# Patient Record
Sex: FEMALE | Race: WHITE | NOT HISPANIC OR LATINO | Employment: OTHER | URBAN - METROPOLITAN AREA
[De-identification: names, ages, dates, MRNs, and addresses within clinical notes are randomized per-mention and may not be internally consistent; named-entity substitution may affect disease eponyms.]

---

## 2017-01-06 ENCOUNTER — OFFICE VISIT (OUTPATIENT)
Dept: RETAIL CLINIC | Facility: CLINIC | Age: 67
End: 2017-01-06

## 2017-01-06 VITALS
OXYGEN SATURATION: 97 % | DIASTOLIC BLOOD PRESSURE: 68 MMHG | HEART RATE: 72 BPM | SYSTOLIC BLOOD PRESSURE: 104 MMHG | RESPIRATION RATE: 24 BRPM | TEMPERATURE: 98.8 F

## 2017-01-06 DIAGNOSIS — N30.00 ACUTE CYSTITIS WITHOUT HEMATURIA: Primary | ICD-10-CM

## 2017-01-06 LAB
BILIRUB BLD-MCNC: ABNORMAL MG/DL
CLARITY, POC: ABNORMAL
COLOR UR: YELLOW
GLUCOSE UR STRIP-MCNC: NEGATIVE MG/DL
KETONES UR QL: ABNORMAL
LEUKOCYTE EST, POC: ABNORMAL
NITRITE UR-MCNC: POSITIVE MG/ML
PH UR: 5 [PH] (ref 5–8)
PROT UR STRIP-MCNC: ABNORMAL MG/DL
RBC # UR STRIP: ABNORMAL /UL
SP GR UR: 1.02 (ref 1–1.03)
UROBILINOGEN UR QL: NORMAL

## 2017-01-06 PROCEDURE — 81002 URINALYSIS NONAUTO W/O SCOPE: CPT | Performed by: NURSE PRACTITIONER

## 2017-01-06 PROCEDURE — 99213 OFFICE O/P EST LOW 20 MIN: CPT | Performed by: NURSE PRACTITIONER

## 2017-01-06 RX ORDER — PHENAZOPYRIDINE HYDROCHLORIDE 100 MG/1
100 TABLET, FILM COATED ORAL 3 TIMES DAILY PRN
Qty: 6 TABLET | Refills: 0 | Status: SHIPPED | OUTPATIENT
Start: 2017-01-06 | End: 2017-01-08

## 2017-01-06 RX ORDER — NITROFURANTOIN 25; 75 MG/1; MG/1
100 CAPSULE ORAL 2 TIMES DAILY
Qty: 14 CAPSULE | Refills: 0 | Status: SHIPPED | OUTPATIENT
Start: 2017-01-06 | End: 2017-01-13

## 2017-01-06 NOTE — MR AVS SNAPSHOT
Sallynitish Rivera   1/6/2017 9:45 AM   Office Visit    Dept Phone:  [phone]   Encounter #:  [id number]    Provider:  PROVIDER RAMON CABRERA   Department:  [facility]                Your Full Care Plan              Today's Medication Changes          These changes are accurate as of: 1/6/17 10:51 AM.  If you have any questions, ask your nurse or doctor.               New Medication(s)Ordered:     nitrofurantoin (macrocrystal-monohydrate) 100 MG capsule   Commonly known as:  MACROBID   Take 1 capsule by mouth 2 (Two) Times a Day for 7 days.       phenazopyridine 100 MG tablet   Commonly known as:  PYRIDIUM   Take 1 tablet by mouth 3 (Three) Times a Day As Needed for bladder spasms for up to 2 days.         Stop taking medication(s)listed here:     doxycycline 100 MG capsule   Commonly known as:  VIBRAMYCIN           predniSONE 10 MG tablet   Commonly known as:  DELTASONE                Where to Get Your Medications      These medications were sent to [id number] - [place], [place] - [address] [place] [address] [phone]  - [phone] [place] [id number]     Phone:  [phone]     nitrofurantoin (macrocrystal-monohydrate) 100 MG capsule    phenazopyridine 100 MG tablet                  Your Updated Medication List          This list is accurate as of: 1/6/17 10:51 AM.  Always use your most recent med list.                albuterol 108 (90 BASE) MCG/ACT inhaler   Commonly known as:  PROVENTIL HFA;VENTOLIN HFA   Inhale 2 puffs Every 4 (Four) Hours As Needed for wheezing.       aspirin 325 MG tablet       atorvastatin 20 MG tablet   Commonly known as:  LIPITOR   TAKE 1 TABLET BY MOUTH ONE TIME A DAY       diltiaZEM 120 MG tablet   Commonly known as:  CARDIZEM   Take 1 tablet by mouth 3 (three) times a day.       metFORMIN 1000 MG tablet   Commonly known as:  GLUCOPHAGE   Take 1 tablet by mouth 2 (two) times a day with meals.       metoprolol  tartrate 25 MG tablet   Commonly known as:  LOPRESSOR   TAKE 1 TABLET BY MOUTH TWO TIMES A DAY       nitrofurantoin (macrocrystal-monohydrate) 100 MG capsule   Commonly known as:  MACROBID   Take 1 capsule by mouth 2 (Two) Times a Day for 7 days.       omeprazole 20 MG capsule   Commonly known as:  priLOSEC   Take 1 capsule by mouth daily.       phenazopyridine 100 MG tablet   Commonly known as:  PYRIDIUM   Take 1 tablet by mouth 3 (Three) Times a Day As Needed for bladder spasms for up to 2 days.       potassium chloride 10 MEQ CR tablet   Commonly known as:  K-DUR   Take 1 tablet by mouth daily.               We Performed the Following     POC Urinalysis Dipstick     Urine Culture, Masood ([facility])       You Were Diagnosed With        Codes Comments    Acute cystitis without hematuria    -  Primary ICD-10-CM: N30.00  ICD-9-CM: 595.0       Medications to be Given to You by a Medical Professional       Instructions    Urinary Tract Infection  Urinary tract infections (UTIs) can develop anywhere along your urinary tract. Your urinary tract is your body's drainage system for removing wastes and extra water. Your urinary tract includes two kidneys, two ureters, a bladder, and a urethra. Your kidneys are a pair of bean-shaped organs. Each kidney is about the size of your fist. They are located below your ribs, one on each side of your spine.  CAUSES  Infections are caused by microbes, which are microscopic organisms, including fungi, viruses, and bacteria. These organisms are so small that they can only be seen through a microscope. Bacteria are the microbes that most commonly cause UTIs.  SYMPTOMS   Symptoms of UTIs may vary by age and gender of the patient and by the location of the infection. Symptoms in young women typically include a frequent and intense urge to urinate and a painful, burning feeling in the bladder or urethra during urination. Older women and men are more likely to be tired, shaky, and weak and  have muscle aches and abdominal pain. A fever may mean the infection is in your kidneys. Other symptoms of a kidney infection include pain in your back or sides below the ribs, nausea, and vomiting.  DIAGNOSIS  To diagnose a UTI, your caregiver will ask you about your symptoms. Your caregiver will also ask you to provide a urine sample. The urine sample will be tested for bacteria and white blood cells. White blood cells are made by your body to help fight infection.  TREATMENT   Typically, UTIs can be treated with medication. Because most UTIs are caused by a bacterial infection, they usually can be treated with the use of antibiotics. The choice of antibiotic and length of treatment depend on your symptoms and the type of bacteria causing your infection.  HOME CARE INSTRUCTIONS  · If you were prescribed antibiotics, take them exactly as your caregiver instructs you. Finish the medication even if you feel better after you have only taken some of the medication.  · Drink enough water and fluids to keep your urine clear or pale yellow.  · Avoid caffeine, tea, and carbonated beverages. They tend to irritate your bladder.  · Empty your bladder often. Avoid holding urine for long periods of time.  · Empty your bladder before and after sexual intercourse.  · After a bowel movement, women should cleanse from front to back. Use each tissue only once.  SEEK MEDICAL CARE IF:   · You have back pain.  · You develop a fever.  · Your symptoms do not begin to resolve within 3 days.  SEEK IMMEDIATE MEDICAL CARE IF:   · You have severe back pain or lower abdominal pain.  · You develop chills.  · You have nausea or vomiting.  · You have continued burning or discomfort with urination.  MAKE SURE YOU:   · Understand these instructions.  · Will watch your condition.  · Will get help right away if you are not doing well or get worse.     This information is not intended to replace advice given to you by your health care provider. Make  sure you discuss any questions you have with your health care provider.     Document Released: 2006 Document Revised: 2016 Document Reviewed: 2013  Elsevier Interactive Patient Education  [employer]       Patient Instructions History      Upcoming Appointments     Visit Type Date Time Department    OFFICE VISIT 2017  9:45 AM [facility]    OFFICE VISIT 2017  8:00 AM MICHAEL IQBAL      [employer] Signup     [facility] [employer] allows you to send messages to your doctor, view your test results, renew your prescriptions, schedule appointments, and more. To sign up, go to [email or web address] and click on the Sign Up Now link in the New User? box. Enter your [employer] Activation Code exactly as it appears below along with the last four digits of your Social Security Number and your Date of Birth () to complete the sign-up process. If you do not sign up before the expiration date, you must request a new code.    [employer] Activation Code: [email or web address]  Expires: 2017 10:51 AM    If you have questions, you can email [email or web address] or call [phone] to talk to our [employer] staff. Remember, [employer] is NOT to be used for urgent needs. For medical emergencies, dial 911.               Other Info from Your Visit           Your Appointments     2017  8:00 AM EST   Office Visit with Christopher Hastings MD   [facility] (--)    [address] [place] [address] [phone]           Arrive 15 minutes prior to appointment.              Allergies     No Known Allergies      Reason for Visit     Urinary Tract Infection pressure, burning since this am      Vital Signs     Blood Pressure Pulse Temperature Respirations Oxygen Saturation Smoking Status    104/68 72 98.8 °F (37.1 °C) 24 97% Former Smoker      Problems and Diagnoses Noted     Bladder infection    -  Primary

## 2017-01-06 NOTE — PROGRESS NOTES
Subjective   Sally Rivera is a 66 y.o. female.     Urinary Tract Infection    This is a new problem. The current episode started today. The problem occurs every urination. The quality of the pain is described as burning and aching. The pain is severe. There has been no fever. She is not sexually active. There is no history of pyelonephritis. Associated symptoms include frequency and urgency. Pertinent negatives include no chills, discharge, flank pain, hematuria, hesitancy, nausea, possible pregnancy, sweats or vomiting. She has tried nothing for the symptoms. Her past medical history is significant for recurrent UTIs. There is no history of a single kidney.        The following portions of the patient's history were reviewed and updated as appropriate: allergies, current medications, past family history, past medical history, past social history, past surgical history and problem list.    Review of Systems   Constitutional: Positive for fatigue. Negative for chills, diaphoresis and fever.   Gastrointestinal: Negative for nausea and vomiting.   Genitourinary: Positive for dysuria, frequency, pelvic pain (pressure) and urgency. Negative for difficulty urinating, flank pain, hematuria, hesitancy, vaginal discharge and vaginal pain.       Objective   Physical Exam   Constitutional: She is oriented to person, place, and time. She appears well-developed and well-nourished. She does not appear ill. No distress.   HENT:   Head: Normocephalic.   Right Ear: Hearing, tympanic membrane, external ear and ear canal normal.   Left Ear: Hearing, tympanic membrane, external ear and ear canal normal.   Nose: Mucosal edema present. No rhinorrhea or sinus tenderness.   Mouth/Throat: Oropharynx is clear and moist and mucous membranes are normal.   Eyes: Conjunctivae are normal.   Sclera white.   Neck: No tracheal deviation present.   Cardiovascular: Normal rate, regular rhythm, S1 normal, S2 normal and normal heart sounds.     Pulmonary/Chest: Effort normal and breath sounds normal. No accessory muscle usage. No respiratory distress.   Abdominal: Soft. Bowel sounds are normal. There is tenderness in the suprapubic area. There is no CVA tenderness.   Lymphadenopathy:     She has no cervical adenopathy.   Neurological: She is alert and oriented to person, place, and time.   Skin: Skin is warm and dry.   Vitals reviewed.      Assessment/Plan   Diagnoses and all orders for this visit:    Acute cystitis without hematuria  -     nitrofurantoin, macrocrystal-monohydrate, (MACROBID) 100 MG capsule; Take 1 capsule by mouth 2 (Two) Times a Day for 7 days.  -     phenazopyridine (PYRIDIUM) 100 MG tablet; Take 1 tablet by mouth 3 (Three) Times a Day As Needed for bladder spasms for up to 2 days.  -     POC Urinalysis Dipstick  -     Urine Culture, Comprehen ([facility])

## 2017-01-06 NOTE — PATIENT INSTRUCTIONS
Urinary Tract Infection  Urinary tract infections (UTIs) can develop anywhere along your urinary tract. Your urinary tract is your body's drainage system for removing wastes and extra water. Your urinary tract includes two kidneys, two ureters, a bladder, and a urethra. Your kidneys are a pair of bean-shaped organs. Each kidney is about the size of your fist. They are located below your ribs, one on each side of your spine.  CAUSES  Infections are caused by microbes, which are microscopic organisms, including fungi, viruses, and bacteria. These organisms are so small that they can only be seen through a microscope. Bacteria are the microbes that most commonly cause UTIs.  SYMPTOMS   Symptoms of UTIs may vary by age and gender of the patient and by the location of the infection. Symptoms in young women typically include a frequent and intense urge to urinate and a painful, burning feeling in the bladder or urethra during urination. Older women and men are more likely to be tired, shaky, and weak and have muscle aches and abdominal pain. A fever may mean the infection is in your kidneys. Other symptoms of a kidney infection include pain in your back or sides below the ribs, nausea, and vomiting.  DIAGNOSIS  To diagnose a UTI, your caregiver will ask you about your symptoms. Your caregiver will also ask you to provide a urine sample. The urine sample will be tested for bacteria and white blood cells. White blood cells are made by your body to help fight infection.  TREATMENT   Typically, UTIs can be treated with medication. Because most UTIs are caused by a bacterial infection, they usually can be treated with the use of antibiotics. The choice of antibiotic and length of treatment depend on your symptoms and the type of bacteria causing your infection.  HOME CARE INSTRUCTIONS  · If you were prescribed antibiotics, take them exactly as your caregiver instructs you. Finish the medication even if you feel better after  you have only taken some of the medication.  · Drink enough water and fluids to keep your urine clear or pale yellow.  · Avoid caffeine, tea, and carbonated beverages. They tend to irritate your bladder.  · Empty your bladder often. Avoid holding urine for long periods of time.  · Empty your bladder before and after sexual intercourse.  · After a bowel movement, women should cleanse from front to back. Use each tissue only once.  SEEK MEDICAL CARE IF:   · You have back pain.  · You develop a fever.  · Your symptoms do not begin to resolve within 3 days.  SEEK IMMEDIATE MEDICAL CARE IF:   · You have severe back pain or lower abdominal pain.  · You develop chills.  · You have nausea or vomiting.  · You have continued burning or discomfort with urination.  MAKE SURE YOU:   · Understand these instructions.  · Will watch your condition.  · Will get help right away if you are not doing well or get worse.     This information is not intended to replace advice given to you by your health care provider. Make sure you discuss any questions you have with your health care provider.     Document Released: 09/27/2006 Document Revised: 09/07/2016 Document Reviewed: 01/25/2013  [employer] Interactive Patient Education ©2016 [employer]

## 2017-01-07 DIAGNOSIS — I25.10 CHRONIC CORONARY ARTERY DISEASE: ICD-10-CM

## 2017-01-07 DIAGNOSIS — E78.5 HYPERLIPIDEMIA: ICD-10-CM

## 2017-01-08 ENCOUNTER — TELEPHONE (OUTPATIENT)
Dept: RETAIL CLINIC | Facility: CLINIC | Age: 67
End: 2017-01-08

## 2017-01-08 NOTE — TELEPHONE ENCOUNTER
Reported urine cx of mixed urogenital ruby to patient. Advised that she can stop abx therapy. Patient reports feeling better. Advised to f/u with PCP or Gyne if symptoms return. Patient verbalizes understanding.

## 2017-01-09 RX ORDER — ATORVASTATIN CALCIUM 20 MG/1
TABLET, FILM COATED ORAL
Qty: 30 TABLET | Refills: 0 | Status: SHIPPED | OUTPATIENT
Start: 2017-01-09 | End: 2017-02-06 | Stop reason: SDUPTHER

## 2017-01-21 DIAGNOSIS — I25.10 CHRONIC CORONARY ARTERY DISEASE: ICD-10-CM

## 2017-02-06 DIAGNOSIS — I25.10 CHRONIC CORONARY ARTERY DISEASE: ICD-10-CM

## 2017-02-06 DIAGNOSIS — E78.5 HYPERLIPIDEMIA: ICD-10-CM

## 2017-02-06 RX ORDER — ATORVASTATIN CALCIUM 20 MG/1
TABLET, FILM COATED ORAL
Qty: 30 TABLET | Refills: 0 | Status: SHIPPED | OUTPATIENT
Start: 2017-02-06 | End: 2017-03-28 | Stop reason: SDUPTHER

## 2017-02-27 DIAGNOSIS — E78.5 HYPERLIPIDEMIA: ICD-10-CM

## 2017-02-28 RX ORDER — ATORVASTATIN CALCIUM 20 MG/1
TABLET, FILM COATED ORAL
Qty: 30 TABLET | Refills: 0 | Status: SHIPPED | OUTPATIENT
Start: 2017-02-28 | End: 2017-04-05 | Stop reason: SDUPTHER

## 2017-03-07 RX ORDER — POTASSIUM CHLORIDE 750 MG/1
TABLET, EXTENDED RELEASE ORAL
Qty: 90 TABLET | Refills: 0 | Status: SHIPPED | OUTPATIENT
Start: 2017-03-07 | End: 2017-03-30

## 2017-03-21 DIAGNOSIS — I25.10 CHRONIC CORONARY ARTERY DISEASE: ICD-10-CM

## 2017-03-30 ENCOUNTER — OFFICE VISIT (OUTPATIENT)
Dept: FAMILY MEDICINE CLINIC | Facility: CLINIC | Age: 67
End: 2017-03-30

## 2017-03-30 VITALS
DIASTOLIC BLOOD PRESSURE: 74 MMHG | SYSTOLIC BLOOD PRESSURE: 124 MMHG | WEIGHT: 214 LBS | HEART RATE: 66 BPM | BODY MASS INDEX: 36.54 KG/M2 | HEIGHT: 64 IN | TEMPERATURE: 98.1 F | OXYGEN SATURATION: 98 %

## 2017-03-30 DIAGNOSIS — E11.9 CONTROLLED TYPE 2 DIABETES MELLITUS WITHOUT COMPLICATION, WITHOUT LONG-TERM CURRENT USE OF INSULIN (HCC): Primary | ICD-10-CM

## 2017-03-30 DIAGNOSIS — E78.5 HYPERLIPIDEMIA, UNSPECIFIED HYPERLIPIDEMIA TYPE: ICD-10-CM

## 2017-03-30 DIAGNOSIS — E66.9 OBESITY (BMI 30-39.9): ICD-10-CM

## 2017-03-30 DIAGNOSIS — Z98.890 HISTORY OF RECTAL POLYPECTOMY: ICD-10-CM

## 2017-03-30 DIAGNOSIS — I25.10 CHRONIC CORONARY ARTERY DISEASE: ICD-10-CM

## 2017-03-30 DIAGNOSIS — K76.0 NONALCOHOLIC FATTY LIVER DISEASE: ICD-10-CM

## 2017-03-30 DIAGNOSIS — Z79.899 HIGH RISK MEDICATION USE: ICD-10-CM

## 2017-03-30 DIAGNOSIS — Z87.19 HISTORY OF RECTAL POLYPECTOMY: ICD-10-CM

## 2017-03-30 DIAGNOSIS — I10 ESSENTIAL HYPERTENSION: ICD-10-CM

## 2017-03-30 DIAGNOSIS — L98.9: ICD-10-CM

## 2017-03-30 DIAGNOSIS — L98.9 BENIGN SKIN LESION OF THORACIC REGION: ICD-10-CM

## 2017-03-30 PROCEDURE — 99214 OFFICE O/P EST MOD 30 MIN: CPT | Performed by: FAMILY MEDICINE

## 2017-03-30 RX ORDER — CLOTRIMAZOLE AND BETAMETHASONE DIPROPIONATE 10; .64 MG/G; MG/G
CREAM TOPICAL 2 TIMES DAILY
Qty: 15 G | Refills: 1 | Status: SHIPPED | OUTPATIENT
Start: 2017-03-30 | End: 2017-10-24

## 2017-03-30 RX ORDER — DILTIAZEM HYDROCHLORIDE 120 MG/1
120 TABLET, FILM COATED ORAL 3 TIMES DAILY
Qty: 270 TABLET | Refills: 3 | Status: SHIPPED | OUTPATIENT
Start: 2017-03-30 | End: 2018-06-25 | Stop reason: SDUPTHER

## 2017-03-30 RX ORDER — CLOTRIMAZOLE AND BETAMETHASONE DIPROPIONATE 10; .64 MG/G; MG/G
CREAM TOPICAL 2 TIMES DAILY
Qty: 15 G | Refills: 1 | Status: SHIPPED | OUTPATIENT
Start: 2017-03-30 | End: 2017-03-30 | Stop reason: SDUPTHER

## 2017-03-30 NOTE — PROGRESS NOTES
HPI  Sally Rivera is a 67 y.o. female who is here for follow up of diabetes and hypertension.  Has a couple of skin lesions she would like to have rechecked.  Reports diarrhea with metformin and is only taking 500 mg twice a day.  Otherwise denies new complaints and seems to be stable.      Review of Systems   Gastrointestinal: Positive for diarrhea.   Skin:        Itchy skin lesion on anterior chest and a lesion on her back which seems to be enlarging.   All other systems reviewed and are negative.        Past Medical History:   Diagnosis Date   • Acid reflux    • Diabetes mellitus type I    • Elevated cholesterol    • Heart attack 1995   • Hypertension    • Liver disease        Past Surgical History:   Procedure Laterality Date   • CHOLECYSTECTOMY  2011   • COLON SURGERY  2014    bowel resection for obstruction   • HYSTERECTOMY  2011       Family History   Problem Relation Age of Onset   • No Known Problems Mother    • Diabetes Father    • Heart disease Father        Social History     Social History   • Marital status:      Spouse name: N/A   • Number of children: N/A   • Years of education: N/A     Occupational History   • Not on file.     Social History Main Topics   • Smoking status: Former Smoker     Quit date: 12/20/1990   • Smokeless tobacco: Not on file   • Alcohol use No   • Drug use: No   • Sexual activity: Not on file     Other Topics Concern   • Not on file     Social History Narrative         Physical Exam   Constitutional: She appears well-developed and well-nourished.   HENT:   Head: Normocephalic.   Left Ear: External ear normal.   Mouth/Throat: Oropharynx is clear and moist.   Eyes: Conjunctivae are normal. Pupils are equal, round, and reactive to light.   Neck: Neck supple. No JVD present. No thyromegaly present.   Cardiovascular: Normal rate and regular rhythm.    Pulmonary/Chest: Effort normal. No respiratory distress.   Abdominal: Soft. She exhibits no distension and no mass. There  is no tenderness.   Musculoskeletal: She exhibits no edema or deformity.   Neurological: She is alert. She has normal reflexes.   Skin: Skin is warm and dry.        Psychiatric: She has a normal mood and affect. Her behavior is normal. Judgment and thought content normal.   Nursing note and vitals reviewed.        Assessment/Plan    Sally was seen today for follow-up, hypertension, hyperlipidemia and diabetes.    Diagnoses and all orders for this visit:    Controlled type 2 diabetes mellitus without complication, without long-term current use of insulin  -     Comprehensive Metabolic Panel  -     Hemoglobin A1c    Hyperlipidemia, unspecified hyperlipidemia type  -     Lipid Panel    Nonalcoholic fatty liver disease    Essential hypertension  -     Comprehensive Metabolic Panel    Chronic coronary artery disease  -     diltiaZEM (CARDIZEM) 120 MG tablet; Take 1 tablet by mouth 3 (Three) Times a Day.    High risk medication use  -     CBC & Differential  -     Comprehensive Metabolic Panel    Benign skin lesion of sternum    Benign skin lesion of thoracic region    History of rectal polypectomy    Obesity (BMI 30-39.9)    Other orders  -     Discontinue: clotrimazole-betamethasone (LOTRISONE) 1-0.05 % cream; Apply  topically 2 (Two) Times a Day.  -     clotrimazole-betamethasone (LOTRISONE) 1-0.05 % cream; Apply  topically 2 (Two) Times a Day.        Patient is here for routine follow-up of diabetes and hypertension.  2 skin lesions noted one of which could be a fungal infection and will be treated as above.  Other lesion appears to be a seborrheic keratosis.  Some weight gain is noted and strongly recommended weight loss diet etc.  Also reviewing old records note a rectal polyp was removed in 2011 and will contact gastroenterologist if will need follow-up colonoscopy prior to 2021.  Await lab work and otherwise continue current therapy including follow-up in 6 months.

## 2017-03-31 LAB
ALBUMIN SERPL-MCNC: 4.6 G/DL (ref 3.5–5.2)
ALBUMIN/GLOB SERPL: 1.4 G/DL
ALP SERPL-CCNC: 94 U/L (ref 39–117)
ALT SERPL-CCNC: 23 U/L (ref 1–33)
AST SERPL-CCNC: 21 U/L (ref 1–32)
BASOPHILS # BLD AUTO: 0.03 10*3/MM3 (ref 0–0.2)
BASOPHILS NFR BLD AUTO: 0.4 % (ref 0–1.5)
BILIRUB SERPL-MCNC: 0.5 MG/DL (ref 0.1–1.2)
BUN SERPL-MCNC: 14 MG/DL (ref 8–23)
BUN/CREAT SERPL: 19.7 (ref 7–25)
CALCIUM SERPL-MCNC: 9.9 MG/DL (ref 8.6–10.5)
CHLORIDE SERPL-SCNC: 100 MMOL/L (ref 98–107)
CHOLEST SERPL-MCNC: 166 MG/DL (ref 0–200)
CO2 SERPL-SCNC: 22.3 MMOL/L (ref 22–29)
CREAT SERPL-MCNC: 0.71 MG/DL (ref 0.57–1)
EOSINOPHIL # BLD AUTO: 0.21 10*3/MM3 (ref 0–0.7)
EOSINOPHIL NFR BLD AUTO: 2.8 % (ref 0.3–6.2)
ERYTHROCYTE [DISTWIDTH] IN BLOOD BY AUTOMATED COUNT: 16.7 % (ref 11.7–13)
GLOBULIN SER CALC-MCNC: 3.4 GM/DL
GLUCOSE SERPL-MCNC: 99 MG/DL (ref 65–99)
HBA1C MFR BLD: 6.4 % (ref 4.8–5.6)
HCT VFR BLD AUTO: 40.5 % (ref 35.6–45.5)
HDLC SERPL-MCNC: 41 MG/DL (ref 40–60)
HGB BLD-MCNC: 12.7 G/DL (ref 11.9–15.5)
IMM GRANULOCYTES # BLD: 0 10*3/MM3 (ref 0–0.03)
IMM GRANULOCYTES NFR BLD: 0 % (ref 0–0.5)
INTERPRETATION: NORMAL
LDLC SERPL CALC-MCNC: 89 MG/DL (ref 0–100)
LYMPHOCYTES # BLD AUTO: 2.68 10*3/MM3 (ref 0.9–4.8)
LYMPHOCYTES NFR BLD AUTO: 35.7 % (ref 19.6–45.3)
Lab: NORMAL
MCH RBC QN AUTO: 24.9 PG (ref 26.9–32)
MCHC RBC AUTO-ENTMCNC: 31.4 G/DL (ref 32.4–36.3)
MCV RBC AUTO: 79.3 FL (ref 80.5–98.2)
MONOCYTES # BLD AUTO: 0.5 10*3/MM3 (ref 0.2–1.2)
MONOCYTES NFR BLD AUTO: 6.7 % (ref 5–12)
NEUTROPHILS # BLD AUTO: 4.08 10*3/MM3 (ref 1.9–8.1)
NEUTROPHILS NFR BLD AUTO: 54.4 % (ref 42.7–76)
PLATELET # BLD AUTO: 257 10*3/MM3 (ref 140–500)
POTASSIUM SERPL-SCNC: 4.3 MMOL/L (ref 3.5–5.2)
PROT SERPL-MCNC: 8 G/DL (ref 6–8.5)
RBC # BLD AUTO: 5.11 10*6/MM3 (ref 3.9–5.2)
SODIUM SERPL-SCNC: 139 MMOL/L (ref 136–145)
TRIGL SERPL-MCNC: 178 MG/DL (ref 0–150)
VLDLC SERPL CALC-MCNC: 35.6 MG/DL (ref 5–40)
WBC # BLD AUTO: 7.5 10*3/MM3 (ref 4.5–10.7)

## 2017-04-01 DIAGNOSIS — I25.10 CHRONIC CORONARY ARTERY DISEASE: ICD-10-CM

## 2017-04-03 DIAGNOSIS — I47.1 ATRIAL PAROXYSMAL TACHYCARDIA (HCC): ICD-10-CM

## 2017-04-03 DIAGNOSIS — I25.10 CHRONIC CORONARY ARTERY DISEASE: Primary | ICD-10-CM

## 2017-04-03 RX ORDER — METOPROLOL TARTRATE 50 MG/1
50 TABLET, FILM COATED ORAL 2 TIMES DAILY
Qty: 180 TABLET | Refills: 3 | Status: SHIPPED | OUTPATIENT
Start: 2017-04-03 | End: 2018-06-02 | Stop reason: SDUPTHER

## 2017-04-05 DIAGNOSIS — E78.5 HYPERLIPIDEMIA: ICD-10-CM

## 2017-04-05 RX ORDER — ATORVASTATIN CALCIUM 20 MG/1
TABLET, FILM COATED ORAL
Qty: 90 TABLET | Refills: 3 | Status: SHIPPED | OUTPATIENT
Start: 2017-04-05 | End: 2018-07-07 | Stop reason: SDUPTHER

## 2017-04-07 DIAGNOSIS — K21.9 GASTROESOPHAGEAL REFLUX DISEASE WITHOUT ESOPHAGITIS: ICD-10-CM

## 2017-04-07 RX ORDER — OMEPRAZOLE 20 MG/1
20 CAPSULE, DELAYED RELEASE ORAL DAILY
Qty: 90 CAPSULE | Refills: 3
Start: 2017-04-07 | End: 2017-04-10 | Stop reason: SDUPTHER

## 2017-04-10 DIAGNOSIS — K21.9 GASTROESOPHAGEAL REFLUX DISEASE WITHOUT ESOPHAGITIS: ICD-10-CM

## 2017-04-10 RX ORDER — OMEPRAZOLE 20 MG/1
20 CAPSULE, DELAYED RELEASE ORAL DAILY
Qty: 90 CAPSULE | Refills: 3 | Status: SHIPPED | OUTPATIENT
Start: 2017-04-10 | End: 2018-05-24 | Stop reason: SDUPTHER

## 2017-06-29 RX ORDER — POTASSIUM CHLORIDE 750 MG/1
TABLET, FILM COATED, EXTENDED RELEASE ORAL
Qty: 90 TABLET | Refills: 0 | Status: SHIPPED | OUTPATIENT
Start: 2017-06-29 | End: 2017-09-26 | Stop reason: SDUPTHER

## 2017-07-03 RX ORDER — POTASSIUM CHLORIDE 750 MG/1
TABLET, EXTENDED RELEASE ORAL
Qty: 90 TABLET | Refills: 0 | Status: SHIPPED | OUTPATIENT
Start: 2017-07-03 | End: 2017-10-24 | Stop reason: SDUPTHER

## 2017-09-26 RX ORDER — POTASSIUM CHLORIDE 750 MG/1
TABLET, FILM COATED, EXTENDED RELEASE ORAL
Qty: 90 TABLET | Refills: 0 | Status: SHIPPED | OUTPATIENT
Start: 2017-09-26 | End: 2017-10-24 | Stop reason: SDUPTHER

## 2017-10-24 ENCOUNTER — OFFICE VISIT (OUTPATIENT)
Dept: FAMILY MEDICINE CLINIC | Facility: CLINIC | Age: 67
End: 2017-10-24

## 2017-10-24 VITALS
HEART RATE: 72 BPM | TEMPERATURE: 97.5 F | HEIGHT: 64 IN | WEIGHT: 220 LBS | BODY MASS INDEX: 37.56 KG/M2 | SYSTOLIC BLOOD PRESSURE: 132 MMHG | OXYGEN SATURATION: 99 % | RESPIRATION RATE: 16 BRPM | DIASTOLIC BLOOD PRESSURE: 86 MMHG

## 2017-10-24 DIAGNOSIS — E78.5 HYPERLIPIDEMIA, UNSPECIFIED HYPERLIPIDEMIA TYPE: ICD-10-CM

## 2017-10-24 DIAGNOSIS — E11.59 CONTROLLED TYPE 2 DIABETES MELLITUS WITH OTHER CIRCULATORY COMPLICATION, WITHOUT LONG-TERM CURRENT USE OF INSULIN (HCC): ICD-10-CM

## 2017-10-24 DIAGNOSIS — I10 ESSENTIAL HYPERTENSION: ICD-10-CM

## 2017-10-24 DIAGNOSIS — Z79.899 HIGH RISK MEDICATION USE: Primary | ICD-10-CM

## 2017-10-24 DIAGNOSIS — I25.10 CHRONIC CORONARY ARTERY DISEASE: ICD-10-CM

## 2017-10-24 DIAGNOSIS — K76.0 NONALCOHOLIC FATTY LIVER DISEASE: ICD-10-CM

## 2017-10-24 DIAGNOSIS — M25.512 ACUTE PAIN OF LEFT SHOULDER: ICD-10-CM

## 2017-10-24 PROCEDURE — 99214 OFFICE O/P EST MOD 30 MIN: CPT | Performed by: FAMILY MEDICINE

## 2017-10-24 RX ORDER — ZOSTER VACCINE LIVE 19400 [PFU]/.65ML
INJECTION, POWDER, LYOPHILIZED, FOR SUSPENSION SUBCUTANEOUS
Refills: 0 | COMMUNITY
Start: 2017-09-25 | End: 2018-06-07

## 2017-10-24 RX ORDER — INFLUENZA A VIRUS A/MICHIGAN/45/2015 X-275 (H1N1) ANTIGEN (FORMALDEHYDE INACTIVATED), INFLUENZA A VIRUS A/SINGAPORE/INFIMH-16-0019/2016 IVR-186 (H3N2) ANTIGEN (FORMALDEHYDE INACTIVATED), AND INFLUENZA B VIRUS B/MARYLAND/15/2016 BX-69A (A B/COLORADO/6/2017-LIKE VIRUS) ANTIGEN (FORMALDEHYDE INACTIVATED) 60; 60; 60 UG/.5ML; UG/.5ML; UG/.5ML
INJECTION, SUSPENSION INTRAMUSCULAR
Refills: 0 | COMMUNITY
Start: 2017-09-25 | End: 2018-06-07

## 2017-10-24 RX ORDER — NAPROXEN 500 MG/1
500 TABLET ORAL 2 TIMES DAILY WITH MEALS
Qty: 30 TABLET | Refills: 0 | Status: SHIPPED | OUTPATIENT
Start: 2017-10-24 | End: 2017-11-02 | Stop reason: SDUPTHER

## 2017-10-24 NOTE — PROGRESS NOTES
HPI  Sally Rivera is a 67 y.o. female who is here for plain of left shoulder pain.  She has been taking care of her 2-year-old granddaughter and has been lifting her a lot because of allergy symptoms etc.  Pain is fairly constant and does seem to be worse with certain positions.  Has used some ibuprofen with slight improvement.  Patient is past due for routine follow-up of her diabetes and known coronary artery disease.      Review of Systems   Musculoskeletal: Positive for arthralgias, myalgias and neck pain.   Hematological: Bruises/bleeds easily.   All other systems reviewed and are negative.        Past Medical History:   Diagnosis Date   • Acid reflux    • Diabetes mellitus type I    • Elevated cholesterol    • Heart attack 1995   • Hypertension    • Liver disease        Past Surgical History:   Procedure Laterality Date   • CHOLECYSTECTOMY  2011   • COLON SURGERY  2014    bowel resection for obstruction   • HYSTERECTOMY  2011       Family History   Problem Relation Age of Onset   • No Known Problems Mother    • Diabetes Father    • Heart disease Father        Social History     Social History   • Marital status:      Spouse name: N/A   • Number of children: N/A   • Years of education: N/A     Occupational History   • Not on file.     Social History Main Topics   • Smoking status: Former Smoker     Quit date: 12/20/1990   • Smokeless tobacco: Not on file   • Alcohol use No   • Drug use: No   • Sexual activity: Not on file     Other Topics Concern   • Not on file     Social History Narrative   • No narrative on file         Physical Exam   Constitutional: She appears well-developed and well-nourished. No distress.   HENT:   Head: Normocephalic.   Mouth/Throat: Oropharynx is clear and moist.   Eyes: EOM are normal. Pupils are equal, round, and reactive to light.   Neck: Normal range of motion. Neck supple. No thyromegaly present.   Cardiovascular: Normal rate, regular rhythm and normal heart sounds.     Pulmonary/Chest: Effort normal. No respiratory distress. She has no wheezes.   Abdominal: Soft. She exhibits no distension. There is no tenderness.   Musculoskeletal: She exhibits tenderness. She exhibits no edema or deformity.        Left shoulder: She exhibits decreased range of motion and pain. She exhibits no tenderness, no bony tenderness, no swelling, no effusion, no crepitus, no deformity and no spasm.        Arms:  Skin: Skin is dry.   Psychiatric: She has a normal mood and affect. Her behavior is normal. Judgment and thought content normal.   Nursing note and vitals reviewed.        Assessment/Plan    Sally was seen today for arm pain.    Diagnoses and all orders for this visit:    High risk medication use  -     CBC & Differential  -     Comprehensive Metabolic Panel    Hyperlipidemia, unspecified hyperlipidemia type  -     Lipid Panel    Essential hypertension    Controlled type 2 diabetes mellitus with other circulatory complication, without long-term current use of insulin  -     Hemoglobin A1c    Chronic coronary artery disease    Nonalcoholic fatty liver disease    Acute pain of left shoulder  -     naproxen (NAPROSYN) 500 MG tablet; Take 1 tablet by mouth 2 (Two) Times a Day With Meals.      Patient presents mostly because of pain in the left shoulder probably related to lifting 2-year-old grandchild.  Recommend trying to rest shoulder including wearing a sling.  Will give short course of Naprosyn, felt to be safest NSAID with known coronary artery disease.  Also is past due for follow-up of diabetes and routine lab work will be obtained.  If shoulder symptoms worsen or persist may need orthopedic consultation as discussed.  Otherwise routine follow-up in one month especially regarding other issues noted above, as well as routine health maintenance    This note includes information entered using a voice recognition dictation system.  Though reviewed, some nonsensible errors may remain.

## 2017-10-25 LAB
ALBUMIN SERPL-MCNC: 4.5 G/DL (ref 3.5–5.2)
ALBUMIN/GLOB SERPL: 1.4 G/DL
ALP SERPL-CCNC: 82 U/L (ref 39–117)
ALT SERPL-CCNC: 31 U/L (ref 1–33)
AST SERPL-CCNC: 31 U/L (ref 1–32)
BASOPHILS # BLD AUTO: 0.04 10*3/MM3 (ref 0–0.2)
BASOPHILS NFR BLD AUTO: 0.4 % (ref 0–1.5)
BILIRUB SERPL-MCNC: 0.3 MG/DL (ref 0.1–1.2)
BUN SERPL-MCNC: 14 MG/DL (ref 8–23)
BUN/CREAT SERPL: 19.4 (ref 7–25)
CALCIUM SERPL-MCNC: 9.7 MG/DL (ref 8.6–10.5)
CHLORIDE SERPL-SCNC: 99 MMOL/L (ref 98–107)
CHOLEST SERPL-MCNC: 169 MG/DL (ref 0–200)
CO2 SERPL-SCNC: 23 MMOL/L (ref 22–29)
CREAT SERPL-MCNC: 0.72 MG/DL (ref 0.57–1)
EOSINOPHIL # BLD AUTO: 0.22 10*3/MM3 (ref 0–0.7)
EOSINOPHIL NFR BLD AUTO: 2.3 % (ref 0.3–6.2)
ERYTHROCYTE [DISTWIDTH] IN BLOOD BY AUTOMATED COUNT: 16.5 % (ref 11.7–13)
GFR SERPLBLD CREATININE-BSD FMLA CKD-EPI: 81 ML/MIN/1.73
GFR SERPLBLD CREATININE-BSD FMLA CKD-EPI: 98 ML/MIN/1.73
GLOBULIN SER CALC-MCNC: 3.3 GM/DL
GLUCOSE SERPL-MCNC: 131 MG/DL (ref 65–99)
HBA1C MFR BLD: 6.8 % (ref 4.8–5.6)
HCT VFR BLD AUTO: 39 % (ref 35.6–45.5)
HDLC SERPL-MCNC: 39 MG/DL (ref 40–60)
HGB BLD-MCNC: 12.5 G/DL (ref 11.9–15.5)
IMM GRANULOCYTES # BLD: 0.02 10*3/MM3 (ref 0–0.03)
IMM GRANULOCYTES NFR BLD: 0.2 % (ref 0–0.5)
INTERPRETATION: NORMAL
LDLC SERPL CALC-MCNC: 89 MG/DL (ref 0–100)
LYMPHOCYTES # BLD AUTO: 3.48 10*3/MM3 (ref 0.9–4.8)
LYMPHOCYTES NFR BLD AUTO: 36.1 % (ref 19.6–45.3)
MCH RBC QN AUTO: 25.4 PG (ref 26.9–32)
MCHC RBC AUTO-ENTMCNC: 32.1 G/DL (ref 32.4–36.3)
MCV RBC AUTO: 79.3 FL (ref 80.5–98.2)
MONOCYTES # BLD AUTO: 0.73 10*3/MM3 (ref 0.2–1.2)
MONOCYTES NFR BLD AUTO: 7.6 % (ref 5–12)
NEUTROPHILS # BLD AUTO: 5.15 10*3/MM3 (ref 1.9–8.1)
NEUTROPHILS NFR BLD AUTO: 53.4 % (ref 42.7–76)
PLATELET # BLD AUTO: 227 10*3/MM3 (ref 140–500)
POTASSIUM SERPL-SCNC: 4.2 MMOL/L (ref 3.5–5.2)
PROT SERPL-MCNC: 7.8 G/DL (ref 6–8.5)
RBC # BLD AUTO: 4.92 10*6/MM3 (ref 3.9–5.2)
SODIUM SERPL-SCNC: 139 MMOL/L (ref 136–145)
TRIGL SERPL-MCNC: 206 MG/DL (ref 0–150)
VLDLC SERPL CALC-MCNC: 41.2 MG/DL (ref 5–40)
WBC # BLD AUTO: 9.64 10*3/MM3 (ref 4.5–10.7)

## 2017-11-02 DIAGNOSIS — M25.512 ACUTE PAIN OF LEFT SHOULDER: ICD-10-CM

## 2017-11-02 RX ORDER — NAPROXEN 500 MG/1
TABLET ORAL
Qty: 30 TABLET | Refills: 0 | Status: SHIPPED | OUTPATIENT
Start: 2017-11-02 | End: 2017-11-16 | Stop reason: SDUPTHER

## 2017-11-16 DIAGNOSIS — M25.512 ACUTE PAIN OF LEFT SHOULDER: ICD-10-CM

## 2017-11-16 RX ORDER — NAPROXEN 500 MG/1
TABLET ORAL
Qty: 30 TABLET | Refills: 0 | Status: SHIPPED | OUTPATIENT
Start: 2017-11-16 | End: 2018-07-05

## 2017-12-07 RX ORDER — POTASSIUM CHLORIDE 750 MG/1
TABLET, FILM COATED, EXTENDED RELEASE ORAL
Qty: 90 TABLET | Refills: 0 | Status: SHIPPED | OUTPATIENT
Start: 2017-12-07 | End: 2018-05-10 | Stop reason: SDUPTHER

## 2018-02-21 RX ORDER — POTASSIUM CHLORIDE 750 MG/1
TABLET, FILM COATED, EXTENDED RELEASE ORAL
Qty: 90 TABLET | Refills: 0 | Status: SHIPPED | OUTPATIENT
Start: 2018-02-21 | End: 2018-07-23 | Stop reason: SDUPTHER

## 2018-04-30 DIAGNOSIS — Z12.39 BREAST CANCER SCREENING: Primary | ICD-10-CM

## 2018-05-10 RX ORDER — POTASSIUM CHLORIDE 750 MG/1
TABLET, FILM COATED, EXTENDED RELEASE ORAL
Qty: 90 TABLET | Refills: 0 | Status: SHIPPED | OUTPATIENT
Start: 2018-05-10 | End: 2018-06-07 | Stop reason: SDUPTHER

## 2018-05-16 ENCOUNTER — APPOINTMENT (OUTPATIENT)
Dept: MAMMOGRAPHY | Facility: HOSPITAL | Age: 68
End: 2018-05-16
Attending: FAMILY MEDICINE

## 2018-05-17 ENCOUNTER — HOSPITAL ENCOUNTER (OUTPATIENT)
Dept: MAMMOGRAPHY | Facility: HOSPITAL | Age: 68
Discharge: HOME | End: 2018-05-17
Attending: FAMILY MEDICINE | Admitting: FAMILY MEDICINE

## 2018-05-17 DIAGNOSIS — Z12.39 BREAST CANCER SCREENING: ICD-10-CM

## 2018-05-17 PROCEDURE — 77067 SCR MAMMO BI INCL CAD: CPT

## 2018-05-24 DIAGNOSIS — K21.9 GASTROESOPHAGEAL REFLUX DISEASE WITHOUT ESOPHAGITIS: ICD-10-CM

## 2018-05-24 RX ORDER — OMEPRAZOLE 20 MG/1
CAPSULE, DELAYED RELEASE ORAL
Qty: 14 CAPSULE | Refills: 0 | Status: SHIPPED | OUTPATIENT
Start: 2018-05-24 | End: 2018-06-01 | Stop reason: SDUPTHER

## 2018-06-01 DIAGNOSIS — K21.9 GASTROESOPHAGEAL REFLUX DISEASE WITHOUT ESOPHAGITIS: ICD-10-CM

## 2018-06-01 RX ORDER — OMEPRAZOLE 20 MG/1
CAPSULE, DELAYED RELEASE ORAL
Qty: 14 CAPSULE | Refills: 0 | OUTPATIENT
Start: 2018-06-01

## 2018-06-01 RX ORDER — OMEPRAZOLE 20 MG/1
CAPSULE, DELAYED RELEASE ORAL
Qty: 30 CAPSULE | Refills: 0 | Status: SHIPPED | OUTPATIENT
Start: 2018-06-01 | End: 2018-07-02 | Stop reason: SDUPTHER

## 2018-06-02 DIAGNOSIS — I25.10 CHRONIC CORONARY ARTERY DISEASE: ICD-10-CM

## 2018-06-02 DIAGNOSIS — I47.1 ATRIAL PAROXYSMAL TACHYCARDIA (HCC): ICD-10-CM

## 2018-06-04 RX ORDER — METOPROLOL TARTRATE 50 MG/1
50 TABLET, FILM COATED ORAL 2 TIMES DAILY
Qty: 60 TABLET | Refills: 0 | Status: SHIPPED | OUTPATIENT
Start: 2018-06-04 | End: 2018-07-02 | Stop reason: SDUPTHER

## 2018-06-07 ENCOUNTER — OFFICE VISIT (OUTPATIENT)
Dept: RETAIL CLINIC | Facility: CLINIC | Age: 68
End: 2018-06-07

## 2018-06-07 VITALS
OXYGEN SATURATION: 95 % | DIASTOLIC BLOOD PRESSURE: 80 MMHG | RESPIRATION RATE: 18 BRPM | TEMPERATURE: 99.7 F | HEART RATE: 74 BPM | SYSTOLIC BLOOD PRESSURE: 110 MMHG

## 2018-06-07 DIAGNOSIS — J40 BRONCHITIS: Primary | ICD-10-CM

## 2018-06-07 DIAGNOSIS — J04.0 LARYNGITIS: ICD-10-CM

## 2018-06-07 PROCEDURE — 99213 OFFICE O/P EST LOW 20 MIN: CPT | Performed by: NURSE PRACTITIONER

## 2018-06-07 RX ORDER — GUAIFENESIN 600 MG/1
1200 TABLET, EXTENDED RELEASE ORAL 2 TIMES DAILY
COMMUNITY
End: 2018-07-05

## 2018-06-07 NOTE — PROGRESS NOTES
Sally Rivera is a 68 y.o. female.     Laryngitis   This is a new problem. The current episode started in the past 7 days. The problem occurs constantly. The problem has been gradually worsening. Associated symptoms include anorexia, congestion, coughing, fatigue and headaches. Pertinent negatives include no abdominal pain, chest pain, chills, fever, myalgias, nausea, sore throat or vomiting. Nothing aggravates the symptoms. She has tried nothing for the symptoms.   Cough   This is a new problem. The current episode started in the past 7 days. The problem has been gradually worsening. The problem occurs every few minutes. The cough is non-productive. Associated symptoms include headaches, nasal congestion and postnasal drip. Pertinent negatives include no chest pain, chills, fever, myalgias, sore throat or wheezing. Nothing aggravates the symptoms. She has tried nothing for the symptoms. There is no history of asthma or COPD.        The following portions of the patient's history were reviewed and updated as appropriate: allergies, current medications, past family history, past medical history, past social history, past surgical history and problem list.    Review of Systems   Constitutional: Positive for fatigue. Negative for chills and fever.   HENT: Positive for congestion and postnasal drip. Negative for sore throat.    Respiratory: Positive for cough. Negative for wheezing.    Cardiovascular: Negative for chest pain.   Gastrointestinal: Positive for anorexia. Negative for abdominal pain, nausea and vomiting.   Musculoskeletal: Negative for myalgias.   Neurological: Positive for headaches.           Physical Exam   Constitutional: She is oriented to person, place, and time. She appears well-developed and well-nourished. No distress.   HENT:   Head: Normocephalic and atraumatic.   Right Ear: Hearing, tympanic membrane, external ear and ear canal normal.   Left Ear: Hearing, tympanic membrane, external  ear and ear canal normal.   Nose: Nose normal.   Mouth/Throat: Uvula is midline and mucous membranes are normal. Posterior oropharyngeal erythema present. No oropharyngeal exudate or tonsillar abscesses.   Eyes: Conjunctivae and EOM are normal. Pupils are equal, round, and reactive to light.   Neck: Normal range of motion. Neck supple.   Cardiovascular: Normal rate, regular rhythm and normal heart sounds.    Pulmonary/Chest: No respiratory distress. She has wheezes (left lung).   Neurological: She is alert and oriented to person, place, and time.   Skin: Skin is warm and dry.   Psychiatric: She has a normal mood and affect. Her behavior is normal.           Diagnoses and all orders for this visit:    Bronchitis    Laryngitis    Other orders  -     guaiFENesin (MUCINEX) 600 MG 12 hr tablet; Take 1,200 mg by mouth 2 (Two) Times a Day.  -     PredniSONE 5 MG tablet therapy pack dosepak; Take as directed on package instructions.      Acute Bronchitis, Adult  Acute bronchitis is sudden (acute) swelling of the air tubes (bronchi) in the lungs. Acute bronchitis causes these tubes to fill with mucus, which can make it hard to breathe. It can also cause coughing or wheezing.  In adults, acute bronchitis usually goes away within 2 weeks. A cough caused by bronchitis may last up to 3 weeks. Smoking, allergies, and asthma can make the condition worse. Repeated episodes of bronchitis may cause further lung problems, such as chronic obstructive pulmonary disease (COPD).  What are the causes?  This condition can be caused by germs and by substances that irritate the lungs, including:  · Cold and flu viruses. This condition is most often caused by the same virus that causes a cold.  · Bacteria.  · Exposure to tobacco smoke, dust, fumes, and air pollution.  What increases the risk?  This condition is more likely to develop in people who:  · Have close contact with someone with acute bronchitis.  · Are exposed to lung irritants, such  as tobacco smoke, dust, fumes, and vapors.  · Have a weak immune system.  · Have a respiratory condition such as asthma.  What are the signs or symptoms?  Symptoms of this condition include:  · A cough.  · Coughing up clear, yellow, or green mucus.  · Wheezing.  · Chest congestion.  · Shortness of breath.  · A fever.  · Body aches.  · Chills.  · A sore throat.  How is this diagnosed?  This condition is usually diagnosed with a physical exam. During the exam, your health care provider may order tests, such as chest X-rays, to rule out other conditions. He or she may also:  · Test a sample of your mucus for bacterial infection.  · Check the level of oxygen in your blood. This is done to check for pneumonia.  · Do a chest X-ray or lung function testing to rule out pneumonia and other conditions.  · Perform blood tests.  Your health care provider will also ask about your symptoms and medical history.  How is this treated?  Most cases of acute bronchitis clear up over time without treatment. Your health care provider may recommend:  · Drinking more fluids. Drinking more makes your mucus thinner, which may make it easier to breathe.  · Taking a medicine for a fever or cough.  · Taking an antibiotic medicine.  · Using an inhaler to help improve shortness of breath and to control a cough.  · Using a cool mist vaporizer or humidifier to make it easier to breathe.  Follow these instructions at home:  Medicines   · Take over-the-counter and prescription medicines only as told by your health care provider.  · If you were prescribed an antibiotic, take it as told by your health care provider. Do not stop taking the antibiotic even if you start to feel better.  General instructions   · Get plenty of rest.  · Drink enough fluids to keep your urine clear or pale yellow.  · Avoid smoking and secondhand smoke. Exposure to cigarette smoke or irritating chemicals will make bronchitis worse. If you smoke and you need help quitting, ask  your health care provider. Quitting smoking will help your lungs heal faster.  · Use an inhaler, cool mist vaporizer, or humidifier as told by your health care provider.  · Keep all follow-up visits as told by your health care provider. This is important.  How is this prevented?  To lower your risk of getting this condition again:  · Wash your hands often with soap and water. If soap and water are not available, use hand .  · Avoid contact with people who have cold symptoms.  · Try not to touch your hands to your mouth, nose, or eyes.  · Make sure to get the flu shot every year.  Contact a health care provider if:  · Your symptoms do not improve in 2 weeks of treatment.  Get help right away if:  · You cough up blood.  · You have chest pain.  · You have severe shortness of breath.  · You become dehydrated.  · You faint or keep feeling like you are going to faint.  · You keep vomiting.  · You have a severe headache.  · Your fever or chills gets worse.  This information is not intended to replace advice given to you by your health care provider. Make sure you discuss any questions you have with your health care provider.  Document Released: 01/25/2006 Document Revised: 07/12/2017 Document Reviewed: 06/07/2017  [employer] Interactive Patient Education © 2017 [employer]  Laryngitis  Laryngitis is swelling (inflammation) of your vocal cords. This causes hoarseness, coughing, loss of voice, sore throat, or a dry throat. When your vocal cords are inflamed, your voice sounds different.  Laryngitis can be temporary (acute) or long-term (chronic). Most cases of acute laryngitis improve with time. Chronic laryngitis is laryngitis that lasts for more than three weeks.  Follow these instructions at home:  · Drink enough fluid to keep your pee (urine) clear or pale yellow.  · Breathe in moist air. Use a humidifier if you live in a dry climate.  · Take medicines only as told by your doctor.  · Do not smoke cigarettes or  electronic cigarettes. If you need help quitting, ask your doctor.  · Talk as little as possible. Also avoid whispering, which can cause vocal strain.  · Write instead of talking. Do this until your voice is back to normal.  Contact a doctor if:  · You have a fever.  · Your pain is worse.  · You have trouble swallowing.  Get help right away if:  · You cough up blood.  · You have trouble breathing.  This information is not intended to replace advice given to you by your health care provider. Make sure you discuss any questions you have with your health care provider.  Document Released: 12/06/2012 Document Revised: 05/25/2017 Document Reviewed: 06/02/2015  [employer] Interactive Patient Education © 2017 [employer]

## 2018-06-07 NOTE — PATIENT INSTRUCTIONS
Acute Bronchitis, Adult  Acute bronchitis is sudden (acute) swelling of the air tubes (bronchi) in the lungs. Acute bronchitis causes these tubes to fill with mucus, which can make it hard to breathe. It can also cause coughing or wheezing.  In adults, acute bronchitis usually goes away within 2 weeks. A cough caused by bronchitis may last up to 3 weeks. Smoking, allergies, and asthma can make the condition worse. Repeated episodes of bronchitis may cause further lung problems, such as chronic obstructive pulmonary disease (COPD).  What are the causes?  This condition can be caused by germs and by substances that irritate the lungs, including:  · Cold and flu viruses. This condition is most often caused by the same virus that causes a cold.  · Bacteria.  · Exposure to tobacco smoke, dust, fumes, and air pollution.  What increases the risk?  This condition is more likely to develop in people who:  · Have close contact with someone with acute bronchitis.  · Are exposed to lung irritants, such as tobacco smoke, dust, fumes, and vapors.  · Have a weak immune system.  · Have a respiratory condition such as asthma.  What are the signs or symptoms?  Symptoms of this condition include:  · A cough.  · Coughing up clear, yellow, or green mucus.  · Wheezing.  · Chest congestion.  · Shortness of breath.  · A fever.  · Body aches.  · Chills.  · A sore throat.  How is this diagnosed?  This condition is usually diagnosed with a physical exam. During the exam, your health care provider may order tests, such as chest X-rays, to rule out other conditions. He or she may also:  · Test a sample of your mucus for bacterial infection.  · Check the level of oxygen in your blood. This is done to check for pneumonia.  · Do a chest X-ray or lung function testing to rule out pneumonia and other conditions.  · Perform blood tests.  Your health care provider will also ask about your symptoms and medical history.  How is this treated?  Most cases  of acute bronchitis clear up over time without treatment. Your health care provider may recommend:  · Drinking more fluids. Drinking more makes your mucus thinner, which may make it easier to breathe.  · Taking a medicine for a fever or cough.  · Taking an antibiotic medicine.  · Using an inhaler to help improve shortness of breath and to control a cough.  · Using a cool mist vaporizer or humidifier to make it easier to breathe.  Follow these instructions at home:  Medicines   · Take over-the-counter and prescription medicines only as told by your health care provider.  · If you were prescribed an antibiotic, take it as told by your health care provider. Do not stop taking the antibiotic even if you start to feel better.  General instructions   · Get plenty of rest.  · Drink enough fluids to keep your urine clear or pale yellow.  · Avoid smoking and secondhand smoke. Exposure to cigarette smoke or irritating chemicals will make bronchitis worse. If you smoke and you need help quitting, ask your health care provider. Quitting smoking will help your lungs heal faster.  · Use an inhaler, cool mist vaporizer, or humidifier as told by your health care provider.  · Keep all follow-up visits as told by your health care provider. This is important.  How is this prevented?  To lower your risk of getting this condition again:  · Wash your hands often with soap and water. If soap and water are not available, use hand .  · Avoid contact with people who have cold symptoms.  · Try not to touch your hands to your mouth, nose, or eyes.  · Make sure to get the flu shot every year.  Contact a health care provider if:  · Your symptoms do not improve in 2 weeks of treatment.  Get help right away if:  · You cough up blood.  · You have chest pain.  · You have severe shortness of breath.  · You become dehydrated.  · You faint or keep feeling like you are going to faint.  · You keep vomiting.  · You have a severe headache.  · Your  fever or chills gets worse.  This information is not intended to replace advice given to you by your health care provider. Make sure you discuss any questions you have with your health care provider.  Document Released: 01/25/2006 Document Revised: 07/12/2017 Document Reviewed: 06/07/2017  [employer] Interactive Patient Education © 2017 [employer]  Laryngitis  Laryngitis is swelling (inflammation) of your vocal cords. This causes hoarseness, coughing, loss of voice, sore throat, or a dry throat. When your vocal cords are inflamed, your voice sounds different.  Laryngitis can be temporary (acute) or long-term (chronic). Most cases of acute laryngitis improve with time. Chronic laryngitis is laryngitis that lasts for more than three weeks.  Follow these instructions at home:  · Drink enough fluid to keep your pee (urine) clear or pale yellow.  · Breathe in moist air. Use a humidifier if you live in a dry climate.  · Take medicines only as told by your doctor.  · Do not smoke cigarettes or electronic cigarettes. If you need help quitting, ask your doctor.  · Talk as little as possible. Also avoid whispering, which can cause vocal strain.  · Write instead of talking. Do this until your voice is back to normal.  Contact a doctor if:  · You have a fever.  · Your pain is worse.  · You have trouble swallowing.  Get help right away if:  · You cough up blood.  · You have trouble breathing.  This information is not intended to replace advice given to you by your health care provider. Make sure you discuss any questions you have with your health care provider.  Document Released: 12/06/2012 Document Revised: 05/25/2017 Document Reviewed: 06/02/2015  [employer] Interactive Patient Education © 2017 [employer]

## 2018-06-15 DIAGNOSIS — I25.10 CHRONIC CORONARY ARTERY DISEASE: ICD-10-CM

## 2018-06-15 RX ORDER — DILTIAZEM HYDROCHLORIDE 120 MG/1
TABLET, FILM COATED ORAL
Qty: 270 TABLET | Refills: 2 | OUTPATIENT
Start: 2018-06-15

## 2018-06-16 DIAGNOSIS — E78.5 HYPERLIPIDEMIA: ICD-10-CM

## 2018-06-18 RX ORDER — ATORVASTATIN CALCIUM 20 MG/1
TABLET, FILM COATED ORAL
Qty: 90 TABLET | Refills: 2 | OUTPATIENT
Start: 2018-06-18

## 2018-06-25 DIAGNOSIS — I25.10 CHRONIC CORONARY ARTERY DISEASE: ICD-10-CM

## 2018-06-25 RX ORDER — DILTIAZEM HYDROCHLORIDE 120 MG/1
TABLET, FILM COATED ORAL
Qty: 270 TABLET | Refills: 3 | Status: SHIPPED | OUTPATIENT
Start: 2018-06-25 | End: 2019-06-11 | Stop reason: SDUPTHER

## 2018-07-02 DIAGNOSIS — I25.10 CHRONIC CORONARY ARTERY DISEASE: ICD-10-CM

## 2018-07-02 DIAGNOSIS — I47.1 ATRIAL PAROXYSMAL TACHYCARDIA (HCC): ICD-10-CM

## 2018-07-02 DIAGNOSIS — K21.9 GASTROESOPHAGEAL REFLUX DISEASE WITHOUT ESOPHAGITIS: ICD-10-CM

## 2018-07-02 RX ORDER — OMEPRAZOLE 20 MG/1
CAPSULE, DELAYED RELEASE ORAL
Qty: 30 CAPSULE | Refills: 5 | Status: SHIPPED | OUTPATIENT
Start: 2018-07-02 | End: 2018-09-24 | Stop reason: SDUPTHER

## 2018-07-02 RX ORDER — METOPROLOL TARTRATE 50 MG/1
TABLET, FILM COATED ORAL
Qty: 60 TABLET | Refills: 5 | Status: SHIPPED | OUTPATIENT
Start: 2018-07-02 | End: 2018-12-11 | Stop reason: SDUPTHER

## 2018-07-05 ENCOUNTER — OFFICE VISIT (OUTPATIENT)
Dept: FAMILY MEDICINE CLINIC | Facility: CLINIC | Age: 68
End: 2018-07-05

## 2018-07-05 VITALS
HEART RATE: 84 BPM | TEMPERATURE: 97.9 F | WEIGHT: 224 LBS | RESPIRATION RATE: 16 BRPM | DIASTOLIC BLOOD PRESSURE: 80 MMHG | BODY MASS INDEX: 38.24 KG/M2 | SYSTOLIC BLOOD PRESSURE: 125 MMHG | OXYGEN SATURATION: 93 % | HEIGHT: 64 IN

## 2018-07-05 DIAGNOSIS — I10 ESSENTIAL HYPERTENSION: ICD-10-CM

## 2018-07-05 DIAGNOSIS — E78.5 HYPERLIPIDEMIA, UNSPECIFIED HYPERLIPIDEMIA TYPE: ICD-10-CM

## 2018-07-05 DIAGNOSIS — Z79.899 HIGH RISK MEDICATION USE: ICD-10-CM

## 2018-07-05 DIAGNOSIS — K76.0 NONALCOHOLIC FATTY LIVER DISEASE: ICD-10-CM

## 2018-07-05 DIAGNOSIS — E11.59 CONTROLLED TYPE 2 DIABETES MELLITUS WITH OTHER CIRCULATORY COMPLICATION, WITHOUT LONG-TERM CURRENT USE OF INSULIN (HCC): ICD-10-CM

## 2018-07-05 DIAGNOSIS — I25.10 CHRONIC CORONARY ARTERY DISEASE: Primary | ICD-10-CM

## 2018-07-05 DIAGNOSIS — K21.9 GASTROESOPHAGEAL REFLUX DISEASE WITHOUT ESOPHAGITIS: ICD-10-CM

## 2018-07-05 PROCEDURE — 99214 OFFICE O/P EST MOD 30 MIN: CPT | Performed by: FAMILY MEDICINE

## 2018-07-05 RX ORDER — HYDROCODONE BITARTRATE AND ACETAMINOPHEN 5; 325 MG/1; MG/1
TABLET ORAL
Refills: 0 | COMMUNITY
Start: 2018-05-24 | End: 2019-05-03

## 2018-07-05 NOTE — PROGRESS NOTES
HPI  Sally Rivera is a 68 y.o. female who is here for follow up of diabetes and hyperlipidemia.  Patient has been unable to tolerate metformin because of cramping and diarrhea.  Admits to dietary indiscretions with weight gain and expects blood sugars will be elevated.  Denies any chest pain or shortness of breath and has not followed up with cardiologist.      Review of Systems   HENT: Positive for mouth sores.    Gastrointestinal: Positive for diarrhea.   All other systems reviewed and are negative.        Past Medical History:   Diagnosis Date   • Acid reflux    • Diabetes mellitus type I (CMS/HCC)    • Elevated cholesterol    • Heart attack 1995   • Hypertension    • Liver disease        Past Surgical History:   Procedure Laterality Date   • CHOLECYSTECTOMY  2011   • COLON SURGERY  2014    bowel resection for obstruction   • HYSTERECTOMY  2011   • MOUTH BIOPSY         Family History   Problem Relation Age of Onset   • No Known Problems Mother    • Diabetes Father    • Heart disease Father        Social History     Social History   • Marital status:      Spouse name: N/A   • Number of children: N/A   • Years of education: N/A     Occupational History   • Not on file.     Social History Main Topics   • Smoking status: Former Smoker     Quit date: 12/20/1990   • Smokeless tobacco: Not on file   • Alcohol use No   • Drug use: No   • Sexual activity: Not on file     Other Topics Concern   • Not on file     Social History Narrative   • No narrative on file         Physical Exam   Constitutional: She is oriented to person, place, and time. She appears well-developed and well-nourished.   HENT:   Head: Normocephalic.   Nose: Nose normal.   Mouth/Throat: Oropharynx is clear and moist.   Eyes: Conjunctivae and EOM are normal. Pupils are equal, round, and reactive to light.   Neck: Normal range of motion.   Cardiovascular: Normal rate, regular rhythm and normal heart sounds.    Pulmonary/Chest: Effort  normal. No respiratory distress.   Abdominal: Soft. She exhibits no distension.   Musculoskeletal: Normal range of motion. She exhibits no edema or deformity.   Lymphadenopathy:     She has no cervical adenopathy.   Neurological: She is alert and oriented to person, place, and time. Coordination normal.   Skin: Skin is warm and dry.   Psychiatric: She has a normal mood and affect. Her behavior is normal. Judgment and thought content normal.   Nursing note and vitals reviewed.        Assessment/Plan    Sally was seen today for diabetes, hypertension, hyperlipidemia and rapid heart rate.    Diagnoses and all orders for this visit:    Chronic coronary artery disease    Hyperlipidemia, unspecified hyperlipidemia type  -     Lipid Panel    Essential hypertension  -     Comprehensive Metabolic Panel    Gastroesophageal reflux disease without esophagitis    Nonalcoholic fatty liver disease  -     Comprehensive Metabolic Panel    Controlled type 2 diabetes mellitus with other circulatory complication, without long-term current use of insulin (CMS/[facility])  -     Hemoglobin A1c  -     Microalbumin / Creatinine Urine Ratio - Urine, Clean Catch    High risk medication use  -     CBC & Differential  -     Comprehensive Metabolic Panel        Patient is here for routine follow-up especially of diabetes and known coronary artery disease.  Has been unable to tolerate metformin and admits to dietary indiscretions.  Unfortunately treatment alternatives somewhat limited but will await lab testing.  In view of elevated liver tests in the past might consider Actos therapy has a possible affordable alternative?  Also should consider follow-up appointment with cardiologist?  Most likely will need follow-up appointment in 3 months because expect diabetes will be no longer under good control.  Discussed avoiding sweets and low carbohydrate diet as well as weight loss and exercise.    This note includes information entered using a voice  recognition dictation system.  Though reviewed, some nonsensible errors may remain.

## 2018-07-06 DIAGNOSIS — E11.9 TYPE 2 DIABETES MELLITUS WITHOUT COMPLICATION, WITHOUT LONG-TERM CURRENT USE OF INSULIN (HCC): Primary | ICD-10-CM

## 2018-07-06 DIAGNOSIS — K76.0 NONALCOHOLIC FATTY LIVER DISEASE: ICD-10-CM

## 2018-07-06 LAB
ALBUMIN SERPL-MCNC: 4.6 G/DL (ref 3.6–4.8)
ALBUMIN/CREAT UR: 37.6 MG/G CREAT (ref 0–30)
ALBUMIN/GLOB SERPL: 1.4 {RATIO} (ref 1.2–2.2)
ALP SERPL-CCNC: 81 IU/L (ref 39–117)
ALT SERPL-CCNC: 37 IU/L (ref 0–32)
AST SERPL-CCNC: 66 IU/L (ref 0–40)
BASOPHILS # BLD AUTO: 0 X10E3/UL (ref 0–0.2)
BASOPHILS NFR BLD AUTO: 0 %
BILIRUB SERPL-MCNC: 0.5 MG/DL (ref 0–1.2)
BUN SERPL-MCNC: 13 MG/DL (ref 8–27)
BUN/CREAT SERPL: 18 (ref 12–28)
CALCIUM SERPL-MCNC: 9.4 MG/DL (ref 8.7–10.3)
CHLORIDE SERPL-SCNC: 101 MMOL/L (ref 96–106)
CHOLEST SERPL-MCNC: 172 MG/DL (ref 100–199)
CO2 SERPL-SCNC: 19 MMOL/L (ref 20–29)
CREAT SERPL-MCNC: 0.74 MG/DL (ref 0.57–1)
CREAT UR-MCNC: 266 MG/DL
EOSINOPHIL # BLD AUTO: 0.2 X10E3/UL (ref 0–0.4)
EOSINOPHIL NFR BLD AUTO: 2 %
ERYTHROCYTE [DISTWIDTH] IN BLOOD BY AUTOMATED COUNT: 16.7 % (ref 12.3–15.4)
GLOBULIN SER CALC-MCNC: 3.4 G/DL (ref 1.5–4.5)
GLUCOSE SERPL-MCNC: 152 MG/DL (ref 65–99)
HBA1C MFR BLD: 8.8 % (ref 4.8–5.6)
HCT VFR BLD AUTO: 39.1 % (ref 34–46.6)
HDLC SERPL-MCNC: 39 MG/DL
HGB BLD-MCNC: 12.7 G/DL (ref 11.1–15.9)
IMM GRANULOCYTES # BLD: 0 X10E3/UL (ref 0–0.1)
IMM GRANULOCYTES NFR BLD: 0 %
LDLC SERPL CALC-MCNC: 102 MG/DL (ref 0–99)
LYMPHOCYTES # BLD AUTO: 3.5 X10E3/UL (ref 0.7–3.1)
LYMPHOCYTES NFR BLD AUTO: 39 %
MCH RBC QN AUTO: 25 PG (ref 26.6–33)
MCHC RBC AUTO-ENTMCNC: 32.5 G/DL (ref 31.5–35.7)
MCV RBC AUTO: 77 FL (ref 79–97)
MICROALBUMIN UR-MCNC: 100 UG/ML
MONOCYTES # BLD AUTO: 0.5 X10E3/UL (ref 0.1–0.9)
MONOCYTES NFR BLD AUTO: 6 %
NEUTROPHILS # BLD AUTO: 4.6 X10E3/UL (ref 1.4–7)
NEUTROPHILS NFR BLD AUTO: 53 %
PLATELET # BLD AUTO: 252 X10E3/UL (ref 150–379)
POTASSIUM SERPL-SCNC: 4.2 MMOL/L (ref 3.5–5.2)
PROT SERPL-MCNC: 8 G/DL (ref 6–8.5)
RBC # BLD AUTO: 5.09 X10E6/UL (ref 3.77–5.28)
SODIUM SERPL-SCNC: 137 MMOL/L (ref 134–144)
TRIGL SERPL-MCNC: 156 MG/DL (ref 0–149)
VLDLC SERPL CALC-MCNC: 31 MG/DL (ref 5–40)
WBC # BLD AUTO: 8.9 X10E3/UL (ref 3.4–10.8)

## 2018-07-06 RX ORDER — PIOGLITAZONEHYDROCHLORIDE 15 MG/1
15 TABLET ORAL DAILY
Qty: 30 TABLET | Refills: 5 | Status: SHIPPED | OUTPATIENT
Start: 2018-07-06 | End: 2018-09-25 | Stop reason: SDUPTHER

## 2018-07-07 DIAGNOSIS — E78.5 HYPERLIPIDEMIA: ICD-10-CM

## 2018-07-09 RX ORDER — ATORVASTATIN CALCIUM 20 MG/1
TABLET, FILM COATED ORAL
Qty: 90 TABLET | Refills: 2 | Status: SHIPPED | OUTPATIENT
Start: 2018-07-09 | End: 2018-12-31 | Stop reason: SDUPTHER

## 2018-07-23 RX ORDER — POTASSIUM CHLORIDE 750 MG/1
TABLET, FILM COATED, EXTENDED RELEASE ORAL
Qty: 90 TABLET | Refills: 0 | Status: SHIPPED | OUTPATIENT
Start: 2018-07-23 | End: 2019-12-22

## 2018-09-24 ENCOUNTER — OFFICE VISIT (OUTPATIENT)
Dept: FAMILY MEDICINE CLINIC | Facility: CLINIC | Age: 68
End: 2018-09-24

## 2018-09-24 VITALS
DIASTOLIC BLOOD PRESSURE: 72 MMHG | RESPIRATION RATE: 16 BRPM | BODY MASS INDEX: 39.44 KG/M2 | TEMPERATURE: 98 F | OXYGEN SATURATION: 95 % | HEIGHT: 64 IN | HEART RATE: 67 BPM | SYSTOLIC BLOOD PRESSURE: 112 MMHG | WEIGHT: 231 LBS

## 2018-09-24 DIAGNOSIS — Z79.899 HIGH RISK MEDICATION USE: ICD-10-CM

## 2018-09-24 DIAGNOSIS — K21.9 GASTROESOPHAGEAL REFLUX DISEASE WITHOUT ESOPHAGITIS: ICD-10-CM

## 2018-09-24 DIAGNOSIS — I10 ESSENTIAL HYPERTENSION: ICD-10-CM

## 2018-09-24 DIAGNOSIS — E11.9 TYPE 2 DIABETES MELLITUS WITHOUT COMPLICATION, WITHOUT LONG-TERM CURRENT USE OF INSULIN (HCC): Primary | ICD-10-CM

## 2018-09-24 DIAGNOSIS — K76.0 NONALCOHOLIC FATTY LIVER DISEASE: ICD-10-CM

## 2018-09-24 DIAGNOSIS — K57.30 DIVERTICULOSIS OF LARGE INTESTINE WITHOUT HEMORRHAGE: ICD-10-CM

## 2018-09-24 DIAGNOSIS — I25.10 CHRONIC CORONARY ARTERY DISEASE: ICD-10-CM

## 2018-09-24 DIAGNOSIS — I47.1 ATRIAL PAROXYSMAL TACHYCARDIA (HCC): ICD-10-CM

## 2018-09-24 LAB
ALBUMIN SERPL-MCNC: 4 G/DL (ref 3.5–5.2)
ALBUMIN/GLOB SERPL: 1.1 G/DL
ALP SERPL-CCNC: 71 U/L (ref 39–117)
ALT SERPL-CCNC: 33 U/L (ref 1–33)
AST SERPL-CCNC: 40 U/L (ref 1–32)
BILIRUB SERPL-MCNC: 0.4 MG/DL (ref 0.1–1.2)
BUN SERPL-MCNC: 11 MG/DL (ref 8–23)
BUN/CREAT SERPL: 15.5 (ref 7–25)
CALCIUM SERPL-MCNC: 9.1 MG/DL (ref 8.6–10.5)
CHLORIDE SERPL-SCNC: 102 MMOL/L (ref 98–107)
CO2 SERPL-SCNC: 21.3 MMOL/L (ref 22–29)
CREAT SERPL-MCNC: 0.71 MG/DL (ref 0.57–1)
GLOBULIN SER CALC-MCNC: 3.5 GM/DL
GLUCOSE SERPL-MCNC: 160 MG/DL (ref 65–99)
HBA1C MFR BLD: 7.8 % (ref 4.8–5.6)
POTASSIUM SERPL-SCNC: 4.5 MMOL/L (ref 3.5–5.2)
PROT SERPL-MCNC: 7.5 G/DL (ref 6–8.5)
SODIUM SERPL-SCNC: 138 MMOL/L (ref 136–145)

## 2018-09-24 PROCEDURE — 99213 OFFICE O/P EST LOW 20 MIN: CPT | Performed by: FAMILY MEDICINE

## 2018-09-24 RX ORDER — OMEPRAZOLE 20 MG/1
20 CAPSULE, DELAYED RELEASE ORAL DAILY
Qty: 90 CAPSULE | Refills: 3 | Status: SHIPPED | OUTPATIENT
Start: 2018-09-24 | End: 2019-11-30 | Stop reason: SDUPTHER

## 2018-09-24 RX ORDER — INFLUENZA VACCINE, ADJUVANTED 15; 15; 15 UG/.5ML; UG/.5ML; UG/.5ML
INJECTION, SUSPENSION INTRAMUSCULAR
Refills: 0 | COMMUNITY
Start: 2018-09-16 | End: 2018-09-24

## 2018-09-24 NOTE — PROGRESS NOTES
LEVI Lunanitish Rivera is a 68 y.o. female who is here for follow up of diabetes.  3 months ago added Actos but discontinued metformin because of GI intolerance.  However blood sugars began to increase significantly in 6 weeks ago patient resumed metformin at 500 mg twice a day.  Seems to be tolerated.  However does report some abdominal pain and cramping she feels is diverticulitis and increased fiber in her diet.  This was discussed specifically recommending a low fiber and even liquid diet if having acute episodes.  Also discussed if symptoms worsen may need to get CT scan and consider antibiotic therapy.  Especially if develops fever and chills.  Currently patient is taking Actos and metformin and will await lab work.  Depending on results consider increasing Actos and continue metformin as tolerated.  Call if abdominal symptoms worsen.      Review of Systems   Constitutional: Negative for chills, fatigue and fever.   Gastrointestinal: Positive for abdominal distention and abdominal pain. Negative for blood in stool and diarrhea.   All other systems reviewed and are negative.        Past Medical History:   Diagnosis Date   • Acid reflux    • Diabetes mellitus type I (CMS/HCC)    • Elevated cholesterol    • Heart attack 1995   • Hypertension    • Liver disease        Past Surgical History:   Procedure Laterality Date   • CHOLECYSTECTOMY  2011   • COLON SURGERY  2014    bowel resection for obstruction   • HYSTERECTOMY  2011   • MOUTH BIOPSY         Family History   Problem Relation Age of Onset   • No Known Problems Mother    • Diabetes Father    • Heart disease Father        Social History     Social History   • Marital status:      Spouse name: N/A   • Number of children: N/A   • Years of education: N/A     Occupational History   • Not on file.     Social History Main Topics   • Smoking status: Former Smoker     Quit date: 12/20/1990   • Smokeless tobacco: Not on file   • Alcohol use No   • Drug use:  No   • Sexual activity: Not on file     Other Topics Concern   • Not on file     Social History Narrative   • No narrative on file         Physical Exam   Constitutional: She is oriented to person, place, and time. She appears well-developed and well-nourished. No distress.   Eyes: Pupils are equal, round, and reactive to light. Conjunctivae and EOM are normal.   Neck: Normal range of motion.   Cardiovascular: Normal rate and regular rhythm.    Pulmonary/Chest: Effort normal. No respiratory distress.   Abdominal: Soft. She exhibits no distension. There is no tenderness. There is no guarding.   Musculoskeletal: She exhibits no edema or deformity.   Neurological: She is alert and oriented to person, place, and time. Coordination normal.   Skin: Skin is warm and dry.   Psychiatric: She has a normal mood and affect. Her behavior is normal. Judgment and thought content normal.   Nursing note and vitals reviewed.        Assessment/Plan    Sally was seen today for hyperlipidemia, hypertension and diabetes.    Diagnoses and all orders for this visit:    Type 2 diabetes mellitus without complication, without long-term current use of insulin (CMS/HCC)  -     metFORMIN (GLUCOPHAGE) 500 MG tablet; Take 1 tablet by mouth 2 (Two) Times a Day With Meals.  -     Hemoglobin A1c    Gastroesophageal reflux disease without esophagitis  -     omeprazole (priLOSEC) 20 MG capsule; Take 1 capsule by mouth Daily.    Chronic coronary artery disease    Atrial paroxysmal tachycardia (CMS/HCC)    Nonalcoholic fatty liver disease  -     Comprehensive Metabolic Panel    High risk medication use  -     Comprehensive Metabolic Panel    Diverticulosis of large intestine without hemorrhage    Essential hypertension      Patient is mostly here for follow-up of diabetes after starting a new medication 3 months ago.  Had discontinued metformin because of abdominal symptoms but blood sugars increased and therefore resumed metformin at 500 mg twice a  day.  This was discussed and hopefully can remain on both Actos and metformin.  Also discussed recommendations for acute flareups of diverticulitis.  Depending on today's lab work consider adjusting current medications?  Otherwise follow-up in 3 months.    This note includes information entered using a voice recognition dictation system.  Though reviewed, some nonsensible errors may remain.

## 2018-09-25 DIAGNOSIS — K76.0 NONALCOHOLIC FATTY LIVER DISEASE: ICD-10-CM

## 2018-09-25 DIAGNOSIS — E11.9 TYPE 2 DIABETES MELLITUS WITHOUT COMPLICATION, WITHOUT LONG-TERM CURRENT USE OF INSULIN (HCC): ICD-10-CM

## 2018-09-25 RX ORDER — PIOGLITAZONEHYDROCHLORIDE 15 MG/1
15 TABLET ORAL DAILY
Qty: 90 TABLET | Refills: 3 | Status: SHIPPED | OUTPATIENT
Start: 2018-09-25 | End: 2019-06-26 | Stop reason: SDUPTHER

## 2018-10-05 RX ORDER — POTASSIUM CHLORIDE 750 MG/1
TABLET, FILM COATED, EXTENDED RELEASE ORAL
Qty: 90 TABLET | Refills: 3 | Status: SHIPPED | OUTPATIENT
Start: 2018-10-05 | End: 2018-12-28 | Stop reason: SDUPTHER

## 2018-12-11 DIAGNOSIS — I25.10 CHRONIC CORONARY ARTERY DISEASE: ICD-10-CM

## 2018-12-11 DIAGNOSIS — I47.1 ATRIAL PAROXYSMAL TACHYCARDIA (HCC): ICD-10-CM

## 2018-12-11 RX ORDER — METOPROLOL TARTRATE 50 MG/1
TABLET, FILM COATED ORAL
Qty: 60 TABLET | Refills: 5 | Status: SHIPPED | OUTPATIENT
Start: 2018-12-11 | End: 2019-05-28 | Stop reason: SDUPTHER

## 2018-12-18 ENCOUNTER — OFFICE VISIT (OUTPATIENT)
Dept: RETAIL CLINIC | Facility: CLINIC | Age: 68
End: 2018-12-18

## 2018-12-18 VITALS
SYSTOLIC BLOOD PRESSURE: 102 MMHG | RESPIRATION RATE: 16 BRPM | TEMPERATURE: 97.7 F | OXYGEN SATURATION: 95 % | DIASTOLIC BLOOD PRESSURE: 64 MMHG | HEART RATE: 60 BPM

## 2018-12-18 DIAGNOSIS — J40 BRONCHITIS: Primary | ICD-10-CM

## 2018-12-18 DIAGNOSIS — H65.03 BILATERAL ACUTE SEROUS OTITIS MEDIA, RECURRENCE NOT SPECIFIED: ICD-10-CM

## 2018-12-18 DIAGNOSIS — J98.8 VIRAL RESPIRATORY ILLNESS: ICD-10-CM

## 2018-12-18 DIAGNOSIS — B97.89 VIRAL RESPIRATORY ILLNESS: ICD-10-CM

## 2018-12-18 PROCEDURE — 99213 OFFICE O/P EST LOW 20 MIN: CPT | Performed by: NURSE PRACTITIONER

## 2018-12-18 RX ORDER — ALBUTEROL SULFATE 90 UG/1
2 AEROSOL, METERED RESPIRATORY (INHALATION) EVERY 4 HOURS PRN
Qty: 1 INHALER | Refills: 0 | Status: SHIPPED | OUTPATIENT
Start: 2018-12-18 | End: 2018-12-28

## 2018-12-18 RX ORDER — AZITHROMYCIN 250 MG/1
TABLET, FILM COATED ORAL
Qty: 6 TABLET | Refills: 0 | Status: SHIPPED | OUTPATIENT
Start: 2018-12-18 | End: 2018-12-28

## 2018-12-18 NOTE — PATIENT INSTRUCTIONS
Acute Bronchitis, Adult  Acute bronchitis is sudden (acute) swelling of the air tubes (bronchi) in the lungs. Acute bronchitis causes these tubes to fill with mucus, which can make it hard to breathe. It can also cause coughing or wheezing.  In adults, acute bronchitis usually goes away within 2 weeks. A cough caused by bronchitis may last up to 3 weeks. Smoking, allergies, and asthma can make the condition worse. Repeated episodes of bronchitis may cause further lung problems, such as chronic obstructive pulmonary disease (COPD).  What are the causes?  This condition can be caused by germs and by substances that irritate the lungs, including:  · Cold and flu viruses. This condition is most often caused by the same virus that causes a cold.  · Bacteria.  · Exposure to tobacco smoke, dust, fumes, and air pollution.    What increases the risk?  This condition is more likely to develop in people who:  · Have close contact with someone with acute bronchitis.  · Are exposed to lung irritants, such as tobacco smoke, dust, fumes, and vapors.  · Have a weak immune system.  · Have a respiratory condition such as asthma.    What are the signs or symptoms?  Symptoms of this condition include:  · A cough.  · Coughing up clear, yellow, or green mucus.  · Wheezing.  · Chest congestion.  · Shortness of breath.  · A fever.  · Body aches.  · Chills.  · A sore throat.    How is this diagnosed?  This condition is usually diagnosed with a physical exam. During the exam, your health care provider may order tests, such as chest X-rays, to rule out other conditions. He or she may also:  · Test a sample of your mucus for bacterial infection.  · Check the level of oxygen in your blood. This is done to check for pneumonia.  · Do a chest X-ray or lung function testing to rule out pneumonia and other conditions.  · Perform blood tests.    Your health care provider will also ask about your symptoms and medical history.  How is this  treated?  Most cases of acute bronchitis clear up over time without treatment. Your health care provider may recommend:  · Drinking more fluids. Drinking more makes your mucus thinner, which may make it easier to breathe.  · Taking a medicine for a fever or cough.  · Taking an antibiotic medicine.  · Using an inhaler to help improve shortness of breath and to control a cough.  · Using a cool mist vaporizer or humidifier to make it easier to breathe.    Follow these instructions at home:  Medicines  · Take over-the-counter and prescription medicines only as told by your health care provider.  · If you were prescribed an antibiotic, take it as told by your health care provider. Do not stop taking the antibiotic even if you start to feel better.  General instructions  · Get plenty of rest.  · Drink enough fluids to keep your urine clear or pale yellow.  · Avoid smoking and secondhand smoke. Exposure to cigarette smoke or irritating chemicals will make bronchitis worse. If you smoke and you need help quitting, ask your health care provider. Quitting smoking will help your lungs heal faster.  · Use an inhaler, cool mist vaporizer, or humidifier as told by your health care provider.  · Keep all follow-up visits as told by your health care provider. This is important.  How is this prevented?  To lower your risk of getting this condition again:  · Wash your hands often with soap and water. If soap and water are not available, use hand .  · Avoid contact with people who have cold symptoms.  · Try not to touch your hands to your mouth, nose, or eyes.  · Make sure to get the flu shot every year.    Contact a health care provider if:  · Your symptoms do not improve in 2 weeks of treatment.  Get help right away if:  · You cough up blood.  · You have chest pain.  · You have severe shortness of breath.  · You become dehydrated.  · You faint or keep feeling like you are going to faint.  · You keep vomiting.  · You have a  severe headache.  · Your fever or chills gets worse.  This information is not intended to replace advice given to you by your health care provider. Make sure you discuss any questions you have with your health care provider.  Document Released: 01/25/2006 Document Revised: 07/12/2017 Document Reviewed: 06/07/2017  [employer] Interactive Patient Education © 2018 [employer]  Viral Respiratory Infection  A respiratory infection is an illness that affects part of the respiratory system, such as the lungs, nose, or throat. Most respiratory infections are caused by either viruses or bacteria. A respiratory infection that is caused by a virus is called a viral respiratory infection.  Common types of viral respiratory infections include:  · A cold.  · The flu (influenza).  · A respiratory syncytial virus (RSV) infection.    How do I know if I have a viral respiratory infection?  Most viral respiratory infections cause:  · A stuffy or runny nose.  · Yellow or green nasal discharge.  · A cough.  · Sneezing.  · Fatigue.  · Achy muscles.  · A sore throat.  · Sweating or chills.  · A fever.  · A headache.    How are viral respiratory infections treated?  If influenza is diagnosed early, it may be treated with an antiviral medicine that shortens the length of time a person has symptoms. Symptoms of viral respiratory infections may be treated with over-the-counter and prescription medicines, such as:  · Expectorants. These make it easier to cough up mucus.  · Decongestant nasal sprays.    Health care providers do not prescribe antibiotic medicines for viral infections. This is because antibiotics are designed to kill bacteria. They have no effect on viruses.  How do I know if I should stay home from work or school?  To avoid exposing others to your respiratory infection, stay home if you have:  · A fever.  · A persistent cough.  · A sore throat.  · A runny nose.  · Sneezing.  · Muscles  aches.  · Headaches.  · Fatigue.  · Weakness.  · Chills.  · Sweating.  · Nausea.    Follow these instructions at home:  · Rest as much as possible.  · Take over-the-counter and prescription medicines only as told by your health care provider.  · Drink enough fluid to keep your urine clear or pale yellow. This helps prevent dehydration and helps loosen up mucus.  · Gargle with a salt-water mixture 3-4 times per day or as needed. To make a salt-water mixture, completely dissolve ½-1 tsp of salt in 1 cup of warm water.  · Use nose drops made from salt water to ease congestion and soften raw skin around your nose.  · Do not drink alcohol.  · Do not use tobacco products, including cigarettes, chewing tobacco, and e-cigarettes. If you need help quitting, ask your health care provider.  Contact a health care provider if:  · Your symptoms last for 10 days or longer.  · Your symptoms get worse over time.  · You have a fever.  · You have severe sinus pain in your face or forehead.  · The glands in your jaw or neck become very swollen.  Get help right away if:  · You feel pain or pressure in your chest.  · You have shortness of breath.  · You faint or feel like you will faint.  · You have severe and persistent vomiting.  · You feel confused or disoriented.  This information is not intended to replace advice given to you by your health care provider. Make sure you discuss any questions you have with your health care provider.  Document Released: 09/27/2006 Document Revised: 05/25/2017 Document Reviewed: 05/25/2016  [employer] Interactive Patient Education © 2018 [employer]  Otitis Media With Effusion  Otitis media with effusion is the presence of fluid in the middle ear. This is a common problem in children, which often follows ear infections. It may be present for weeks or longer after the infection. Unlike an acute ear infection, otitis media with effusion refers only to fluid behind the ear drum and not infection. Children  "with repeated ear and sinus infections and allergy problems are the most likely to get otitis media with effusion.  CAUSES   The most frequent cause of the fluid buildup is dysfunction of the eustachian tubes. These are the tubes that drain fluid in the ears to the back of the nose (nasopharynx).  SYMPTOMS   · The main symptom of this condition is hearing loss. As a result, you or your child may:  ¨ Listen to the TV at a loud volume.  ¨ Not respond to questions.  ¨ Ask \"what\" often when spoken to.  ¨ Mistake or confuse one sound or word for another.  · There may be a sensation of fullness or pressure but usually not pain.  DIAGNOSIS   · Your health care provider will diagnose this condition by examining you or your child's ears.  · Your health care provider may test the pressure in you or your child's ear with a tympanometer.  · A hearing test may be conducted if the problem persists.  TREATMENT   · Treatment depends on the duration and the effects of the effusion.  · Antibiotics, decongestants, nose drops, and cortisone-type drugs (tablets or nasal spray) may not be helpful.  · Children with persistent ear effusions may have delayed language or behavioral problems. Children at risk for developmental delays in hearing, learning, and speech may require referral to a specialist earlier than children not at risk.  · You or your child's health care provider may suggest a referral to an ear, nose, and throat surgeon for treatment. The following may help restore normal hearing:  ¨ Drainage of fluid.  ¨ Placement of ear tubes (tympanostomy tubes).  ¨ Removal of adenoids (adenoidectomy).  HOME CARE INSTRUCTIONS   · Avoid secondhand smoke.  · Infants who are  are less likely to have this condition.  · Avoid feeding infants while they are lying flat.  · Avoid known environmental allergens.  · Avoid people who are sick.  SEEK MEDICAL CARE IF:   · Hearing is not better in 3 months.  · Hearing is worse.  · Ear " pain.  · Drainage from the ear.  · Dizziness.  MAKE SURE YOU:   · Understand these instructions.  · Will watch your condition.  · Will get help right away if you are not doing well or get worse.  This information is not intended to replace advice given to you by your health care provider. Make sure you discuss any questions you have with your health care provider.  Document Released: 01/25/2006 Document Revised: 01/08/2016 Document Reviewed: 07/15/2014  [employer] Interactive Patient Education © 2017 [employer]

## 2018-12-18 NOTE — PROGRESS NOTES
Subjective   Patient ID: Sally Rivera is a 68 y.o. female presents with   Chief Complaint   Patient presents with   • URI     Pt states her symptoms started a week ago. She states she has a cough and short of breath. She states she has started sneezing.        URI    This is a new problem. The current episode started 1 to 4 weeks ago. The problem has been waxing and waning. Maximum temperature: low grade. Associated symptoms include congestion, coughing (greyish), diarrhea (baseline from metformin), headaches (from cough), sneezing and wheezing (mild). Pertinent negatives include no ear pain, nausea, rhinorrhea, sore throat or vomiting. Treatments tried: mucinex. The treatment provided mild relief.       No Known Allergies    The following portions of the patient's history were reviewed and updated as appropriate: allergies, current medications, past family history, past medical history, past social history, past surgical history and problem list.      Review of Systems   Constitutional: Positive for chills, diaphoresis, fatigue and fever.   HENT: Positive for congestion, postnasal drip (mild) and sneezing. Negative for ear pain, rhinorrhea, sinus pressure and sore throat.    Respiratory: Positive for cough (greyish), chest tightness (mild), shortness of breath (mild) and wheezing (mild).    Cardiovascular: Negative.    Gastrointestinal: Positive for diarrhea (baseline from metformin). Negative for nausea and vomiting.   Neurological: Positive for headaches (from cough).       Objective     Vitals:    12/18/18 0926   BP: 102/64   Pulse: 60   Resp: 16   Temp: 97.7 °F (36.5 °C)   SpO2: 95%         Physical Exam   Constitutional: She is oriented to person, place, and time. She appears well-developed and well-nourished. She does not appear ill. No distress.   HENT:   Head: Normocephalic.   Right Ear: Hearing, external ear and ear canal normal. A middle ear effusion is present.   Left Ear: Hearing, external ear  and ear canal normal. A middle ear effusion is present.   Nose: Mucosal edema present. No rhinorrhea or sinus tenderness.   Mouth/Throat: Oropharynx is clear and moist and mucous membranes are normal. No tonsillar exudate.   Eyes: Conjunctivae are normal.   Sclera white.   Neck: No tracheal deviation present.   Cardiovascular: Normal rate, regular rhythm, S1 normal, S2 normal and normal heart sounds.   Pulmonary/Chest: Effort normal. No accessory muscle usage or stridor. No respiratory distress. She has no decreased breath sounds. She has wheezes in the right upper field and the left upper field. She has no rhonchi. She has no rales.   Abdominal: Soft. Bowel sounds are normal. There is no tenderness.   Lymphadenopathy:     She has no cervical adenopathy.   Neurological: She is alert and oriented to person, place, and time.   Skin: Skin is warm and dry.   Vitals reviewed.        Sally was seen today for uri.    Diagnoses and all orders for this visit:    Bronchitis  -     azithromycin (ZITHROMAX Z-REUBEN) 250 MG tablet; Take 2 tablets the first day, then 1 tablet daily for 4 days.  -     albuterol sulfate  (90 Base) MCG/ACT inhaler; Inhale 2 puffs Every 4 (Four) Hours As Needed for Wheezing or Shortness of Air for up to 30 days.  -     PredniSONE 5 MG tablet therapy pack dosepak; Take as directed on package    Bilateral acute serous otitis media, recurrence not specified  -     PredniSONE 5 MG tablet therapy pack dosepak; Take as directed on package    Viral respiratory illness        Patient understands possible side effects of all medications ordered. Follow-up with Primary Care Physician in 48-72 hours if these symptoms worsen or fail to improve as anticipated. Patient verbalizes understanding.      Acute Bronchitis, Adult  Acute bronchitis is sudden (acute) swelling of the air tubes (bronchi) in the lungs. Acute bronchitis causes these tubes to fill with mucus, which can make it hard to breathe. It can  also cause coughing or wheezing.  In adults, acute bronchitis usually goes away within 2 weeks. A cough caused by bronchitis may last up to 3 weeks. Smoking, allergies, and asthma can make the condition worse. Repeated episodes of bronchitis may cause further lung problems, such as chronic obstructive pulmonary disease (COPD).  What are the causes?  This condition can be caused by germs and by substances that irritate the lungs, including:  · Cold and flu viruses. This condition is most often caused by the same virus that causes a cold.  · Bacteria.  · Exposure to tobacco smoke, dust, fumes, and air pollution.    What increases the risk?  This condition is more likely to develop in people who:  · Have close contact with someone with acute bronchitis.  · Are exposed to lung irritants, such as tobacco smoke, dust, fumes, and vapors.  · Have a weak immune system.  · Have a respiratory condition such as asthma.    What are the signs or symptoms?  Symptoms of this condition include:  · A cough.  · Coughing up clear, yellow, or green mucus.  · Wheezing.  · Chest congestion.  · Shortness of breath.  · A fever.  · Body aches.  · Chills.  · A sore throat.    How is this diagnosed?  This condition is usually diagnosed with a physical exam. During the exam, your health care provider may order tests, such as chest X-rays, to rule out other conditions. He or she may also:  · Test a sample of your mucus for bacterial infection.  · Check the level of oxygen in your blood. This is done to check for pneumonia.  · Do a chest X-ray or lung function testing to rule out pneumonia and other conditions.  · Perform blood tests.    Your health care provider will also ask about your symptoms and medical history.  How is this treated?  Most cases of acute bronchitis clear up over time without treatment. Your health care provider may recommend:  · Drinking more fluids. Drinking more makes your mucus thinner, which may make it easier to  breathe.  · Taking a medicine for a fever or cough.  · Taking an antibiotic medicine.  · Using an inhaler to help improve shortness of breath and to control a cough.  · Using a cool mist vaporizer or humidifier to make it easier to breathe.    Follow these instructions at home:  Medicines  · Take over-the-counter and prescription medicines only as told by your health care provider.  · If you were prescribed an antibiotic, take it as told by your health care provider. Do not stop taking the antibiotic even if you start to feel better.  General instructions  · Get plenty of rest.  · Drink enough fluids to keep your urine clear or pale yellow.  · Avoid smoking and secondhand smoke. Exposure to cigarette smoke or irritating chemicals will make bronchitis worse. If you smoke and you need help quitting, ask your health care provider. Quitting smoking will help your lungs heal faster.  · Use an inhaler, cool mist vaporizer, or humidifier as told by your health care provider.  · Keep all follow-up visits as told by your health care provider. This is important.  How is this prevented?  To lower your risk of getting this condition again:  · Wash your hands often with soap and water. If soap and water are not available, use hand .  · Avoid contact with people who have cold symptoms.  · Try not to touch your hands to your mouth, nose, or eyes.  · Make sure to get the flu shot every year.    Contact a health care provider if:  · Your symptoms do not improve in 2 weeks of treatment.  Get help right away if:  · You cough up blood.  · You have chest pain.  · You have severe shortness of breath.  · You become dehydrated.  · You faint or keep feeling like you are going to faint.  · You keep vomiting.  · You have a severe headache.  · Your fever or chills gets worse.  This information is not intended to replace advice given to you by your health care provider. Make sure you discuss any questions you have with your health care  provider.  Document Released: 01/25/2006 Document Revised: 07/12/2017 Document Reviewed: 06/07/2017  [employer] Interactive Patient Education © 2018 [employer]  Viral Respiratory Infection  A respiratory infection is an illness that affects part of the respiratory system, such as the lungs, nose, or throat. Most respiratory infections are caused by either viruses or bacteria. A respiratory infection that is caused by a virus is called a viral respiratory infection.  Common types of viral respiratory infections include:  · A cold.  · The flu (influenza).  · A respiratory syncytial virus (RSV) infection.    How do I know if I have a viral respiratory infection?  Most viral respiratory infections cause:  · A stuffy or runny nose.  · Yellow or green nasal discharge.  · A cough.  · Sneezing.  · Fatigue.  · Achy muscles.  · A sore throat.  · Sweating or chills.  · A fever.  · A headache.    How are viral respiratory infections treated?  If influenza is diagnosed early, it may be treated with an antiviral medicine that shortens the length of time a person has symptoms. Symptoms of viral respiratory infections may be treated with over-the-counter and prescription medicines, such as:  · Expectorants. These make it easier to cough up mucus.  · Decongestant nasal sprays.    Health care providers do not prescribe antibiotic medicines for viral infections. This is because antibiotics are designed to kill bacteria. They have no effect on viruses.  How do I know if I should stay home from work or school?  To avoid exposing others to your respiratory infection, stay home if you have:  · A fever.  · A persistent cough.  · A sore throat.  · A runny nose.  · Sneezing.  · Muscles aches.  · Headaches.  · Fatigue.  · Weakness.  · Chills.  · Sweating.  · Nausea.    Follow these instructions at home:  · Rest as much as possible.  · Take over-the-counter and prescription medicines only as told by your health care provider.  · Drink enough  fluid to keep your urine clear or pale yellow. This helps prevent dehydration and helps loosen up mucus.  · Gargle with a salt-water mixture 3-4 times per day or as needed. To make a salt-water mixture, completely dissolve ½-1 tsp of salt in 1 cup of warm water.  · Use nose drops made from salt water to ease congestion and soften raw skin around your nose.  · Do not drink alcohol.  · Do not use tobacco products, including cigarettes, chewing tobacco, and e-cigarettes. If you need help quitting, ask your health care provider.  Contact a health care provider if:  · Your symptoms last for 10 days or longer.  · Your symptoms get worse over time.  · You have a fever.  · You have severe sinus pain in your face or forehead.  · The glands in your jaw or neck become very swollen.  Get help right away if:  · You feel pain or pressure in your chest.  · You have shortness of breath.  · You faint or feel like you will faint.  · You have severe and persistent vomiting.  · You feel confused or disoriented.  This information is not intended to replace advice given to you by your health care provider. Make sure you discuss any questions you have with your health care provider.  Document Released: 09/27/2006 Document Revised: 05/25/2017 Document Reviewed: 05/25/2016  [employer] Interactive Patient Education © 2018 [employer]  Otitis Media With Effusion  Otitis media with effusion is the presence of fluid in the middle ear. This is a common problem in children, which often follows ear infections. It may be present for weeks or longer after the infection. Unlike an acute ear infection, otitis media with effusion refers only to fluid behind the ear drum and not infection. Children with repeated ear and sinus infections and allergy problems are the most likely to get otitis media with effusion.  CAUSES   The most frequent cause of the fluid buildup is dysfunction of the eustachian tubes. These are the tubes that drain fluid in the ears  "to the back of the nose (nasopharynx).  SYMPTOMS   · The main symptom of this condition is hearing loss. As a result, you or your child may:  ¨ Listen to the TV at a loud volume.  ¨ Not respond to questions.  ¨ Ask \"what\" often when spoken to.  ¨ Mistake or confuse one sound or word for another.  · There may be a sensation of fullness or pressure but usually not pain.  DIAGNOSIS   · Your health care provider will diagnose this condition by examining you or your child's ears.  · Your health care provider may test the pressure in you or your child's ear with a tympanometer.  · A hearing test may be conducted if the problem persists.  TREATMENT   · Treatment depends on the duration and the effects of the effusion.  · Antibiotics, decongestants, nose drops, and cortisone-type drugs (tablets or nasal spray) may not be helpful.  · Children with persistent ear effusions may have delayed language or behavioral problems. Children at risk for developmental delays in hearing, learning, and speech may require referral to a specialist earlier than children not at risk.  · You or your child's health care provider may suggest a referral to an ear, nose, and throat surgeon for treatment. The following may help restore normal hearing:  ¨ Drainage of fluid.  ¨ Placement of ear tubes (tympanostomy tubes).  ¨ Removal of adenoids (adenoidectomy).  HOME CARE INSTRUCTIONS   · Avoid secondhand smoke.  · Infants who are  are less likely to have this condition.  · Avoid feeding infants while they are lying flat.  · Avoid known environmental allergens.  · Avoid people who are sick.  SEEK MEDICAL CARE IF:   · Hearing is not better in 3 months.  · Hearing is worse.  · Ear pain.  · Drainage from the ear.  · Dizziness.  MAKE SURE YOU:   · Understand these instructions.  · Will watch your condition.  · Will get help right away if you are not doing well or get worse.  This information is not intended to replace advice given to you by your " health care provider. Make sure you discuss any questions you have with your health care provider.  Document Released: 01/25/2006 Document Revised: 01/08/2016 Document Reviewed: 07/15/2014  [employer] Interactive Patient Education © 2017 [employer]

## 2018-12-28 ENCOUNTER — OFFICE VISIT (OUTPATIENT)
Dept: FAMILY MEDICINE CLINIC | Facility: CLINIC | Age: 68
End: 2018-12-28

## 2018-12-28 VITALS
HEART RATE: 73 BPM | TEMPERATURE: 98.2 F | DIASTOLIC BLOOD PRESSURE: 80 MMHG | WEIGHT: 238.6 LBS | SYSTOLIC BLOOD PRESSURE: 140 MMHG | HEIGHT: 64 IN | RESPIRATION RATE: 16 BRPM | OXYGEN SATURATION: 98 % | BODY MASS INDEX: 40.74 KG/M2

## 2018-12-28 DIAGNOSIS — I25.10 CHRONIC CORONARY ARTERY DISEASE: ICD-10-CM

## 2018-12-28 DIAGNOSIS — Z79.899 HIGH RISK MEDICATION USE: ICD-10-CM

## 2018-12-28 DIAGNOSIS — Z23 IMMUNIZATION DUE: ICD-10-CM

## 2018-12-28 DIAGNOSIS — E78.5 HYPERLIPIDEMIA, UNSPECIFIED HYPERLIPIDEMIA TYPE: Primary | ICD-10-CM

## 2018-12-28 DIAGNOSIS — I10 ESSENTIAL HYPERTENSION: ICD-10-CM

## 2018-12-28 DIAGNOSIS — E11.9 TYPE 2 DIABETES MELLITUS WITHOUT COMPLICATION, WITHOUT LONG-TERM CURRENT USE OF INSULIN (HCC): ICD-10-CM

## 2018-12-28 DIAGNOSIS — K76.0 NONALCOHOLIC FATTY LIVER DISEASE: ICD-10-CM

## 2018-12-28 LAB
ALBUMIN SERPL-MCNC: 4 G/DL (ref 3.5–5.2)
ALBUMIN/GLOB SERPL: 1.1 G/DL
ALP SERPL-CCNC: 84 U/L (ref 39–117)
ALT SERPL-CCNC: 31 U/L (ref 1–33)
AST SERPL-CCNC: 34 U/L (ref 1–32)
BASOPHILS # BLD AUTO: 0.03 10*3/MM3 (ref 0–0.2)
BASOPHILS NFR BLD AUTO: 0.3 % (ref 0–1.5)
BILIRUB SERPL-MCNC: 0.3 MG/DL (ref 0.1–1.2)
BUN SERPL-MCNC: 15 MG/DL (ref 8–23)
BUN/CREAT SERPL: 19.2 (ref 7–25)
CALCIUM SERPL-MCNC: 9.2 MG/DL (ref 8.6–10.5)
CHLORIDE SERPL-SCNC: 103 MMOL/L (ref 98–107)
CHOLEST SERPL-MCNC: 175 MG/DL (ref 0–200)
CO2 SERPL-SCNC: 22.9 MMOL/L (ref 22–29)
CREAT SERPL-MCNC: 0.78 MG/DL (ref 0.57–1)
EOSINOPHIL # BLD AUTO: 0.26 10*3/MM3 (ref 0–0.7)
EOSINOPHIL NFR BLD AUTO: 2.8 % (ref 0.3–6.2)
ERYTHROCYTE [DISTWIDTH] IN BLOOD BY AUTOMATED COUNT: 15.7 % (ref 11.7–13)
GLOBULIN SER CALC-MCNC: 3.6 GM/DL
GLUCOSE SERPL-MCNC: 103 MG/DL (ref 65–99)
HBA1C MFR BLD: 7.4 % (ref 4.8–5.6)
HCT VFR BLD AUTO: 41.2 % (ref 35.6–45.5)
HDLC SERPL-MCNC: 48 MG/DL (ref 40–60)
HGB BLD-MCNC: 12.7 G/DL (ref 11.9–15.5)
IMM GRANULOCYTES # BLD: 0.02 10*3/MM3 (ref 0–0.03)
IMM GRANULOCYTES NFR BLD: 0.2 % (ref 0–0.5)
LDLC SERPL CALC-MCNC: 102 MG/DL (ref 0–100)
LYMPHOCYTES # BLD AUTO: 3.65 10*3/MM3 (ref 0.9–4.8)
LYMPHOCYTES NFR BLD AUTO: 39.2 % (ref 19.6–45.3)
MCH RBC QN AUTO: 25.8 PG (ref 26.9–32)
MCHC RBC AUTO-ENTMCNC: 30.8 G/DL (ref 32.4–36.3)
MCV RBC AUTO: 83.7 FL (ref 80.5–98.2)
MONOCYTES # BLD AUTO: 0.7 10*3/MM3 (ref 0.2–1.2)
MONOCYTES NFR BLD AUTO: 7.5 % (ref 5–12)
NEUTROPHILS # BLD AUTO: 4.65 10*3/MM3 (ref 1.9–8.1)
NEUTROPHILS NFR BLD AUTO: 50 % (ref 42.7–76)
PLATELET # BLD AUTO: 238 10*3/MM3 (ref 140–500)
POTASSIUM SERPL-SCNC: 4.8 MMOL/L (ref 3.5–5.2)
PROT SERPL-MCNC: 7.6 G/DL (ref 6–8.5)
RBC # BLD AUTO: 4.92 10*6/MM3 (ref 3.9–5.2)
SODIUM SERPL-SCNC: 139 MMOL/L (ref 136–145)
TRIGL SERPL-MCNC: 127 MG/DL (ref 0–150)
VLDLC SERPL CALC-MCNC: 25.4 MG/DL (ref 5–40)
WBC # BLD AUTO: 9.31 10*3/MM3 (ref 4.5–10.7)

## 2018-12-28 PROCEDURE — 99214 OFFICE O/P EST MOD 30 MIN: CPT | Performed by: FAMILY MEDICINE

## 2018-12-28 NOTE — PROGRESS NOTES
LEVI  Sally Rivera is a 68 y.o. female who is here for follow up of diabetes hyperlipidemia and known coronary artery disease.  Patient is no longer being followed by cardiologist and said to be doing well.  Last visit was started on Actos mostly because of cost issues.  Also of interest does have history of nonalcoholic fatty liver disease and there is some new evidence active may be helpful in this condition.  All of this was discussed.  Patient had recent episode of acute bronchitis and was treated with Z-Stevie and prednisone and symptoms do seem to have resolved.  Admits to dietary indiscretions over the holidays and plans on resuming a better diet and exercise now that holidays are over etc.  This is strongly encouraged.      Review of Systems   Constitutional: Positive for unexpected weight change.   HENT: Positive for congestion.    Respiratory: Positive for cough, chest tightness and shortness of breath.    Cardiovascular: Negative for chest pain and leg swelling.   All other systems reviewed and are negative.        Past Medical History:   Diagnosis Date   • Acid reflux    • Diabetes mellitus type I (CMS/HCC)    • Elevated cholesterol    • Heart attack (CMS/HCC) 1995   • Hypertension    • Liver disease        Past Surgical History:   Procedure Laterality Date   • CHOLECYSTECTOMY  2011   • COLON SURGERY  2014    bowel resection for obstruction   • HYSTERECTOMY  2011   • MOUTH BIOPSY         Family History   Problem Relation Age of Onset   • No Known Problems Mother    • Diabetes Father    • Heart disease Father        Social History     Socioeconomic History   • Marital status:      Spouse name: Not on file   • Number of children: Not on file   • Years of education: Not on file   • Highest education level: Not on file   Social Needs   • Financial resource strain: Not on file   • Food insecurity - worry: Not on file   • Food insecurity - inability: Not on file   • Transportation needs - medical:  Not on file   • Transportation needs - non-medical: Not on file   Occupational History   • Not on file   Tobacco Use   • Smoking status: Former Smoker     Last attempt to quit: 1990     Years since quittin.0   Substance and Sexual Activity   • Alcohol use: No   • Drug use: No   • Sexual activity: Not on file   Other Topics Concern   • Not on file   Social History Narrative   • Not on file         Physical Exam   Constitutional: She is oriented to person, place, and time. She appears well-developed and well-nourished.   HENT:   Head: Normocephalic and atraumatic.   Nose: Nose normal.   Mouth/Throat: Oropharynx is clear and moist.   Eyes: Conjunctivae and EOM are normal. Pupils are equal, round, and reactive to light.   Neck: Normal range of motion. Neck supple. No thyromegaly present.   Cardiovascular: Normal rate, regular rhythm and normal heart sounds.   Pulmonary/Chest: Effort normal. No respiratory distress. She has no wheezes. She has no rales.   Abdominal: Soft. She exhibits no distension. There is no tenderness.   Musculoskeletal: Normal range of motion. She exhibits no edema or deformity.   Neurological: She is alert and oriented to person, place, and time. She exhibits normal muscle tone. Coordination normal.   Skin: Skin is warm and dry.   Psychiatric: She has a normal mood and affect. Her behavior is normal. Judgment and thought content normal.   Nursing note and vitals reviewed.        Assessment/Plan    Sally was seen today for diabetes.    Diagnoses and all orders for this visit:    Hyperlipidemia, unspecified hyperlipidemia type  -     Lipid Panel    Essential hypertension  -     Comprehensive Metabolic Panel    Type 2 diabetes mellitus without complication, without long-term current use of insulin (CMS/[facility])  -     Comprehensive Metabolic Panel  -     Hemoglobin A1c    High risk medication use  -     CBC & Differential  -     Comprehensive Metabolic Panel    Nonalcoholic fatty liver  disease  -     Comprehensive Metabolic Panel    Chronic coronary artery disease      Here for routine follow-up especially of hypertension and diabetes.  Also known coronary artery disease no longer followed by cardiologist.  Recent episode of bronchitis treated at a little clinic with prednisone and Z-Stevie and seems to be resolving.  Will get routine lab work as noted above and strongly encouraged to follow diet now that holidays are over and resume exercise program specifically walking 20 minutes every day.  Might consider increasing new diabetic medication depending on above results but hopefully diabetes levels will significantly improve with weight loss and exercise as discussed.  Will plan on follow-up in 3 months.  Reports having pneumonia vaccine over 10 years ago and will update with Prevnar today.  Also recommend checking with pharmacy regarding shingles and other vaccines to verify insurance coverage.    This note includes information entered using a voice recognition dictation system.  Though reviewed, some nonsensible errors may remain.

## 2018-12-31 DIAGNOSIS — E78.5 HYPERLIPIDEMIA: ICD-10-CM

## 2018-12-31 RX ORDER — ATORVASTATIN CALCIUM 40 MG/1
40 TABLET, FILM COATED ORAL DAILY
Qty: 90 TABLET | Refills: 3 | Status: SHIPPED | OUTPATIENT
Start: 2018-12-31 | End: 2019-12-22

## 2019-03-18 ENCOUNTER — OFFICE VISIT (OUTPATIENT)
Dept: RETAIL CLINIC | Facility: CLINIC | Age: 69
End: 2019-03-18

## 2019-03-18 VITALS
SYSTOLIC BLOOD PRESSURE: 116 MMHG | RESPIRATION RATE: 16 BRPM | DIASTOLIC BLOOD PRESSURE: 64 MMHG | TEMPERATURE: 98.9 F | OXYGEN SATURATION: 96 % | HEART RATE: 85 BPM

## 2019-03-18 DIAGNOSIS — J40 BRONCHITIS: Primary | ICD-10-CM

## 2019-03-18 DIAGNOSIS — J32.9 SINUSITIS, UNSPECIFIED CHRONICITY, UNSPECIFIED LOCATION: ICD-10-CM

## 2019-03-18 LAB
EXPIRATION DATE: NORMAL
FLUAV AG NPH QL: NEGATIVE
FLUBV AG NPH QL: NEGATIVE
INTERNAL CONTROL: NORMAL
Lab: NORMAL

## 2019-03-18 PROCEDURE — 87804 INFLUENZA ASSAY W/OPTIC: CPT | Performed by: NURSE PRACTITIONER

## 2019-03-18 PROCEDURE — 99213 OFFICE O/P EST LOW 20 MIN: CPT | Performed by: NURSE PRACTITIONER

## 2019-03-18 RX ORDER — DOXYCYCLINE HYCLATE 100 MG
100 TABLET ORAL 2 TIMES DAILY
Qty: 20 TABLET | Refills: 0 | Status: SHIPPED | OUTPATIENT
Start: 2019-03-18 | End: 2019-03-20 | Stop reason: ALTCHOICE

## 2019-03-18 RX ORDER — PROMETHAZINE HYDROCHLORIDE AND PHENYLEPHRINE HYDROCHLORIDE 6.25; 5 MG/5ML; MG/5ML
5 SYRUP ORAL EVERY 4 HOURS PRN
Qty: 180 ML | Refills: 0 | Status: SHIPPED | OUTPATIENT
Start: 2019-03-18 | End: 2019-05-03

## 2019-03-18 RX ORDER — PREDNISONE 20 MG/1
20 TABLET ORAL DAILY
Qty: 5 TABLET | Refills: 0 | Status: SHIPPED | OUTPATIENT
Start: 2019-03-18 | End: 2019-03-23

## 2019-03-18 NOTE — PATIENT INSTRUCTIONS
Acute Bronchitis, Adult  Acute bronchitis is sudden (acute) swelling of the air tubes (bronchi) in the lungs. Acute bronchitis causes these tubes to fill with mucus, which can make it hard to breathe. It can also cause coughing or wheezing.  In adults, acute bronchitis usually goes away within 2 weeks. A cough caused by bronchitis may last up to 3 weeks. Smoking, allergies, and asthma can make the condition worse. Repeated episodes of bronchitis may cause further lung problems, such as chronic obstructive pulmonary disease (COPD).  What are the causes?  This condition can be caused by germs and by substances that irritate the lungs, including:  · Cold and flu viruses. This condition is most often caused by the same virus that causes a cold.  · Bacteria.  · Exposure to tobacco smoke, dust, fumes, and air pollution.    What increases the risk?  This condition is more likely to develop in people who:  · Have close contact with someone with acute bronchitis.  · Are exposed to lung irritants, such as tobacco smoke, dust, fumes, and vapors.  · Have a weak immune system.  · Have a respiratory condition such as asthma.    What are the signs or symptoms?  Symptoms of this condition include:  · A cough.  · Coughing up clear, yellow, or green mucus.  · Wheezing.  · Chest congestion.  · Shortness of breath.  · A fever.  · Body aches.  · Chills.  · A sore throat.    How is this diagnosed?  This condition is usually diagnosed with a physical exam. During the exam, your health care provider may order tests, such as chest X-rays, to rule out other conditions. He or she may also:  · Test a sample of your mucus for bacterial infection.  · Check the level of oxygen in your blood. This is done to check for pneumonia.  · Do a chest X-ray or lung function testing to rule out pneumonia and other conditions.  · Perform blood tests.    Your health care provider will also ask about your symptoms and medical history.  How is this  treated?  Most cases of acute bronchitis clear up over time without treatment. Your health care provider may recommend:  · Drinking more fluids. Drinking more makes your mucus thinner, which may make it easier to breathe.  · Taking a medicine for a fever or cough.  · Taking an antibiotic medicine.  · Using an inhaler to help improve shortness of breath and to control a cough.  · Using a cool mist vaporizer or humidifier to make it easier to breathe.    Follow these instructions at home:  Medicines  · Take over-the-counter and prescription medicines only as told by your health care provider.  · If you were prescribed an antibiotic, take it as told by your health care provider. Do not stop taking the antibiotic even if you start to feel better.  General instructions  · Get plenty of rest.  · Drink enough fluids to keep your urine pale yellow.  · Avoid smoking and secondhand smoke. Exposure to cigarette smoke or irritating chemicals will make bronchitis worse. If you smoke and you need help quitting, ask your health care provider. Quitting smoking will help your lungs heal faster.  · Use an inhaler, cool mist vaporizer, or humidifier as told by your health care provider.  · Keep all follow-up visits as told by your health care provider. This is important.  How is this prevented?  To lower your risk of getting this condition again:  · Wash your hands often with soap and water. If soap and water are not available, use hand .  · Avoid contact with people who have cold symptoms.  · Try not to touch your hands to your mouth, nose, or eyes.  · Make sure to get the flu shot every year.    Contact a health care provider if:  · Your symptoms do not improve in 2 weeks of treatment.  Get help right away if:  · You cough up blood.  · You have chest pain.  · You have severe shortness of breath.  · You become dehydrated.  · You faint or keep feeling like you are going to faint.  · You keep vomiting.  · You have a severe  headache.  · Your fever or chills gets worse.  This information is not intended to replace advice given to you by your health care provider. Make sure you discuss any questions you have with your health care provider.  Document Released: 01/25/2006 Document Revised: 08/01/2018 Document Reviewed: 06/07/2017  [employer] Interactive Patient Education © 2019 [employer]

## 2019-03-18 NOTE — PROGRESS NOTES
Subjective:     Sally Rivera is a 69 y.o.     Sinusitis   This is a new problem. The current episode started yesterday. The problem has been rapidly worsening since onset. The maximum temperature recorded prior to her arrival was 100.4 - 100.9 F. The fever has been present for less than 1 day. The pain is mild. Associated symptoms include chills, coughing, headaches, a hoarse voice and sinus pressure. (Wheezing) Past treatments include oral decongestants (ventolin inhaler). The treatment provided no relief.         The following portions of the patient's history were reviewed and updated as appropriate: allergies, current medications, past family history, past medical history, past social history, past surgical history and problem list.      Review of Systems   Constitutional: Positive for chills, fatigue and fever.   HENT: Positive for hoarse voice, sinus pressure and sinus pain.    Respiratory: Positive for cough.    Cardiovascular: Negative.    Musculoskeletal: Positive for myalgias.   Neurological: Positive for headaches.         Objective:    Vitals:    03/18/19 1007   BP: 116/64   BP Location: Right arm   Patient Position: Sitting   Cuff Size: Adult   Pulse: 85   Resp: 16   Temp: 98.9 °F (37.2 °C)   SpO2: 96%       Physical Exam   Constitutional: She is oriented to person, place, and time. She appears well-developed and well-nourished. She has a sickly appearance.   HENT:   Head: Normocephalic and atraumatic.   Right Ear: No tenderness. Tympanic membrane is erythematous and bulging.   Left Ear: No tenderness. Tympanic membrane is not erythematous and not bulging.   Nose: Mucosal edema and rhinorrhea present. Right sinus exhibits maxillary sinus tenderness and frontal sinus tenderness. Left sinus exhibits maxillary sinus tenderness and frontal sinus tenderness.   Mouth/Throat: Oropharynx is clear and moist and mucous membranes are normal.   Eyes: Conjunctivae are normal.   Cardiovascular: Normal rate,  regular rhythm and normal heart sounds.   Pulmonary/Chest: She has decreased breath sounds.   Patient short of air   Neurological: She is alert and oriented to person, place, and time.   Skin: Skin is warm and dry.   Psychiatric: She has a normal mood and affect. Her behavior is normal. Judgment and thought content normal.   Vitals reviewed.        Sally was seen today for sinusitis.    Diagnoses and all orders for this visit:    Bronchitis  -     POC Influenza A / B    Sinusitis, unspecified chronicity, unspecified location    Other orders  -     predniSONE (DELTASONE) 20 MG tablet; Take 1 tablet by mouth Daily for 5 days.  -     doxycycline (VIBRAMYICN) 100 MG tablet; Take 1 tablet by mouth 2 (Two) Times a Day for 10 days.  -     promethazine-phenylephrine (promethazine-phenylephrine) 6.25-5 MG/5ML syrup syrup; Take 5 mL by mouth Every 4 (Four) Hours As Needed (cough).

## 2019-03-20 DIAGNOSIS — J01.41 ACUTE RECURRENT PANSINUSITIS: Primary | ICD-10-CM

## 2019-03-20 RX ORDER — AMOXICILLIN AND CLAVULANATE POTASSIUM 875; 125 MG/1; MG/1
1 TABLET, FILM COATED ORAL 2 TIMES DAILY
Qty: 20 TABLET | Refills: 0 | Status: SHIPPED | OUTPATIENT
Start: 2019-03-20 | End: 2019-05-03

## 2019-03-26 ENCOUNTER — OFFICE VISIT (OUTPATIENT)
Dept: FAMILY MEDICINE CLINIC | Facility: CLINIC | Age: 69
End: 2019-03-26

## 2019-03-26 VITALS
OXYGEN SATURATION: 92 % | HEART RATE: 65 BPM | DIASTOLIC BLOOD PRESSURE: 70 MMHG | RESPIRATION RATE: 16 BRPM | WEIGHT: 238.4 LBS | BODY MASS INDEX: 40.7 KG/M2 | TEMPERATURE: 98.1 F | SYSTOLIC BLOOD PRESSURE: 120 MMHG | HEIGHT: 64 IN

## 2019-03-26 DIAGNOSIS — E78.5 HYPERLIPIDEMIA, UNSPECIFIED HYPERLIPIDEMIA TYPE: ICD-10-CM

## 2019-03-26 DIAGNOSIS — I10 ESSENTIAL HYPERTENSION: ICD-10-CM

## 2019-03-26 DIAGNOSIS — E11.9 TYPE 2 DIABETES MELLITUS WITHOUT COMPLICATION, WITHOUT LONG-TERM CURRENT USE OF INSULIN (HCC): ICD-10-CM

## 2019-03-26 DIAGNOSIS — Z23 IMMUNIZATION DUE: ICD-10-CM

## 2019-03-26 DIAGNOSIS — I25.10 CHRONIC CORONARY ARTERY DISEASE: ICD-10-CM

## 2019-03-26 DIAGNOSIS — Z79.899 HIGH RISK MEDICATION USE: ICD-10-CM

## 2019-03-26 DIAGNOSIS — J06.9 ACUTE URI: Primary | ICD-10-CM

## 2019-03-26 LAB
ALBUMIN SERPL-MCNC: 4 G/DL (ref 3.5–5.2)
ALBUMIN/GLOB SERPL: 1.2 G/DL
ALP SERPL-CCNC: 67 U/L (ref 39–117)
ALT SERPL-CCNC: 33 U/L (ref 1–33)
AST SERPL-CCNC: 29 U/L (ref 1–32)
BASOPHILS # BLD AUTO: 0.03 10*3/MM3 (ref 0–0.2)
BASOPHILS NFR BLD AUTO: 0.3 % (ref 0–1.5)
BILIRUB SERPL-MCNC: 0.5 MG/DL (ref 0.2–1.2)
BUN SERPL-MCNC: 9 MG/DL (ref 8–23)
BUN/CREAT SERPL: 13.4 (ref 7–25)
CALCIUM SERPL-MCNC: 9 MG/DL (ref 8.6–10.5)
CHLORIDE SERPL-SCNC: 101 MMOL/L (ref 98–107)
CHOLEST SERPL-MCNC: 139 MG/DL (ref 0–200)
CO2 SERPL-SCNC: 21.7 MMOL/L (ref 22–29)
CREAT SERPL-MCNC: 0.67 MG/DL (ref 0.57–1)
EOSINOPHIL # BLD AUTO: 0.14 10*3/MM3 (ref 0–0.4)
EOSINOPHIL NFR BLD AUTO: 1.6 % (ref 0.3–6.2)
ERYTHROCYTE [DISTWIDTH] IN BLOOD BY AUTOMATED COUNT: 16.6 % (ref 12.3–15.4)
GLOBULIN SER CALC-MCNC: 3.4 GM/DL
GLUCOSE SERPL-MCNC: 135 MG/DL (ref 65–99)
HBA1C MFR BLD: 8.18 % (ref 4.8–5.6)
HCT VFR BLD AUTO: 42.8 % (ref 34–46.6)
HDLC SERPL-MCNC: 35 MG/DL (ref 40–60)
HGB BLD-MCNC: 13.2 G/DL (ref 12–15.9)
IMM GRANULOCYTES # BLD AUTO: 0.02 10*3/MM3 (ref 0–0.05)
IMM GRANULOCYTES NFR BLD AUTO: 0.2 % (ref 0–0.5)
LDLC SERPL CALC-MCNC: 80 MG/DL (ref 0–100)
LYMPHOCYTES # BLD AUTO: 3.24 10*3/MM3 (ref 0.7–3.1)
LYMPHOCYTES NFR BLD AUTO: 37.2 % (ref 19.6–45.3)
MCH RBC QN AUTO: 25.3 PG (ref 26.6–33)
MCHC RBC AUTO-ENTMCNC: 30.8 G/DL (ref 31.5–35.7)
MCV RBC AUTO: 82.1 FL (ref 79–97)
MONOCYTES # BLD AUTO: 0.58 10*3/MM3 (ref 0.1–0.9)
MONOCYTES NFR BLD AUTO: 6.7 % (ref 5–12)
NEUTROPHILS # BLD AUTO: 4.7 10*3/MM3 (ref 1.4–7)
NEUTROPHILS NFR BLD AUTO: 54 % (ref 42.7–76)
NRBC BLD AUTO-RTO: 0 /100 WBC (ref 0–0)
PLATELET # BLD AUTO: 196 10*3/MM3 (ref 140–450)
POTASSIUM SERPL-SCNC: 4.1 MMOL/L (ref 3.5–5.2)
PROT SERPL-MCNC: 7.4 G/DL (ref 6–8.5)
RBC # BLD AUTO: 5.21 10*6/MM3 (ref 3.77–5.28)
SODIUM SERPL-SCNC: 136 MMOL/L (ref 136–145)
TRIGL SERPL-MCNC: 120 MG/DL (ref 0–150)
VLDLC SERPL CALC-MCNC: 24 MG/DL (ref 5–40)
WBC # BLD AUTO: 8.71 10*3/MM3 (ref 3.4–10.8)

## 2019-03-26 PROCEDURE — 90715 TDAP VACCINE 7 YRS/> IM: CPT | Performed by: FAMILY MEDICINE

## 2019-03-26 PROCEDURE — 99214 OFFICE O/P EST MOD 30 MIN: CPT | Performed by: FAMILY MEDICINE

## 2019-03-26 PROCEDURE — 90471 IMMUNIZATION ADMIN: CPT | Performed by: FAMILY MEDICINE

## 2019-03-26 NOTE — PROGRESS NOTES
HPI  Sally Rivera is a 69 y.o. female who is here for follow up of diabetes hypertension known coronary artery disease.  1 week history of sinus infection and cough.  Was seen at [facility] and started on antibiotic but no improvement.  Recently changed to Augmentin because of sinus infection.  Today finally feels like she is improving.   has also had upper respiratory symptoms.  Last visit patient's statin therapy was increased to 40 mg and will recheck lab work today.  Also immunization updates.      Review of Systems   Constitutional: Negative for chills, diaphoresis and fever.   HENT: Positive for congestion.    Respiratory: Positive for cough. Negative for shortness of breath.    All other systems reviewed and are negative.        Past Medical History:   Diagnosis Date   • Acid reflux    • Diabetes mellitus type I (CMS/HCC)    • Elevated cholesterol    • Heart attack (CMS/HCC)    • Hypertension    • Liver disease        Past Surgical History:   Procedure Laterality Date   • CHOLECYSTECTOMY     • COLON SURGERY      bowel resection for obstruction   • HYSTERECTOMY     • MOUTH BIOPSY         Family History   Problem Relation Age of Onset   • No Known Problems Mother    • Diabetes Father    • Heart disease Father        Social History     Socioeconomic History   • Marital status:      Spouse name: Not on file   • Number of children: Not on file   • Years of education: Not on file   • Highest education level: Not on file   Tobacco Use   • Smoking status: Former Smoker     Last attempt to quit: 1990     Years since quittin.2   • Smokeless tobacco: Never Used   Substance and Sexual Activity   • Alcohol use: No   • Drug use: No   • Sexual activity: Defer         Physical Exam   Constitutional: She is oriented to person, place, and time. She appears well-developed and well-nourished. No distress.   HENT:   Head: Normocephalic.   Nose: Nose normal.    Mouth/Throat: Oropharynx is clear and moist.   Eyes: Conjunctivae and EOM are normal. Pupils are equal, round, and reactive to light.   Neck: Normal range of motion. Neck supple. No tracheal deviation present.   Cardiovascular: Normal rate and regular rhythm.   Pulmonary/Chest: Effort normal and breath sounds normal. No respiratory distress. She has no wheezes. She has no rales.   Abdominal: Soft. She exhibits no distension. There is no tenderness.   Musculoskeletal: Normal range of motion. She exhibits no edema or deformity.   Neurological: She is alert and oriented to person, place, and time. Coordination normal.   Skin: Skin is warm and dry.   Psychiatric: She has a normal mood and affect. Her behavior is normal. Judgment and thought content normal.   Nursing note and vitals reviewed.        Assessment/Plan    Sally was seen today for hyperlipidemia, hypertension, diabetes, cough, sinus problem and shortness of breath.    Diagnoses and all orders for this visit:    Acute URI  -     CBC & Differential    Chronic coronary artery disease    Hyperlipidemia, unspecified hyperlipidemia type  -     Lipid Panel    Essential hypertension  -     Comprehensive Metabolic Panel    High risk medication use  -     CBC & Differential  -     Comprehensive Metabolic Panel    Type 2 diabetes mellitus without complication, without long-term current use of insulin (CMS/[facility])  -     Hemoglobin A1c      Patient here mostly for follow-up of above-noted medical problems.  Has had upper respiratory symptoms for the last week which today seem to be some better.  Continue current therapy including complete antibiotic therapy for sinus infection.  Call if symptoms worsen or persist greater than 1 week.  Otherwise continue current therapy, await lab work noted above and follow-up in 3 months    This note includes information entered using a voice recognition dictation system.  Though reviewed, some nonsensible errors may remain.

## 2019-03-28 DIAGNOSIS — E11.9 TYPE 2 DIABETES MELLITUS WITHOUT COMPLICATION, WITHOUT LONG-TERM CURRENT USE OF INSULIN (HCC): ICD-10-CM

## 2019-04-19 RX ORDER — PREDNISONE 1 MG/1
TABLET ORAL
Qty: 21 TABLET | Refills: 0 | OUTPATIENT
Start: 2019-04-19

## 2019-05-03 RX ORDER — FUROSEMIDE 20 MG/1
20 TABLET ORAL DAILY PRN
Qty: 30 TABLET | Refills: 2 | Status: SHIPPED | OUTPATIENT
Start: 2019-05-03 | End: 2019-06-07 | Stop reason: SDUPTHER

## 2019-05-23 DIAGNOSIS — E11.9 TYPE 2 DIABETES MELLITUS WITHOUT COMPLICATION, WITHOUT LONG-TERM CURRENT USE OF INSULIN (HCC): ICD-10-CM

## 2019-05-28 DIAGNOSIS — I47.1 ATRIAL PAROXYSMAL TACHYCARDIA (HCC): ICD-10-CM

## 2019-05-28 DIAGNOSIS — I25.10 CHRONIC CORONARY ARTERY DISEASE: ICD-10-CM

## 2019-05-28 RX ORDER — METOPROLOL TARTRATE 50 MG/1
TABLET, FILM COATED ORAL
Qty: 60 TABLET | Refills: 5 | Status: SHIPPED | OUTPATIENT
Start: 2019-05-28 | End: 2019-11-25 | Stop reason: SDUPTHER

## 2019-06-07 ENCOUNTER — OFFICE VISIT (OUTPATIENT)
Dept: FAMILY MEDICINE CLINIC | Facility: CLINIC | Age: 69
End: 2019-06-07

## 2019-06-07 VITALS
TEMPERATURE: 98.1 F | DIASTOLIC BLOOD PRESSURE: 84 MMHG | WEIGHT: 244 LBS | OXYGEN SATURATION: 94 % | SYSTOLIC BLOOD PRESSURE: 134 MMHG | HEART RATE: 70 BPM | BODY MASS INDEX: 41.66 KG/M2 | HEIGHT: 64 IN

## 2019-06-07 DIAGNOSIS — I10 ESSENTIAL HYPERTENSION: ICD-10-CM

## 2019-06-07 DIAGNOSIS — E78.5 HYPERLIPIDEMIA, UNSPECIFIED HYPERLIPIDEMIA TYPE: ICD-10-CM

## 2019-06-07 DIAGNOSIS — E11.9 TYPE 2 DIABETES MELLITUS WITHOUT COMPLICATION, WITHOUT LONG-TERM CURRENT USE OF INSULIN (HCC): Primary | ICD-10-CM

## 2019-06-07 DIAGNOSIS — R60.1 GENERALIZED EDEMA: ICD-10-CM

## 2019-06-07 DIAGNOSIS — M17.12 PRIMARY OSTEOARTHRITIS OF LEFT KNEE: ICD-10-CM

## 2019-06-07 PROCEDURE — 99212 OFFICE O/P EST SF 10 MIN: CPT | Performed by: FAMILY MEDICINE

## 2019-06-07 RX ORDER — FUROSEMIDE 40 MG/1
40 TABLET ORAL DAILY PRN
Qty: 30 TABLET | Refills: 5 | Status: SHIPPED | OUTPATIENT
Start: 2019-06-07 | End: 2019-11-25 | Stop reason: SDUPTHER

## 2019-06-07 NOTE — PROGRESS NOTES
HPI  Sally Rivera is a 69 y.o. female who is here for follow up of diabetes known coronary artery disease.  Only new complaint is severe left knee pain.  Is making very difficult to ambulate aggravated by lifting grandchild.  Has been able to increase metformin to 3 times daily but if ever takes 2 pills at a time has severe cramping and diarrhea.  This was again discussed.      Review of Systems   Constitutional: Positive for unexpected weight change.   Musculoskeletal: Positive for arthralgias.   All other systems reviewed and are negative.        Past Medical History:   Diagnosis Date   • Acid reflux    • Diabetes mellitus type I (CMS/HCC)    • Elevated cholesterol    • Heart attack (CMS/HCC)    • Hypertension    • Liver disease        Past Surgical History:   Procedure Laterality Date   • CHOLECYSTECTOMY     • COLON SURGERY      bowel resection for obstruction   • HYSTERECTOMY     • MOUTH BIOPSY         Family History   Problem Relation Age of Onset   • No Known Problems Mother    • Diabetes Father    • Heart disease Father        Social History     Socioeconomic History   • Marital status:      Spouse name: Not on file   • Number of children: Not on file   • Years of education: Not on file   • Highest education level: Not on file   Tobacco Use   • Smoking status: Former Smoker     Last attempt to quit: 1990     Years since quittin.4   • Smokeless tobacco: Never Used   Substance and Sexual Activity   • Alcohol use: No   • Drug use: No   • Sexual activity: Defer         Physical Exam   Constitutional: She is oriented to person, place, and time. She appears well-developed and well-nourished. No distress.   HENT:   Head: Normocephalic and atraumatic.   Nose: Nose normal.   Mouth/Throat: Oropharynx is clear and moist.   Eyes: Conjunctivae and EOM are normal. Pupils are equal, round, and reactive to light.   Neck: Normal range of motion. Neck supple.   Cardiovascular: Normal  rate and regular rhythm.   Pulmonary/Chest: Effort normal.   Abdominal: Soft. She exhibits no distension. There is no tenderness.   Musculoskeletal: She exhibits edema and deformity.        Left knee: She exhibits decreased range of motion and deformity. She exhibits no swelling, no effusion and no erythema.   Lymphadenopathy:     She has no cervical adenopathy.   Neurological: She is alert and oriented to person, place, and time.   Skin: Skin is warm and dry.   Psychiatric: She has a normal mood and affect. Her behavior is normal. Judgment and thought content normal.   Vitals reviewed.        Assessment/Plan    Sally was seen today for follow-up, diabetes, hyperlipidemia and knee pain.    Diagnoses and all orders for this visit:    Type 2 diabetes mellitus without complication, without long-term current use of insulin (CMS/[facility])  -     Basic Metabolic Panel; Future  -     Hemoglobin A1c; Future    Essential hypertension  -     Basic Metabolic Panel; Future    Hyperlipidemia, unspecified hyperlipidemia type    Primary osteoarthritis of left knee  -     Ambulatory Referral to Orthopedic Surgery        Patient is here for follow-up of diabetes.  Was able to increase metformin to 500 mg 3 times daily at last visit and able to tolerate.  Will check A1c in 2 weeks as discussed.  If remains elevated consider increasing Actos.  Also reports worsening swelling and would like to increase Lasix to 40 mg.  Main complaint is worsening left knee pain and will get orthopedic consultation for x-ray and discussion of further treatment options.  Patient is aware that extreme weight excess is aggravating knee pain and has been on diet plans in the past with temporary success.  All of this was discussed.  Again adjust medication depending on lab work in 2 weeks and follow-up in 3 months.  Cholesterol levels seem to be much better with current therapy.    This note includes information entered using a voice recognition dictation  system.  Though reviewed, some nonsensible errors may remain.

## 2019-06-11 DIAGNOSIS — I25.10 CHRONIC CORONARY ARTERY DISEASE: ICD-10-CM

## 2019-06-11 RX ORDER — DILTIAZEM HYDROCHLORIDE 120 MG/1
TABLET, FILM COATED ORAL
Qty: 270 TABLET | Refills: 2 | Status: SHIPPED | OUTPATIENT
Start: 2019-06-11 | End: 2020-03-02

## 2019-06-26 ENCOUNTER — RESULTS ENCOUNTER (OUTPATIENT)
Dept: FAMILY MEDICINE CLINIC | Facility: CLINIC | Age: 69
End: 2019-06-26

## 2019-06-26 DIAGNOSIS — E11.9 TYPE 2 DIABETES MELLITUS WITHOUT COMPLICATION, WITHOUT LONG-TERM CURRENT USE OF INSULIN (HCC): ICD-10-CM

## 2019-06-26 DIAGNOSIS — K76.0 NONALCOHOLIC FATTY LIVER DISEASE: ICD-10-CM

## 2019-06-26 DIAGNOSIS — I10 ESSENTIAL HYPERTENSION: ICD-10-CM

## 2019-06-26 LAB
BUN SERPL-MCNC: 12 MG/DL (ref 8–23)
BUN/CREAT SERPL: 15.4 (ref 7–25)
CALCIUM SERPL-MCNC: 9.3 MG/DL (ref 8.6–10.5)
CHLORIDE SERPL-SCNC: 99 MMOL/L (ref 98–107)
CO2 SERPL-SCNC: 23.4 MMOL/L (ref 22–29)
CREAT SERPL-MCNC: 0.78 MG/DL (ref 0.57–1)
GLUCOSE SERPL-MCNC: 254 MG/DL (ref 65–99)
HBA1C MFR BLD: 8.3 % (ref 4.8–5.6)
POTASSIUM SERPL-SCNC: 4.2 MMOL/L (ref 3.5–5.2)
SODIUM SERPL-SCNC: 137 MMOL/L (ref 136–145)

## 2019-06-26 RX ORDER — PIOGLITAZONEHYDROCHLORIDE 30 MG/1
30 TABLET ORAL DAILY
Qty: 90 TABLET | Refills: 3 | Status: SHIPPED | OUTPATIENT
Start: 2019-06-26 | End: 2020-06-03 | Stop reason: SDUPTHER

## 2019-07-10 ENCOUNTER — OFFICE VISIT (OUTPATIENT)
Dept: ORTHOPEDIC SURGERY | Facility: CLINIC | Age: 69
End: 2019-07-10

## 2019-07-10 VITALS — HEIGHT: 64 IN | WEIGHT: 244 LBS | BODY MASS INDEX: 41.66 KG/M2 | TEMPERATURE: 97.4 F

## 2019-07-10 DIAGNOSIS — M17.12 ARTHRITIS OF LEFT KNEE: ICD-10-CM

## 2019-07-10 DIAGNOSIS — G89.29 CHRONIC PAIN OF LEFT KNEE: Primary | ICD-10-CM

## 2019-07-10 DIAGNOSIS — M25.562 CHRONIC PAIN OF LEFT KNEE: Primary | ICD-10-CM

## 2019-07-10 PROCEDURE — 99204 OFFICE O/P NEW MOD 45 MIN: CPT | Performed by: ORTHOPAEDIC SURGERY

## 2019-07-10 PROCEDURE — 73562 X-RAY EXAM OF KNEE 3: CPT | Performed by: ORTHOPAEDIC SURGERY

## 2019-07-10 PROCEDURE — 20610 DRAIN/INJ JOINT/BURSA W/O US: CPT | Performed by: ORTHOPAEDIC SURGERY

## 2019-07-10 RX ORDER — METHYLPREDNISOLONE ACETATE 80 MG/ML
80 INJECTION, SUSPENSION INTRA-ARTICULAR; INTRALESIONAL; INTRAMUSCULAR; SOFT TISSUE
Status: COMPLETED | OUTPATIENT
Start: 2019-07-10 | End: 2019-07-10

## 2019-07-10 RX ADMIN — METHYLPREDNISOLONE ACETATE 80 MG: 80 INJECTION, SUSPENSION INTRA-ARTICULAR; INTRALESIONAL; INTRAMUSCULAR; SOFT TISSUE at 14:29

## 2019-07-10 NOTE — PROGRESS NOTES
"Patient Name: Sally Rivera   YOB: 1950  Referring Primary Care Physician: Christopher Hastings MD  BMI: Body mass index is 41.88 kg/m².    Chief Complaint:    Chief Complaint   Patient presents with   • Left Knee - Establish Care, Pain        HPI:     Sally Rivera is a 69 y.o. female who presents today for evaluation of   Chief Complaint   Patient presents with   • Left Knee - Establish Care, Pain   .  Patient is complaining of atraumatic left knee pain that has been hurting her for several months.  She does have a history of a fall last winter over a parking concrete device may have started then.  She says she takes a little bit of ibuprofen but is hesitant to do it because she is had some \"liver problems\" is a retired  from [employer].  She is not locking or catching it does not radiate and is relieved by rest    This problem is new to this examiner.     Subjective   Medications:   Home Medications:  Current Outpatient Medications on File Prior to Visit   Medication Sig   • aspirin 325 MG tablet Take 325 mg by mouth daily.   • atorvastatin (LIPITOR) 40 MG tablet Take 1 tablet by mouth Daily. 200001   • diltiaZEM (CARDIZEM) 120 MG tablet TAKE 1 TABLET BY MOUTH THREE TIMES A DAY    • furosemide (LASIX) 40 MG tablet Take 1 tablet by mouth Daily As Needed (Swelling).   • KLOR-CON 10 MEQ CR tablet TAKE 1 TABLET BY MOUTH ONE TIME A DAY    • metFORMIN (GLUCOPHAGE) 500 MG tablet Take 1 tablet by mouth 3 (Three) Times a Day.   • metoprolol tartrate (LOPRESSOR) 50 MG tablet TAKE 1 TABLET BY MOUTH TWO TIMES A DAY    • omeprazole (priLOSEC) 20 MG capsule Take 1 capsule by mouth Daily.   • pioglitazone (ACTOS) 30 MG tablet Take 1 tablet by mouth Daily.     No current facility-administered medications on file prior to visit.      Current Medications:  Scheduled Meds:  Continuous Infusions:  No current facility-administered medications for this visit.   PRN Meds:.    I have reviewed the " patient's medical history in detail and updated the computerized patient record.  Review and summarization of old records includes:    Past Medical History:   Diagnosis Date   • Acid reflux    • Diabetes mellitus type I (CMS/HCC)    • Elevated cholesterol    • Heart attack (CMS/HCC)    • Hypertension    • Liver disease         Past Surgical History:   Procedure Laterality Date   • CHOLECYSTECTOMY     • COLON SURGERY      bowel resection for obstruction   • HYSTERECTOMY     • MOUTH BIOPSY          Social History     Occupational History   • Not on file   Tobacco Use   • Smoking status: Former Smoker     Last attempt to quit: 1990     Years since quittin.5   • Smokeless tobacco: Never Used   Substance and Sexual Activity   • Alcohol use: No   • Drug use: No   • Sexual activity: Defer      Social History     Social History Narrative   • Not on file        Family History   Problem Relation Age of Onset   • No Known Problems Mother    • Diabetes Father    • Heart disease Father        ROS: 14 point review of systems was performed and all other systems were reviewed and are negative except for documented findings in HPI and today's encounter.     Allergies: No Known Allergies  Constitutional:  Denies fever, shaking or chills   Eyes:  Denies change in visual acuity   HENT:  Denies nasal congestion or sore throat   Respiratory:  Denies cough or shortness of breath   Cardiovascular:  Denies chest pain or severe LE edema   GI:  Denies abdominal pain, nausea, vomiting, bloody stools or diarrhea   Musculoskeletal:  Numbness, tingling, pain, or loss of motor function only as noted above in history of present illness.  : Denies painful urination or hematuria  Integument:  Denies rash, lesion or ulceration   Neurologic:  Denies headache or focal weakness  Endocrine:  Denies lymphadenopathy  Psych:  Denies confusion or change in mental status   Hem:  Denies active bleeding    OBJECTIVE:  Physical Exam:  "  Temp 97.4 °F (36.3 °C) (Temporal)   Ht 162.6 cm (64\")   Wt 111 kg (244 lb)   BMI 41.88 kg/m²     General Appearance:    Alert, cooperative, in no acute distress                  Eyes: conjunctiva clear  ENT: external ears and nose atraumatic  CV: no peripheral edema  Resp: normal respiratory effort  Skin: no rashes or wounds; normal turgor  Psych: mood and affect appropriate  Lymph: no nodes appreciated  Neuro: gross sensation intact  Vascular:  Palpable peripheral pulse in noted extremity  Musculoskeletal Extremities: She has medial joint line tenderness mild synovitis some effusion Baker's cyst she does not really limp.  Radiology:   AP lateral 40 degree PA left knee taken the office today without comparison view show advanced osteoarthritis with a varus pattern    Assessment:     ICD-10-CM ICD-9-CM   1. Chronic pain of left knee M25.562 719.46    G89.29 338.29   2. Arthritis of left knee M17.12 716.96        Large Joint Arthrocentesis: L knee  Date/Time: 7/10/2019 2:29 PM  Consent given by: patient  Site marked: site marked  Timeout: Immediately prior to procedure a time out was called to verify the correct patient, procedure, equipment, support staff and site/side marked as required   Supporting Documentation  Indications: pain and joint swelling   Procedure Details  Location: knee - L knee  Preparation: Patient was prepped and draped in the usual sterile fashion  Needle size: 22 G  Approach: anterolateral  Medications administered: 80 mg methylPREDNISolone acetate 80 MG/ML; 4 mL lidocaine (cardiac)  Patient tolerance: patient tolerated the procedure well with no immediate complications             Plan: Biomechanics of pertinent body area discussed.  Risks, benefits, alternatives, comparisons, and complications of accepted medicines, injections, recommendations, surgical procedures, and therapies explained and education provided in laymen's terms. Natural history and expected course of this patient's " diagnosis discussed along with evaluation of therapies. Questions answered. When appropriate I also discussed proper use of cane, walker, trekking poles.   BMI:  The concept of BMI body mass index and its importance and implications discussed.  BMI suggested to be < 40 or as low as possible. Lifestyle measures for weight loss and how this affects orthopedic condition.  EXERCISES:  Advice on benefits of, and types of regular/moderate exercise including biomechanical forces involved as it pertains to this complaint.  MEDICATIONS:  Prescription, OTC and Monitoring of Medications per orders to address ortho complaints; Evaluation and discussion of safety, precautions, side effects, and warnings given especially of long term NSAID or steroid therapy.    RICE: Rest, ice, compression, and elevation therapy, Cryotherapy/brachy therapy, and or OTC linaments as indicated with instructions.   Cortisone Injection. See procedure note.if she is not getting better she could call for physical therapy I gave her handout today.      7/10/2019    Much of this encounter note is an electronic transcription/translation of spoken language to printed text. The electronic translation of spoken language may permit erroneous, or at times, nonsensical words or phrases to be inadvertently transcribed; Although I have reviewed the note for such errors, some may still exist

## 2019-10-02 ENCOUNTER — OFFICE VISIT (OUTPATIENT)
Dept: FAMILY MEDICINE CLINIC | Facility: CLINIC | Age: 69
End: 2019-10-02

## 2019-10-02 VITALS
HEART RATE: 75 BPM | DIASTOLIC BLOOD PRESSURE: 70 MMHG | TEMPERATURE: 98.1 F | SYSTOLIC BLOOD PRESSURE: 130 MMHG | RESPIRATION RATE: 16 BRPM | OXYGEN SATURATION: 94 % | WEIGHT: 241 LBS | BODY MASS INDEX: 41.15 KG/M2 | HEIGHT: 64 IN

## 2019-10-02 DIAGNOSIS — I10 ESSENTIAL HYPERTENSION: ICD-10-CM

## 2019-10-02 DIAGNOSIS — I25.10 CHRONIC CORONARY ARTERY DISEASE: Primary | ICD-10-CM

## 2019-10-02 DIAGNOSIS — Z79.899 HIGH RISK MEDICATION USE: ICD-10-CM

## 2019-10-02 DIAGNOSIS — E78.5 HYPERLIPIDEMIA, UNSPECIFIED HYPERLIPIDEMIA TYPE: ICD-10-CM

## 2019-10-02 DIAGNOSIS — E11.65 TYPE 2 DIABETES MELLITUS WITH HYPERGLYCEMIA, WITHOUT LONG-TERM CURRENT USE OF INSULIN (HCC): ICD-10-CM

## 2019-10-02 PROCEDURE — 99213 OFFICE O/P EST LOW 20 MIN: CPT | Performed by: FAMILY MEDICINE

## 2019-10-02 RX ORDER — INFLUENZA VACCINE, ADJUVANTED 15; 15; 15 UG/.5ML; UG/.5ML; UG/.5ML
INJECTION, SUSPENSION INTRAMUSCULAR
Refills: 0 | COMMUNITY
Start: 2019-08-22 | End: 2020-08-04

## 2019-10-02 NOTE — PROGRESS NOTES
HPI  Sally Rivera is a 69 y.o. female who is here for follow up [place].  Most recent A1c was elevated.  Medications reviewed.  Patient says less dry mouth and thinks sugars may be improving.      Review of Systems   Cardiovascular: Negative for palpitations.   Gastrointestinal: Negative for abdominal distention and abdominal pain.   All other systems reviewed and are negative.        Past Medical History:   Diagnosis Date   • Acid reflux    • Diabetes mellitus type I (CMS/HCC)    • Elevated cholesterol    • Heart attack (CMS/HCC)    • Hypertension    • Liver disease        Past Surgical History:   Procedure Laterality Date   • CHOLECYSTECTOMY     • COLON SURGERY      bowel resection for obstruction   • HYSTERECTOMY     • MOUTH BIOPSY         Family History   Problem Relation Age of Onset   • No Known Problems Mother    • Diabetes Father    • Heart disease Father        Social History     Socioeconomic History   • Marital status:      Spouse name: Not on file   • Number of children: Not on file   • Years of education: Not on file   • Highest education level: Not on file   Tobacco Use   • Smoking status: Former Smoker     Last attempt to quit: 1990     Years since quittin.8   • Smokeless tobacco: Never Used   Substance and Sexual Activity   • Alcohol use: No   • Drug use: No   • Sexual activity: Defer         Physical Exam   Constitutional: She is oriented to person, place, and time. She appears well-developed and well-nourished. No distress.   HENT:   Head: Normocephalic.   Nose: Nose normal.   Mouth/Throat: Oropharynx is clear and moist.   Eyes: Conjunctivae and EOM are normal. Pupils are equal, round, and reactive to light.   Neck: Normal range of motion.   Cardiovascular: Normal rate and regular rhythm.   Pulmonary/Chest: Effort normal and breath sounds normal.   Abdominal: Soft. She exhibits no distension.   Musculoskeletal: Normal range of motion. She exhibits no  edema or deformity.   Neurological: She is alert and oriented to person, place, and time. Coordination normal.   Skin: Skin is warm and dry.   Psychiatric: She has a normal mood and affect. Her behavior is normal. Judgment and thought content normal.   Nursing note and vitals reviewed.        Assessment/Plan    Diagnoses and all orders for this visit:    Chronic coronary artery disease    Hyperlipidemia, unspecified hyperlipidemia type    Essential hypertension  -     Basic Metabolic Panel    Type 2 diabetes mellitus with hyperglycemia, without long-term current use of insulin (CMS/[facility])  -     Basic Metabolic Panel  -     Hemoglobin A1c  -     Microalbumin / Creatinine Urine Ratio - Urine, Clean Catch    High risk medication use  -     Basic Metabolic Panel      Patient here for routine follow-up of diabetes.  Overall denies any new complaints.  Most recent A1c was somewhat elevated.  Has gone to diabetic education classes in the past and will give refresher booklet.  Blood pressure seems to be under good control.  Some weight loss is noted and encouraged as well as exercise.  Reports last annual eye exam in January.  Apparently colon polyp removed in 2011 but specialist recommended repeat colonoscopy every 10 years?    This note includes information entered using a voice recognition dictation system.  Though reviewed, some nonsensible errors may remain.

## 2019-10-03 LAB
ALBUMIN/CREAT UR: 10.8 MG/G CREAT (ref 0–30)
BUN SERPL-MCNC: 13 MG/DL (ref 8–27)
BUN/CREAT SERPL: 16 (ref 12–28)
CALCIUM SERPL-MCNC: 8.9 MG/DL (ref 8.7–10.3)
CHLORIDE SERPL-SCNC: 103 MMOL/L (ref 96–106)
CO2 SERPL-SCNC: 21 MMOL/L (ref 20–29)
CREAT SERPL-MCNC: 0.82 MG/DL (ref 0.57–1)
CREAT UR-MCNC: 112.2 MG/DL
GLUCOSE SERPL-MCNC: 103 MG/DL (ref 65–99)
HBA1C MFR BLD: 7.3 % (ref 4.8–5.6)
MICROALBUMIN UR-MCNC: 12.1 UG/ML
POTASSIUM SERPL-SCNC: 3.9 MMOL/L (ref 3.5–5.2)
SODIUM SERPL-SCNC: 141 MMOL/L (ref 134–144)

## 2019-10-10 ENCOUNTER — HOSPITAL ENCOUNTER (EMERGENCY)
Facility: HOSPITAL | Age: 69
Discharge: HOME | End: 2019-10-10
Attending: EMERGENCY MEDICINE | Admitting: EMERGENCY MEDICINE

## 2019-10-10 ENCOUNTER — APPOINTMENT (OUTPATIENT)
Dept: GENERAL RADIOLOGY | Facility: HOSPITAL | Age: 69
End: 2019-10-10

## 2019-10-10 VITALS
WEIGHT: 240 LBS | SYSTOLIC BLOOD PRESSURE: 121 MMHG | RESPIRATION RATE: 18 BRPM | TEMPERATURE: 98.1 F | HEIGHT: 64 IN | HEART RATE: 70 BPM | OXYGEN SATURATION: 94 % | DIASTOLIC BLOOD PRESSURE: 71 MMHG | BODY MASS INDEX: 40.97 KG/M2

## 2019-10-10 DIAGNOSIS — M54.50 LUMBAR BACK PAIN: Primary | ICD-10-CM

## 2019-10-10 DIAGNOSIS — M54.31 SCIATICA OF RIGHT SIDE: ICD-10-CM

## 2019-10-10 LAB
ANION GAP SERPL CALCULATED.3IONS-SCNC: 16.3 MMOL/L (ref 5–15)
BASOPHILS # BLD AUTO: 0.05 10*3/MM3 (ref 0–0.2)
BASOPHILS NFR BLD AUTO: 0.6 % (ref 0–1.5)
BILIRUB UR QL STRIP: NEGATIVE
BUN BLD-MCNC: 16 MG/DL (ref 8–23)
BUN/CREAT SERPL: 19.5 (ref 7–25)
CALCIUM SPEC-SCNC: 8.9 MG/DL (ref 8.6–10.5)
CHLORIDE SERPL-SCNC: 99 MMOL/L (ref 98–107)
CLARITY UR: CLEAR
CO2 SERPL-SCNC: 23.7 MMOL/L (ref 22–29)
COLOR UR: YELLOW
CREAT BLD-MCNC: 0.82 MG/DL (ref 0.57–1)
DEPRECATED RDW RBC AUTO: 45.4 FL (ref 37–54)
EOSINOPHIL # BLD AUTO: 0.26 10*3/MM3 (ref 0–0.4)
EOSINOPHIL NFR BLD AUTO: 3.3 % (ref 0.3–6.2)
ERYTHROCYTE [DISTWIDTH] IN BLOOD BY AUTOMATED COUNT: 15.8 % (ref 12.3–15.4)
GFR SERPL CREATININE-BSD FRML MDRD: 69 ML/MIN/1.73
GLUCOSE BLD-MCNC: 115 MG/DL (ref 65–99)
GLUCOSE UR STRIP-MCNC: NEGATIVE MG/DL
HCT VFR BLD AUTO: 37.7 % (ref 34–46.6)
HGB BLD-MCNC: 11.9 G/DL (ref 12–15.9)
HGB UR QL STRIP.AUTO: NEGATIVE
IMM GRANULOCYTES # BLD AUTO: 0.02 10*3/MM3 (ref 0–0.05)
IMM GRANULOCYTES NFR BLD AUTO: 0.3 % (ref 0–0.5)
KETONES UR QL STRIP: NEGATIVE
LEUKOCYTE ESTERASE UR QL STRIP.AUTO: NEGATIVE
LYMPHOCYTES # BLD AUTO: 2.62 10*3/MM3 (ref 0.7–3.1)
LYMPHOCYTES NFR BLD AUTO: 33.4 % (ref 19.6–45.3)
MCH RBC QN AUTO: 24.8 PG (ref 26.6–33)
MCHC RBC AUTO-ENTMCNC: 31.6 G/DL (ref 31.5–35.7)
MCV RBC AUTO: 78.5 FL (ref 79–97)
MONOCYTES # BLD AUTO: 0.53 10*3/MM3 (ref 0.1–0.9)
MONOCYTES NFR BLD AUTO: 6.8 % (ref 5–12)
NEUTROPHILS # BLD AUTO: 4.36 10*3/MM3 (ref 1.7–7)
NEUTROPHILS NFR BLD AUTO: 55.6 % (ref 42.7–76)
NITRITE UR QL STRIP: NEGATIVE
NRBC BLD AUTO-RTO: 0 /100 WBC (ref 0–0.2)
PH UR STRIP.AUTO: <=5 [PH] (ref 5–8)
PLATELET # BLD AUTO: 197 10*3/MM3 (ref 140–450)
PMV BLD AUTO: 9.8 FL (ref 6–12)
POTASSIUM BLD-SCNC: 4.6 MMOL/L (ref 3.5–5.2)
PROT UR QL STRIP: NEGATIVE
RBC # BLD AUTO: 4.8 10*6/MM3 (ref 3.77–5.28)
SODIUM BLD-SCNC: 139 MMOL/L (ref 136–145)
SP GR UR STRIP: 1.01 (ref 1–1.03)
UROBILINOGEN UR QL STRIP: NORMAL
WBC NRBC COR # BLD: 7.84 10*3/MM3 (ref 3.4–10.8)

## 2019-10-10 PROCEDURE — 96375 TX/PRO/DX INJ NEW DRUG ADDON: CPT

## 2019-10-10 PROCEDURE — 81003 URINALYSIS AUTO W/O SCOPE: CPT | Performed by: EMERGENCY MEDICINE

## 2019-10-10 PROCEDURE — 72110 X-RAY EXAM L-2 SPINE 4/>VWS: CPT

## 2019-10-10 PROCEDURE — 25010000002 ONDANSETRON PER 1 MG: Performed by: EMERGENCY MEDICINE

## 2019-10-10 PROCEDURE — 99283 EMERGENCY DEPT VISIT LOW MDM: CPT

## 2019-10-10 PROCEDURE — 96374 THER/PROPH/DIAG INJ IV PUSH: CPT

## 2019-10-10 PROCEDURE — 80048 BASIC METABOLIC PNL TOTAL CA: CPT | Performed by: EMERGENCY MEDICINE

## 2019-10-10 PROCEDURE — 25010000002 MORPHINE PER 10 MG: Performed by: EMERGENCY MEDICINE

## 2019-10-10 PROCEDURE — 85025 COMPLETE CBC W/AUTO DIFF WBC: CPT | Performed by: EMERGENCY MEDICINE

## 2019-10-10 RX ORDER — ONDANSETRON 2 MG/ML
4 INJECTION INTRAMUSCULAR; INTRAVENOUS ONCE
Status: COMPLETED | OUTPATIENT
Start: 2019-10-10 | End: 2019-10-10

## 2019-10-10 RX ORDER — HYDROCODONE BITARTRATE AND ACETAMINOPHEN 5; 325 MG/1; MG/1
1 TABLET ORAL EVERY 6 HOURS PRN
Qty: 20 TABLET | Refills: 0 | Status: SHIPPED | OUTPATIENT
Start: 2019-10-10 | End: 2020-06-02

## 2019-10-10 RX ORDER — SODIUM CHLORIDE 0.9 % (FLUSH) 0.9 %
10 SYRINGE (ML) INJECTION AS NEEDED
Status: DISCONTINUED | OUTPATIENT
Start: 2019-10-10 | End: 2019-10-10 | Stop reason: HOSPADM

## 2019-10-10 RX ORDER — MORPHINE SULFATE 2 MG/ML
2 INJECTION, SOLUTION INTRAMUSCULAR; INTRAVENOUS ONCE
Status: COMPLETED | OUTPATIENT
Start: 2019-10-10 | End: 2019-10-10

## 2019-10-10 RX ORDER — METHYLPREDNISOLONE 4 MG/1
TABLET ORAL
Qty: 21 EACH | Refills: 0 | Status: SHIPPED | OUTPATIENT
Start: 2019-10-10 | End: 2020-06-02

## 2019-10-10 RX ORDER — CYCLOBENZAPRINE HCL 10 MG
10 TABLET ORAL 3 TIMES DAILY PRN
Qty: 20 TABLET | Refills: 0 | Status: SHIPPED | OUTPATIENT
Start: 2019-10-10 | End: 2021-05-10

## 2019-10-10 RX ADMIN — ONDANSETRON 4 MG: 2 INJECTION INTRAMUSCULAR; INTRAVENOUS at 13:15

## 2019-10-10 RX ADMIN — MORPHINE SULFATE 2 MG: 2 INJECTION, SOLUTION INTRAMUSCULAR; INTRAVENOUS at 13:15

## 2019-10-10 NOTE — ED PROVIDER NOTES
" EMERGENCY DEPARTMENT ENCOUNTER    CHIEF COMPLAINT  Chief Complaint: R lower back pain  History given by: self  History limited by: nothing  Room Number: 35/35  PMD: Christopher Hastings MD      HPI:  Pt is a 69 y.o. female who presents complaining of waxing and waning lower R back pain that started 3 days ago. Pt denies numbness, abd pain, SOA, CP, hip pain, weakness, urinary incontinence. The pt states she has finished voiding and twisted to wipe herself, when she felt a sudden onset of \"incapacitating\" pain to the R lower back. The pain seemed to gradually improve, however returned with any twisting motion. Pain is exacerbated with positional movements. Due to continous discomfort, she decided to come to the ED for further evaluation. Pt has not followed up with Dr. Hastings (PMD) since onset of sx's.     Duration:  3 days  Onset: sudden  Timing: waxing and waning  Location: R lower back  Radiation: none stated  Quality: pain  Intensity/Severity: moderate  Progression: worsening  Associated Symptoms: none stated  Aggravating Factors: Twisting and positional movements  Alleviating Factors: none stated  Previous Episodes: none stated  Treatment before arrival: none stated    PAST MEDICAL HISTORY  Active Ambulatory Problems     Diagnosis Date Noted   • Chronic coronary artery disease 02/24/2016   • Gastroesophageal reflux disease 02/24/2016   • Hyperlipidemia 02/24/2016   • Hypertension 02/24/2016   • Nonalcoholic fatty liver disease 02/24/2016   • Atrial paroxysmal tachycardia (CMS/HCC) 02/24/2016   • Type 2 diabetes mellitus with hyperglycemia, without long-term current use of insulin (CMS/HCC) 02/24/2016   • History of rectal polypectomy 03/30/2017   • Diverticulosis of large intestine without hemorrhage 09/24/2018   • High risk medication use 09/24/2018     Resolved Ambulatory Problems     Diagnosis Date Noted   • No Resolved Ambulatory Problems     Past Medical History:   Diagnosis Date   • Acid reflux    • " Diabetes mellitus type I (CMS/HCC)    • Elevated cholesterol    • Heart attack (CMS/HCC)    • Hypertension    • Liver disease        PAST SURGICAL HISTORY  Past Surgical History:   Procedure Laterality Date   • CHOLECYSTECTOMY     • COLON SURGERY      bowel resection for obstruction   • HYSTERECTOMY     • MOUTH BIOPSY         FAMILY HISTORY  Family History   Problem Relation Age of Onset   • No Known Problems Mother    • Diabetes Father    • Heart disease Father        SOCIAL HISTORY  Social History     Socioeconomic History   • Marital status:      Spouse name: Not on file   • Number of children: Not on file   • Years of education: Not on file   • Highest education level: Not on file   Tobacco Use   • Smoking status: Former Smoker     Last attempt to quit: 1990     Years since quittin.8   • Smokeless tobacco: Never Used   Substance and Sexual Activity   • Alcohol use: No   • Drug use: No   • Sexual activity: Defer       ALLERGIES  Patient has no known allergies.    REVIEW OF SYSTEMS  Review of Systems   Constitutional: Negative for fever.   HENT: Negative for sore throat.    Eyes: Negative.    Respiratory: Negative for cough and shortness of breath.    Cardiovascular: Negative for chest pain.   Gastrointestinal: Negative for abdominal pain, diarrhea and vomiting.   Genitourinary: Negative for dysuria.   Musculoskeletal: Positive for back pain (R lower). Negative for neck pain.   Skin: Negative for rash.   Allergic/Immunologic: Negative.    Neurological: Negative for weakness, numbness and headaches.   Hematological: Negative.    Psychiatric/Behavioral: Negative.    All other systems reviewed and are negative.      PHYSICAL EXAM  ED Triage Vitals   Temp Heart Rate Resp BP SpO2   10/10/19 1114 10/10/19 1114 10/10/19 1114 10/10/19 1140 10/10/19 1114   98.1 °F (36.7 °C) 70 18 121/71 94 %      Temp src Heart Rate Source Patient Position BP Location FiO2 (%)   10/10/19 1114 10/10/19 1114  -- -- --   Tympanic Monitor          Physical Exam   Constitutional: She is oriented to person, place, and time. No distress.   HENT:   Head: Normocephalic and atraumatic.   Eyes: EOM are normal. Pupils are equal, round, and reactive to light.   Neck: Normal range of motion. Neck supple.   Cardiovascular: Normal rate, regular rhythm and normal heart sounds.   Pulmonary/Chest: Effort normal and breath sounds normal. No respiratory distress.   Abdominal: Soft. There is no tenderness. There is no rebound and no guarding.   Musculoskeletal: Normal range of motion. She exhibits no edema.        Lumbar back: She exhibits tenderness (R lower). She exhibits normal range of motion and no spasm.   No step-off. Neg SLR   Neurological: She is alert and oriented to person, place, and time. She has normal motor skills, normal sensation, normal strength and normal reflexes.   Skin: Skin is warm and dry. No rash noted.   Psychiatric: Mood and affect normal.   Nursing note and vitals reviewed.      LAB RESULTS  Lab Results (last 24 hours)     Procedure Component Value Units Date/Time    Urinalysis With Microscopic If Indicated (No Culture) - Urine, Clean Catch [[id number]]  (Normal) Collected:  10/10/19 1237    Specimen:  Urine, Clean Catch Updated:  10/10/19 1246     Color, UA Yellow     Appearance, UA Clear     pH, UA <=5.0     Specific Gravity, UA 1.011     Glucose, UA Negative     Ketones, UA Negative     Bilirubin, UA Negative     Blood, UA Negative     Protein, UA Negative     Leuk Esterase, UA Negative     Nitrite, UA Negative     Urobilinogen, UA 0.2 E.U./dL    Narrative:       Urine microscopic not indicated.    CBC & Differential [[id number]] Collected:  10/10/19 1314    Specimen:  Blood Updated:  10/10/19 1336    Narrative:       The following orders were created for panel order CBC & Differential.  Procedure                               Abnormality         Status                     ---------                                -----------         ------                     CBC Auto Differential[[id number]]        Abnormal            Final result                 Please view results for these tests on the individual orders.    Basic Metabolic Panel [[id number]]  (Abnormal) Collected:  10/10/19 1314    Specimen:  Blood Updated:  10/10/19 1355     Glucose 115 mg/dL      BUN 16 mg/dL      Creatinine 0.82 mg/dL      Sodium 139 mmol/L      Potassium 4.6 mmol/L      Chloride 99 mmol/L      CO2 23.7 mmol/L      Calcium 8.9 mg/dL      eGFR Non African Amer 69 mL/min/1.73      BUN/Creatinine Ratio 19.5     Anion Gap 16.3 mmol/L     Narrative:       GFR Normal >60  Chronic Kidney Disease <60  Kidney Failure <15    CBC Auto Differential [[id number]]  (Abnormal) Collected:  10/10/19 1314    Specimen:  Blood Updated:  10/10/19 1336     WBC 7.84 10*3/mm3      RBC 4.80 10*6/mm3      Hemoglobin 11.9 g/dL      Hematocrit 37.7 %      MCV 78.5 fL      MCH 24.8 pg      MCHC 31.6 g/dL      RDW 15.8 %      RDW-SD 45.4 fl      MPV 9.8 fL      Platelets 197 10*3/mm3      Neutrophil % 55.6 %      Lymphocyte % 33.4 %      Monocyte % 6.8 %      Eosinophil % 3.3 %      Basophil % 0.6 %      Immature Grans % 0.3 %      Neutrophils, Absolute 4.36 10*3/mm3      Lymphocytes, Absolute 2.62 10*3/mm3      Monocytes, Absolute 0.53 10*3/mm3      Eosinophils, Absolute 0.26 10*3/mm3      Basophils, Absolute 0.05 10*3/mm3      Immature Grans, Absolute 0.02 10*3/mm3      nRBC 0.0 /100 WBC           I ordered the above labs and reviewed the results    RADIOLOGY  XR Spine Lumbar 4+ View   Final Result       No acute fracture is identified. Degenerative changes. For further   evaluation, if clinically indicated, MRI can be considered.               This report was finalized on 10/10/2019 1:10 PM by Dr. Ernie Portillo M.D.               I ordered the above noted radiological studies. Interpreted by radiologist. Reviewed by me in PACS.       PROGRESS AND CONSULTS     1235 XR Spine  Lumbar ordered. UA ordered.    1236 Morphine ordered. Zofran ordered. BMP ordered.    1401 Rechecked pt. Pt is resting comfortably in NAD with improved pain. HR is 68 and BP of 108/64. Discussed w/pt results of XR so no fx or dislocation. Plan is to ambulate pt and d/c. Pt understands and agrees with the plan. All questions were answered.     1402 Ambulate pt ordered.     MEDICAL DECISION MAKING  Results were reviewed/discussed with the patient and they were also made aware of online access. Pt also made aware that some labs, such as cultures, will not be resulted during ER visit and follow up with PMD is necessary.     MDM  Number of Diagnoses or Management Options  Lumbar back pain:   Sciatica of right side:      Amount and/or Complexity of Data Reviewed  Clinical lab tests: ordered and reviewed  Tests in the radiology section of CPT®: ordered and reviewed (XR Spine Lumbar)  Decide to obtain previous medical records or to obtain history from someone other than the patient: yes  Independent visualization of images, tracings, or specimens: yes           DIAGNOSIS  Final diagnoses:   Lumbar back pain   Sciatica of right side       DISPOSITION  DISCHARGE    Patient discharged in stable condition.    Reviewed implications of results, diagnosis, meds, responsibility to follow up, warning signs and symptoms of possible worsening, potential complications and reasons to return to ER.    Patient/Family voiced understanding of above instructions.    Discussed plan for discharge, as there is no emergent indication for admission. Patient referred to primary care provider for BP management due to today's BP. Pt/family is agreeable and understands need for follow up and repeat testing.  Pt is aware that discharge does not mean that nothing is wrong but it indicates no emergency is present that requires admission and they must continue care with follow-up as given below or physician of their choice.     FOLLOW-UP  Christopher Hastings  MD ELISE  [address] [facility] [address]  [phone]    Schedule an appointment as soon as possible for a visit   If symptoms worsen or do not improve         Medication List      New Prescriptions    cyclobenzaprine 10 MG tablet  Commonly known as:  FLEXERIL  Take 1 tablet by mouth 3 (Three) Times a Day As Needed for Muscle Spasms.     HYDROcodone-acetaminophen 5-325 MG per tablet  Commonly known as:  NORCO  Take 1 tablet by mouth Every 6 (Six) Hours As Needed for Moderate Pain .     methylPREDNISolone 4 MG tablet  Commonly known as:  MEDROL (REUBEN)  Take as directed on package instructions.              Latest Documented Vital Signs:  As of 2:36 PM  BP- 121/71 HR- 70 Temp- 98.1 °F (36.7 °C) (Tympanic) O2 sat- 94%    --  Documentation assistance provided by azam Garcia for Dr. Christo Newton.  Information recorded by the scribe was done at my direction and has been verified and validated by me.     Jose Amin  10/10/19 [id number]       Christo Newton MD  10/10/19 3458

## 2019-10-10 NOTE — ED NOTES
Pt being d/c'd home with three new prescriptions.   One being a narcotic.      Yessenia Alegre, RN  10/10/19 2595

## 2019-10-10 NOTE — ED NOTES
PT ambulated to restroom with out any assistance. Pt reports pain improved and she feels better than when she first arrived. MD will be notified.      Andreina Campos  10/10/19 5802

## 2019-11-25 DIAGNOSIS — I47.1 ATRIAL PAROXYSMAL TACHYCARDIA (HCC): ICD-10-CM

## 2019-11-25 DIAGNOSIS — I25.10 CHRONIC CORONARY ARTERY DISEASE: ICD-10-CM

## 2019-11-25 DIAGNOSIS — R60.1 GENERALIZED EDEMA: ICD-10-CM

## 2019-11-25 RX ORDER — METOPROLOL TARTRATE 50 MG/1
TABLET, FILM COATED ORAL
Qty: 60 TABLET | Refills: 4 | Status: SHIPPED | OUTPATIENT
Start: 2019-11-25 | End: 2020-06-29

## 2019-11-25 RX ORDER — FUROSEMIDE 40 MG/1
TABLET ORAL
Qty: 30 TABLET | Refills: 4 | Status: SHIPPED | OUTPATIENT
Start: 2019-11-25 | End: 2020-06-11

## 2019-11-30 DIAGNOSIS — K21.9 GASTROESOPHAGEAL REFLUX DISEASE WITHOUT ESOPHAGITIS: ICD-10-CM

## 2019-11-30 RX ORDER — OMEPRAZOLE 20 MG/1
CAPSULE, DELAYED RELEASE ORAL
Qty: 90 CAPSULE | Refills: 0 | Status: SHIPPED | OUTPATIENT
Start: 2019-11-30 | End: 2020-03-20

## 2019-12-21 DIAGNOSIS — E78.5 HYPERLIPIDEMIA: ICD-10-CM

## 2019-12-22 RX ORDER — POTASSIUM CHLORIDE 750 MG/1
TABLET, FILM COATED, EXTENDED RELEASE ORAL
Qty: 90 TABLET | Refills: 0 | Status: SHIPPED | OUTPATIENT
Start: 2019-12-22 | End: 2020-03-20

## 2019-12-22 RX ORDER — ATORVASTATIN CALCIUM 40 MG/1
TABLET, FILM COATED ORAL
Qty: 90 TABLET | Refills: 0 | Status: SHIPPED | OUTPATIENT
Start: 2019-12-22 | End: 2020-03-20

## 2020-02-29 DIAGNOSIS — I25.10 CHRONIC CORONARY ARTERY DISEASE: ICD-10-CM

## 2020-03-02 RX ORDER — DILTIAZEM HYDROCHLORIDE 120 MG/1
TABLET, FILM COATED ORAL
Qty: 270 TABLET | Refills: 0 | Status: SHIPPED | OUTPATIENT
Start: 2020-03-02 | End: 2020-07-20

## 2020-03-20 DIAGNOSIS — E78.5 HYPERLIPIDEMIA: ICD-10-CM

## 2020-03-20 DIAGNOSIS — K21.9 GASTROESOPHAGEAL REFLUX DISEASE WITHOUT ESOPHAGITIS: ICD-10-CM

## 2020-03-20 RX ORDER — OMEPRAZOLE 20 MG/1
CAPSULE, DELAYED RELEASE ORAL
Qty: 90 CAPSULE | Refills: 0 | Status: SHIPPED | OUTPATIENT
Start: 2020-03-20 | End: 2020-06-29

## 2020-03-20 RX ORDER — ATORVASTATIN CALCIUM 40 MG/1
TABLET, FILM COATED ORAL
Qty: 90 TABLET | Refills: 0 | Status: SHIPPED | OUTPATIENT
Start: 2020-03-20 | End: 2020-07-06

## 2020-03-20 RX ORDER — POTASSIUM CHLORIDE 750 MG/1
TABLET, EXTENDED RELEASE ORAL
Qty: 90 TABLET | Refills: 0 | Status: SHIPPED | OUTPATIENT
Start: 2020-03-20 | End: 2020-06-29

## 2020-05-30 DIAGNOSIS — E11.9 TYPE 2 DIABETES MELLITUS WITHOUT COMPLICATION, WITHOUT LONG-TERM CURRENT USE OF INSULIN (HCC): ICD-10-CM

## 2020-05-30 DIAGNOSIS — K76.0 NONALCOHOLIC FATTY LIVER DISEASE: ICD-10-CM

## 2020-06-01 RX ORDER — PIOGLITAZONEHYDROCHLORIDE 30 MG/1
TABLET ORAL
Qty: 90 TABLET | Refills: 0 | OUTPATIENT
Start: 2020-06-01

## 2020-06-02 ENCOUNTER — OFFICE VISIT (OUTPATIENT)
Dept: FAMILY MEDICINE CLINIC | Facility: CLINIC | Age: 70
End: 2020-06-02

## 2020-06-02 VITALS
HEIGHT: 64 IN | TEMPERATURE: 98 F | WEIGHT: 248 LBS | BODY MASS INDEX: 42.34 KG/M2 | OXYGEN SATURATION: 95 % | SYSTOLIC BLOOD PRESSURE: 124 MMHG | HEART RATE: 92 BPM | DIASTOLIC BLOOD PRESSURE: 80 MMHG

## 2020-06-02 DIAGNOSIS — E66.01 OBESITY, MORBID, BMI 40.0-49.9 (HCC): ICD-10-CM

## 2020-06-02 DIAGNOSIS — I25.10 CHRONIC CORONARY ARTERY DISEASE: ICD-10-CM

## 2020-06-02 DIAGNOSIS — Z79.899 HIGH RISK MEDICATION USE: ICD-10-CM

## 2020-06-02 DIAGNOSIS — Z12.31 BREAST CANCER SCREENING BY MAMMOGRAM: ICD-10-CM

## 2020-06-02 DIAGNOSIS — E11.65 TYPE 2 DIABETES MELLITUS WITH HYPERGLYCEMIA, WITHOUT LONG-TERM CURRENT USE OF INSULIN (HCC): Primary | ICD-10-CM

## 2020-06-02 DIAGNOSIS — E78.5 HYPERLIPIDEMIA, UNSPECIFIED HYPERLIPIDEMIA TYPE: ICD-10-CM

## 2020-06-02 DIAGNOSIS — I10 ESSENTIAL HYPERTENSION: ICD-10-CM

## 2020-06-02 PROCEDURE — 99213 OFFICE O/P EST LOW 20 MIN: CPT | Performed by: FAMILY MEDICINE

## 2020-06-02 NOTE — PROGRESS NOTES
HPI  Sally Rivera is a 70 y.o. female who is here for follow up of diabetes known coronary artery disease and hypertension.  Note previously dictated but somehow computer updated and completely disappeared?  Overall doing well with no new complaints.      Review of Systems   Constitutional: Positive for unexpected weight change.   Respiratory: Negative for shortness of breath.    Cardiovascular: Negative for chest pain, palpitations and leg swelling.   All other systems reviewed and are negative.        Past Medical History:   Diagnosis Date   • Acid reflux    • Diabetes mellitus type I (CMS/HCC)    • Elevated cholesterol    • Heart attack (CMS/HCC)    • Hypertension    • Liver disease        Past Surgical History:   Procedure Laterality Date   • CHOLECYSTECTOMY     • COLON SURGERY      bowel resection for obstruction   • HYSTERECTOMY     • MOUTH BIOPSY         Family History   Problem Relation Age of Onset   • No Known Problems Mother    • Diabetes Father    • Heart disease Father        Social History     Socioeconomic History   • Marital status:      Spouse name: Not on file   • Number of children: Not on file   • Years of education: Not on file   • Highest education level: Not on file   Tobacco Use   • Smoking status: Former Smoker     Last attempt to quit: 1990     Years since quittin.4   • Smokeless tobacco: Never Used   Substance and Sexual Activity   • Alcohol use: No   • Drug use: No   • Sexual activity: Defer         Physical Exam   Constitutional: She is oriented to person, place, and time. She appears well-developed and well-nourished. No distress.   HENT:   Head: Normocephalic and atraumatic.   Nose: Nose normal.   Mouth/Throat: Oropharynx is clear and moist.   Eyes: Pupils are equal, round, and reactive to light. Conjunctivae and EOM are normal.   Cardiovascular: Normal rate and regular rhythm.   Pulmonary/Chest: Effort normal. No respiratory distress.    Abdominal: Soft.   Musculoskeletal: Normal range of motion. She exhibits no edema.   Neurological: She is alert and oriented to person, place, and time. Coordination normal.   Skin: Skin is warm and dry.   Psychiatric: She has a normal mood and affect. Her behavior is normal. Judgment and thought content normal.   Nursing note and vitals reviewed.        Assessment/Plan    Sally was seen today for follow-up and med refill.    Diagnoses and all orders for this visit:    Type 2 diabetes mellitus with hyperglycemia, without long-term current use of insulin (CMS/[facility])  -     Comprehensive Metabolic Panel  -     Hemoglobin A1c    Chronic coronary artery disease  -     Lipid Panel    Hyperlipidemia, unspecified hyperlipidemia type    Essential hypertension  -     Comprehensive Metabolic Panel    High risk medication use  -     CBC & Differential  -     Comprehensive Metabolic Panel    Obesity, morbid, BMI 40.0-49.9 (CMS/[facility])    Breast cancer screening by mammogram  -     Mammo Screening Bilateral With CAD; Future        Patient here for routine follow-up especially of diabetes.  Denies any new complaints and seems to be doing well on current regimen.  Strongly encouraged weight loss and exercise.  Also recommend routine follow-up in 6 months along with Medicare wellness evaluation    This note includes information entered using a voice recognition dictation system.  Though reviewed, some nonsensible errors may remain.

## 2020-06-03 DIAGNOSIS — E11.9 TYPE 2 DIABETES MELLITUS WITHOUT COMPLICATION, WITHOUT LONG-TERM CURRENT USE OF INSULIN (HCC): ICD-10-CM

## 2020-06-03 DIAGNOSIS — D50.9 MICROCYTIC ANEMIA: Primary | ICD-10-CM

## 2020-06-03 DIAGNOSIS — K76.0 NONALCOHOLIC FATTY LIVER DISEASE: ICD-10-CM

## 2020-06-03 LAB
ALBUMIN SERPL-MCNC: 4.6 G/DL (ref 3.5–5.2)
ALBUMIN/GLOB SERPL: 1.6 G/DL
ALP SERPL-CCNC: 68 U/L (ref 39–117)
ALT SERPL-CCNC: 28 U/L (ref 1–33)
AST SERPL-CCNC: 36 U/L (ref 1–32)
BASOPHILS # BLD AUTO: ABNORMAL 10*3/UL
BASOPHILS # BLD MANUAL: 0.09 10*3/MM3 (ref 0–0.2)
BASOPHILS NFR BLD MANUAL: 1 % (ref 0–1.5)
BILIRUB SERPL-MCNC: 0.3 MG/DL (ref 0.2–1.2)
BUN SERPL-MCNC: 17 MG/DL (ref 8–23)
BUN/CREAT SERPL: 19.3 (ref 7–25)
CALCIUM SERPL-MCNC: 9.2 MG/DL (ref 8.6–10.5)
CHLORIDE SERPL-SCNC: 103 MMOL/L (ref 98–107)
CHOLEST SERPL-MCNC: 153 MG/DL (ref 0–200)
CO2 SERPL-SCNC: 25 MMOL/L (ref 22–29)
CREAT SERPL-MCNC: 0.88 MG/DL (ref 0.57–1)
DIFFERENTIAL COMMENT: NORMAL
EOSINOPHIL # BLD AUTO: ABNORMAL 10*3/UL
EOSINOPHIL # BLD MANUAL: 0.09 10*3/MM3 (ref 0–0.4)
EOSINOPHIL NFR BLD AUTO: ABNORMAL %
EOSINOPHIL NFR BLD MANUAL: 1 % (ref 0.3–6.2)
ERYTHROCYTE [DISTWIDTH] IN BLOOD BY AUTOMATED COUNT: 16.5 % (ref 12.3–15.4)
GLOBULIN SER CALC-MCNC: 2.9 GM/DL
GLUCOSE SERPL-MCNC: 197 MG/DL (ref 65–99)
HBA1C MFR BLD: 7.8 % (ref 4.8–5.6)
HCT VFR BLD AUTO: 34.6 % (ref 34–46.6)
HDLC SERPL-MCNC: 39 MG/DL (ref 40–60)
HGB BLD-MCNC: 11 G/DL (ref 12–15.9)
LDLC SERPL CALC-MCNC: 79 MG/DL (ref 0–100)
LYMPHOCYTES # BLD AUTO: ABNORMAL 10*3/UL
LYMPHOCYTES # BLD MANUAL: 1.97 10*3/MM3 (ref 0.7–3.1)
LYMPHOCYTES NFR BLD AUTO: ABNORMAL %
LYMPHOCYTES NFR BLD MANUAL: 21.2 % (ref 19.6–45.3)
Lab: NORMAL
Lab: NORMAL
MCH RBC QN AUTO: 23.5 PG (ref 26.6–33)
MCHC RBC AUTO-ENTMCNC: 31.8 G/DL (ref 31.5–35.7)
MCV RBC AUTO: 73.9 FL (ref 79–97)
MONOCYTES # BLD MANUAL: 0.47 10*3/MM3 (ref 0.1–0.9)
MONOCYTES NFR BLD AUTO: ABNORMAL %
MONOCYTES NFR BLD MANUAL: 5.1 % (ref 5–12)
NEUTROPHILS # BLD MANUAL: 6.67 10*3/MM3 (ref 1.7–7)
NEUTROPHILS NFR BLD AUTO: ABNORMAL %
NEUTROPHILS NFR BLD MANUAL: 71.7 % (ref 42.7–76)
PLATELET # BLD AUTO: 250 10*3/MM3 (ref 140–450)
PLATELET BLD QL SMEAR: NORMAL
POTASSIUM SERPL-SCNC: 4.2 MMOL/L (ref 3.5–5.2)
PROT SERPL-MCNC: 7.5 G/DL (ref 6–8.5)
RBC # BLD AUTO: 4.68 10*6/MM3 (ref 3.77–5.28)
RBC MORPH BLD: NORMAL
SODIUM SERPL-SCNC: 140 MMOL/L (ref 136–145)
TRIGL SERPL-MCNC: 177 MG/DL (ref 0–150)
VLDLC SERPL CALC-MCNC: 35.4 MG/DL (ref 5–40)
WBC # BLD AUTO: 9.3 10*3/MM3 (ref 3.4–10.8)

## 2020-06-03 RX ORDER — PIOGLITAZONEHYDROCHLORIDE 45 MG/1
45 TABLET ORAL DAILY
Qty: 90 TABLET | Refills: 3 | Status: SHIPPED | OUTPATIENT
Start: 2020-06-03 | End: 2021-06-07

## 2020-06-08 DIAGNOSIS — R60.1 GENERALIZED EDEMA: ICD-10-CM

## 2020-06-09 DIAGNOSIS — D50.9 IRON DEFICIENCY ANEMIA, UNSPECIFIED IRON DEFICIENCY ANEMIA TYPE: Primary | ICD-10-CM

## 2020-06-09 LAB
FERRITIN SERPL-MCNC: 11.4 NG/ML (ref 13–150)
IRON SATN MFR SERPL: 4 % (ref 20–50)
IRON SERPL-MCNC: 24 MCG/DL (ref 37–145)
TIBC SERPL-MCNC: 583 MCG/DL
UIBC SERPL-MCNC: 559 MCG/DL (ref 112–346)
WRITTEN AUTHORIZATION: NORMAL

## 2020-06-09 RX ORDER — FERROUS SULFATE 325(65) MG
325 TABLET ORAL
Qty: 90 TABLET | Refills: 0 | Status: SHIPPED | OUTPATIENT
Start: 2020-06-09 | End: 2020-07-06

## 2020-06-11 RX ORDER — FUROSEMIDE 40 MG/1
TABLET ORAL
Qty: 30 TABLET | Refills: 0 | Status: SHIPPED | OUTPATIENT
Start: 2020-06-11 | End: 2020-07-06

## 2020-06-12 DIAGNOSIS — E11.9 TYPE 2 DIABETES MELLITUS WITHOUT COMPLICATION, WITHOUT LONG-TERM CURRENT USE OF INSULIN (HCC): ICD-10-CM

## 2020-06-27 DIAGNOSIS — I25.10 CHRONIC CORONARY ARTERY DISEASE: ICD-10-CM

## 2020-06-27 DIAGNOSIS — K21.9 GASTROESOPHAGEAL REFLUX DISEASE WITHOUT ESOPHAGITIS: ICD-10-CM

## 2020-06-27 DIAGNOSIS — I47.1 ATRIAL PAROXYSMAL TACHYCARDIA (HCC): ICD-10-CM

## 2020-06-29 RX ORDER — OMEPRAZOLE 20 MG/1
CAPSULE, DELAYED RELEASE ORAL
Qty: 90 CAPSULE | Refills: 0 | Status: SHIPPED | OUTPATIENT
Start: 2020-06-29 | End: 2020-09-08 | Stop reason: SDUPTHER

## 2020-06-29 RX ORDER — POTASSIUM CHLORIDE 750 MG/1
TABLET, EXTENDED RELEASE ORAL
Qty: 90 TABLET | Refills: 0 | Status: SHIPPED | OUTPATIENT
Start: 2020-06-29 | End: 2020-09-08 | Stop reason: SDUPTHER

## 2020-06-29 RX ORDER — METOPROLOL TARTRATE 50 MG/1
TABLET, FILM COATED ORAL
Qty: 60 TABLET | Refills: 0 | Status: SHIPPED | OUTPATIENT
Start: 2020-06-29 | End: 2020-07-27

## 2020-07-04 DIAGNOSIS — D50.9 IRON DEFICIENCY ANEMIA, UNSPECIFIED IRON DEFICIENCY ANEMIA TYPE: ICD-10-CM

## 2020-07-04 DIAGNOSIS — E78.5 HYPERLIPIDEMIA: ICD-10-CM

## 2020-07-04 DIAGNOSIS — R60.1 GENERALIZED EDEMA: ICD-10-CM

## 2020-07-06 RX ORDER — ATORVASTATIN CALCIUM 40 MG/1
TABLET, FILM COATED ORAL
Qty: 90 TABLET | Refills: 0 | Status: SHIPPED | OUTPATIENT
Start: 2020-07-06 | End: 2020-09-08 | Stop reason: SDUPTHER

## 2020-07-06 RX ORDER — FERROUS SULFATE 325(65) MG
TABLET ORAL
Qty: 90 TABLET | Refills: 0 | Status: SHIPPED | OUTPATIENT
Start: 2020-07-06 | End: 2020-08-03

## 2020-07-06 RX ORDER — FUROSEMIDE 40 MG/1
TABLET ORAL
Qty: 30 TABLET | Refills: 0 | Status: SHIPPED | OUTPATIENT
Start: 2020-07-06 | End: 2020-08-10

## 2020-07-18 DIAGNOSIS — I25.10 CHRONIC CORONARY ARTERY DISEASE: ICD-10-CM

## 2020-07-20 RX ORDER — DILTIAZEM HYDROCHLORIDE 120 MG/1
TABLET, FILM COATED ORAL
Qty: 270 TABLET | Refills: 0 | Status: SHIPPED | OUTPATIENT
Start: 2020-07-20 | End: 2020-10-12

## 2020-07-25 DIAGNOSIS — I47.1 ATRIAL PAROXYSMAL TACHYCARDIA (HCC): ICD-10-CM

## 2020-07-25 DIAGNOSIS — I25.10 CHRONIC CORONARY ARTERY DISEASE: ICD-10-CM

## 2020-07-27 RX ORDER — METOPROLOL TARTRATE 50 MG/1
TABLET, FILM COATED ORAL
Qty: 60 TABLET | Refills: 0 | Status: SHIPPED | OUTPATIENT
Start: 2020-07-27 | End: 2020-08-24

## 2020-08-01 DIAGNOSIS — D50.9 IRON DEFICIENCY ANEMIA, UNSPECIFIED IRON DEFICIENCY ANEMIA TYPE: ICD-10-CM

## 2020-08-03 DIAGNOSIS — D50.9 IRON DEFICIENCY ANEMIA, UNSPECIFIED IRON DEFICIENCY ANEMIA TYPE: Primary | ICD-10-CM

## 2020-08-03 RX ORDER — FERROUS SULFATE 325(65) MG
TABLET ORAL
Qty: 90 TABLET | Refills: 0 | OUTPATIENT
Start: 2020-08-03

## 2020-08-04 ENCOUNTER — RESULTS ENCOUNTER (OUTPATIENT)
Dept: FAMILY MEDICINE CLINIC | Facility: CLINIC | Age: 70
End: 2020-08-04

## 2020-08-04 ENCOUNTER — APPOINTMENT (OUTPATIENT)
Dept: MAMMOGRAPHY | Facility: HOSPITAL | Age: 70
End: 2020-08-04

## 2020-08-04 DIAGNOSIS — D50.9 IRON DEFICIENCY ANEMIA, UNSPECIFIED IRON DEFICIENCY ANEMIA TYPE: ICD-10-CM

## 2020-08-04 LAB
BASOPHILS # BLD AUTO: 0.1 X10E3/UL (ref 0–0.2)
BASOPHILS NFR BLD AUTO: 1 %
EOSINOPHIL # BLD AUTO: 0.3 X10E3/UL (ref 0–0.4)
EOSINOPHIL NFR BLD AUTO: 3 %
ERYTHROCYTE [DISTWIDTH] IN BLOOD BY AUTOMATED COUNT: 21 % (ref 11.7–15.4)
FERRITIN SERPL-MCNC: 68 NG/ML (ref 15–150)
HCT VFR BLD AUTO: 39.6 % (ref 34–46.6)
HGB BLD-MCNC: 13.5 G/DL (ref 11.1–15.9)
IMM GRANULOCYTES # BLD AUTO: 0 X10E3/UL (ref 0–0.1)
IMM GRANULOCYTES NFR BLD AUTO: 0 %
LYMPHOCYTES # BLD AUTO: 3.5 X10E3/UL (ref 0.7–3.1)
LYMPHOCYTES NFR BLD AUTO: 37 %
MCH RBC QN AUTO: 26.8 PG (ref 26.6–33)
MCHC RBC AUTO-ENTMCNC: 34.1 G/DL (ref 31.5–35.7)
MCV RBC AUTO: 79 FL (ref 79–97)
MONOCYTES # BLD AUTO: 0.7 X10E3/UL (ref 0.1–0.9)
MONOCYTES NFR BLD AUTO: 7 %
NEUTROPHILS # BLD AUTO: 4.8 X10E3/UL (ref 1.4–7)
NEUTROPHILS NFR BLD AUTO: 52 %
PLATELET # BLD AUTO: 192 X10E3/UL (ref 150–450)
RBC # BLD AUTO: 5.03 X10E6/UL (ref 3.77–5.28)
WBC # BLD AUTO: 9.4 X10E3/UL (ref 3.4–10.8)

## 2020-08-08 DIAGNOSIS — R60.1 GENERALIZED EDEMA: ICD-10-CM

## 2020-08-10 RX ORDER — FUROSEMIDE 40 MG/1
TABLET ORAL
Qty: 30 TABLET | Refills: 0 | Status: SHIPPED | OUTPATIENT
Start: 2020-08-10 | End: 2020-09-04 | Stop reason: SDUPTHER

## 2020-08-22 DIAGNOSIS — I47.1 ATRIAL PAROXYSMAL TACHYCARDIA (HCC): ICD-10-CM

## 2020-08-22 DIAGNOSIS — I25.10 CHRONIC CORONARY ARTERY DISEASE: ICD-10-CM

## 2020-08-24 RX ORDER — METOPROLOL TARTRATE 50 MG/1
TABLET, FILM COATED ORAL
Qty: 180 TABLET | Refills: 3 | Status: SHIPPED | OUTPATIENT
Start: 2020-08-24 | End: 2020-08-27 | Stop reason: SDUPTHER

## 2020-08-27 ENCOUNTER — TELEPHONE (OUTPATIENT)
Dept: FAMILY MEDICINE CLINIC | Facility: CLINIC | Age: 70
End: 2020-08-27

## 2020-08-27 DIAGNOSIS — I47.1 ATRIAL PAROXYSMAL TACHYCARDIA (HCC): ICD-10-CM

## 2020-08-27 DIAGNOSIS — I25.10 CHRONIC CORONARY ARTERY DISEASE: ICD-10-CM

## 2020-08-27 RX ORDER — METOPROLOL TARTRATE 50 MG/1
50 TABLET, FILM COATED ORAL 2 TIMES DAILY
Qty: 180 TABLET | Refills: 3 | Status: SHIPPED | OUTPATIENT
Start: 2020-08-27 | End: 2021-08-16

## 2020-09-04 ENCOUNTER — OFFICE VISIT (OUTPATIENT)
Dept: FAMILY MEDICINE CLINIC | Facility: CLINIC | Age: 70
End: 2020-09-04

## 2020-09-04 VITALS
DIASTOLIC BLOOD PRESSURE: 64 MMHG | BODY MASS INDEX: 43.29 KG/M2 | OXYGEN SATURATION: 94 % | WEIGHT: 253.6 LBS | HEART RATE: 63 BPM | TEMPERATURE: 97.3 F | SYSTOLIC BLOOD PRESSURE: 114 MMHG | HEIGHT: 64 IN

## 2020-09-04 DIAGNOSIS — Z87.19 HISTORY OF RECTAL POLYPECTOMY: ICD-10-CM

## 2020-09-04 DIAGNOSIS — R60.1 GENERALIZED EDEMA: ICD-10-CM

## 2020-09-04 DIAGNOSIS — E11.65 TYPE 2 DIABETES MELLITUS WITH HYPERGLYCEMIA, WITHOUT LONG-TERM CURRENT USE OF INSULIN (HCC): ICD-10-CM

## 2020-09-04 DIAGNOSIS — Z00.00 MEDICARE ANNUAL WELLNESS VISIT, INITIAL: Primary | ICD-10-CM

## 2020-09-04 DIAGNOSIS — E66.01 OBESITY, MORBID, BMI 40.0-49.9 (HCC): ICD-10-CM

## 2020-09-04 DIAGNOSIS — K21.9 GASTROESOPHAGEAL REFLUX DISEASE WITHOUT ESOPHAGITIS: ICD-10-CM

## 2020-09-04 DIAGNOSIS — I25.10 CHRONIC CORONARY ARTERY DISEASE: ICD-10-CM

## 2020-09-04 DIAGNOSIS — Z79.899 HIGH RISK MEDICATION USE: ICD-10-CM

## 2020-09-04 DIAGNOSIS — E78.5 HYPERLIPIDEMIA, UNSPECIFIED HYPERLIPIDEMIA TYPE: ICD-10-CM

## 2020-09-04 DIAGNOSIS — K57.30 DIVERTICULOSIS OF LARGE INTESTINE WITHOUT HEMORRHAGE: ICD-10-CM

## 2020-09-04 DIAGNOSIS — Z98.890 HISTORY OF RECTAL POLYPECTOMY: ICD-10-CM

## 2020-09-04 DIAGNOSIS — I10 ESSENTIAL HYPERTENSION: ICD-10-CM

## 2020-09-04 PROCEDURE — G0438 PPPS, INITIAL VISIT: HCPCS | Performed by: FAMILY MEDICINE

## 2020-09-04 PROCEDURE — 96160 PT-FOCUSED HLTH RISK ASSMT: CPT | Performed by: FAMILY MEDICINE

## 2020-09-04 RX ORDER — FUROSEMIDE 40 MG/1
40 TABLET ORAL DAILY
Qty: 90 TABLET | Refills: 3 | Status: SHIPPED | OUTPATIENT
Start: 2020-09-04 | End: 2021-09-03

## 2020-09-04 NOTE — PROGRESS NOTES
The ABCs of the Annual Wellness Visit  Initial Medicare Wellness Visit    Chief Complaint   Patient presents with   • Annual Exam     Medicare Wellness       Subjective   History of Present Illness:  Sally Rivera is a 70 y.o. female who presents for an Initial Medicare Wellness Visit.    HEALTH RISK ASSESSMENT    Recent Hospitalizations:  No hospitalization(s) within the last year.    Current Medical Providers:  Patient Care Team:  Christopher Hastings MD as PCP - General  Parker Sky MD as Consulting Physician (Gastroenterology)    Smoking Status:  Social History     Tobacco Use   Smoking Status Former Smoker   • Last attempt to quit: 1990   • Years since quittin.7   Smokeless Tobacco Never Used       Alcohol Consumption:  Social History     Substance and Sexual Activity   Alcohol Use No       Depression Screen:   PHQ-2/PHQ-9 Depression Screening 2020   Little interest or pleasure in doing things 0   Feeling down, depressed, or hopeless 0   Trouble falling or staying asleep, or sleeping too much 0   Feeling tired or having little energy 0   Poor appetite or overeating 0   Feeling bad about yourself - or that you are a failure or have let yourself or your family down 0   Trouble concentrating on things, such as reading the newspaper or watching television 0   Moving or speaking so slowly that other people could have noticed. Or the opposite - being so fidgety or restless that you have been moving around a lot more than usual 0   Thoughts that you would be better off dead, or of hurting yourself in some way 0   Total Score 0   If you checked off any problems, how difficult have these problems made it for you to do your work, take care of things at home, or get along with other people? Not difficult at all       Fall Risk Screen:  STEADI Fall Risk Assessment has not been completed.    Health Habits and Functional and Cognitive Screening:  Functional & Cognitive Status 2020   Do you have  difficulty preparing food and eating? No   Do you have difficulty bathing yourself, getting dressed or grooming yourself? No   Do you have difficulty using the toilet? No   Do you have difficulty moving around from place to place? No   Do you have trouble with steps or getting out of a bed or a chair? No   Current Diet Diabetic Diet   Dental Exam Up to date   Eye Exam Not up to date   Exercise (times per week) 0 times per week   Current Exercise Activities Include None   Do you need help using the phone?  No   Are you deaf or do you have serious difficulty hearing?  No   Do you need help with transportation? No   Do you need help shopping? No   Do you need help preparing meals?  No   Do you need help with housework?  No   Do you need help with laundry? No   Do you need help taking your medications? No   Do you need help managing money? No   Do you ever drive or ride in a car without wearing a seat belt? No   Have you felt unusual stress, anger or loneliness in the last month? No   Who do you live with? Spouse   If you need help, do you have trouble finding someone available to you? No   Have you been bothered in the last four weeks by sexual problems? No   Do you have difficulty concentrating, remembering or making decisions? No         Does the patient have evidence of cognitive impairment? Yes    Asprin use counseling:Taking ASA appropriately as indicated    Age-appropriate Screening Schedule:  Refer to the list below for future screening recommendations based on patient's age, sex and/or medical conditions. Orders for these recommended tests are listed in the plan section. The patient has been provided with a written plan.    Health Maintenance   Topic Date Due   • ZOSTER VACCINE (2 of 2) 11/20/2017   • DIABETIC EYE EXAM  01/15/2020   • MAMMOGRAM  05/17/2020   • INFLUENZA VACCINE  08/01/2020   • URINE MICROALBUMIN  10/02/2020   • HEMOGLOBIN A1C  12/02/2020   • COLONOSCOPY  05/20/2021   • DIABETIC FOOT EXAM   06/02/2021   • LIPID PANEL  06/02/2021   • TDAP/TD VACCINES (2 - Td) 03/26/2029          The following portions of the patient's history were reviewed and updated as appropriate: allergies, past family history, past social history and past surgical history.    Outpatient Medications Prior to Visit   Medication Sig Dispense Refill   • aspirin 325 MG tablet Take 325 mg by mouth daily.     • atorvastatin (LIPITOR) 40 MG tablet TAKE 1 TABLET BY MOUTH ONE TIME A DAY  90 tablet 0   • dilTIAZem (CARDIZEM) 120 MG tablet TAKE 1 TABLET BY MOUTH THREE TIMES A DAY  270 tablet 0   • metFORMIN (GLUCOPHAGE) 500 MG tablet Take 1 tablet by mouth 3 (Three) Times a Day. 270 tablet 3   • metoprolol tartrate (LOPRESSOR) 50 MG tablet Take 1 tablet by mouth 2 (Two) Times a Day. 180 tablet 3   • omeprazole (priLOSEC) 20 MG capsule TAKE 1 CAPSULE BY MOUTH ONE TIME A DAY  90 capsule 0   • pioglitazone (ACTOS) 45 MG tablet Take 1 tablet by mouth Daily. 90 tablet 3   • potassium chloride (K-DUR,KLOR-CON) 10 MEQ CR tablet TAKE 1 TABLET BY MOUTH ONE TIME A DAY  90 tablet 0   • furosemide (LASIX) 40 MG tablet TAKE 1 TABLET BY MOUTH ONE TIME A DAY AS NEEDED for swelling (Patient taking differently: 40 mg Daily.) 30 tablet 0   • cyclobenzaprine (FLEXERIL) 10 MG tablet Take 1 tablet by mouth 3 (Three) Times a Day As Needed for Muscle Spasms. 20 tablet 0     No facility-administered medications prior to visit.        Patient Active Problem List   Diagnosis   • Chronic coronary artery disease   • Gastroesophageal reflux disease   • Hyperlipidemia   • Hypertension   • Nonalcoholic fatty liver disease   • Atrial paroxysmal tachycardia (CMS/HCC)   • Type 2 diabetes mellitus with hyperglycemia, without long-term current use of insulin (CMS/HCC)   • History of rectal polypectomy   • Diverticulosis of large intestine without hemorrhage   • High risk medication use   • Obesity, morbid, BMI 40.0-49.9 (CMS/HCC)       Advanced Care Planning:  ACP discussion was  "held with the patient during this visit. Patient has an advance directive in EMR which is still valid.     Review of Systems    Compared to one year ago, the patient feels her physical health is the same.  Compared to one year ago, the patient feels her mental health is the same.    Reviewed chart for potential of high risk medication in the elderly: no  Reviewed chart for potential of harmful drug interactions in the elderly:no    Objective         Vitals:    09/04/20 0926   BP: 114/64   Pulse: 63   Temp: 97.3 °F (36.3 °C)   SpO2: 94%   Weight: 115 kg (253 lb 9.6 oz)   Height: 162.6 cm (64\")       Body mass index is 43.53 kg/m².  Discussed the patient's BMI with her. The BMI is above average; BMI management plan is completed.    Physical Exam          Assessment/Plan   Medicare Risks and Personalized Health Plan  CMS Preventative Services Quick Reference  Cardiovascular risk    The above risks/problems have been discussed with the patient.  Pertinent information has been shared with the patient in the After Visit Summary.  Follow up plans and orders are seen below in the Assessment/Plan Section.    Diagnoses and all orders for this visit:    1. Medicare annual wellness visit, initial (Primary)    2. Generalized edema  -     furosemide (LASIX) 40 MG tablet; Take 1 tablet by mouth Daily.  Dispense: 90 tablet; Refill: 3    3. Type 2 diabetes mellitus with hyperglycemia, without long-term current use of insulin (CMS/[facility])  -     Hemoglobin A1c    4. Obesity, morbid, BMI 40.0-49.9 (CMS/[facility])    5. High risk medication use  -     CBC & Differential  -     Comprehensive Metabolic Panel    6. Hyperlipidemia, unspecified hyperlipidemia type  -     Lipid Panel    7. Chronic coronary artery disease  -     Lipid Panel    8. Essential hypertension    9. Gastroesophageal reflux disease without esophagitis    10. Diverticulosis of large intestine without hemorrhage    11. History of rectal polypectomy      Follow Up:  Return in " about 3 months (around 12/4/2020) for Recheck.     Patient here for initial Medicare wellness evaluation as well as routine follow-up of above-noted medical problems.  Patient denies any chest pain shortness of breath.  Does have chronic edema controlled with furosemide.  Again strongly recommend weight loss especially to help with diabetic control.  Unfortunately newer diabetic medication are unaffordable.  Await above-noted lab work to adjust therapy if needed.  Does have appointment for annual eye evaluation in the next few weeks.  Also will check with pharmacy regarding flu shot and also may well be due for Pneumovax 23.  Again check with pharmacy.  Previous vaccination at this office ended up not being covered?    This note includes information entered using a voice recognition dictation system.  Though reviewed, some nonsensible errors may remain.      An After Visit Summary and PPPS were given to the patient.

## 2020-09-05 LAB
ALBUMIN SERPL-MCNC: 4.6 G/DL (ref 3.5–5.2)
ALBUMIN/GLOB SERPL: 1.8 G/DL
ALP SERPL-CCNC: 63 U/L (ref 39–117)
ALT SERPL-CCNC: 30 U/L (ref 1–33)
AST SERPL-CCNC: 41 U/L (ref 1–32)
BASOPHILS # BLD AUTO: 0.06 10*3/MM3 (ref 0–0.2)
BASOPHILS NFR BLD AUTO: 0.8 % (ref 0–1.5)
BILIRUB SERPL-MCNC: 0.5 MG/DL (ref 0–1.2)
BUN SERPL-MCNC: 12 MG/DL (ref 8–23)
BUN/CREAT SERPL: 14.8 (ref 7–25)
CALCIUM SERPL-MCNC: 8.9 MG/DL (ref 8.6–10.5)
CHLORIDE SERPL-SCNC: 99 MMOL/L (ref 98–107)
CHOLEST SERPL-MCNC: 165 MG/DL (ref 0–200)
CO2 SERPL-SCNC: 24.1 MMOL/L (ref 22–29)
CREAT SERPL-MCNC: 0.81 MG/DL (ref 0.57–1)
EOSINOPHIL # BLD AUTO: 0.21 10*3/MM3 (ref 0–0.4)
EOSINOPHIL NFR BLD AUTO: 2.9 % (ref 0.3–6.2)
ERYTHROCYTE [DISTWIDTH] IN BLOOD BY AUTOMATED COUNT: 18.2 % (ref 12.3–15.4)
GLOBULIN SER CALC-MCNC: 2.5 GM/DL
GLUCOSE SERPL-MCNC: 127 MG/DL (ref 65–99)
HBA1C MFR BLD: 7.5 % (ref 4.8–5.6)
HCT VFR BLD AUTO: 38.3 % (ref 34–46.6)
HDLC SERPL-MCNC: 40 MG/DL (ref 40–60)
HGB BLD-MCNC: 13.5 G/DL (ref 12–15.9)
IMM GRANULOCYTES # BLD AUTO: 0.02 10*3/MM3 (ref 0–0.05)
IMM GRANULOCYTES NFR BLD AUTO: 0.3 % (ref 0–0.5)
LDLC SERPL CALC-MCNC: 95 MG/DL (ref 0–100)
LYMPHOCYTES # BLD AUTO: 2.66 10*3/MM3 (ref 0.7–3.1)
LYMPHOCYTES NFR BLD AUTO: 36.4 % (ref 19.6–45.3)
MCH RBC QN AUTO: 28.4 PG (ref 26.6–33)
MCHC RBC AUTO-ENTMCNC: 35.2 G/DL (ref 31.5–35.7)
MCV RBC AUTO: 80.5 FL (ref 79–97)
MONOCYTES # BLD AUTO: 0.48 10*3/MM3 (ref 0.1–0.9)
MONOCYTES NFR BLD AUTO: 6.6 % (ref 5–12)
NEUTROPHILS # BLD AUTO: 3.87 10*3/MM3 (ref 1.7–7)
NEUTROPHILS NFR BLD AUTO: 53 % (ref 42.7–76)
NRBC BLD AUTO-RTO: 0 /100 WBC (ref 0–0.2)
PLATELET # BLD AUTO: 203 10*3/MM3 (ref 140–450)
POTASSIUM SERPL-SCNC: 4 MMOL/L (ref 3.5–5.2)
PROT SERPL-MCNC: 7.1 G/DL (ref 6–8.5)
RBC # BLD AUTO: 4.76 10*6/MM3 (ref 3.77–5.28)
SODIUM SERPL-SCNC: 137 MMOL/L (ref 136–145)
TRIGL SERPL-MCNC: 150 MG/DL (ref 0–150)
VLDLC SERPL CALC-MCNC: 30 MG/DL
WBC # BLD AUTO: 7.3 10*3/MM3 (ref 3.4–10.8)

## 2020-09-06 DIAGNOSIS — K21.9 GASTROESOPHAGEAL REFLUX DISEASE WITHOUT ESOPHAGITIS: ICD-10-CM

## 2020-09-06 DIAGNOSIS — D50.9 IRON DEFICIENCY ANEMIA, UNSPECIFIED IRON DEFICIENCY ANEMIA TYPE: ICD-10-CM

## 2020-09-06 DIAGNOSIS — E78.5 HYPERLIPIDEMIA: ICD-10-CM

## 2020-09-08 DIAGNOSIS — K21.9 GASTROESOPHAGEAL REFLUX DISEASE WITHOUT ESOPHAGITIS: ICD-10-CM

## 2020-09-08 DIAGNOSIS — E78.5 HYPERLIPIDEMIA: ICD-10-CM

## 2020-09-08 RX ORDER — FERROUS SULFATE 325(65) MG
TABLET ORAL
Qty: 90 TABLET | Refills: 0 | OUTPATIENT
Start: 2020-09-08

## 2020-09-08 RX ORDER — POTASSIUM CHLORIDE 750 MG/1
TABLET, EXTENDED RELEASE ORAL
Qty: 90 TABLET | Refills: 0 | OUTPATIENT
Start: 2020-09-08

## 2020-09-08 RX ORDER — OMEPRAZOLE 20 MG/1
20 CAPSULE, DELAYED RELEASE ORAL DAILY
Qty: 90 CAPSULE | Refills: 3 | Status: SHIPPED | OUTPATIENT
Start: 2020-09-08 | End: 2021-09-28

## 2020-09-08 RX ORDER — ATORVASTATIN CALCIUM 40 MG/1
TABLET, FILM COATED ORAL
Qty: 90 TABLET | Refills: 0 | OUTPATIENT
Start: 2020-09-08

## 2020-09-08 RX ORDER — OMEPRAZOLE 20 MG/1
CAPSULE, DELAYED RELEASE ORAL
Qty: 90 CAPSULE | Refills: 0 | OUTPATIENT
Start: 2020-09-08

## 2020-09-08 RX ORDER — POTASSIUM CHLORIDE 750 MG/1
10 TABLET, EXTENDED RELEASE ORAL DAILY
Qty: 90 TABLET | Refills: 3 | Status: SHIPPED | OUTPATIENT
Start: 2020-09-08 | End: 2021-09-03

## 2020-09-08 RX ORDER — ATORVASTATIN CALCIUM 40 MG/1
40 TABLET, FILM COATED ORAL DAILY
Qty: 90 TABLET | Refills: 3 | Status: SHIPPED | OUTPATIENT
Start: 2020-09-08 | End: 2021-09-03

## 2020-10-02 ENCOUNTER — HOSPITAL ENCOUNTER (OUTPATIENT)
Dept: MAMMOGRAPHY | Facility: HOSPITAL | Age: 70
Discharge: HOME | End: 2020-10-02
Admitting: FAMILY MEDICINE

## 2020-10-02 DIAGNOSIS — Z12.31 BREAST CANCER SCREENING BY MAMMOGRAM: ICD-10-CM

## 2020-10-02 DIAGNOSIS — R92.8 ABNORMAL MAMMOGRAM OF LEFT BREAST: Primary | ICD-10-CM

## 2020-10-02 PROCEDURE — 77067 SCR MAMMO BI INCL CAD: CPT

## 2020-10-03 DIAGNOSIS — R92.8 ABNORMAL MAMMOGRAM OF LEFT BREAST: Primary | ICD-10-CM

## 2020-10-10 DIAGNOSIS — I25.10 CHRONIC CORONARY ARTERY DISEASE: ICD-10-CM

## 2020-10-12 RX ORDER — DILTIAZEM HYDROCHLORIDE 120 MG/1
TABLET, FILM COATED ORAL
Qty: 270 TABLET | Refills: 3 | Status: SHIPPED | OUTPATIENT
Start: 2020-10-12 | End: 2021-09-03

## 2020-10-27 ENCOUNTER — HOSPITAL ENCOUNTER (OUTPATIENT)
Dept: ULTRASOUND IMAGING | Facility: HOSPITAL | Age: 70
Discharge: HOME | End: 2020-10-27
Admitting: FAMILY MEDICINE

## 2020-10-27 DIAGNOSIS — R92.8 ABNORMAL MAMMOGRAM OF LEFT BREAST: ICD-10-CM

## 2020-10-27 PROCEDURE — 76882 US LMTD JT/FCL EVL NVASC XTR: CPT

## 2020-10-28 ENCOUNTER — HOSPITAL ENCOUNTER (OUTPATIENT)
Dept: GENERAL RADIOLOGY | Facility: HOSPITAL | Age: 70
Discharge: HOME | End: 2020-10-28
Admitting: FAMILY MEDICINE

## 2020-10-28 DIAGNOSIS — R59.0 AXILLARY ADENOPATHY: ICD-10-CM

## 2020-10-28 DIAGNOSIS — R59.0 AXILLARY ADENOPATHY: Primary | ICD-10-CM

## 2020-10-28 PROCEDURE — 71046 X-RAY EXAM CHEST 2 VIEWS: CPT

## 2020-10-29 DIAGNOSIS — R59.0 AXILLARY ADENOPATHY: Primary | ICD-10-CM

## 2020-11-17 ENCOUNTER — OFFICE VISIT (OUTPATIENT)
Dept: SURGERY | Facility: CLINIC | Age: 70
End: 2020-11-17

## 2020-11-17 VITALS — HEIGHT: 64 IN | BODY MASS INDEX: 42.78 KG/M2 | WEIGHT: 250.6 LBS

## 2020-11-17 DIAGNOSIS — R59.0 AXILLARY LYMPHADENOPATHY: Primary | ICD-10-CM

## 2020-11-17 DIAGNOSIS — R92.8 OTHER ABNORMAL AND INCONCLUSIVE FINDINGS ON DIAGNOSTIC IMAGING OF BREAST: ICD-10-CM

## 2020-11-17 PROCEDURE — 99203 OFFICE O/P NEW LOW 30 MIN: CPT | Performed by: SURGERY

## 2020-11-17 NOTE — PROGRESS NOTES
Subjective   Sally Rivera is a 70 y.o. female who presents to the office in surgical consultation from Christopher Hastings MD for a left axillary lymphadenopathy.    History of Present Illness     The patient recently underwent a screening mammogram which was normal regarding her breast but did show a possible mass in the left axilla.  She underwent an ultrasound of her left axilla that showed enlarged lymph nodes that did not have a normal architecture and was suspicious.  The cortex was thickened and irregular.  She has not had any breast problems.  In years past she had problems with fibrocystic changes but those resolved by removing caffeine.  She has not had any infections involving her left upper extremity or skin of the chest wall.  A recent chest x-ray was completely normal.  She had no previous knowledge of enlarged lymph nodes or any symptoms prior to the mammogram.    The patient has not had any constitutional symptoms such as fevers nor night sweats.  She does have excessive fatigue but she attributes this to retiring and not being as active as she was while she was working.    Review of Systems   Constitutional: Positive for fatigue. Negative for chills, fever and unexpected weight change.   HENT: Negative for trouble swallowing and voice change.    Respiratory: Negative for chest tightness and shortness of breath.    Cardiovascular: Negative for chest pain and palpitations.   Gastrointestinal: Negative for abdominal pain, blood in stool, constipation, diarrhea, nausea and vomiting.   Psychiatric/Behavioral: Negative for agitation and confusion.     Past Medical History:   Diagnosis Date   • Acid reflux    • Diabetes mellitus type I (CMS/HCC)    • Diverticulitis    • Elevated cholesterol    • GERD (gastroesophageal reflux disease)    • Heart attack (CMS/HCC) 1995   • Hyperlipidemia    • Hypertension    • Hypertension    • Liver disease      Past Surgical History:   Procedure Laterality Date   •  CHOLECYSTECTOMY     • COLON SURGERY      bowel resection for obstruction   • COLONOSCOPY      Dr. Sky   • HYSTERECTOMY     • MOUTH BIOPSY       Family History   Problem Relation Age of Onset   • No Known Problems Mother    • Diabetes Father    • Heart disease Father      Social History     Socioeconomic History   • Marital status:      Spouse name: Not on file   • Number of children: Not on file   • Years of education: Not on file   • Highest education level: Not on file   Tobacco Use   • Smoking status: Former Smoker     Quit date: 1990     Years since quittin.9   • Smokeless tobacco: Never Used   Substance and Sexual Activity   • Alcohol use: No   • Drug use: No   • Sexual activity: Defer       Objective   Physical Exam  Constitutional:       Appearance: Normal appearance. She is well-developed. She is not toxic-appearing.   Eyes:      General: No scleral icterus.  Pulmonary:      Effort: Pulmonary effort is normal. No respiratory distress.   Chest:      Comments: Right breast: Normal appearance with no palpable nodules and no axillary lymphadenopathy.  Left breast: Normal appearance with no palpable nodules.  There is a 2 to 3 cm palpable lymph node in the anterior axilla adjacent to the pectoralis major muscle that is mobile.  Skin:     General: Skin is warm and dry.   Neurological:      Mental Status: She is alert and oriented to person, place, and time.   Psychiatric:         Behavior: Behavior normal.         Thought Content: Thought content normal.         Judgment: Judgment normal.         Assessment/Plan       The primary encounter diagnosis was Axillary lymphadenopathy. A diagnosis of Other abnormal and inconclusive findings on diagnostic imaging of breast  was also pertinent to this visit.    The patient has an enlarged lymph node of the left axilla that was first noted on mammography.  She has a palpable lymph node but no palpable breast abnormalities.  Options for  evaluation including an ultrasound-guided biopsy of the lymph node for surgical excision were discussed with her.  It was explained that a surgical excision provides the entire lymph node for evaluation and a definitive diagnosis.  She is trying to watch her grandchildren and would like less recovery time if possible.  She will be scheduled for an ultrasound-guided core needle biopsy.  She understands that if this study is nondiagnostic, an excisional biopsy of the lymph node will be necessary.

## 2020-11-30 ENCOUNTER — HOSPITAL ENCOUNTER (OUTPATIENT)
Dept: ULTRASOUND IMAGING | Facility: HOSPITAL | Age: 70
Discharge: HOME | End: 2020-11-30
Admitting: SURGERY

## 2020-11-30 VITALS
BODY MASS INDEX: 42.68 KG/M2 | SYSTOLIC BLOOD PRESSURE: 162 MMHG | HEART RATE: 76 BPM | WEIGHT: 250 LBS | RESPIRATION RATE: 20 BRPM | OXYGEN SATURATION: 95 % | HEIGHT: 64 IN | TEMPERATURE: 97 F | DIASTOLIC BLOOD PRESSURE: 80 MMHG

## 2020-11-30 DIAGNOSIS — R92.8 OTHER ABNORMAL AND INCONCLUSIVE FINDINGS ON DIAGNOSTIC IMAGING OF BREAST: ICD-10-CM

## 2020-11-30 DIAGNOSIS — R59.0 AXILLARY LYMPHADENOPATHY: ICD-10-CM

## 2020-11-30 PROCEDURE — 88365 INSITU HYBRIDIZATION (FISH): CPT

## 2020-11-30 PROCEDURE — 88341 IMHCHEM/IMCYTCHM EA ADD ANTB: CPT | Performed by: SURGERY

## 2020-11-30 PROCEDURE — 25010000003 LIDOCAINE 1 % SOLUTION: Performed by: RADIOLOGY

## 2020-11-30 PROCEDURE — 88341 IMHCHEM/IMCYTCHM EA ADD ANTB: CPT

## 2020-11-30 PROCEDURE — 88185 FLOWCYTOMETRY/TC ADD-ON: CPT

## 2020-11-30 PROCEDURE — 76942 ECHO GUIDE FOR BIOPSY: CPT

## 2020-11-30 PROCEDURE — 88184 FLOWCYTOMETRY/ TC 1 MARKER: CPT

## 2020-11-30 PROCEDURE — 88360 TUMOR IMMUNOHISTOCHEM/MANUAL: CPT | Performed by: SURGERY

## 2020-11-30 PROCEDURE — 88305 TISSUE EXAM BY PATHOLOGIST: CPT | Performed by: SURGERY

## 2020-11-30 PROCEDURE — 88300 SURGICAL PATH GROSS: CPT | Performed by: SURGERY

## 2020-11-30 PROCEDURE — 88342 IMHCHEM/IMCYTCHM 1ST ANTB: CPT | Performed by: SURGERY

## 2020-11-30 PROCEDURE — 88323 CONSLTJ&REPRT MATRL PREP SLD: CPT

## 2020-11-30 RX ORDER — LIDOCAINE HYDROCHLORIDE 10 MG/ML
10 INJECTION, SOLUTION INFILTRATION; PERINEURAL ONCE
Status: COMPLETED | OUTPATIENT
Start: 2020-11-30 | End: 2020-11-30

## 2020-11-30 RX ADMIN — LIDOCAINE HYDROCHLORIDE 10 ML: 10 INJECTION, SOLUTION INFILTRATION; PERINEURAL at 14:45

## 2020-11-30 RX ADMIN — LIDOCAINE HYDROCHLORIDE 5 ML: 10 INJECTION, SOLUTION INFILTRATION; PERINEURAL at 14:45

## 2020-12-03 ENCOUNTER — TELEPHONE (OUTPATIENT)
Dept: SURGERY | Facility: CLINIC | Age: 70
End: 2020-12-03

## 2020-12-03 NOTE — TELEPHONE ENCOUNTER
The patient was called on the telephone with results of the core needle biopsy.  There is a benign reactive lymph node with no evidence of malignancy.  There is no further treatment necessary unless the lymph node gets larger or become symptomatic.  She will follow-up on an as-needed basis.

## 2020-12-04 LAB
DX PRELIMINARY: NORMAL
LAB AP CASE REPORT: NORMAL
LAB AP CLINICAL INFORMATION: NORMAL
LAB AP FLOW CYTOMETRY SUMMARY: NORMAL
LAB AP SPECIAL STAINS: NORMAL
PATH REPORT.FINAL DX SPEC: NORMAL
PATH REPORT.GROSS SPEC: NORMAL

## 2021-03-09 DIAGNOSIS — Z23 IMMUNIZATION DUE: ICD-10-CM

## 2021-05-10 ENCOUNTER — OFFICE VISIT (OUTPATIENT)
Dept: FAMILY MEDICINE CLINIC | Facility: CLINIC | Age: 71
End: 2021-05-10

## 2021-05-10 VITALS
HEIGHT: 64 IN | DIASTOLIC BLOOD PRESSURE: 70 MMHG | SYSTOLIC BLOOD PRESSURE: 110 MMHG | RESPIRATION RATE: 20 BRPM | HEART RATE: 65 BPM | TEMPERATURE: 97.9 F | WEIGHT: 253.2 LBS | BODY MASS INDEX: 43.23 KG/M2 | OXYGEN SATURATION: 92 %

## 2021-05-10 DIAGNOSIS — I10 ESSENTIAL HYPERTENSION: ICD-10-CM

## 2021-05-10 DIAGNOSIS — E78.5 HYPERLIPIDEMIA, UNSPECIFIED HYPERLIPIDEMIA TYPE: ICD-10-CM

## 2021-05-10 DIAGNOSIS — E11.65 TYPE 2 DIABETES MELLITUS WITH HYPERGLYCEMIA, WITHOUT LONG-TERM CURRENT USE OF INSULIN (HCC): Primary | ICD-10-CM

## 2021-05-10 DIAGNOSIS — E66.01 OBESITY, MORBID, BMI 40.0-49.9 (HCC): ICD-10-CM

## 2021-05-10 DIAGNOSIS — I25.10 CHRONIC CORONARY ARTERY DISEASE: ICD-10-CM

## 2021-05-10 DIAGNOSIS — Z79.899 HIGH RISK MEDICATION USE: ICD-10-CM

## 2021-05-10 DIAGNOSIS — K76.0 NONALCOHOLIC FATTY LIVER DISEASE: ICD-10-CM

## 2021-05-10 PROCEDURE — 99213 OFFICE O/P EST LOW 20 MIN: CPT | Performed by: FAMILY MEDICINE

## 2021-05-10 RX ORDER — UREA 10 %
1 LOTION (ML) TOPICAL
COMMUNITY
Start: 2021-01-27 | End: 2021-05-10

## 2021-05-10 NOTE — PROGRESS NOTES
HPI  Sally Rivera is a 71 y.o. female who is here for follow up of diabetes hypertension known coronary artery disease and hyperlipidemia.  Patient denies any new complaints.  Is allergy symptoms.  Otherwise no chest pain shortness of breath.  Has received Covid vaccine.  Also reports getting shingles and pneumonia vaccines?      Review of Systems   Allergic/Immunologic: Positive for environmental allergies.   All other systems reviewed and are negative.        Past Medical History:   Diagnosis Date   • Acid reflux    • Diabetes mellitus type I (CMS/HCC)    • Diverticulitis    • Elevated cholesterol    • GERD (gastroesophageal reflux disease)    • Heart attack (CMS/HCC)    • Hyperlipidemia    • Hypertension    • Hypertension    • Liver disease        Past Surgical History:   Procedure Laterality Date   • CHOLECYSTECTOMY     • COLON SURGERY      bowel resection for obstruction   • COLONOSCOPY      Dr. Sky   • HYSTERECTOMY     • MOUTH BIOPSY     • OOPHORECTOMY     • US GUIDED LYMPH NODE BIOPSY  2020       Family History   Problem Relation Age of Onset   • No Known Problems Mother    • Diabetes Father    • Heart disease Father        Social History     Socioeconomic History   • Marital status:      Spouse name: Not on file   • Number of children: Not on file   • Years of education: Not on file   • Highest education level: Not on file   Tobacco Use   • Smoking status: Former Smoker     Quit date: 1990     Years since quittin.4   • Smokeless tobacco: Never Used   Substance and Sexual Activity   • Alcohol use: No   • Drug use: No   • Sexual activity: Defer       Vitals:    05/10/21 0815   BP: 110/70   Pulse: 65   Resp: 20   Temp: 97.9 °F (36.6 °C)   SpO2: 92%        Body mass index is 43.43 kg/m².      Physical Exam  Vitals and nursing note reviewed.   Constitutional:       General: She is not in acute distress.     Appearance: She is well-developed. She is obese. She  is not ill-appearing.   HENT:      Head: Normocephalic and atraumatic.      Nose:      Comments: Patient with mask.  Provider with mask and shield  Eyes:      Conjunctiva/sclera: Conjunctivae normal.      Pupils: Pupils are equal, round, and reactive to light.   Neck:      Thyroid: No thyromegaly.   Cardiovascular:      Rate and Rhythm: Normal rate and regular rhythm.      Heart sounds: Normal heart sounds.   Pulmonary:      Effort: Pulmonary effort is normal. No respiratory distress.      Breath sounds: Normal breath sounds.   Abdominal:      General: There is no distension.      Palpations: Abdomen is soft. There is no mass.      Tenderness: There is no abdominal tenderness.      Hernia: No hernia is present.   Musculoskeletal:         General: No tenderness or deformity. Normal range of motion.      Cervical back: Normal range of motion.   Lymphadenopathy:      Cervical: No cervical adenopathy.   Skin:     General: Skin is warm and dry.      Coloration: Skin is not pale.      Findings: No rash.   Neurological:      Mental Status: She is alert and oriented to person, place, and time.      Motor: No abnormal muscle tone.      Coordination: Coordination normal.   Psychiatric:         Mood and Affect: Mood normal.         Behavior: Behavior normal.         Thought Content: Thought content normal.         Judgment: Judgment normal.           Assessment/Plan    Diagnoses and all orders for this visit:    1. Type 2 diabetes mellitus with hyperglycemia, without long-term current use of insulin (CMS/[facility]) (Primary)  -     Lipid Panel  -     Hemoglobin A1c  -     Comprehensive Metabolic Panel  -     Microalbumin / Creatinine Urine Ratio - Urine, Clean Catch    2. High risk medication use  -     CBC & Differential  -     Lipid Panel    3. Chronic coronary artery disease    4. Hyperlipidemia, unspecified hyperlipidemia type    5. Essential hypertension  -     Comprehensive Metabolic Panel    6. Obesity, morbid, BMI 40.0-49.9  (CMS/HCC)    7. Nonalcoholic fatty liver disease  -     Comprehensive Metabolic Panel      Patient here for routine follow-up of above-noted medical issues.  Overall seems to be doing well on current regimen.  Again encouraged weight loss and exercise.  Otherwise recheck in 6 months or as needed.    This note includes information entered using a voice recognition dictation system.  Though reviewed, some nonsensible errors may remain.

## 2021-05-11 LAB
ALBUMIN SERPL-MCNC: 4.5 G/DL (ref 3.5–5.2)
ALBUMIN/CREAT UR: 25 MG/G CREAT (ref 0–29)
ALBUMIN/GLOB SERPL: 1.6 G/DL
ALP SERPL-CCNC: 69 U/L (ref 39–117)
ALT SERPL-CCNC: 27 U/L (ref 1–33)
AST SERPL-CCNC: 35 U/L (ref 1–32)
BASOPHILS # BLD AUTO: 0.05 10*3/MM3 (ref 0–0.2)
BASOPHILS NFR BLD AUTO: 0.6 % (ref 0–1.5)
BILIRUB SERPL-MCNC: 0.6 MG/DL (ref 0–1.2)
BUN SERPL-MCNC: 15 MG/DL (ref 8–23)
BUN/CREAT SERPL: 19.2 (ref 7–25)
CALCIUM SERPL-MCNC: 9.5 MG/DL (ref 8.6–10.5)
CHLORIDE SERPL-SCNC: 102 MMOL/L (ref 98–107)
CHOLEST SERPL-MCNC: 158 MG/DL (ref 0–200)
CO2 SERPL-SCNC: 24.8 MMOL/L (ref 22–29)
CREAT SERPL-MCNC: 0.78 MG/DL (ref 0.57–1)
CREAT UR-MCNC: 139.2 MG/DL
EOSINOPHIL # BLD AUTO: 0.27 10*3/MM3 (ref 0–0.4)
EOSINOPHIL NFR BLD AUTO: 3.4 % (ref 0.3–6.2)
ERYTHROCYTE [DISTWIDTH] IN BLOOD BY AUTOMATED COUNT: 14.4 % (ref 12.3–15.4)
GLOBULIN SER CALC-MCNC: 2.9 GM/DL
GLUCOSE SERPL-MCNC: 136 MG/DL (ref 65–99)
HBA1C MFR BLD: 7.4 % (ref 4.8–5.6)
HCT VFR BLD AUTO: 39.8 % (ref 34–46.6)
HDLC SERPL-MCNC: 39 MG/DL (ref 40–60)
HGB BLD-MCNC: 13.3 G/DL (ref 12–15.9)
IMM GRANULOCYTES # BLD AUTO: 0.03 10*3/MM3 (ref 0–0.05)
IMM GRANULOCYTES NFR BLD AUTO: 0.4 % (ref 0–0.5)
LDLC SERPL CALC-MCNC: 92 MG/DL (ref 0–100)
LYMPHOCYTES # BLD AUTO: 2.86 10*3/MM3 (ref 0.7–3.1)
LYMPHOCYTES NFR BLD AUTO: 35.5 % (ref 19.6–45.3)
MCH RBC QN AUTO: 27 PG (ref 26.6–33)
MCHC RBC AUTO-ENTMCNC: 33.4 G/DL (ref 31.5–35.7)
MCV RBC AUTO: 80.7 FL (ref 79–97)
MICROALBUMIN UR-MCNC: 35.2 UG/ML
MONOCYTES # BLD AUTO: 0.44 10*3/MM3 (ref 0.1–0.9)
MONOCYTES NFR BLD AUTO: 5.5 % (ref 5–12)
NEUTROPHILS # BLD AUTO: 4.4 10*3/MM3 (ref 1.7–7)
NEUTROPHILS NFR BLD AUTO: 54.6 % (ref 42.7–76)
NRBC BLD AUTO-RTO: 0 /100 WBC (ref 0–0.2)
PLATELET # BLD AUTO: 209 10*3/MM3 (ref 140–450)
POTASSIUM SERPL-SCNC: 4.2 MMOL/L (ref 3.5–5.2)
PROT SERPL-MCNC: 7.4 G/DL (ref 6–8.5)
RBC # BLD AUTO: 4.93 10*6/MM3 (ref 3.77–5.28)
SODIUM SERPL-SCNC: 139 MMOL/L (ref 136–145)
TRIGL SERPL-MCNC: 157 MG/DL (ref 0–150)
VLDLC SERPL CALC-MCNC: 27 MG/DL (ref 5–40)
WBC # BLD AUTO: 8.05 10*3/MM3 (ref 3.4–10.8)

## 2021-06-05 DIAGNOSIS — E11.9 TYPE 2 DIABETES MELLITUS WITHOUT COMPLICATION, WITHOUT LONG-TERM CURRENT USE OF INSULIN (HCC): ICD-10-CM

## 2021-06-05 DIAGNOSIS — K76.0 NONALCOHOLIC FATTY LIVER DISEASE: ICD-10-CM

## 2021-06-07 RX ORDER — PIOGLITAZONEHYDROCHLORIDE 45 MG/1
TABLET ORAL
Qty: 90 TABLET | Refills: 3 | Status: SHIPPED | OUTPATIENT
Start: 2021-06-07 | End: 2022-06-02

## 2021-07-17 DIAGNOSIS — E11.9 TYPE 2 DIABETES MELLITUS WITHOUT COMPLICATION, WITHOUT LONG-TERM CURRENT USE OF INSULIN (HCC): ICD-10-CM

## 2021-07-20 ENCOUNTER — TELEPHONE (OUTPATIENT)
Dept: FAMILY MEDICINE CLINIC | Facility: CLINIC | Age: 71
End: 2021-07-20

## 2021-07-20 DIAGNOSIS — E11.65 TYPE 2 DIABETES MELLITUS WITH HYPERGLYCEMIA, WITHOUT LONG-TERM CURRENT USE OF INSULIN (HCC): Primary | ICD-10-CM

## 2021-08-03 ENCOUNTER — OFFICE VISIT (OUTPATIENT)
Dept: FAMILY MEDICINE CLINIC | Facility: CLINIC | Age: 71
End: 2021-08-03

## 2021-08-03 VITALS
TEMPERATURE: 98.1 F | BODY MASS INDEX: 42.03 KG/M2 | WEIGHT: 246.2 LBS | DIASTOLIC BLOOD PRESSURE: 76 MMHG | SYSTOLIC BLOOD PRESSURE: 120 MMHG | HEIGHT: 64 IN | OXYGEN SATURATION: 92 % | RESPIRATION RATE: 18 BRPM | HEART RATE: 71 BPM

## 2021-08-03 DIAGNOSIS — Z12.11 SCREENING FOR COLORECTAL CANCER: ICD-10-CM

## 2021-08-03 DIAGNOSIS — K76.0 NONALCOHOLIC FATTY LIVER DISEASE: ICD-10-CM

## 2021-08-03 DIAGNOSIS — E11.65 TYPE 2 DIABETES MELLITUS WITH HYPERGLYCEMIA, WITHOUT LONG-TERM CURRENT USE OF INSULIN (HCC): Primary | ICD-10-CM

## 2021-08-03 DIAGNOSIS — K43.2 INCISIONAL HERNIA, WITHOUT OBSTRUCTION OR GANGRENE: ICD-10-CM

## 2021-08-03 DIAGNOSIS — Z12.12 SCREENING FOR COLORECTAL CANCER: ICD-10-CM

## 2021-08-03 PROCEDURE — 99213 OFFICE O/P EST LOW 20 MIN: CPT | Performed by: FAMILY MEDICINE

## 2021-08-03 NOTE — PROGRESS NOTES
LEVI  Sally Rivera is a 71 y.o. female who is here for painful knot in the epigastric region.  This apparently has been somewhat painful for the last couple of weeks.  Not sure how long has been in location.  Of note patient had previous laparoscopic surgery apparently for bowel obstruction.  Patient has been having some difficulty with bowel movements complicated by Metformin causing diarrhea.  Was scheduled to return next week to recheck A1c as discussed below.      Review of Systems   Gastrointestinal: Positive for abdominal pain, constipation and diarrhea. Negative for blood in stool.   All other systems reviewed and are negative.        Past Medical History:   Diagnosis Date   • Acid reflux    • Diabetes mellitus type I (CMS/HCC)    • Diverticulitis    • Elevated cholesterol    • GERD (gastroesophageal reflux disease)    • Heart attack (CMS/HCC)    • Hyperlipidemia    • Hypertension    • Hypertension    • Liver disease        Past Surgical History:   Procedure Laterality Date   • CHOLECYSTECTOMY     • COLON SURGERY      bowel resection for obstruction   • COLONOSCOPY      Dr. Sky   • HYSTERECTOMY     • MOUTH BIOPSY     • OOPHORECTOMY     • US GUIDED LYMPH NODE BIOPSY  2020       Family History   Problem Relation Age of Onset   • No Known Problems Mother    • Diabetes Father    • Heart disease Father        Social History     Socioeconomic History   • Marital status:      Spouse name: Not on file   • Number of children: Not on file   • Years of education: Not on file   • Highest education level: Not on file   Tobacco Use   • Smoking status: Former Smoker     Quit date: 1990     Years since quittin.6   • Smokeless tobacco: Never Used   Substance and Sexual Activity   • Alcohol use: No   • Drug use: No   • Sexual activity: Defer       Vitals:    21 1139   BP: 120/76   Pulse: 71   Resp: 18   Temp: 98.1 °F (36.7 °C)   SpO2: 92%        Body mass index is  42.23 kg/m².      Physical Exam  Vitals and nursing note reviewed.   Constitutional:       General: She is not in acute distress.     Appearance: She is well-developed.   HENT:      Head: Normocephalic and atraumatic.      Nose:      Comments: Patient with mask.  Provider with mask and shield  Eyes:      Conjunctiva/sclera: Conjunctivae normal.      Pupils: Pupils are equal, round, and reactive to light.   Neck:      Thyroid: No thyromegaly.   Cardiovascular:      Rate and Rhythm: Normal rate and regular rhythm.      Heart sounds: Normal heart sounds.   Pulmonary:      Effort: Pulmonary effort is normal. No respiratory distress.      Breath sounds: Normal breath sounds.   Abdominal:      General: There is no distension.      Palpations: Abdomen is soft. There is no mass.      Tenderness: There is no abdominal tenderness.      Hernia: A hernia is present.       Musculoskeletal:         General: No tenderness or deformity. Normal range of motion.      Cervical back: Normal range of motion.   Lymphadenopathy:      Cervical: No cervical adenopathy.   Skin:     General: Skin is warm and dry.      Coloration: Skin is not pale.      Findings: No rash.   Neurological:      Mental Status: She is alert and oriented to person, place, and time.      Motor: No abnormal muscle tone.      Coordination: Coordination normal.   Psychiatric:         Behavior: Behavior normal.         Thought Content: Thought content normal.         Judgment: Judgment normal.           Assessment/Plan    Diagnoses and all orders for this visit:    1. Type 2 diabetes mellitus with hyperglycemia, without long-term current use of insulin (CMS/HCC) (Primary)  -     Comprehensive Metabolic Panel  -     Hemoglobin A1c    2. Nonalcoholic fatty liver disease  -     Comprehensive Metabolic Panel    3. Incisional hernia, without obstruction or gangrene      Patient presents with probable incisional hernia related to previous puncture site of laparoscopic  surgery.  Also due for repeat lab work as noted above.  No evidence of diverticulitis nor other acute abdominal symptoms.  Liver is possibly slightly enlarged but patient has known nonalcoholic fatty liver disease.  This has been evaluated including biopsies etc. in the past.  Discussed trying to keep bowel movements regular etc.  Will await lab work.  If symptoms persist or worsen recommend evaluation by general surgeon.    This note includes information entered using a voice recognition dictation system.

## 2021-08-04 LAB
ALBUMIN SERPL-MCNC: 4.6 G/DL (ref 3.5–5.2)
ALBUMIN/GLOB SERPL: 1.5 G/DL
ALP SERPL-CCNC: 73 U/L (ref 39–117)
ALT SERPL-CCNC: 24 U/L (ref 1–33)
AST SERPL-CCNC: 36 U/L (ref 1–32)
BILIRUB SERPL-MCNC: 0.4 MG/DL (ref 0–1.2)
BUN SERPL-MCNC: 12 MG/DL (ref 8–23)
BUN/CREAT SERPL: 14.3 (ref 7–25)
CALCIUM SERPL-MCNC: 9.4 MG/DL (ref 8.6–10.5)
CHLORIDE SERPL-SCNC: 101 MMOL/L (ref 98–107)
CO2 SERPL-SCNC: 25.8 MMOL/L (ref 22–29)
CREAT SERPL-MCNC: 0.84 MG/DL (ref 0.57–1)
GLOBULIN SER CALC-MCNC: 3.1 GM/DL
GLUCOSE SERPL-MCNC: 103 MG/DL (ref 65–99)
HBA1C MFR BLD: 7.5 % (ref 4.8–5.6)
POTASSIUM SERPL-SCNC: 4.2 MMOL/L (ref 3.5–5.2)
PROT SERPL-MCNC: 7.7 G/DL (ref 6–8.5)
SODIUM SERPL-SCNC: 142 MMOL/L (ref 136–145)

## 2021-08-14 DIAGNOSIS — I25.10 CHRONIC CORONARY ARTERY DISEASE: ICD-10-CM

## 2021-08-14 DIAGNOSIS — I47.1 ATRIAL PAROXYSMAL TACHYCARDIA (HCC): ICD-10-CM

## 2021-08-16 RX ORDER — METOPROLOL TARTRATE 50 MG/1
TABLET, FILM COATED ORAL
Qty: 180 TABLET | Refills: 0 | Status: SHIPPED | OUTPATIENT
Start: 2021-08-16 | End: 2021-09-03

## 2021-08-18 ENCOUNTER — PREP FOR SURGERY (OUTPATIENT)
Dept: OTHER | Facility: HOSPITAL | Age: 71
End: 2021-08-18

## 2021-08-18 DIAGNOSIS — Z86.010 HISTORY OF COLON POLYPS: Primary | ICD-10-CM

## 2021-08-23 PROBLEM — Z86.0100 HISTORY OF COLON POLYPS: Status: ACTIVE | Noted: 2021-08-23

## 2021-08-23 PROBLEM — Z86.010 HISTORY OF COLON POLYPS: Status: ACTIVE | Noted: 2021-08-23

## 2021-09-03 DIAGNOSIS — I47.1 ATRIAL PAROXYSMAL TACHYCARDIA (HCC): ICD-10-CM

## 2021-09-03 DIAGNOSIS — I25.10 CHRONIC CORONARY ARTERY DISEASE: ICD-10-CM

## 2021-09-03 DIAGNOSIS — R60.1 GENERALIZED EDEMA: ICD-10-CM

## 2021-09-03 DIAGNOSIS — E78.5 HYPERLIPIDEMIA: ICD-10-CM

## 2021-09-03 RX ORDER — FUROSEMIDE 40 MG/1
TABLET ORAL
Qty: 90 TABLET | Refills: 3 | Status: SHIPPED | OUTPATIENT
Start: 2021-09-03 | End: 2022-08-22 | Stop reason: SDUPTHER

## 2021-09-03 RX ORDER — ATORVASTATIN CALCIUM 40 MG/1
TABLET, FILM COATED ORAL
Qty: 90 TABLET | Refills: 3 | Status: SHIPPED | OUTPATIENT
Start: 2021-09-03 | End: 2022-08-22 | Stop reason: SDUPTHER

## 2021-09-03 RX ORDER — DILTIAZEM HYDROCHLORIDE 120 MG/1
TABLET, FILM COATED ORAL
Qty: 270 TABLET | Refills: 3 | Status: SHIPPED | OUTPATIENT
Start: 2021-09-03 | End: 2022-08-22 | Stop reason: SDUPTHER

## 2021-09-03 RX ORDER — METOPROLOL TARTRATE 50 MG/1
TABLET, FILM COATED ORAL
Qty: 180 TABLET | Refills: 3 | Status: SHIPPED | OUTPATIENT
Start: 2021-09-03 | End: 2022-11-30 | Stop reason: SDUPTHER

## 2021-09-03 RX ORDER — POTASSIUM CHLORIDE 750 MG/1
TABLET, EXTENDED RELEASE ORAL
Qty: 90 TABLET | Refills: 3 | Status: SHIPPED | OUTPATIENT
Start: 2021-09-03 | End: 2022-08-22 | Stop reason: SDUPTHER

## 2021-09-03 NOTE — TELEPHONE ENCOUNTER
Rx Refill Note  Requested Prescriptions     Pending Prescriptions Disp Refills   • furosemide (LASIX) 40 MG tablet [Pharmacy Med Name: Furosemide Oral Tablet 40 MG] 90 tablet 0     Sig: TAKE 1 TABLET BY MOUTH ONE TIME A DAY   • potassium chloride (K-DUR,KLOR-CON) 10 MEQ CR tablet [Pharmacy Med Name: Potassium Chloride Gracie ER Oral Tablet Extended Release 10 MEQ] 90 tablet 0     Sig: TAKE 1 TABLET BY MOUTH ONE TIME A DAY   • atorvastatin (LIPITOR) 40 MG tablet [Pharmacy Med Name: Atorvastatin Calcium Oral Tablet 40 MG] 90 tablet 0     Sig: TAKE 1 TABLET BY MOUTH ONE TIME A DAY   • dilTIAZem (CARDIZEM) 120 MG tablet [Pharmacy Med Name: dilTIAZem HCl Oral Tablet 120 MG] 270 tablet 0     Sig: TAKE 1 TABLET BY MOUTH THREE TIMES A DAY   • metoprolol tartrate (LOPRESSOR) 50 MG tablet [Pharmacy Med Name: Metoprolol Tartrate Oral Tablet 50 MG] 180 tablet 0     Sig: TAKE 1 TABLET BY MOUTH TWO TIMES A DAY      Last office visit with prescribing clinician: 8/3/2021      Next office visit with prescribing clinician: 11/9/2021            Matthew Alegre MA  09/03/21, 07:07 EDT

## 2021-09-16 ENCOUNTER — TELEPHONE (OUTPATIENT)
Dept: FAMILY MEDICINE CLINIC | Facility: CLINIC | Age: 71
End: 2021-09-16

## 2021-09-16 ENCOUNTER — TRANSCRIBE ORDERS (OUTPATIENT)
Dept: ADMINISTRATIVE | Facility: HOSPITAL | Age: 71
End: 2021-09-16

## 2021-09-16 DIAGNOSIS — U07.1 CLINICAL DIAGNOSIS OF SEVERE ACUTE RESPIRATORY SYNDROME CORONAVIRUS 2 (SARS-COV-2) DISEASE: Primary | ICD-10-CM

## 2021-09-16 NOTE — TELEPHONE ENCOUNTER
Caller: Sally Rivera    Relationship to patient: Self    Best call back number: [phone]    Date of positive COVID19 test: 09/16/2021    COVID19 symptoms: COUGH, CHEST CONGESTIONS, HEADACHE, EXHAUSTION, BODY ACHES    Additional information or concerns: PATIENT WOULD LIKE TO GET AN ORDER FOR THE MONOCLONAL ANTIBODY INFUSION THERAPY AT [facility]    What is the patients preferred pharmacy: [id number] - [place], [place] - [address] [place] [address] [phone] [facility] [phone]   [phone]      PLEASE CALL AND ADVISE

## 2021-09-17 ENCOUNTER — HOSPITAL ENCOUNTER (OUTPATIENT)
Dept: INFUSION THERAPY | Facility: HOSPITAL | Age: 71
Discharge: HOME | End: 2021-09-17
Admitting: FAMILY MEDICINE

## 2021-09-17 VITALS
OXYGEN SATURATION: 96 % | RESPIRATION RATE: 18 BRPM | DIASTOLIC BLOOD PRESSURE: 71 MMHG | TEMPERATURE: 97.7 F | SYSTOLIC BLOOD PRESSURE: 116 MMHG | HEART RATE: 65 BPM

## 2021-09-17 DIAGNOSIS — U07.1 CLINICAL DIAGNOSIS OF COVID-19: Primary | ICD-10-CM

## 2021-09-17 PROCEDURE — 96365 THER/PROPH/DIAG IV INF INIT: CPT

## 2021-09-17 PROCEDURE — 25010000006 INJECTION, CASIRIVIMAB AND IMDEVIMAB, 1200 MG: Performed by: FAMILY MEDICINE

## 2021-09-17 PROCEDURE — M0243 CASIRIVI AND IMDEVI INFUSION: HCPCS | Performed by: FAMILY MEDICINE

## 2021-09-17 RX ORDER — METHYLPREDNISOLONE SODIUM SUCCINATE 125 MG/2ML
125 INJECTION, POWDER, LYOPHILIZED, FOR SOLUTION INTRAMUSCULAR; INTRAVENOUS AS NEEDED
Status: DISCONTINUED | OUTPATIENT
Start: 2021-09-17 | End: 2021-09-19 | Stop reason: HOSPADM

## 2021-09-17 RX ORDER — EPINEPHRINE 1 MG/ML
0.3 INJECTION, SOLUTION, CONCENTRATE INTRAVENOUS AS NEEDED
Status: CANCELLED | OUTPATIENT
Start: 2021-09-20

## 2021-09-17 RX ORDER — DIPHENHYDRAMINE HCL 50 MG
50 CAPSULE ORAL ONCE AS NEEDED
Status: CANCELLED | OUTPATIENT
Start: 2021-09-20

## 2021-09-17 RX ORDER — METHYLPREDNISOLONE SODIUM SUCCINATE 125 MG/2ML
125 INJECTION, POWDER, LYOPHILIZED, FOR SOLUTION INTRAMUSCULAR; INTRAVENOUS AS NEEDED
Status: CANCELLED | OUTPATIENT
Start: 2021-09-20

## 2021-09-17 RX ORDER — DIPHENHYDRAMINE HCL 50 MG
50 CAPSULE ORAL ONCE AS NEEDED
Status: DISCONTINUED | OUTPATIENT
Start: 2021-09-17 | End: 2021-09-19 | Stop reason: HOSPADM

## 2021-09-17 RX ORDER — DIPHENHYDRAMINE HCL 50 MG
50 CAPSULE ORAL ONCE AS NEEDED
Status: CANCELLED | OUTPATIENT
Start: 2021-09-17

## 2021-09-17 RX ORDER — DIPHENHYDRAMINE HYDROCHLORIDE 50 MG/ML
50 INJECTION INTRAMUSCULAR; INTRAVENOUS ONCE AS NEEDED
Status: DISCONTINUED | OUTPATIENT
Start: 2021-09-17 | End: 2021-09-19 | Stop reason: HOSPADM

## 2021-09-17 RX ORDER — DIPHENHYDRAMINE HYDROCHLORIDE 50 MG/ML
50 INJECTION INTRAMUSCULAR; INTRAVENOUS ONCE AS NEEDED
Status: CANCELLED | OUTPATIENT
Start: 2021-09-20

## 2021-09-17 RX ORDER — TACROLIMUS 5 MG/1
1 CAPSULE ORAL EVERY 12 HOURS SCHEDULED
COMMUNITY
End: 2023-01-16

## 2021-09-17 RX ORDER — SODIUM CHLORIDE 9 MG/ML
30 INJECTION, SOLUTION INTRAVENOUS ONCE
Status: COMPLETED | OUTPATIENT
Start: 2021-09-17 | End: 2021-09-17

## 2021-09-17 RX ORDER — SODIUM CHLORIDE 9 MG/ML
30 INJECTION, SOLUTION INTRAVENOUS ONCE
Status: CANCELLED | OUTPATIENT
Start: 2021-09-20

## 2021-09-17 RX ORDER — METHYLPREDNISOLONE SODIUM SUCCINATE 125 MG/2ML
125 INJECTION, POWDER, LYOPHILIZED, FOR SOLUTION INTRAMUSCULAR; INTRAVENOUS AS NEEDED
Status: CANCELLED | OUTPATIENT
Start: 2021-09-17

## 2021-09-17 RX ORDER — DIPHENHYDRAMINE HYDROCHLORIDE 50 MG/ML
50 INJECTION INTRAMUSCULAR; INTRAVENOUS ONCE AS NEEDED
Status: CANCELLED | OUTPATIENT
Start: 2021-09-17

## 2021-09-17 RX ORDER — SODIUM CHLORIDE 9 MG/ML
30 INJECTION, SOLUTION INTRAVENOUS ONCE
Status: CANCELLED | OUTPATIENT
Start: 2021-09-17

## 2021-09-17 RX ADMIN — CASIRIVIMAB AND IMDEVIMAB 1200 MG: 600; 600 INJECTION, SOLUTION, CONCENTRATE INTRAVENOUS at 16:20

## 2021-09-17 RX ADMIN — SODIUM CHLORIDE 30 ML: 9 INJECTION, SOLUTION INTRAVENOUS at 16:20

## 2021-09-20 ENCOUNTER — HOSPITAL ENCOUNTER (OUTPATIENT)
Dept: INFUSION THERAPY | Facility: HOSPITAL | Age: 71
Discharge: HOME | End: 2021-09-20

## 2021-09-21 DIAGNOSIS — E11.9 TYPE 2 DIABETES MELLITUS WITHOUT COMPLICATION, WITHOUT LONG-TERM CURRENT USE OF INSULIN (HCC): ICD-10-CM

## 2021-09-21 NOTE — TELEPHONE ENCOUNTER
Rx Refill Note  Requested Prescriptions     Pending Prescriptions Disp Refills   • metFORMIN (GLUCOPHAGE) 500 MG tablet 270 tablet 3     Sig: Take 1 tablet by mouth 3 (Three) Times a Day.      Last office visit with prescribing clinician: 8/3/2021      Next office visit with prescribing clinician: 11/9/2021            Matthew Alegre MA  09/21/21, 10:54 EDT

## 2021-09-21 NOTE — TELEPHONE ENCOUNTER
Caller: Sally Rivera    Relationship: Self      Medication requested (name and dosage): metFORMIN (GLUCOPHAGE) 500 MG tablet  tablet        Patient taking differently: 1,000 mg 2 Times Daily With Meals,            Pharmacy where request should be sent: [facility]    Additional details provided by patient: PATIENT IS TAKING 1000 MG 2 TIMES DAILY    Best call back number: [phone]    Does the patient have less than a 3 day supply:  [] Yes  [x] No    Burak Paul Rep   09/21/21 10:43 EDT

## 2021-09-28 DIAGNOSIS — K21.9 GASTROESOPHAGEAL REFLUX DISEASE WITHOUT ESOPHAGITIS: ICD-10-CM

## 2021-09-28 RX ORDER — OMEPRAZOLE 20 MG/1
CAPSULE, DELAYED RELEASE ORAL
Qty: 90 CAPSULE | Refills: 0 | Status: SHIPPED | OUTPATIENT
Start: 2021-09-28 | End: 2021-12-06

## 2021-10-07 ENCOUNTER — ANESTHESIA (OUTPATIENT)
Dept: GASTROENTEROLOGY | Facility: HOSPITAL | Age: 71
End: 2021-10-07

## 2021-10-07 ENCOUNTER — HOSPITAL ENCOUNTER (OUTPATIENT)
Facility: HOSPITAL | Age: 71
Setting detail: HOSPITAL OUTPATIENT SURGERY
Discharge: HOME | End: 2021-10-07
Attending: SURGERY | Admitting: SURGERY

## 2021-10-07 ENCOUNTER — ANESTHESIA EVENT (OUTPATIENT)
Dept: GASTROENTEROLOGY | Facility: HOSPITAL | Age: 71
End: 2021-10-07

## 2021-10-07 VITALS
WEIGHT: 237 LBS | OXYGEN SATURATION: 95 % | TEMPERATURE: 98 F | DIASTOLIC BLOOD PRESSURE: 73 MMHG | HEART RATE: 58 BPM | RESPIRATION RATE: 20 BRPM | HEIGHT: 64 IN | BODY MASS INDEX: 40.46 KG/M2 | SYSTOLIC BLOOD PRESSURE: 127 MMHG

## 2021-10-07 DIAGNOSIS — Z86.010 HISTORY OF COLON POLYPS: ICD-10-CM

## 2021-10-07 LAB — GLUCOSE BLDC GLUCOMTR-MCNC: 163 MG/DL (ref 70–130)

## 2021-10-07 PROCEDURE — S0260 H&P FOR SURGERY: HCPCS | Performed by: SURGERY

## 2021-10-07 PROCEDURE — 88305 TISSUE EXAM BY PATHOLOGIST: CPT | Performed by: SURGERY

## 2021-10-07 PROCEDURE — 82962 GLUCOSE BLOOD TEST: CPT

## 2021-10-07 PROCEDURE — 25010000002 PROPOFOL 10 MG/ML EMULSION: Performed by: ANESTHESIOLOGY

## 2021-10-07 PROCEDURE — 45380 COLONOSCOPY AND BIOPSY: CPT | Performed by: SURGERY

## 2021-10-07 RX ORDER — DIPHENHYDRAMINE HCL 25 MG
25 CAPSULE ORAL
Status: DISCONTINUED | OUTPATIENT
Start: 2021-10-07 | End: 2021-10-07 | Stop reason: HOSPADM

## 2021-10-07 RX ORDER — PROMETHAZINE HYDROCHLORIDE 25 MG/1
25 SUPPOSITORY RECTAL ONCE AS NEEDED
Status: DISCONTINUED | OUTPATIENT
Start: 2021-10-07 | End: 2021-10-07 | Stop reason: HOSPADM

## 2021-10-07 RX ORDER — PROPOFOL 10 MG/ML
VIAL (ML) INTRAVENOUS AS NEEDED
Status: DISCONTINUED | OUTPATIENT
Start: 2021-10-07 | End: 2021-10-07 | Stop reason: SURG

## 2021-10-07 RX ORDER — FLUMAZENIL 0.1 MG/ML
0.2 INJECTION INTRAVENOUS AS NEEDED
Status: DISCONTINUED | OUTPATIENT
Start: 2021-10-07 | End: 2021-10-07 | Stop reason: HOSPADM

## 2021-10-07 RX ORDER — HYDRALAZINE HYDROCHLORIDE 20 MG/ML
5 INJECTION INTRAMUSCULAR; INTRAVENOUS
Status: DISCONTINUED | OUTPATIENT
Start: 2021-10-07 | End: 2021-10-07 | Stop reason: HOSPADM

## 2021-10-07 RX ORDER — LIDOCAINE HYDROCHLORIDE 20 MG/ML
INJECTION, SOLUTION INFILTRATION; PERINEURAL AS NEEDED
Status: DISCONTINUED | OUTPATIENT
Start: 2021-10-07 | End: 2021-10-07 | Stop reason: SURG

## 2021-10-07 RX ORDER — HYDROCODONE BITARTRATE AND ACETAMINOPHEN 7.5; 325 MG/1; MG/1
1 TABLET ORAL ONCE AS NEEDED
Status: DISCONTINUED | OUTPATIENT
Start: 2021-10-07 | End: 2021-10-07 | Stop reason: HOSPADM

## 2021-10-07 RX ORDER — OXYCODONE AND ACETAMINOPHEN 10; 325 MG/1; MG/1
1 TABLET ORAL EVERY 4 HOURS PRN
Status: DISCONTINUED | OUTPATIENT
Start: 2021-10-07 | End: 2021-10-07 | Stop reason: HOSPADM

## 2021-10-07 RX ORDER — HYDROMORPHONE HCL 110MG/55ML
0.5 PATIENT CONTROLLED ANALGESIA SYRINGE INTRAVENOUS
Status: DISCONTINUED | OUTPATIENT
Start: 2021-10-07 | End: 2021-10-07 | Stop reason: HOSPADM

## 2021-10-07 RX ORDER — ONDANSETRON 2 MG/ML
4 INJECTION INTRAMUSCULAR; INTRAVENOUS ONCE AS NEEDED
Status: DISCONTINUED | OUTPATIENT
Start: 2021-10-07 | End: 2021-10-07 | Stop reason: HOSPADM

## 2021-10-07 RX ORDER — PROMETHAZINE HYDROCHLORIDE 25 MG/1
25 TABLET ORAL ONCE AS NEEDED
Status: DISCONTINUED | OUTPATIENT
Start: 2021-10-07 | End: 2021-10-07 | Stop reason: HOSPADM

## 2021-10-07 RX ORDER — NALOXONE HCL 0.4 MG/ML
0.2 VIAL (ML) INJECTION AS NEEDED
Status: DISCONTINUED | OUTPATIENT
Start: 2021-10-07 | End: 2021-10-07 | Stop reason: HOSPADM

## 2021-10-07 RX ORDER — EPHEDRINE SULFATE 50 MG/ML
5 INJECTION, SOLUTION INTRAVENOUS ONCE AS NEEDED
Status: DISCONTINUED | OUTPATIENT
Start: 2021-10-07 | End: 2021-10-07 | Stop reason: HOSPADM

## 2021-10-07 RX ORDER — LABETALOL HYDROCHLORIDE 5 MG/ML
5 INJECTION, SOLUTION INTRAVENOUS
Status: DISCONTINUED | OUTPATIENT
Start: 2021-10-07 | End: 2021-10-07 | Stop reason: HOSPADM

## 2021-10-07 RX ORDER — DIPHENHYDRAMINE HYDROCHLORIDE 50 MG/ML
12.5 INJECTION INTRAMUSCULAR; INTRAVENOUS
Status: DISCONTINUED | OUTPATIENT
Start: 2021-10-07 | End: 2021-10-07 | Stop reason: HOSPADM

## 2021-10-07 RX ORDER — SODIUM CHLORIDE, SODIUM LACTATE, POTASSIUM CHLORIDE, CALCIUM CHLORIDE 600; 310; 30; 20 MG/100ML; MG/100ML; MG/100ML; MG/100ML
30 INJECTION, SOLUTION INTRAVENOUS CONTINUOUS PRN
Status: DISCONTINUED | OUTPATIENT
Start: 2021-10-07 | End: 2021-10-07 | Stop reason: HOSPADM

## 2021-10-07 RX ORDER — FENTANYL CITRATE 50 UG/ML
50 INJECTION, SOLUTION INTRAMUSCULAR; INTRAVENOUS
Status: DISCONTINUED | OUTPATIENT
Start: 2021-10-07 | End: 2021-10-07 | Stop reason: HOSPADM

## 2021-10-07 RX ORDER — IBUPROFEN 600 MG/1
600 TABLET ORAL ONCE AS NEEDED
Status: DISCONTINUED | OUTPATIENT
Start: 2021-10-07 | End: 2021-10-07 | Stop reason: HOSPADM

## 2021-10-07 RX ADMIN — PROPOFOL 100 MCG/KG/MIN: 10 INJECTION, EMULSION INTRAVENOUS at 08:10

## 2021-10-07 RX ADMIN — PROPOFOL 100 MG: 10 INJECTION, EMULSION INTRAVENOUS at 08:04

## 2021-10-07 RX ADMIN — SODIUM CHLORIDE, POTASSIUM CHLORIDE, SODIUM LACTATE AND CALCIUM CHLORIDE 30 ML/HR: 600; 310; 30; 20 INJECTION, SOLUTION INTRAVENOUS at 07:48

## 2021-10-07 RX ADMIN — LIDOCAINE HYDROCHLORIDE 100 MG: 20 INJECTION, SOLUTION INFILTRATION; PERINEURAL at 08:04

## 2021-10-07 NOTE — H&P
[facility]   HISTORY AND PHYSICAL    Patient Name: Sally Rivera  : 1950  MRN: [id number]  Primary Care Physician:  Christopher Hastings MD  Date of admission: 10/7/2021    Subjective   Subjective     Chief Complaint: History of colon polyp    History of Present Illness     The patient is a very pleasant 71-year-old female with a history of colon polyps.  She has not had any significant GI symptoms since that time.  She presents for screening colonoscopy high risk group.    Review of Systems   Constitutional: Negative for fatigue and fever.   Respiratory: Negative for chest tightness and shortness of breath.    Cardiovascular: Negative for chest pain and palpitations.   Gastrointestinal: Negative for abdominal pain, blood in stool, constipation, diarrhea, nausea and vomiting.        Personal History     Past Medical History:   Diagnosis Date   • Acid reflux    • Diabetes mellitus type I (HCC)    • Diverticulitis    • Elevated cholesterol    • GERD (gastroesophageal reflux disease)    • Heart attack (HCC)    • Hyperlipidemia    • Hypertension    • Hypertension    • Liver disease        Past Surgical History:   Procedure Laterality Date   • CHOLECYSTECTOMY     • COLON SURGERY      bowel resection for obstruction   • COLONOSCOPY      Dr. Sky   • HYSTERECTOMY     • MOUTH BIOPSY     • OOPHORECTOMY     • US GUIDED LYMPH NODE BIOPSY  2020       Family History: family history includes Diabetes in her father; Heart disease in her father; No Known Problems in her mother. Otherwise pertinent FHx was reviewed and not pertinent to current issue.    Social History:  reports that she quit smoking about 30 years ago. She has never used smokeless tobacco. She reports that she does not drink alcohol and does not use drugs.    Home Medications:  aspirin, atorvastatin, dilTIAZem, furosemide, metFORMIN, metoprolol tartrate, omeprazole, pioglitazone, potassium chloride, and  tacrolimus    Allergies:  No Known Allergies    Objective    Objective     Vitals:   Temp:  [98 °F (36.7 °C)] 98 °F (36.7 °C)  Heart Rate:  [72] 72  Resp:  [16] 16  BP: (135)/(82) 135/82    Physical Exam  Constitutional:       Appearance: Normal appearance. She is well-developed. She is not toxic-appearing.   Eyes:      General: No scleral icterus.  Pulmonary:      Effort: Pulmonary effort is normal. No respiratory distress.   Abdominal:      Palpations: Abdomen is soft.      Tenderness: There is no abdominal tenderness.   Skin:     General: Skin is warm and dry.   Neurological:      Mental Status: She is alert and oriented to person, place, and time.   Psychiatric:         Behavior: Behavior normal.         Thought Content: Thought content normal.         Judgment: Judgment normal.         Assessment/Plan   Assessment / Plan     Brief Patient Summary:  Sally Rivera is a 71 y.o. female who has a history of colon polyps.    Active Hospital Problems:  Active Hospital Problems    Diagnosis    • **History of colon polyps      Added automatically from request for surgery [id number]       Plan: Colonoscopy.  The patient understands the indications, alternatives, risks, and benefits of the procedure and wishes to proceed.      Electronically signed by Facundo Klein Jr., MD, 10/07/21, 8:07 AM EDT.

## 2021-10-07 NOTE — DISCHARGE INSTRUCTIONS
WHAT ARE DIVERTICULOSIS AND DIVERTICULITIS?  Many people have small pouches in their colons that bulge outward through weak spots, like an inner tube that pokes through weak places in a tire.  Each pouch is called a diverticulum.  The condition of having diverticula is DIVERTICULOSIS.  The condition becomes more common as people age.  About half of all people over the age of 60 have diverticulosis    When pouches become infected or inflamed, the condition is called DIVERTICULITIS.  This happens in 10% to 25% of people with diverticulosis.  Diverticulosis and diverticulitis are also called DIVERTICULAR DISEASE.     WHAT ARE THE SYMPTOMS?  Diverticulosis - Most people do not have any discomfort or symptoms.  However, symptoms may include mild cramps, bloating, and constipation.  Other diseases such as irritable bowel syndrome (IBS) and stomach ulcers cause similar problems, so these symptoms do not always mean a person has diverticulosis.  You should visit your doctor if you have these troubling symptoms.    Diverticulitis - The most common symptom is abdominal pain.  The most common sign is tenderness around the left side of the lower abdomen.  If infection is the cause, fever, nausea, vomiting, chills, cramping, and constipation may occur as well.  The severity depends on the extent of the infection and complications.    WHAT ARE THE COMPLICATIONS?  Diverticulitis can lead to bleeding, infections,perforations or tears, or blockages.  These complications always require treatment to prevent them from proggressing and causing serous illness.    Bleeding from a diverticula is a rare complication.  When this occurs, blood may appear in the toilet or in your stool.  Bleeding can be severe, but it may stop by itself and not require treatment.  Doctors believe bleeding diverticula are caused by a small blood vessel in a diverticulum that weakens and finally bursts.  If you have bleeding from the rectum, you should see your  doctor.  If the bleeding does not stop you may need surgery.    Abscess, Perforation, and Peritonitis - The infection causing diverticulitis often clears up after a few days of treatment with antibiotics.  If the condition gets worse, an abscess may form in the colon.  An abscess is an infected area with pus that may cause swelling and destroy tissue.  Sometimes the infected diverticula may develop small holes, called perforations.  These perforations allow pus to leak out of the colon into the abdominal area.  If the abscess is small and remains in the colon, it may clear up after treatment with antibiotics.  If not, the doctor may need to drain it.  A large abscess can become a serious problem if the infection leaks out and contaminates areas outside the colon.  Infection that spreads into the abdominal cavity is called peritonitis.  Peritonitis requires immediate surgery toclean the abdominal cavity and remove the damaged part of the colon.  Without surgery, peritonitis can be fatal.    FISTULA  A fistula is an abnormal connection of tissue between two organs or between an organ and the skin.  When damaged tissues come into contact with each other during infection, they sometimes stick together.  If they heal that way, a fistula forms.  When diverticulitis-related infection spreads through out the colon, the colon's tissue may stick to nearby tissues.  The organs usually involved are the bladder, small intestine, and skin.  The problem can be corrected with surgery to remove the fistula and affected part of the colon.    INTESTINAL OBSTRUCTION  The scarring caused by infection may cause partial or total blockage of the large intestine.  When this happens, the colon is unable to move bowel contents normally.  When the obstruction totally blocks the intestine, emergency surgery is necessary.  Partial blockage is not an emergency, so the surgery to correct it can be planned.    WHAT CAUSES DIVERTICULAR  DISEASE  Although not proven, the dominant theory is that a low-fiber diet is the main cause of diverticular disease.  The disease was first noticed in the [place] in the early 1900s.  At about the same time, processed foods were introduced into the American diet.  Many processed foods contain refined, low-fiber flour.  Unlike whole-wheat flour, refined flour has no wheat bran.    Diverticular disease is common in developed or industrialized countries-particularly the [place], [place], and [place]-where low-fiber diets are common.  The disease is rare in countries of [place] and [place], where people eat high-fiber vegetable diets.    Fiber is the part of fruits, vegetables, and whole grains that the body cannot digest.  Some fiber dissolves easily in water (soluble fiber).  It takes on a soft, jelly-like texture in the intestines.  Some fiber passes almost unchanged through the intestines (insoluble fiber).  Both kinds of fiber help make stools soft and easy to pass.  Fiber also prevents constipation.    Constipation makes the muscles strain to move stool that is too hard.  It is the main cause of increased pressure in the colon.  This excess pressure might cause the weak spots in the colon to bulge out and become diverticula.  Diverticulitis occurs when diverticula become infected or inflamed.  Doctors are not certain what causes the infection.  It may begin when stool or bacteria are caught in the diverticula.  An attack of diverticulitis can develop suddenly and without warning.    HOW DOES THE DOCTOR DIAGNOSE DIVERTICULAR DISEASE  The doctor asks about medical history, does a physical exam, and may perform one or more diagnostic tests.  Because most people do not have symptoms, diverticulosis is often found through tests ordered for another ailment.    When taking a medical history, the doctor may ask about bowel habits, symptoms, pain, diet, and medications.  The physical exam usually involves a  digital rectal exam.  To preform this test. The doctor inserts a gloved, lubricated finger into the rectum to detect tenderness, blockage, or blood.  The doctor may check stool for signs of bleeding and test blood for signs of infection.  The doctor may also order x-rays or other tests.    WHAT IS THE TREATMENT FOR DIVERTICULAR DISEASE  Increasing the amount of fiber in the diet may reduce symptoms of diverticulosis and prevent complications such as diverticulitis.  Fiber keeps stool soft and lowers pressure inside the colon so that bowel contents can move through easily.  The American Dietetic Association. Recommends 20 to 35 grams of fiber each day.  The doctor may also recommend taking a fiber product such as Citrucel or Metamucil once a dya.  These products are mixed water and provide about 2 to 3.5 grams of fiber per  Tablespoon, mixed with 8 ounces of water.    Avoidance of nuts, popcorn, and sunflower, pumpkin, hank, and sesame seeds has been recommended by physicians out of fear that food particles could enter, block, or irritate the diverticula.  However, no scientific data support this treatment measure.  Eating a high-fiber diet is the only requirement highly emphasized across the medical literature.  Eliminating specific foods is not necessary.  The seeds in tomatoes, zucchini, cucumbers, strawberries, and raspberries, as well as poppy seeds, are generally considered harmless.  People differ in amounts and types of foods the can eat.  Decisions about diet should be made based on what works best for each person.  Keeping a food diary may help identify what foods may cause symptoms.    If cramps, bloating, and constipation are problems, the doctor may prescribe  Short course of pain medication.  However, many medications affect emptying of the colon, an undesirable side effect for people with diverticulosis.    DIVERTICULITIS  Treatment focuses on clearing up the infection and inflammation, resting the  colon, and preventing or minimizing complications.  An attack of diverticulitis without complications may respond to antibiotics within a few days if treated early.  To help the colon rest, the doctor may recommend bed rest and a liquid diet, along with a pain reliever.    An acute attack with severe pain or sever infection may require a hospital stay.  Most acute cases of diverticulitis are treated with antibiotics and a liquid diet.  The antibiotics are given by injection into a vein.  In some cases, however, surgery may be necessary.    WHEN IS SURGERY NECESSARY  If attacks are severe or frequent, the doctor may advise surgery.  The surgeon removes the affected part of the colon and joins the remaining sections.  This typed of surgery, called colon resection, aims to keep attacks from coming back and to prevent complications.  The doctor may also recommend surgery for complications of a fistula or intestinal obstruction.    If antibiotics do not correct an attack, emergency surgery may be required.  Other reasons for emergency surgery include a large abscess, perforation, peritonitis, or continued bleeding.    Emergency surgery usually involves 2 operations.  The first will clear the infected abdominal cavity and remove part of the colon.  Because infection and sometimes obstruction, it is not safe to rejoin the colon during the first operation.  Instead, the surgeon creates a temporary hole, or stoma, in the abdomen.  The end of the colon is connected to the hole, a procedure called a colostomy, to allow normal eating and bowel movements.  The stool goes into a bag attached to the opening in the abdomen.  In the second operation, the surgeon rejoins the ends of the colon.

## 2021-10-07 NOTE — ANESTHESIA PREPROCEDURE EVALUATION
" Anesthesia Evaluation     Patient summary reviewed and Nursing notes reviewed   no history of anesthetic complications:  NPO Solid Status: > 8 hours  NPO Liquid Status: > 2 hours           Airway   Mallampati: II  Dental - normal exam     Pulmonary    (+) a smoker Former,   Cardiovascular   Exercise tolerance: good (4-7 METS)    Rhythm: regular    (+) hypertension, past MI , CAD, hyperlipidemia,       Neuro/Psych- negative ROS  GI/Hepatic/Renal/Endo    (+) morbid obesity, GERD,  liver disease fatty liver disease, diabetes mellitus,     Musculoskeletal (-) negative ROS    Abdominal    Substance History - negative use     OB/GYN negative ob/gyn ROS         Other                        Anesthesia Plan    ASA 3     MAC   (/82 (BP Location: Left arm, Patient Position: Lying)   Pulse 72   Temp 36.7 °C (98 °F) (Oral)   Resp 16   Ht 162.6 cm (64\")   Wt 108 kg (237 lb)   SpO2 94%   BMI 40.68 kg/m²     I have reviewed the patient's history with the patient and the chart, including all pertinent laboratory results and imaging. I have explained the risks of anesthesia including but not limited to dental damage, corneal abrasion, nerve injury, MI, stroke, and death.    )    Anesthetic plan, all risks, benefits, and alternatives have been provided, discussed and informed consent has been obtained with: patient.      "

## 2021-10-07 NOTE — ANESTHESIA POSTPROCEDURE EVALUATION
"Patient: Sally Rivera    Procedure Summary     Date: 10/07/21 Room / Location: [facility] ENDOSCOPY 9 /  [facility]    Anesthesia Start: 0801 Anesthesia Stop: 0826    Procedure: COLONOSCOPY TO CECUM WITH COLD POLYPECTOMY (N/A ) Diagnosis:       History of colon polyps      (History of colon polyps [Z86.010])    Surgeons: Facundo Klein Jr., MD Provider: Maricarmen Sanchez MD    Anesthesia Type: MAC ASA Status: 3          Anesthesia Type: MAC    Vitals  Vitals Value Taken Time   /65 10/07/21 0830   Temp     Pulse 65 10/07/21 0830   Resp 20 10/07/21 0830   SpO2 90 % 10/07/21 0830           Post Anesthesia Care and Evaluation    Patient location during evaluation: bedside  Patient participation: complete - patient participated  Level of consciousness: awake  Pain management: adequate  Airway patency: patent  Anesthetic complications: No anesthetic complications  PONV Status: controlled  Cardiovascular status: acceptable  Respiratory status: acceptable  Hydration status: acceptable    Comments: /65 (BP Location: Left arm, Patient Position: Lying)   Pulse 65   Temp 36.7 °C (98 °F) (Oral)   Resp 20   Ht 162.6 cm (64\")   Wt 108 kg (237 lb)   SpO2 90%   BMI 40.68 kg/m²         "

## 2021-10-07 NOTE — OP NOTE
Surgeon:     Facundo Klein Jr., M.D.    Preoperative Diagnosis:     1.  History of colonic polyps    Postoperative Diagnosis:     1.  Diverticulosis  2.  Transverse colon polyp    Procedure Performed:     1.  Colonoscopy with biopsy of transverse colon polyp    Indications:     The patient is a pleasant 71-year-old female who has a history of colon polyps.  She presents for screening colonoscopy high risk group.  The patient understands the indications, alternatives, risks, and benefits of the procedure and wishes to proceed.    Procedure:     The patient was identified, taken to the endoscopy suite, and place in the left side down decubitus procedure.  The patient underwent a MAC anesthesia and was appropriately monitored through the case by the anesthesia personnel.  A rectal exam was performed that was normal.  The colonoscope was introduced into the rectum and advanced very carefully to the cecum that was identified by the cecal strap, ileocecal valve, and the appendiceal orifice.  The scope was then slowly withdrawn with careful circumferential examination of the mucosa performed.  The bowel prep was good allowing adequate visualization of mucosal surfaces.  The scope was withdrawn.  The patient tolerated the procedure well and was transferred the recovery area in stable condition.    Findings:    There was diverticulosis involving the sigmoid colon with no inflammatory changes.    There was a small, 4 to 6 mm polyp in the transverse colon that was removed by cold biopsy forceps and retrieved.    Recommendations:     1.  High-fiber diet.  2.  Await pathology results from polypectomy.  3.  Repeat colonoscopy in 5 years.    Facundo Klein Jr., M.D.

## 2021-10-08 LAB
LAB AP CASE REPORT: NORMAL
PATH REPORT.FINAL DX SPEC: NORMAL
PATH REPORT.GROSS SPEC: NORMAL

## 2021-10-11 ENCOUNTER — TELEPHONE (OUTPATIENT)
Dept: SURGERY | Facility: CLINIC | Age: 71
End: 2021-10-11

## 2021-10-11 NOTE — TELEPHONE ENCOUNTER
Called patient and notified her of her benign pathology. Advised of recommended 5 year repeat c-scope. Recall placed.  tab updated.

## 2021-10-11 NOTE — TELEPHONE ENCOUNTER
----- Message from Facundo Klein Jr., MD sent at 10/11/2021 10:03 AM EDT -----  Please contact this patient to inform that the polyp is benign and a repeat colonoscopy is needed in 5 years.  Please place in recall for a colonoscopy in 5 years.  Thanks.

## 2021-11-09 ENCOUNTER — OFFICE VISIT (OUTPATIENT)
Dept: FAMILY MEDICINE CLINIC | Facility: CLINIC | Age: 71
End: 2021-11-09

## 2021-11-09 VITALS
SYSTOLIC BLOOD PRESSURE: 110 MMHG | DIASTOLIC BLOOD PRESSURE: 70 MMHG | WEIGHT: 239.2 LBS | OXYGEN SATURATION: 97 % | HEART RATE: 67 BPM | BODY MASS INDEX: 40.84 KG/M2 | TEMPERATURE: 96 F | HEIGHT: 64 IN | RESPIRATION RATE: 20 BRPM

## 2021-11-09 DIAGNOSIS — Z86.010 HISTORY OF COLON POLYPS: ICD-10-CM

## 2021-11-09 DIAGNOSIS — Z79.899 HIGH RISK MEDICATION USE: ICD-10-CM

## 2021-11-09 DIAGNOSIS — E66.01 OBESITY, MORBID, BMI 40.0-49.9 (HCC): ICD-10-CM

## 2021-11-09 DIAGNOSIS — Z87.19 HISTORY OF RECTAL POLYPECTOMY: ICD-10-CM

## 2021-11-09 DIAGNOSIS — U07.1 COVID-19 VIRUS INFECTION: ICD-10-CM

## 2021-11-09 DIAGNOSIS — I25.10 CHRONIC CORONARY ARTERY DISEASE: ICD-10-CM

## 2021-11-09 DIAGNOSIS — Z00.00 MEDICARE ANNUAL WELLNESS VISIT, SUBSEQUENT: Primary | ICD-10-CM

## 2021-11-09 DIAGNOSIS — E11.65 TYPE 2 DIABETES MELLITUS WITH HYPERGLYCEMIA, WITHOUT LONG-TERM CURRENT USE OF INSULIN (HCC): ICD-10-CM

## 2021-11-09 DIAGNOSIS — Z98.890 HISTORY OF RECTAL POLYPECTOMY: ICD-10-CM

## 2021-11-09 DIAGNOSIS — E78.5 HYPERLIPIDEMIA, UNSPECIFIED HYPERLIPIDEMIA TYPE: ICD-10-CM

## 2021-11-09 DIAGNOSIS — K76.0 NONALCOHOLIC FATTY LIVER DISEASE: ICD-10-CM

## 2021-11-09 PROCEDURE — 96160 PT-FOCUSED HLTH RISK ASSMT: CPT | Performed by: FAMILY MEDICINE

## 2021-11-09 PROCEDURE — 1159F MED LIST DOCD IN RCRD: CPT | Performed by: FAMILY MEDICINE

## 2021-11-09 PROCEDURE — G0439 PPPS, SUBSEQ VISIT: HCPCS | Performed by: FAMILY MEDICINE

## 2021-11-09 PROCEDURE — 1170F FXNL STATUS ASSESSED: CPT | Performed by: FAMILY MEDICINE

## 2021-11-09 NOTE — PROGRESS NOTES
The ABCs of the Annual Wellness Visit  Subsequent Medicare Wellness Visit    Chief Complaint   Patient presents with   • Medicare Wellness-subsequent      Subjective    History of Present Illness:  Sally Rivera is a 71 y.o. female who presents for a Subsequent Medicare Wellness Visit.    The following portions of the patient's history were reviewed and   updated as appropriate: allergies, past family history, past social history and past surgical history.    Compared to one year ago, the patient feels her physical   health is worse.    Compared to one year ago, the patient feels her mental   health is the same.    Recent Hospitalizations:  She was not admitted to the hospital during the last year.       Current Medical Providers:  Patient Care Team:  Christopher Hastings MD as PCP - General  Parker Sky MD as Consulting Physician (Gastroenterology)    Outpatient Medications Prior to Visit   Medication Sig Dispense Refill   • aspirin 325 MG tablet Take 325 mg by mouth daily.     • atorvastatin (LIPITOR) 40 MG tablet TAKE 1 TABLET BY MOUTH ONE TIME A DAY  90 tablet 3   • dilTIAZem (CARDIZEM) 120 MG tablet TAKE 1 TABLET BY MOUTH THREE TIMES A DAY  270 tablet 3   • furosemide (LASIX) 40 MG tablet TAKE 1 TABLET BY MOUTH ONE TIME A DAY  90 tablet 3   • metFORMIN (GLUCOPHAGE) 500 MG tablet Take 2 tablets by mouth 2 (Two) Times a Day With Meals. 360 tablet 3   • metoprolol tartrate (LOPRESSOR) 50 MG tablet TAKE 1 TABLET BY MOUTH TWO TIMES A DAY  180 tablet 3   • omeprazole (priLOSEC) 20 MG capsule TAKE 1 CAPSULE BY MOUTH ONE TIME A DAY  90 capsule 0   • pioglitazone (ACTOS) 45 MG tablet TAKE 1 TABLET BY MOUTH ONE TIME A DAY  90 tablet 3   • potassium chloride (K-DUR,KLOR-CON) 10 MEQ CR tablet TAKE 1 TABLET BY MOUTH ONE TIME A DAY  90 tablet 3   • tacrolimus (PROGRAF) Take 1 mg by mouth Every 12 (Twelve) Hours.       No facility-administered medications prior to visit.       No opioid medication identified on active  "medication list. I have reviewed chart for other potential  high risk medication/s and harmful drug interactions in the elderly.          Aspirin is on active medication list. Aspirin use is indicated based on review of current medical condition/s. Pros and cons of this therapy have been discussed today. Benefits of this medication outweigh potential harm.  Patient has been encouraged to continue taking this medication.  .      Patient Active Problem List   Diagnosis   • Chronic coronary artery disease   • Gastroesophageal reflux disease   • Hyperlipidemia   • Nonalcoholic fatty liver disease   • Atrial paroxysmal tachycardia (HCC)   • Type 2 diabetes mellitus with hyperglycemia, without long-term current use of insulin (HCC)   • History of rectal polypectomy   • Diverticulosis of large intestine without hemorrhage   • High risk medication use   • Obesity, morbid, BMI 40.0-49.9 (HCC)   • History of colon polyps   • Clinical diagnosis of COVID-19     Advance Care Planning  Advance Directive is not on file.  ACP discussion was held with the patient during this visit. Patient has an advance directive (not in EMR), copy requested.          Objective    Vitals:    21 1043   BP: 110/70   BP Location: Left arm   Pulse: 67   Resp: 20   Temp: 96 °F (35.6 °C)   TempSrc: Temporal   SpO2: 97%   Weight: 109 kg (239 lb 3.2 oz)   Height: 162.6 cm (64\")   PainSc: 0-No pain     BMI Readings from Last 1 Encounters:   21 41.06 kg/m²   BMI is above normal parameters. Recommendations include: exercise counseling and nutrition counseling    Does the patient have evidence of cognitive impairment? No    Physical Exam            HEALTH RISK ASSESSMENT    Smoking Status:  Social History     Tobacco Use   Smoking Status Former Smoker   • Quit date: 1990   • Years since quittin.9   Smokeless Tobacco Never Used     Alcohol Consumption:  Social History     Substance and Sexual Activity   Alcohol Use No     Fall Risk " Screen:    JOSEFA Fall Risk Assessment was completed, and patient is at LOW risk for falls.Assessment completed on:11/9/2021    Depression Screening:  PHQ-2/PHQ-9 Depression Screening 11/9/2021   Little interest or pleasure in doing things 0   Feeling down, depressed, or hopeless 0   Trouble falling or staying asleep, or sleeping too much 0   Feeling tired or having little energy 1   Poor appetite or overeating 0   Feeling bad about yourself - or that you are a failure or have let yourself or your family down 0   Trouble concentrating on things, such as reading the newspaper or watching television 0   Moving or speaking so slowly that other people could have noticed. Or the opposite - being so fidgety or restless that you have been moving around a lot more than usual 0   Thoughts that you would be better off dead, or of hurting yourself in some way 0   Total Score 1   If you checked off any problems, how difficult have these problems made it for you to do your work, take care of things at home, or get along with other people? Not difficult at all       Health Habits and Functional and Cognitive Screening:  Functional & Cognitive Status 11/9/2021   Do you have difficulty preparing food and eating? No   Do you have difficulty bathing yourself, getting dressed or grooming yourself? No   Do you have difficulty using the toilet? No   Do you have difficulty moving around from place to place? No   Do you have trouble with steps or getting out of a bed or a chair? No   Current Diet Other   Dental Exam Up to date   Eye Exam Up to date   Exercise (times per week) 0 times per week   Current Exercises Include No Regular Exercise   Current Exercise Activities Include -   Do you need help using the phone?  No   Are you deaf or do you have serious difficulty hearing?  No   Do you need help with transportation? No   Do you need help shopping? No   Do you need help preparing meals?  No   Do you need help with housework?  No   Do you  need help with laundry? No   Do you need help taking your medications? No   Do you need help managing money? No   Do you ever drive or ride in a car without wearing a seat belt? No   Have you felt unusual stress, anger or loneliness in the last month? No   Who do you live with? Spouse   If you need help, do you have trouble finding someone available to you? No   Have you been bothered in the last four weeks by sexual problems? No   Do you have difficulty concentrating, remembering or making decisions? No       Age-appropriate Screening Schedule:  Refer to the list below for future screening recommendations based on patient's age, sex and/or medical conditions. Orders for these recommended tests are listed in the plan section. The patient has been provided with a written plan.    Health Maintenance   Topic Date Due   • DIABETIC FOOT EXAM  06/02/2021   • DXA SCAN  11/09/2021 (Originally 1950)   • HEMOGLOBIN A1C  02/03/2022   • LIPID PANEL  05/10/2022   • URINE MICROALBUMIN  05/10/2022   • MAMMOGRAM  10/02/2022   • DIABETIC EYE EXAM  10/25/2022   • TDAP/TD VACCINES (2 - Td or Tdap) 03/26/2029   • INFLUENZA VACCINE  Completed   • ZOSTER VACCINE  Completed   • PAP SMEAR  Discontinued              Assessment/Plan     The above risks/problems have been discussed with the patient.  Follow up actions/plans if indicated are seen below in the Assessment/Plan Section.  Pertinent information has been shared with the patient in the After Visit Summary.        Follow Up:   Patient with multiple medical issues here for annual Medicare wellness evaluation.  For some reason multiple diagnoses are not showing up in epic.  Overall seems stable on current regimen.  Did have COVID infection several weeks ago despite being vaccinated.  Was given antibiotic infusion and is to wait 90 days before getting third Covid vaccine.  All of this was discussed.  Will get routine lab with probable follow-up in 3 months.    An After Visit Summary  and PPPS were made available to the patient.

## 2021-11-10 LAB
ALBUMIN SERPL-MCNC: 4.5 G/DL (ref 3.8–4.8)
ALBUMIN/GLOB SERPL: 1.3 {RATIO} (ref 1.2–2.2)
ALP SERPL-CCNC: 75 IU/L (ref 44–121)
ALT SERPL-CCNC: 22 IU/L (ref 0–32)
AST SERPL-CCNC: 37 IU/L (ref 0–40)
BASOPHILS # BLD AUTO: 0.1 X10E3/UL (ref 0–0.2)
BASOPHILS NFR BLD AUTO: 1 %
BILIRUB SERPL-MCNC: 0.6 MG/DL (ref 0–1.2)
BUN SERPL-MCNC: 15 MG/DL (ref 8–27)
BUN/CREAT SERPL: 15 (ref 12–28)
CALCIUM SERPL-MCNC: 9.6 MG/DL (ref 8.7–10.3)
CHLORIDE SERPL-SCNC: 99 MMOL/L (ref 96–106)
CHOLEST SERPL-MCNC: 155 MG/DL (ref 100–199)
CO2 SERPL-SCNC: 23 MMOL/L (ref 20–29)
CREAT SERPL-MCNC: 0.99 MG/DL (ref 0.57–1)
EOSINOPHIL # BLD AUTO: 0.2 X10E3/UL (ref 0–0.4)
EOSINOPHIL NFR BLD AUTO: 2 %
ERYTHROCYTE [DISTWIDTH] IN BLOOD BY AUTOMATED COUNT: 14.2 % (ref 11.7–15.4)
GLOBULIN SER CALC-MCNC: 3.5 G/DL (ref 1.5–4.5)
GLUCOSE SERPL-MCNC: 130 MG/DL (ref 65–99)
HBA1C MFR BLD: 7.9 % (ref 4.8–5.6)
HCT VFR BLD AUTO: 40.8 % (ref 34–46.6)
HDLC SERPL-MCNC: 35 MG/DL
HGB BLD-MCNC: 13 G/DL (ref 11.1–15.9)
IMM GRANULOCYTES # BLD AUTO: 0 X10E3/UL (ref 0–0.1)
IMM GRANULOCYTES NFR BLD AUTO: 0 %
LDLC SERPL CALC-MCNC: 86 MG/DL (ref 0–99)
LYMPHOCYTES # BLD AUTO: 3.2 X10E3/UL (ref 0.7–3.1)
LYMPHOCYTES NFR BLD AUTO: 31 %
MCH RBC QN AUTO: 25.4 PG (ref 26.6–33)
MCHC RBC AUTO-ENTMCNC: 31.9 G/DL (ref 31.5–35.7)
MCV RBC AUTO: 80 FL (ref 79–97)
MONOCYTES # BLD AUTO: 0.6 X10E3/UL (ref 0.1–0.9)
MONOCYTES NFR BLD AUTO: 6 %
NEUTROPHILS # BLD AUTO: 6.2 X10E3/UL (ref 1.4–7)
NEUTROPHILS NFR BLD AUTO: 60 %
PLATELET # BLD AUTO: 237 X10E3/UL (ref 150–450)
POTASSIUM SERPL-SCNC: 4.5 MMOL/L (ref 3.5–5.2)
PROT SERPL-MCNC: 8 G/DL (ref 6–8.5)
RBC # BLD AUTO: 5.11 X10E6/UL (ref 3.77–5.28)
SODIUM SERPL-SCNC: 138 MMOL/L (ref 134–144)
TRIGL SERPL-MCNC: 199 MG/DL (ref 0–149)
VLDLC SERPL CALC-MCNC: 34 MG/DL (ref 5–40)
WBC # BLD AUTO: 10.3 X10E3/UL (ref 3.4–10.8)

## 2021-12-04 DIAGNOSIS — K21.9 GASTROESOPHAGEAL REFLUX DISEASE WITHOUT ESOPHAGITIS: ICD-10-CM

## 2021-12-06 RX ORDER — OMEPRAZOLE 20 MG/1
CAPSULE, DELAYED RELEASE ORAL
Qty: 90 CAPSULE | Refills: 0 | Status: SHIPPED | OUTPATIENT
Start: 2021-12-06 | End: 2022-03-04

## 2022-03-04 DIAGNOSIS — K21.9 GASTROESOPHAGEAL REFLUX DISEASE WITHOUT ESOPHAGITIS: ICD-10-CM

## 2022-03-04 RX ORDER — OMEPRAZOLE 20 MG/1
CAPSULE, DELAYED RELEASE ORAL
Qty: 90 CAPSULE | Refills: 0 | Status: SHIPPED | OUTPATIENT
Start: 2022-03-04 | End: 2022-06-02

## 2022-04-04 ENCOUNTER — OFFICE VISIT (OUTPATIENT)
Dept: FAMILY MEDICINE CLINIC | Facility: CLINIC | Age: 72
End: 2022-04-04

## 2022-04-04 ENCOUNTER — HOSPITAL ENCOUNTER (OUTPATIENT)
Dept: GENERAL RADIOLOGY | Facility: HOSPITAL | Age: 72
Discharge: HOME | End: 2022-04-04
Admitting: FAMILY MEDICINE

## 2022-04-04 VITALS
BODY MASS INDEX: 42.47 KG/M2 | TEMPERATURE: 96.8 F | HEIGHT: 64 IN | WEIGHT: 248.8 LBS | DIASTOLIC BLOOD PRESSURE: 80 MMHG | OXYGEN SATURATION: 96 % | RESPIRATION RATE: 20 BRPM | SYSTOLIC BLOOD PRESSURE: 120 MMHG | HEART RATE: 73 BPM

## 2022-04-04 DIAGNOSIS — J18.9 COMMUNITY ACQUIRED PNEUMONIA OF LEFT LOWER LOBE OF LUNG: ICD-10-CM

## 2022-04-04 DIAGNOSIS — I25.10 CHRONIC CORONARY ARTERY DISEASE: ICD-10-CM

## 2022-04-04 DIAGNOSIS — E11.65 TYPE 2 DIABETES MELLITUS WITH HYPERGLYCEMIA, WITHOUT LONG-TERM CURRENT USE OF INSULIN: Primary | ICD-10-CM

## 2022-04-04 DIAGNOSIS — E78.5 HYPERLIPIDEMIA, UNSPECIFIED HYPERLIPIDEMIA TYPE: ICD-10-CM

## 2022-04-04 DIAGNOSIS — K76.0 NONALCOHOLIC FATTY LIVER DISEASE: ICD-10-CM

## 2022-04-04 DIAGNOSIS — Z79.899 HIGH RISK MEDICATION USE: ICD-10-CM

## 2022-04-04 PROCEDURE — 99213 OFFICE O/P EST LOW 20 MIN: CPT | Performed by: FAMILY MEDICINE

## 2022-04-04 PROCEDURE — 71046 X-RAY EXAM CHEST 2 VIEWS: CPT

## 2022-04-04 NOTE — PROGRESS NOTES
HPI  Sally Rivera is a 72 y.o. female who is here for follow up after recent urgent care visit at which time diagnosis was made of pneumonia.  Rales noted in the left lung field.  Patient reports a persistent cough.  Was given prescription for Z-Stevie and Augmentin which has been completed.  Overall denies complaint except for persistent productive cough.  Remains on Mucinex.      Review of Systems   Respiratory: Positive for cough. Negative for shortness of breath.    All other systems reviewed and are negative.        Past Medical History:   Diagnosis Date   • Acid reflux    • Diabetes mellitus type I (HCC)    • Diverticulitis    • Elevated cholesterol    • GERD (gastroesophageal reflux disease)    • Heart attack (HCC)    • Hyperlipidemia    • Hypertension    • Hypertension    • Liver disease        Past Surgical History:   Procedure Laterality Date   • CHOLECYSTECTOMY     • COLON SURGERY      bowel resection for obstruction   • COLONOSCOPY      Dr. Sky   • COLONOSCOPY N/A 10/7/2021    Procedure: COLONOSCOPY TO CECUM WITH COLD POLYPECTOMY;  Surgeon: Facundo Klein Jr., MD;  Location: [facility] ENDOSCOPY;  Service: General;  Laterality: N/A;  PRE- H/O COLON POLYPS  POST-COLON POLYP, DIVERTICULOSIS   • HYSTERECTOMY     • MOUTH BIOPSY     • OOPHORECTOMY     • US GUIDED LYMPH NODE BIOPSY  2020       Family History   Problem Relation Age of Onset   • No Known Problems Mother    • Diabetes Father    • Heart disease Father        Social History     Socioeconomic History   • Marital status:    Tobacco Use   • Smoking status: Former Smoker     Quit date: 1990     Years since quittin.3   • Smokeless tobacco: Never Used   Vaping Use   • Vaping Use: Never used   Substance and Sexual Activity   • Alcohol use: No   • Drug use: No   • Sexual activity: Defer       Vitals:    22 1327   BP: 120/80   Pulse: 73   Resp: 20   Temp: 96.8 °F (36 °C)   SpO2: 96%        Body mass  PA Initiation    Medication: esomeprazole- MA  Insurance Company: [place] Medicaid ([facility]) - Phone [phone] Fax [phone]  [facility] - [place], [place] - [address]  Filling Pharmacy Phone: [phone]  Filling Pharmacy Fax: [phone]  Start Date: 4/3/2020       index is 42.71 kg/m².      Physical Exam  Vitals and nursing note reviewed.   Constitutional:       General: She is not in acute distress.     Appearance: She is well-developed.   HENT:      Head: Normocephalic and atraumatic.   Eyes:      Conjunctiva/sclera: Conjunctivae normal.      Pupils: Pupils are equal, round, and reactive to light.   Neck:      Thyroid: No thyromegaly.   Cardiovascular:      Rate and Rhythm: Normal rate and regular rhythm.      Heart sounds: Normal heart sounds.   Pulmonary:      Effort: Pulmonary effort is normal. No respiratory distress.      Breath sounds: Examination of the left-lower field reveals rales. Rales present.   Abdominal:      General: There is no distension.      Palpations: Abdomen is soft. There is no mass.      Tenderness: There is no abdominal tenderness.      Hernia: No hernia is present.   Musculoskeletal:         General: No tenderness or deformity. Normal range of motion.      Cervical back: Normal range of motion.   Lymphadenopathy:      Cervical: No cervical adenopathy.   Skin:     General: Skin is warm and dry.      Coloration: Skin is not pale.      Findings: No rash.   Neurological:      General: No focal deficit present.      Mental Status: She is alert and oriented to person, place, and time.      Motor: No abnormal muscle tone.      Coordination: Coordination normal.   Psychiatric:         Mood and Affect: Mood normal.         Behavior: Behavior normal.         Thought Content: Thought content normal.         Judgment: Judgment normal.           Assessment/Plan    Diagnoses and all orders for this visit:    1. Type 2 diabetes mellitus with hyperglycemia, without long-term current use of insulin (HCC) (Primary)  -     Comprehensive Metabolic Panel  -     Lipid Panel  -     Hemoglobin A1c    2. Hyperlipidemia, unspecified hyperlipidemia type  -     Lipid Panel    3. Chronic coronary artery disease    4. Nonalcoholic fatty liver disease  -     Comprehensive  Metabolic Panel    5. High risk medication use  -     CBC & Differential  -     Comprehensive Metabolic Panel    6. Community acquired pneumonia of left lower lobe of lung      Patient presents for follow-up after recent urgent care center visit at which time rales were noted in the left lower lobe.  Rales persist but patient seems to be in no distress.  Oxygen saturation levels are okay.  Also is due for routine lab work which will be obtained as above.  Also discussed finding a new primary care provider possibly closer to her home.

## 2022-04-05 LAB
ALBUMIN SERPL-MCNC: 4.3 G/DL (ref 3.7–4.7)
ALBUMIN/GLOB SERPL: 1.3 {RATIO} (ref 1.2–2.2)
ALP SERPL-CCNC: 72 IU/L (ref 44–121)
ALT SERPL-CCNC: 24 IU/L (ref 0–32)
AST SERPL-CCNC: 33 IU/L (ref 0–40)
BASOPHILS # BLD AUTO: 0.1 X10E3/UL (ref 0–0.2)
BASOPHILS NFR BLD AUTO: 1 %
BILIRUB SERPL-MCNC: 0.5 MG/DL (ref 0–1.2)
BUN SERPL-MCNC: 13 MG/DL (ref 8–27)
BUN/CREAT SERPL: 17 (ref 12–28)
CALCIUM SERPL-MCNC: 9.3 MG/DL (ref 8.7–10.3)
CHLORIDE SERPL-SCNC: 103 MMOL/L (ref 96–106)
CHOLEST SERPL-MCNC: 156 MG/DL (ref 100–199)
CO2 SERPL-SCNC: 22 MMOL/L (ref 20–29)
CREAT SERPL-MCNC: 0.76 MG/DL (ref 0.57–1)
EGFRCR SERPLBLD CKD-EPI 2021: 84 ML/MIN/1.73
EOSINOPHIL # BLD AUTO: 0.5 X10E3/UL (ref 0–0.4)
EOSINOPHIL NFR BLD AUTO: 5 %
ERYTHROCYTE [DISTWIDTH] IN BLOOD BY AUTOMATED COUNT: 14.9 % (ref 11.7–15.4)
GLOBULIN SER CALC-MCNC: 3.4 G/DL (ref 1.5–4.5)
GLUCOSE SERPL-MCNC: 128 MG/DL (ref 65–99)
HBA1C MFR BLD: 7.7 % (ref 4.8–5.6)
HCT VFR BLD AUTO: 40 % (ref 34–46.6)
HDLC SERPL-MCNC: 41 MG/DL
HGB BLD-MCNC: 12.9 G/DL (ref 11.1–15.9)
IMM GRANULOCYTES # BLD AUTO: 0 X10E3/UL (ref 0–0.1)
IMM GRANULOCYTES NFR BLD AUTO: 0 %
LDLC SERPL CALC-MCNC: 83 MG/DL (ref 0–99)
LYMPHOCYTES # BLD AUTO: 3.9 X10E3/UL (ref 0.7–3.1)
LYMPHOCYTES NFR BLD AUTO: 38 %
MCH RBC QN AUTO: 25.4 PG (ref 26.6–33)
MCHC RBC AUTO-ENTMCNC: 32.3 G/DL (ref 31.5–35.7)
MCV RBC AUTO: 79 FL (ref 79–97)
MONOCYTES # BLD AUTO: 0.6 X10E3/UL (ref 0.1–0.9)
MONOCYTES NFR BLD AUTO: 6 %
NEUTROPHILS # BLD AUTO: 5.1 X10E3/UL (ref 1.4–7)
NEUTROPHILS NFR BLD AUTO: 50 %
PLATELET # BLD AUTO: 226 X10E3/UL (ref 150–450)
POTASSIUM SERPL-SCNC: 4.3 MMOL/L (ref 3.5–5.2)
PROT SERPL-MCNC: 7.7 G/DL (ref 6–8.5)
RBC # BLD AUTO: 5.07 X10E6/UL (ref 3.77–5.28)
SODIUM SERPL-SCNC: 142 MMOL/L (ref 134–144)
TRIGL SERPL-MCNC: 187 MG/DL (ref 0–149)
VLDLC SERPL CALC-MCNC: 32 MG/DL (ref 5–40)
WBC # BLD AUTO: 10.2 X10E3/UL (ref 3.4–10.8)

## 2022-04-13 ENCOUNTER — TELEPHONE (OUTPATIENT)
Dept: FAMILY MEDICINE CLINIC | Facility: CLINIC | Age: 72
End: 2022-04-13

## 2022-04-13 NOTE — TELEPHONE ENCOUNTER
Caller: Sally Rivera    Relationship: Self    Best call back number: [phone]    What is the best time to reach you: ANYTIME    Who are you requesting to speak with (clinical staff, provider,  specific staff member): DR. DIXON    What was the call regarding: PATIENT WAS SEEN IN OFFICE BY DR. DIXON ON 04/04/2022 AND WAS DIAGNOSED WITH PNEUMONIA.     PATIENT STATES SHE IS NOT FEELING ANY BETTER AND WANTS TO KNOW IF DR. DIXON NEEDS TO SEE HER AGAIN OR WHAT SHE SHOULD DO.     PLEASE CALL AND ADVISE.

## 2022-04-18 ENCOUNTER — HOSPITAL ENCOUNTER (OUTPATIENT)
Dept: CT IMAGING | Facility: HOSPITAL | Age: 72
Discharge: HOME | End: 2022-04-18
Admitting: FAMILY MEDICINE

## 2022-04-18 ENCOUNTER — OFFICE VISIT (OUTPATIENT)
Dept: FAMILY MEDICINE CLINIC | Facility: CLINIC | Age: 72
End: 2022-04-18

## 2022-04-18 VITALS
SYSTOLIC BLOOD PRESSURE: 110 MMHG | RESPIRATION RATE: 18 BRPM | HEART RATE: 83 BPM | BODY MASS INDEX: 41.52 KG/M2 | OXYGEN SATURATION: 91 % | WEIGHT: 243.2 LBS | DIASTOLIC BLOOD PRESSURE: 80 MMHG | TEMPERATURE: 96.6 F | HEIGHT: 64 IN

## 2022-04-18 DIAGNOSIS — R93.89 ABNORMAL CHEST X-RAY: Primary | ICD-10-CM

## 2022-04-18 DIAGNOSIS — R05.3 PERSISTENT COUGH FOR 3 WEEKS OR LONGER: ICD-10-CM

## 2022-04-18 DIAGNOSIS — R93.89 ABNORMAL CHEST X-RAY: ICD-10-CM

## 2022-04-18 PROCEDURE — 99213 OFFICE O/P EST LOW 20 MIN: CPT | Performed by: FAMILY MEDICINE

## 2022-04-18 PROCEDURE — 71250 CT THORAX DX C-: CPT

## 2022-04-18 RX ORDER — DEXTROMETHORPHAN HYDROBROMIDE AND PROMETHAZINE HYDROCHLORIDE 15; 6.25 MG/5ML; MG/5ML
SYRUP ORAL
Qty: 180 ML | Refills: 1 | Status: SHIPPED | OUTPATIENT
Start: 2022-04-18 | End: 2022-07-29

## 2022-04-18 NOTE — PROGRESS NOTES
HPI  Sally Rivera is a 72 y.o. female who is here for follow up of a persistent cough for the last 3 to 4 weeks.  Reports continues to cough up mucus.  Remains somewhat short of air with low oxygen levels.  Denies fever or chills.  Apparently was diagnosed with pneumonia at urgent care center last month and did receive antibiotic therapy x2.  Does report some improvement with rescue inhaler.  Discontinued cigarette use many years ago.  Other history review indicated an axillary lymph node biopsy which was negative and known history of adenomatous colon polyps.  Also under therapy for diabetes.      Review of Systems   Constitutional: Positive for chills. Negative for diaphoresis and fever.   Respiratory: Positive for cough and wheezing.    Cardiovascular: Positive for chest pain.   Allergic/Immunologic: Negative for environmental allergies.   Neurological: Positive for headaches.         Past Medical History:   Diagnosis Date   • Acid reflux    • Diabetes mellitus type I (HCC)    • Diverticulitis    • Elevated cholesterol    • GERD (gastroesophageal reflux disease)    • Heart attack (HCC) 1995   • Hyperlipidemia    • Hypertension    • Hypertension    • Liver disease        Past Surgical History:   Procedure Laterality Date   • CHOLECYSTECTOMY  2011   • COLON SURGERY  2014    bowel resection for obstruction   • COLONOSCOPY  2013    Dr. Sky   • COLONOSCOPY N/A 10/7/2021    Procedure: COLONOSCOPY TO CECUM WITH COLD POLYPECTOMY;  Surgeon: Facundo Klein Jr., MD;  Location: [facility] ENDOSCOPY;  Service: General;  Laterality: N/A;  PRE- H/O COLON POLYPS  POST-COLON POLYP, DIVERTICULOSIS   • HYSTERECTOMY  2011   • MOUTH BIOPSY     • OOPHORECTOMY     • US GUIDED LYMPH NODE BIOPSY  11/30/2020       Family History   Problem Relation Age of Onset   • No Known Problems Mother    • Diabetes Father    • Heart disease Father        Social History     Socioeconomic History   • Marital status:    Tobacco Use   •  Smoking status: Former Smoker     Quit date: 1990     Years since quittin.3   • Smokeless tobacco: Never Used   Vaping Use   • Vaping Use: Never used   Substance and Sexual Activity   • Alcohol use: No   • Drug use: No   • Sexual activity: Defer       Vitals:    22 0948   BP: 110/80   Pulse: 83   Resp: 18   Temp: 96.6 °F (35.9 °C)   SpO2: 91%        Body mass index is 41.75 kg/m².      Physical Exam  Vitals and nursing note reviewed.   Constitutional:       General: She is not in acute distress.     Appearance: She is well-developed.   HENT:      Head: Normocephalic and atraumatic.   Eyes:      Conjunctiva/sclera: Conjunctivae normal.      Pupils: Pupils are equal, round, and reactive to light.   Neck:      Thyroid: No thyromegaly.   Cardiovascular:      Rate and Rhythm: Normal rate and regular rhythm.      Heart sounds: Normal heart sounds.   Pulmonary:      Effort: Pulmonary effort is normal. No respiratory distress.      Breath sounds: Normal breath sounds.   Abdominal:      General: There is no distension.      Palpations: Abdomen is soft. There is no mass.      Tenderness: There is no abdominal tenderness.      Hernia: No hernia is present.   Musculoskeletal:         General: No tenderness or deformity. Normal range of motion.      Cervical back: Normal range of motion.   Lymphadenopathy:      Cervical: No cervical adenopathy.   Skin:     General: Skin is warm and dry.      Coloration: Skin is not pale.      Findings: No rash.   Neurological:      General: No focal deficit present.      Mental Status: She is alert and oriented to person, place, and time.      Motor: No abnormal muscle tone.      Coordination: Coordination normal.   Psychiatric:         Mood and Affect: Mood normal.         Behavior: Behavior normal.         Thought Content: Thought content normal.         Judgment: Judgment normal.           Assessment/Plan    Diagnoses and all orders for this visit:    1. Abnormal chest x-ray  (Primary)  -     CT Chest Without Contrast; Future    2. Persistent cough for 3 weeks or longer  -     CT Chest Without Contrast; Future  -     promethazine-dextromethorphan (PROMETHAZINE-DM) 6.25-15 MG/5ML syrup; 5-10 ml po qid prn cough  Dispense: 180 mL; Refill: 1      Patient presents with a persistent productive cough.  No improvement noted and previous chest x-ray showed possible interstitial infiltrates.  Will get chest CT scan as noted and sample given of Trelegy to see if helps.  Depending on CT results may well need referral to pulmonologist.      Answers for HPI/ROS submitted by the patient on 4/17/2022  What is the primary reason for your visit?: Cough  Chronicity: recurrent  Onset: 1 to 4 weeks ago  Progression since onset: waxing and waning  Frequency: every few minutes  Cough characteristics: productive of purulent sputum  Aggravated by: nothing

## 2022-05-04 ENCOUNTER — TELEPHONE (OUTPATIENT)
Dept: FAMILY MEDICINE CLINIC | Facility: CLINIC | Age: 72
End: 2022-05-04

## 2022-05-04 DIAGNOSIS — R91.8 PULMONARY NODULES/LESIONS, MULTIPLE: Primary | ICD-10-CM

## 2022-05-04 NOTE — TELEPHONE ENCOUNTER
Caller: Sally Rivera    Relationship: Self    Best call back number: [phone]    What is the best time to reach you: ANY    Who are you requesting to speak with (clinical staff, provider,  specific staff member):    Do you know the name of the person who called: NONE    What was the call regarding: PATIENT SAW DR. DIXON ON 4/18/22.  DURING THAT VISIT, DR. DIXON STATED THAT PATIENT MAY NEED TO SEE A PULMONOLOGIST BASED ON CT RESULTS.  PATIENT INQUIRED IF REFERRAL HAS BEEN SENT TO A PULMONOLOGIST.  PATIENT STATES SHE IS DOING WELL.  SHE IS NOT USING AN INHALER.  PLEASE ADVISE    Do you require a callback: YES

## 2022-06-02 DIAGNOSIS — E11.9 TYPE 2 DIABETES MELLITUS WITHOUT COMPLICATION, WITHOUT LONG-TERM CURRENT USE OF INSULIN: ICD-10-CM

## 2022-06-02 DIAGNOSIS — K21.9 GASTROESOPHAGEAL REFLUX DISEASE WITHOUT ESOPHAGITIS: ICD-10-CM

## 2022-06-02 DIAGNOSIS — K76.0 NONALCOHOLIC FATTY LIVER DISEASE: ICD-10-CM

## 2022-06-02 RX ORDER — PIOGLITAZONEHYDROCHLORIDE 45 MG/1
TABLET ORAL
Qty: 90 TABLET | Refills: 0 | Status: SHIPPED | OUTPATIENT
Start: 2022-06-02 | End: 2022-08-22 | Stop reason: SDUPTHER

## 2022-06-02 RX ORDER — OMEPRAZOLE 20 MG/1
CAPSULE, DELAYED RELEASE ORAL
Qty: 90 CAPSULE | Refills: 0 | Status: SHIPPED | OUTPATIENT
Start: 2022-06-02 | End: 2022-08-22 | Stop reason: SDUPTHER

## 2022-06-10 ENCOUNTER — HOSPITAL ENCOUNTER (OUTPATIENT)
Dept: CT IMAGING | Facility: HOSPITAL | Age: 72
Discharge: HOME | End: 2022-06-10
Admitting: FAMILY MEDICINE

## 2022-06-10 DIAGNOSIS — R91.8 PULMONARY NODULES/LESIONS, MULTIPLE: ICD-10-CM

## 2022-06-10 PROCEDURE — 71250 CT THORAX DX C-: CPT

## 2022-06-13 DIAGNOSIS — R91.8 PULMONARY NODULES: Primary | ICD-10-CM

## 2022-07-29 ENCOUNTER — OFFICE VISIT (OUTPATIENT)
Dept: FAMILY MEDICINE CLINIC | Facility: CLINIC | Age: 72
End: 2022-07-29

## 2022-07-29 VITALS
BODY MASS INDEX: 42 KG/M2 | SYSTOLIC BLOOD PRESSURE: 108 MMHG | HEART RATE: 72 BPM | HEIGHT: 64 IN | WEIGHT: 246 LBS | DIASTOLIC BLOOD PRESSURE: 70 MMHG | RESPIRATION RATE: 20 BRPM | OXYGEN SATURATION: 97 % | TEMPERATURE: 97 F

## 2022-07-29 DIAGNOSIS — E11.65 TYPE 2 DIABETES MELLITUS WITH HYPERGLYCEMIA, WITHOUT LONG-TERM CURRENT USE OF INSULIN: Primary | ICD-10-CM

## 2022-07-29 DIAGNOSIS — K21.00 GASTROESOPHAGEAL REFLUX DISEASE WITH ESOPHAGITIS WITHOUT HEMORRHAGE: ICD-10-CM

## 2022-07-29 DIAGNOSIS — R91.1 PULMONARY NODULE: ICD-10-CM

## 2022-07-29 DIAGNOSIS — E78.00 PURE HYPERCHOLESTEROLEMIA: ICD-10-CM

## 2022-07-29 PROBLEM — J18.9 PNEUMONIA: Status: ACTIVE | Noted: 2022-07-29

## 2022-07-29 PROCEDURE — 99214 OFFICE O/P EST MOD 30 MIN: CPT | Performed by: INTERNAL MEDICINE

## 2022-07-29 NOTE — PROGRESS NOTES
"Chief Complaint  Diabetes (Establish care from Dr Hastings who retired) and tingling in finger tips (Mostly on left hand)    Subjective        Sally Rivera presents to [facility]  History of Present Illness patient was seen for diabetes.  Patient's latest hemoglobin A1c was 7.7%.  Patient is getting blood sugars in the 150s.  Patient tries to follow diabetic diet but does not exercise.  Patient was advised to walk 30 minutes 3-5 times a week.  Patient will monitor blood pressure blood sugar and follow-up in 4 months.  Patient does have a pulmonary nodule is being followed by CT scans.  Scans every 3 months indicated is not increased in size.  After discussing it with the patient is felt to see pulmonary to find out what the nodule is.  Patient's lipids have been treated with diet exercise and atorvastatin 40 mg daily.  Triglycerides 187, HDL 41, LDL 83.  Patient's labs were drawn 3 months ago.  Patient's gastroesophageal reflux has been treated with omeprazole 20 mg daily.      Dictated utilizing Dragon dictation. If there are questions or for further clarification, please contact me.    Objective   Vital Signs:  Blood Pressure 108/70 (BP Location: Left arm, Patient Position: Sitting)   Pulse 72   Temperature 97 °F (36.1 °C) (Infrared)   Respiration 20   Height 162.6 cm (64\")   Weight 112 kg (246 lb)   Oxygen Saturation 97%   Body Mass Index 42.23 kg/m²   Estimated body mass index is 42.23 kg/m² as calculated from the following:    Height as of this encounter: 162.6 cm (64\").    Weight as of this encounter: 112 kg (246 lb).          Physical Exam  Vitals and nursing note reviewed.   Constitutional:       Appearance: Normal appearance. She is well-developed.   HENT:      Head: Normocephalic and atraumatic.      Nose: Nose normal.      Mouth/Throat:      Mouth: Mucous membranes are moist.      Pharynx: Oropharynx is clear.   Eyes:      Extraocular Movements: Extraocular " movements intact.      Conjunctiva/sclera: Conjunctivae normal.      Pupils: Pupils are equal, round, and reactive to light.   Cardiovascular:      Rate and Rhythm: Normal rate and regular rhythm.      Heart sounds: Normal heart sounds. No murmur heard.    No friction rub. No gallop.   Pulmonary:      Effort: Pulmonary effort is normal. No respiratory distress.      Breath sounds: Normal breath sounds. No stridor. No wheezing, rhonchi or rales.   Chest:      Chest wall: No tenderness.   Abdominal:      General: Bowel sounds are normal.      Palpations: Abdomen is soft.   Musculoskeletal:         General: Normal range of motion.      Cervical back: Normal range of motion and neck supple.   Skin:     General: Skin is warm and dry.   Neurological:      General: No focal deficit present.      Mental Status: She is alert and oriented to person, place, and time. Mental status is at baseline.   Psychiatric:         Mood and Affect: Mood normal.         Behavior: Behavior normal.         Thought Content: Thought content normal.         Judgment: Judgment normal.        Result Review :                Assessment and Plan  1 continue monitoring blood pressure blood sugar #2 begin low impact exercise 30 minutes 3-5 times a week #3 refer to pulmonary for pulmonary nodule.  Diagnoses and all orders for this visit:    1. Type 2 diabetes mellitus with hyperglycemia, without long-term current use of insulin (HCC) (Primary)    2. Pure hypercholesterolemia    3. Gastroesophageal reflux disease with esophagitis without hemorrhage    4. Pulmonary nodule  -     Ambulatory Referral to Pulmonology             Follow Up   Return in about 4 months (around 11/29/2022), or if symptoms worsen or fail to improve, for Recheck.  Patient was given instructions and counseling regarding her condition or for health maintenance advice. Please see specific information pulled into the AVS if appropriate.

## 2022-08-08 DIAGNOSIS — R60.1 GENERALIZED EDEMA: ICD-10-CM

## 2022-08-09 RX ORDER — FUROSEMIDE 40 MG/1
TABLET ORAL
Qty: 90 TABLET | Refills: 0 | OUTPATIENT
Start: 2022-08-09

## 2022-08-22 DIAGNOSIS — I25.10 CHRONIC CORONARY ARTERY DISEASE: ICD-10-CM

## 2022-08-22 DIAGNOSIS — E11.9 TYPE 2 DIABETES MELLITUS WITHOUT COMPLICATION, WITHOUT LONG-TERM CURRENT USE OF INSULIN: ICD-10-CM

## 2022-08-22 DIAGNOSIS — K21.9 GASTROESOPHAGEAL REFLUX DISEASE WITHOUT ESOPHAGITIS: ICD-10-CM

## 2022-08-22 DIAGNOSIS — K76.0 NONALCOHOLIC FATTY LIVER DISEASE: ICD-10-CM

## 2022-08-22 DIAGNOSIS — E78.5 HYPERLIPIDEMIA: ICD-10-CM

## 2022-08-22 DIAGNOSIS — R60.1 GENERALIZED EDEMA: ICD-10-CM

## 2022-08-22 RX ORDER — ATORVASTATIN CALCIUM 40 MG/1
40 TABLET, FILM COATED ORAL DAILY
Qty: 90 TABLET | Refills: 3 | Status: SHIPPED | OUTPATIENT
Start: 2022-08-22 | End: 2022-11-30 | Stop reason: SDUPTHER

## 2022-08-22 RX ORDER — PIOGLITAZONEHYDROCHLORIDE 45 MG/1
45 TABLET ORAL DAILY
Qty: 90 TABLET | Refills: 1 | Status: SHIPPED | OUTPATIENT
Start: 2022-08-22 | End: 2022-11-11 | Stop reason: ALTCHOICE

## 2022-08-22 RX ORDER — POTASSIUM CHLORIDE 750 MG/1
10 TABLET, EXTENDED RELEASE ORAL DAILY
Qty: 90 TABLET | Refills: 3 | Status: SHIPPED | OUTPATIENT
Start: 2022-08-22 | End: 2022-11-30 | Stop reason: SDUPTHER

## 2022-08-22 RX ORDER — OMEPRAZOLE 20 MG/1
20 CAPSULE, DELAYED RELEASE ORAL DAILY
Qty: 90 CAPSULE | Refills: 1 | Status: SHIPPED | OUTPATIENT
Start: 2022-08-22 | End: 2022-11-30 | Stop reason: SDUPTHER

## 2022-08-22 RX ORDER — DILTIAZEM HYDROCHLORIDE 120 MG/1
120 TABLET, FILM COATED ORAL 3 TIMES DAILY
Qty: 270 TABLET | Refills: 3 | Status: SHIPPED | OUTPATIENT
Start: 2022-08-22 | End: 2022-11-30 | Stop reason: SDUPTHER

## 2022-08-22 RX ORDER — FUROSEMIDE 40 MG/1
40 TABLET ORAL DAILY
Qty: 90 TABLET | Refills: 3 | Status: SHIPPED | OUTPATIENT
Start: 2022-08-22 | End: 2022-11-30 | Stop reason: SDUPTHER

## 2022-08-22 NOTE — TELEPHONE ENCOUNTER
Caller: Sally Rivera    Relationship: Self    Best call back number: [phone] (H)    Requested Prescriptions:   atorvastatin (LIPITOR) 40 MG tablet    dilTIAZem (CARDIZEM) 120 MG tablet    furosemide (LASIX) 40 MG tablet    metFORMIN (GLUCOPHAGE) 500 MG tablet    metoprolol tartrate (LOPRESSOR) 50 MG tablet    omeprazole (priLOSEC) 20 MG capsule    pioglitazone (ACTOS) 45 MG tablet    potassium chloride (K-DUR,KLOR-CON) 10 MEQ CR tablet    Pharmacy where request should be sent:  [id number] [address] [place] [address] [phone] [facility] [phone]           Additional details provided by patient: PATIENT CALLED TO REQUEST NEW PRESCRIPTIONS BE SENT OVER TO HER PHARMACY WITH DR. IZQUIERDO'S NAME ON THEM. PATIENT STATES THAT SHE HAS A 3 DAY SUPPLY LEFT.            Does the patient have less than a 3 day supply:  [] Yes  [x] No    Burak Corado Rep   08/22/22 13:21 EDT

## 2022-09-12 ENCOUNTER — HOSPITAL ENCOUNTER (OUTPATIENT)
Dept: CT IMAGING | Facility: HOSPITAL | Age: 72
Discharge: HOME | End: 2022-09-12
Admitting: FAMILY MEDICINE

## 2022-09-12 DIAGNOSIS — R91.8 PULMONARY NODULES: ICD-10-CM

## 2022-09-12 PROCEDURE — 71250 CT THORAX DX C-: CPT

## 2022-09-20 ENCOUNTER — TRANSCRIBE ORDERS (OUTPATIENT)
Dept: ADMINISTRATIVE | Facility: HOSPITAL | Age: 72
End: 2022-09-20

## 2022-09-20 DIAGNOSIS — I27.20 PULMONARY HTN: Primary | ICD-10-CM

## 2022-09-30 ENCOUNTER — APPOINTMENT (OUTPATIENT)
Dept: GENERAL RADIOLOGY | Facility: HOSPITAL | Age: 72
End: 2022-09-30

## 2022-09-30 ENCOUNTER — HOSPITAL ENCOUNTER (EMERGENCY)
Facility: HOSPITAL | Age: 72
Discharge: HOME | End: 2022-09-30
Attending: EMERGENCY MEDICINE | Admitting: EMERGENCY MEDICINE

## 2022-09-30 VITALS
TEMPERATURE: 97.2 F | HEART RATE: 66 BPM | OXYGEN SATURATION: 95 % | SYSTOLIC BLOOD PRESSURE: 153 MMHG | RESPIRATION RATE: 20 BRPM | DIASTOLIC BLOOD PRESSURE: 96 MMHG

## 2022-09-30 DIAGNOSIS — J45.41 MODERATE PERSISTENT ASTHMA WITH EXACERBATION: ICD-10-CM

## 2022-09-30 DIAGNOSIS — J06.9 URI WITH COUGH AND CONGESTION: Primary | ICD-10-CM

## 2022-09-30 LAB
B PARAPERT DNA SPEC QL NAA+PROBE: NOT DETECTED
B PERT DNA SPEC QL NAA+PROBE: NOT DETECTED
C PNEUM DNA NPH QL NAA+NON-PROBE: NOT DETECTED
FLUAV SUBTYP SPEC NAA+PROBE: NOT DETECTED
FLUBV RNA ISLT QL NAA+PROBE: NOT DETECTED
HADV DNA SPEC NAA+PROBE: NOT DETECTED
HCOV 229E RNA SPEC QL NAA+PROBE: NOT DETECTED
HCOV HKU1 RNA SPEC QL NAA+PROBE: NOT DETECTED
HCOV NL63 RNA SPEC QL NAA+PROBE: NOT DETECTED
HCOV OC43 RNA SPEC QL NAA+PROBE: NOT DETECTED
HMPV RNA NPH QL NAA+NON-PROBE: NOT DETECTED
HPIV1 RNA ISLT QL NAA+PROBE: NOT DETECTED
HPIV2 RNA SPEC QL NAA+PROBE: NOT DETECTED
HPIV3 RNA NPH QL NAA+PROBE: NOT DETECTED
HPIV4 P GENE NPH QL NAA+PROBE: NOT DETECTED
M PNEUMO IGG SER IA-ACNC: NOT DETECTED
RHINOVIRUS RNA SPEC NAA+PROBE: DETECTED
RSV RNA NPH QL NAA+NON-PROBE: NOT DETECTED
SARS-COV-2 RNA NPH QL NAA+NON-PROBE: NOT DETECTED

## 2022-09-30 PROCEDURE — 0202U NFCT DS 22 TRGT SARS-COV-2: CPT | Performed by: EMERGENCY MEDICINE

## 2022-09-30 PROCEDURE — 99283 EMERGENCY DEPT VISIT LOW MDM: CPT

## 2022-09-30 PROCEDURE — 71046 X-RAY EXAM CHEST 2 VIEWS: CPT

## 2022-09-30 RX ORDER — PREDNISONE 10 MG/1
20 TABLET ORAL DAILY
Qty: 10 TABLET | Refills: 0 | Status: SHIPPED | OUTPATIENT
Start: 2022-09-30 | End: 2022-10-05

## 2022-10-27 DIAGNOSIS — E11.9 TYPE 2 DIABETES MELLITUS WITHOUT COMPLICATION, WITHOUT LONG-TERM CURRENT USE OF INSULIN: Primary | ICD-10-CM

## 2022-10-27 DIAGNOSIS — E78.00 PURE HYPERCHOLESTEROLEMIA: ICD-10-CM

## 2022-10-27 DIAGNOSIS — E55.9 VITAMIN D DEFICIENCY: ICD-10-CM

## 2022-10-31 ENCOUNTER — HOSPITAL ENCOUNTER (OUTPATIENT)
Dept: CARDIOLOGY | Facility: HOSPITAL | Age: 72
Discharge: HOME | End: 2022-10-31
Admitting: INTERNAL MEDICINE

## 2022-10-31 VITALS
SYSTOLIC BLOOD PRESSURE: 150 MMHG | HEIGHT: 64 IN | WEIGHT: 246 LBS | HEART RATE: 60 BPM | DIASTOLIC BLOOD PRESSURE: 90 MMHG | BODY MASS INDEX: 42 KG/M2

## 2022-10-31 DIAGNOSIS — I27.20 PULMONARY HTN: ICD-10-CM

## 2022-10-31 LAB
AORTIC ARCH: 2.7 CM
ASCENDING AORTA: 3.2 CM
BH CV ECHO MEAS - ACS: 1.78 CM
BH CV ECHO MEAS - AO MAX PG: 10.6 MMHG
BH CV ECHO MEAS - AO MEAN PG: 6.1 MMHG
BH CV ECHO MEAS - AO ROOT DIAM: 3 CM
BH CV ECHO MEAS - AO V2 MAX: 162.9 CM/SEC
BH CV ECHO MEAS - AO V2 VTI: 39.7 CM
BH CV ECHO MEAS - AVA(I,D): 1.76 CM2
BH CV ECHO MEAS - EDV(CUBED): 125.2 ML
BH CV ECHO MEAS - EDV(MOD-SP2): 72 ML
BH CV ECHO MEAS - EDV(MOD-SP4): 84 ML
BH CV ECHO MEAS - EF(MOD-BP): 57 %
BH CV ECHO MEAS - EF(MOD-SP2): 55.6 %
BH CV ECHO MEAS - EF(MOD-SP4): 56 %
BH CV ECHO MEAS - EF_3D-VOL: 59 %
BH CV ECHO MEAS - ESV(CUBED): 37.3 ML
BH CV ECHO MEAS - ESV(MOD-SP2): 32 ML
BH CV ECHO MEAS - ESV(MOD-SP4): 37 ML
BH CV ECHO MEAS - FS: 33.2 %
BH CV ECHO MEAS - IVS/LVPW: 0.99 CM
BH CV ECHO MEAS - IVSD: 0.84 CM
BH CV ECHO MEAS - LAT PEAK E' VEL: 7.5 CM/SEC
BH CV ECHO MEAS - LV DIASTOLIC VOL/BSA (35-75): 39.3 CM2
BH CV ECHO MEAS - LV MASS(C)D: 144.8 GRAMS
BH CV ECHO MEAS - LV MAX PG: 3.1 MMHG
BH CV ECHO MEAS - LV MEAN PG: 1.9 MMHG
BH CV ECHO MEAS - LV SYSTOLIC VOL/BSA (12-30): 17.3 CM2
BH CV ECHO MEAS - LV V1 MAX: 87.5 CM/SEC
BH CV ECHO MEAS - LV V1 VTI: 22.5 CM
BH CV ECHO MEAS - LVIDD: 5 CM
BH CV ECHO MEAS - LVIDS: 3.3 CM
BH CV ECHO MEAS - LVOT AREA: 3.1 CM2
BH CV ECHO MEAS - LVOT DIAM: 1.99 CM
BH CV ECHO MEAS - LVPWD: 0.84 CM
BH CV ECHO MEAS - MED PEAK E' VEL: 7.1 CM/SEC
BH CV ECHO MEAS - MV A DUR: 0.13 SEC
BH CV ECHO MEAS - MV A MAX VEL: 79 CM/SEC
BH CV ECHO MEAS - MV DEC SLOPE: 635.5 CM/SEC2
BH CV ECHO MEAS - MV DEC TIME: 0.22 MSEC
BH CV ECHO MEAS - MV E MAX VEL: 90 CM/SEC
BH CV ECHO MEAS - MV E/A: 1.14
BH CV ECHO MEAS - MV MAX PG: 7.3 MMHG
BH CV ECHO MEAS - MV MEAN PG: 2.22 MMHG
BH CV ECHO MEAS - MV P1/2T: 60.8 MSEC
BH CV ECHO MEAS - MV V2 VTI: 33.8 CM
BH CV ECHO MEAS - MVA(P1/2T): 3.6 CM2
BH CV ECHO MEAS - MVA(VTI): 2.07 CM2
BH CV ECHO MEAS - PA ACC TIME: 0.11 SEC
BH CV ECHO MEAS - PA PR(ACCEL): 31.5 MMHG
BH CV ECHO MEAS - PA V2 MAX: 112.7 CM/SEC
BH CV ECHO MEAS - PI END-D VEL: 116.7 CM/SEC
BH CV ECHO MEAS - PULM A REVS DUR: 0.11 SEC
BH CV ECHO MEAS - PULM A REVS VEL: 19.5 CM/SEC
BH CV ECHO MEAS - PULM DIAS VEL: 64.3 CM/SEC
BH CV ECHO MEAS - PULM S/D: 0.69
BH CV ECHO MEAS - PULM SYS VEL: 44.3 CM/SEC
BH CV ECHO MEAS - QP/QS: 0.56
BH CV ECHO MEAS - RAP SYSTOLE: 3 MMHG
BH CV ECHO MEAS - RV MAX PG: 1.54 MMHG
BH CV ECHO MEAS - RV V1 MAX: 62.1 CM/SEC
BH CV ECHO MEAS - RV V1 VTI: 16 CM
BH CV ECHO MEAS - RVOT DIAM: 1.76 CM
BH CV ECHO MEAS - RVSP: 49 MMHG
BH CV ECHO MEAS - SI(MOD-SP2): 18.7 ML/M2
BH CV ECHO MEAS - SI(MOD-SP4): 22 ML/M2
BH CV ECHO MEAS - SUP REN AO DIAM: 1.7 CM
BH CV ECHO MEAS - SV(LVOT): 70 ML
BH CV ECHO MEAS - SV(MOD-SP2): 40 ML
BH CV ECHO MEAS - SV(MOD-SP4): 47 ML
BH CV ECHO MEAS - SV(RVOT): 38.9 ML
BH CV ECHO MEAS - TAPSE (>1.6): 2.3 CM
BH CV ECHO MEAS - TR MAX PG: 46.4 MMHG
BH CV ECHO MEAS - TR MAX VEL: 340.7 CM/SEC
BH CV ECHO MEASUREMENTS AVERAGE E/E' RATIO: 12.33
BH CV XLRA - RV BASE: 2.7 CM
BH CV XLRA - RV LENGTH: 6.6 CM
BH CV XLRA - RV MID: 2.6 CM
BH CV XLRA - TDI S': 10 CM/SEC
LEFT ATRIUM VOLUME INDEX: 21.9 ML/M2
MAXIMAL PREDICTED HEART RATE: 149 BPM
SINUS: 2.43 CM
STJ: 2.7 CM
STRESS TARGET HR: 127 BPM

## 2022-10-31 PROCEDURE — 93306 TTE W/DOPPLER COMPLETE: CPT | Performed by: INTERNAL MEDICINE

## 2022-10-31 PROCEDURE — 93306 TTE W/DOPPLER COMPLETE: CPT

## 2022-11-11 ENCOUNTER — TRANSCRIBE ORDERS (OUTPATIENT)
Dept: CARDIOLOGY | Facility: CLINIC | Age: 72
End: 2022-11-11

## 2022-11-11 ENCOUNTER — OFFICE VISIT (OUTPATIENT)
Dept: CARDIOLOGY | Facility: CLINIC | Age: 72
End: 2022-11-11

## 2022-11-11 ENCOUNTER — HOSPITAL ENCOUNTER (OUTPATIENT)
Dept: CT IMAGING | Facility: HOSPITAL | Age: 72
Discharge: HOME | End: 2022-11-11
Admitting: STUDENT IN AN ORGANIZED HEALTH CARE EDUCATION/TRAINING PROGRAM

## 2022-11-11 VITALS
WEIGHT: 242 LBS | BODY MASS INDEX: 41.32 KG/M2 | OXYGEN SATURATION: 87 % | HEIGHT: 64 IN | DIASTOLIC BLOOD PRESSURE: 78 MMHG | SYSTOLIC BLOOD PRESSURE: 140 MMHG | HEART RATE: 74 BPM

## 2022-11-11 DIAGNOSIS — Z01.810 PRE-OPERATIVE CARDIOVASCULAR EXAMINATION: Primary | ICD-10-CM

## 2022-11-11 DIAGNOSIS — R09.02 HYPOXIA: ICD-10-CM

## 2022-11-11 DIAGNOSIS — E11.9 TYPE 2 DIABETES MELLITUS WITHOUT COMPLICATION, WITHOUT LONG-TERM CURRENT USE OF INSULIN: ICD-10-CM

## 2022-11-11 DIAGNOSIS — I27.20 PULMONARY HYPERTENSION: Primary | ICD-10-CM

## 2022-11-11 DIAGNOSIS — Z13.6 SCREENING FOR ISCHEMIC HEART DISEASE: ICD-10-CM

## 2022-11-11 DIAGNOSIS — I27.20 PULMONARY HYPERTENSION: ICD-10-CM

## 2022-11-11 LAB — CREAT BLDA-MCNC: 0.8 MG/DL (ref 0.6–1.3)

## 2022-11-11 PROCEDURE — 99205 OFFICE O/P NEW HI 60 MIN: CPT | Performed by: STUDENT IN AN ORGANIZED HEALTH CARE EDUCATION/TRAINING PROGRAM

## 2022-11-11 PROCEDURE — 71275 CT ANGIOGRAPHY CHEST: CPT

## 2022-11-11 PROCEDURE — 0 IOPAMIDOL PER 1 ML: Performed by: STUDENT IN AN ORGANIZED HEALTH CARE EDUCATION/TRAINING PROGRAM

## 2022-11-11 PROCEDURE — 82565 ASSAY OF CREATININE: CPT

## 2022-11-11 PROCEDURE — 93000 ELECTROCARDIOGRAM COMPLETE: CPT | Performed by: STUDENT IN AN ORGANIZED HEALTH CARE EDUCATION/TRAINING PROGRAM

## 2022-11-11 RX ORDER — METFORMIN HYDROCHLORIDE EXTENDED-RELEASE TABLETS 1000 MG/1
1000 TABLET, FILM COATED, EXTENDED RELEASE ORAL 2 TIMES DAILY WITH MEALS
Qty: 60 TABLET | Refills: 11 | Status: SHIPPED | OUTPATIENT
Start: 2022-11-11 | End: 2022-11-30 | Stop reason: SDUPTHER

## 2022-11-11 RX ADMIN — IOPAMIDOL 95 ML: 755 INJECTION, SOLUTION INTRAVENOUS at 11:59

## 2022-11-11 NOTE — PATIENT INSTRUCTIONS
Stop actos and start jardiance.     We will start the new long acting metformin medication, hopefully this decreases GI upset. I would hold off on metformin until the CT scan of your lungs is complete.

## 2022-11-11 NOTE — NURSING NOTE
Dr. Ham called and stated CTA PE negative. This was called to ADAN Dela Cruz at Dr. Christensen office and stated pt may go home. Pt taken to car per wheelchair by this RN.

## 2022-11-11 NOTE — H&P (VIEW-ONLY)
Subjective:     Encounter Date:11/11/22      Patient ID: Sally Rivera is a 72 y.o. female.    Chief Complaint:   Chief Complaint   Patient presents with   • Follow-up      History of Present Illness    Ms. Rivera is a very pleasant 72-year-old lady past medical history hypertension, hyperlipidemia, recent diagnosis of pulmonary interstitial disease, unclear etiology who presents for further evaluation of elevated pulmonary pressures on echocardiogram.    She reports a Subacute worsening of shortness of breath over the past several months.  She states that started in April.  She reports she had a COVID infection back in September of last year. She follows with Dr. Berry with local pulmonary care, she is being followed for moderate restrictive lung disease and signs of possible pulmonary hypertension.  She has some pulmonary infiltrates which are progressed over the past several months, with some residual scarring.    She denies any history of blood clots or prior heart stents.  She reports that her shortness of air has been progressive over the past several months, and has worsened over the past couple weeks.  In clinic today, she was noted to have an oxygen saturation 87% on room air at rest.  She was placed on 2 L nasal cannula with improvement to 94 to 95%.  Patient reports that she feels markedly improved with the oxygen delivery.    In clinic, she underwent a 6-minute walk test which revealed 86% O2 saturation on room air during the test.  She will need 2 L of nasal cannula oxygen continuously    The following portions of the patient's history were reviewed and updated as appropriate: allergies, current medications, past family history, past medical history, past social history, past surgical history and problem list.    Past Medical History:   Diagnosis Date   • Acid reflux    • CAD (coronary artery disease)     per dr deepthi cesar office note 4/22-dd   • Diabetes mellitus type I (HCC)    •  "Diverticulitis    • Elevated cholesterol    • GERD (gastroesophageal reflux disease)    • Heart attack (HCC) 1995   • Hyperlipidemia    • Hypertension    • Hypertension    • Liver disease        Past Surgical History:   Procedure Laterality Date   • CHOLECYSTECTOMY  2011   • COLON SURGERY  2014    bowel resection for obstruction   • COLONOSCOPY  2013    Dr. Sky   • COLONOSCOPY N/A 10/7/2021    Procedure: COLONOSCOPY TO CECUM WITH COLD POLYPECTOMY;  Surgeon: Facundo Klein Jr., MD;  Location: [facility] ENDOSCOPY;  Service: General;  Laterality: N/A;  PRE- H/O COLON POLYPS  POST-COLON POLYP, DIVERTICULOSIS   • HYSTERECTOMY  2011   • MOUTH BIOPSY     • OOPHORECTOMY     • US GUIDED LYMPH NODE BIOPSY  11/30/2020           ECG 12 Lead    Date/Time: 11/11/2022 10:35 AM  Performed by: Cory Christensen MD  Authorized by: Cory Christensen MD   Previous ECG: no previous ECG available  Rhythm: sinus rhythm  Ectopy: unifocal PVCs  Rate: normal  ST Segments: ST segments normal  T Waves: T waves normal  QRS axis: normal  Other: no other findings  Other findings: low voltage  Comments: Low voltage in precordial leads               Objective:     Vitals:    11/11/22 0929   BP: 140/78   Pulse: 74   SpO2: (!) 87%   Weight: 110 kg (242 lb)   Height: 162.6 cm (64\")         Constitutional:       Appearance: Healthy appearance. Not in distress.   Neck:      Vascular: JVD normal.   Pulmonary:      Effort: Pulmonary effort is normal.      Comments: Coarse Velcro-like breath sounds at bases to mid lungs bilaterally.  Normal work of breathing on 2 L nasal cannula.  No JVD appreciated  Cardiovascular:      PMI at left midclavicular line. Normal rate. Regular rhythm. Normal S2.      Murmurs: There is no murmur.   Pulses:     Intact distal pulses.   Skin:     General: Skin is warm and dry.   Neurological:      General: No focal deficit present.      Mental Status: Alert, oriented to person, place, and time and oriented to person, place and time. "   Psychiatric:         Mood and Affect: Mood and affect normal.         Lab Review:     Lipid Panel    Lipid Panel 4/4/22   Total Cholesterol 156   Triglycerides 187 (A)   HDL Cholesterol 41   VLDL Cholesterol 32   LDL Cholesterol  83   (A) Abnormal value            BUN   Date Value Ref Range Status   04/04/2022 13 8 - 27 mg/dL Final   10/10/2019 16 8 - 23 mg/dL Final     Creatinine   Date Value Ref Range Status   04/04/2022 0.76 0.57 - 1.00 mg/dL Final   10/10/2019 0.82 0.57 - 1.00 mg/dL Final     Potassium   Date Value Ref Range Status   04/04/2022 4.3 3.5 - 5.2 mmol/L Final   10/10/2019 4.6 3.5 - 5.2 mmol/L Final     ALT (SGPT)   Date Value Ref Range Status   04/04/2022 24 0 - 32 IU/L Final     AST (SGOT)   Date Value Ref Range Status   04/04/2022 33 0 - 40 IU/L Final         Performed        Assessment:          Diagnosis Plan   1. Pulmonary hypertension (HCC)  metFORMIN (FORTAMET) 1000 MG (OSM) 24 hr tablet    empagliflozin (JARDIANCE) 10 MG tablet tablet    Case Request Cath Lab: Right Heart Cath with vasodilator as indicated. pHTN workup    CT angiogram chest w contrast      2. Type 2 diabetes mellitus without complication, without long-term current use of insulin (HCC)        3. Hypoxia  metFORMIN (FORTAMET) 1000 MG (OSM) 24 hr tablet    empagliflozin (JARDIANCE) 10 MG tablet tablet    Case Request Cath Lab: Right Heart Cath with vasodilator as indicated. pHTN workup    CT angiogram chest w contrast             Plan:           1. Acute on chronic hypoxic respiratory failure, concerns for interstitial lung disease  2. Pulmonary hypertension, possibly group 3  - Follows with Dr. Berry and pulmonary  - Her O2 sat was 86% on RA on the 6-minute walk test.  Was 87% on room air at rest.  I will write her for a temporary prescription of 2 L continuous oxygen by cannula.  - Although slight limited by habitus, I do not think that volume overload is to explain her symptoms.  She has no JVD on exam.  - We will discuss  new oxygen requirement with pulmonary  - I suspect this is a chronic continuation of her fibrotic changes on her lung CTs.  However given the slight worsening, will obtain a CT PE to rule out thromboembolic lung disease.  She does not appear to have an emergent condition at this time, rather this is a progression of her chronic problem.  - We will refer patient for right heart cath for further evaluation of pulmonary pressures with vasodilator study.  -Reasonable to continue her low-dose Lasix at this time, although she does not appear overly volume overloaded  -Given her new O2 need, I will write for her to receive home oxygen for now.  Will defer further oxygen requirements and equipment to the pulmonary team.    3. Diabetes mellitus  - Given her possible right heart strain, will stop Actos.  - Start Jardiance 10  - She does complain of some diarrhea with metformin, will change to 1000 XR twice daily    4.  Mild mitral regurgitation  - Echo directly viewed by myself, she does have a central MR jet, however does not appear significant enough to be causing her symptoms.  Right heart cath per above.    Thank you very much for allowing us to participate in the care of this pleasant patient.  Please don't hesitate to call if I can be of assistance in any way.      RTC 2 months for symptom check-in.  Will refer for right heart cath and obtain CT PE in interim.  Likely refer to heart failure clinic pending right heart cath findings.  Will ensure to send results of today's clinic visit to referring pulmonologist given her new oxygen requirement.    With new home oxygenation need, home oxygen orders, CT PE coordination, heart cath explanation and coordination,  I spent greater than 60 minutes on this patient encounter.    Current Outpatient Medications:   •  aspirin 325 MG tablet, Take 325 mg by mouth daily., Disp: , Rfl:   •  atorvastatin (LIPITOR) 40 MG tablet, Take 1 tablet by mouth Daily. 200001, Disp: 90 tablet, Rfl:  3  •  dilTIAZem (CARDIZEM) 120 MG tablet, Take 1 tablet by mouth 3 (Three) Times a Day., Disp: 270 tablet, Rfl: 3  •  Fluticasone Furoate-Vilanterol (Breo Ellipta) 100-25 MCG/INH inhaler, Inhale 1 puff Daily., Disp: 60 each, Rfl: 0  •  furosemide (LASIX) 40 MG tablet, Take 1 tablet by mouth Daily. 200001, Disp: 90 tablet, Rfl: 3  •  metoprolol tartrate (LOPRESSOR) 50 MG tablet, TAKE 1 TABLET BY MOUTH TWO TIMES A DAY , Disp: 180 tablet, Rfl: 3  •  omeprazole (priLOSEC) 20 MG capsule, Take 1 capsule by mouth Daily., Disp: 90 capsule, Rfl: 1  •  potassium chloride (K-DUR,KLOR-CON) 10 MEQ CR tablet, Take 1 tablet by mouth Daily. 200001, Disp: 90 tablet, Rfl: 3  •  tacrolimus (PROGRAF), Take 1 mg by mouth Every 12 (Twelve) Hours., Disp: , Rfl:   •  empagliflozin (JARDIANCE) 10 MG tablet tablet, Take 1 tablet by mouth Daily., Disp: 30 tablet, Rfl: 11  •  metFORMIN (FORTAMET) 1000 MG (OSM) 24 hr tablet, Take 1 tablet by mouth 2 (Two) Times a Day With Meals., Disp: 60 tablet, Rfl: 11         No follow-ups on file.      **Dragon Disclaimer:   Much of this encounter note is an electronic transcription/translation of spoken language to printed text. The electronic translation of spoken language may permit erroneous, or at times, nonsensical words or phrases to be inadvertently transcribed. Although I have reviewed the note for such errors, some may still exist.

## 2022-11-11 NOTE — PROGRESS NOTES
Subjective:     Encounter Date:11/11/22      Patient ID: Sally Rivera is a 72 y.o. female.    Chief Complaint:   Chief Complaint   Patient presents with   • Follow-up      History of Present Illness    Ms. Rivera is a very pleasant 72-year-old lady past medical history hypertension, hyperlipidemia, recent diagnosis of pulmonary interstitial disease, unclear etiology who presents for further evaluation of elevated pulmonary pressures on echocardiogram.    She reports a Subacute worsening of shortness of breath over the past several months.  She states that started in April.  She reports she had a COVID infection back in September of last year. She follows with Dr. Berry with local pulmonary care, she is being followed for moderate restrictive lung disease and signs of possible pulmonary hypertension.  She has some pulmonary infiltrates which are progressed over the past several months, with some residual scarring.    She denies any history of blood clots or prior heart stents.  She reports that her shortness of air has been progressive over the past several months, and has worsened over the past couple weeks.  In clinic today, she was noted to have an oxygen saturation 87% on room air at rest.  She was placed on 2 L nasal cannula with improvement to 94 to 95%.  Patient reports that she feels markedly improved with the oxygen delivery.    In clinic, she underwent a 6-minute walk test which revealed 86% O2 saturation on room air during the test.  She will need 2 L of nasal cannula oxygen continuously    The following portions of the patient's history were reviewed and updated as appropriate: allergies, current medications, past family history, past medical history, past social history, past surgical history and problem list.    Past Medical History:   Diagnosis Date   • Acid reflux    • CAD (coronary artery disease)     per dr deepthi cesar office note 4/22-dd   • Diabetes mellitus type I (HCC)    •  "Diverticulitis    • Elevated cholesterol    • GERD (gastroesophageal reflux disease)    • Heart attack (HCC) 1995   • Hyperlipidemia    • Hypertension    • Hypertension    • Liver disease        Past Surgical History:   Procedure Laterality Date   • CHOLECYSTECTOMY  2011   • COLON SURGERY  2014    bowel resection for obstruction   • COLONOSCOPY  2013    Dr. Sky   • COLONOSCOPY N/A 10/7/2021    Procedure: COLONOSCOPY TO CECUM WITH COLD POLYPECTOMY;  Surgeon: Facundo Klein Jr., MD;  Location: [facility] ENDOSCOPY;  Service: General;  Laterality: N/A;  PRE- H/O COLON POLYPS  POST-COLON POLYP, DIVERTICULOSIS   • HYSTERECTOMY  2011   • MOUTH BIOPSY     • OOPHORECTOMY     • US GUIDED LYMPH NODE BIOPSY  11/30/2020           ECG 12 Lead    Date/Time: 11/11/2022 10:35 AM  Performed by: Cory Christensen MD  Authorized by: Cory Christensen MD   Previous ECG: no previous ECG available  Rhythm: sinus rhythm  Ectopy: unifocal PVCs  Rate: normal  ST Segments: ST segments normal  T Waves: T waves normal  QRS axis: normal  Other: no other findings  Other findings: low voltage  Comments: Low voltage in precordial leads               Objective:     Vitals:    11/11/22 0929   BP: 140/78   Pulse: 74   SpO2: (!) 87%   Weight: 110 kg (242 lb)   Height: 162.6 cm (64\")         Constitutional:       Appearance: Healthy appearance. Not in distress.   Neck:      Vascular: JVD normal.   Pulmonary:      Effort: Pulmonary effort is normal.      Comments: Coarse Velcro-like breath sounds at bases to mid lungs bilaterally.  Normal work of breathing on 2 L nasal cannula.  No JVD appreciated  Cardiovascular:      PMI at left midclavicular line. Normal rate. Regular rhythm. Normal S2.      Murmurs: There is no murmur.   Pulses:     Intact distal pulses.   Skin:     General: Skin is warm and dry.   Neurological:      General: No focal deficit present.      Mental Status: Alert, oriented to person, place, and time and oriented to person, place and time. "   Psychiatric:         Mood and Affect: Mood and affect normal.         Lab Review:     Lipid Panel    Lipid Panel 4/4/22   Total Cholesterol 156   Triglycerides 187 (A)   HDL Cholesterol 41   VLDL Cholesterol 32   LDL Cholesterol  83   (A) Abnormal value            BUN   Date Value Ref Range Status   04/04/2022 13 8 - 27 mg/dL Final   10/10/2019 16 8 - 23 mg/dL Final     Creatinine   Date Value Ref Range Status   04/04/2022 0.76 0.57 - 1.00 mg/dL Final   10/10/2019 0.82 0.57 - 1.00 mg/dL Final     Potassium   Date Value Ref Range Status   04/04/2022 4.3 3.5 - 5.2 mmol/L Final   10/10/2019 4.6 3.5 - 5.2 mmol/L Final     ALT (SGPT)   Date Value Ref Range Status   04/04/2022 24 0 - 32 IU/L Final     AST (SGOT)   Date Value Ref Range Status   04/04/2022 33 0 - 40 IU/L Final         Performed        Assessment:          Diagnosis Plan   1. Pulmonary hypertension (HCC)  metFORMIN (FORTAMET) 1000 MG (OSM) 24 hr tablet    empagliflozin (JARDIANCE) 10 MG tablet tablet    Case Request Cath Lab: Right Heart Cath with vasodilator as indicated. pHTN workup    CT angiogram chest w contrast      2. Type 2 diabetes mellitus without complication, without long-term current use of insulin (HCC)        3. Hypoxia  metFORMIN (FORTAMET) 1000 MG (OSM) 24 hr tablet    empagliflozin (JARDIANCE) 10 MG tablet tablet    Case Request Cath Lab: Right Heart Cath with vasodilator as indicated. pHTN workup    CT angiogram chest w contrast             Plan:           1. Acute on chronic hypoxic respiratory failure, concerns for interstitial lung disease  2. Pulmonary hypertension, possibly group 3  - Follows with Dr. Berry and pulmonary  - Her O2 sat was 86% on RA on the 6-minute walk test.  Was 87% on room air at rest.  I will write her for a temporary prescription of 2 L continuous oxygen by cannula.  - Although slight limited by habitus, I do not think that volume overload is to explain her symptoms.  She has no JVD on exam.  - We will discuss  new oxygen requirement with pulmonary  - I suspect this is a chronic continuation of her fibrotic changes on her lung CTs.  However given the slight worsening, will obtain a CT PE to rule out thromboembolic lung disease.  She does not appear to have an emergent condition at this time, rather this is a progression of her chronic problem.  - We will refer patient for right heart cath for further evaluation of pulmonary pressures with vasodilator study.  -Reasonable to continue her low-dose Lasix at this time, although she does not appear overly volume overloaded  -Given her new O2 need, I will write for her to receive home oxygen for now.  Will defer further oxygen requirements and equipment to the pulmonary team.    3. Diabetes mellitus  - Given her possible right heart strain, will stop Actos.  - Start Jardiance 10  - She does complain of some diarrhea with metformin, will change to 1000 XR twice daily    4.  Mild mitral regurgitation  - Echo directly viewed by myself, she does have a central MR jet, however does not appear significant enough to be causing her symptoms.  Right heart cath per above.    Thank you very much for allowing us to participate in the care of this pleasant patient.  Please don't hesitate to call if I can be of assistance in any way.      RTC 2 months for symptom check-in.  Will refer for right heart cath and obtain CT PE in interim.  Likely refer to heart failure clinic pending right heart cath findings.  Will ensure to send results of today's clinic visit to referring pulmonologist given her new oxygen requirement.    With new home oxygenation need, home oxygen orders, CT PE coordination, heart cath explanation and coordination,  I spent greater than 60 minutes on this patient encounter.    Current Outpatient Medications:   •  aspirin 325 MG tablet, Take 325 mg by mouth daily., Disp: , Rfl:   •  atorvastatin (LIPITOR) 40 MG tablet, Take 1 tablet by mouth Daily. 200001, Disp: 90 tablet, Rfl:  3  •  dilTIAZem (CARDIZEM) 120 MG tablet, Take 1 tablet by mouth 3 (Three) Times a Day., Disp: 270 tablet, Rfl: 3  •  Fluticasone Furoate-Vilanterol (Breo Ellipta) 100-25 MCG/INH inhaler, Inhale 1 puff Daily., Disp: 60 each, Rfl: 0  •  furosemide (LASIX) 40 MG tablet, Take 1 tablet by mouth Daily. 200001, Disp: 90 tablet, Rfl: 3  •  metoprolol tartrate (LOPRESSOR) 50 MG tablet, TAKE 1 TABLET BY MOUTH TWO TIMES A DAY , Disp: 180 tablet, Rfl: 3  •  omeprazole (priLOSEC) 20 MG capsule, Take 1 capsule by mouth Daily., Disp: 90 capsule, Rfl: 1  •  potassium chloride (K-DUR,KLOR-CON) 10 MEQ CR tablet, Take 1 tablet by mouth Daily. 200001, Disp: 90 tablet, Rfl: 3  •  tacrolimus (PROGRAF), Take 1 mg by mouth Every 12 (Twelve) Hours., Disp: , Rfl:   •  empagliflozin (JARDIANCE) 10 MG tablet tablet, Take 1 tablet by mouth Daily., Disp: 30 tablet, Rfl: 11  •  metFORMIN (FORTAMET) 1000 MG (OSM) 24 hr tablet, Take 1 tablet by mouth 2 (Two) Times a Day With Meals., Disp: 60 tablet, Rfl: 11         No follow-ups on file.      **Dragon Disclaimer:   Much of this encounter note is an electronic transcription/translation of spoken language to printed text. The electronic translation of spoken language may permit erroneous, or at times, nonsensical words or phrases to be inadvertently transcribed. Although I have reviewed the note for such errors, some may still exist.

## 2022-11-16 ENCOUNTER — LAB (OUTPATIENT)
Dept: LAB | Facility: HOSPITAL | Age: 72
End: 2022-11-16

## 2022-11-16 DIAGNOSIS — Z01.810 PRE-OPERATIVE CARDIOVASCULAR EXAMINATION: ICD-10-CM

## 2022-11-16 DIAGNOSIS — Z13.6 SCREENING FOR ISCHEMIC HEART DISEASE: ICD-10-CM

## 2022-11-16 LAB
ANION GAP SERPL CALCULATED.3IONS-SCNC: 13 MMOL/L (ref 5–15)
BASOPHILS # BLD AUTO: 0.08 10*3/MM3 (ref 0–0.2)
BASOPHILS NFR BLD AUTO: 0.8 % (ref 0–1.5)
BUN SERPL-MCNC: 11 MG/DL (ref 8–23)
BUN/CREAT SERPL: 13.1 (ref 7–25)
CALCIUM SPEC-SCNC: 9.3 MG/DL (ref 8.6–10.5)
CHLORIDE SERPL-SCNC: 100 MMOL/L (ref 98–107)
CO2 SERPL-SCNC: 25 MMOL/L (ref 22–29)
CREAT SERPL-MCNC: 0.84 MG/DL (ref 0.57–1)
DEPRECATED RDW RBC AUTO: 43.9 FL (ref 37–54)
EGFRCR SERPLBLD CKD-EPI 2021: 74.4 ML/MIN/1.73
EOSINOPHIL # BLD AUTO: 0.37 10*3/MM3 (ref 0–0.4)
EOSINOPHIL NFR BLD AUTO: 3.8 % (ref 0.3–6.2)
ERYTHROCYTE [DISTWIDTH] IN BLOOD BY AUTOMATED COUNT: 14.9 % (ref 12.3–15.4)
GLUCOSE SERPL-MCNC: 152 MG/DL (ref 65–99)
HCT VFR BLD AUTO: 40.1 % (ref 34–46.6)
HGB BLD-MCNC: 12.9 G/DL (ref 12–15.9)
IMM GRANULOCYTES # BLD AUTO: 0.02 10*3/MM3 (ref 0–0.05)
IMM GRANULOCYTES NFR BLD AUTO: 0.2 % (ref 0–0.5)
LYMPHOCYTES # BLD AUTO: 3.31 10*3/MM3 (ref 0.7–3.1)
LYMPHOCYTES NFR BLD AUTO: 33.9 % (ref 19.6–45.3)
MCH RBC QN AUTO: 26.2 PG (ref 26.6–33)
MCHC RBC AUTO-ENTMCNC: 32.2 G/DL (ref 31.5–35.7)
MCV RBC AUTO: 81.3 FL (ref 79–97)
MONOCYTES # BLD AUTO: 0.7 10*3/MM3 (ref 0.1–0.9)
MONOCYTES NFR BLD AUTO: 7.2 % (ref 5–12)
NEUTROPHILS NFR BLD AUTO: 5.28 10*3/MM3 (ref 1.7–7)
NEUTROPHILS NFR BLD AUTO: 54.1 % (ref 42.7–76)
NRBC BLD AUTO-RTO: 0 /100 WBC (ref 0–0.2)
PLATELET # BLD AUTO: 216 10*3/MM3 (ref 140–450)
PMV BLD AUTO: 9.8 FL (ref 6–12)
POTASSIUM SERPL-SCNC: 3.8 MMOL/L (ref 3.5–5.2)
RBC # BLD AUTO: 4.93 10*6/MM3 (ref 3.77–5.28)
SODIUM SERPL-SCNC: 138 MMOL/L (ref 136–145)
WBC NRBC COR # BLD: 9.76 10*3/MM3 (ref 3.4–10.8)

## 2022-11-16 PROCEDURE — 36415 COLL VENOUS BLD VENIPUNCTURE: CPT

## 2022-11-16 PROCEDURE — 85025 COMPLETE CBC W/AUTO DIFF WBC: CPT

## 2022-11-16 PROCEDURE — 80048 BASIC METABOLIC PNL TOTAL CA: CPT

## 2022-11-21 ENCOUNTER — HOSPITAL ENCOUNTER (OUTPATIENT)
Facility: HOSPITAL | Age: 72
Setting detail: HOSPITAL OUTPATIENT SURGERY
Discharge: HOME | End: 2022-11-21
Attending: STUDENT IN AN ORGANIZED HEALTH CARE EDUCATION/TRAINING PROGRAM | Admitting: STUDENT IN AN ORGANIZED HEALTH CARE EDUCATION/TRAINING PROGRAM

## 2022-11-21 VITALS
SYSTOLIC BLOOD PRESSURE: 150 MMHG | WEIGHT: 235.8 LBS | OXYGEN SATURATION: 93 % | DIASTOLIC BLOOD PRESSURE: 87 MMHG | HEIGHT: 64 IN | TEMPERATURE: 97.3 F | RESPIRATION RATE: 16 BRPM | HEART RATE: 61 BPM | BODY MASS INDEX: 40.26 KG/M2

## 2022-11-21 DIAGNOSIS — I27.20 PULMONARY HYPERTENSION: Primary | ICD-10-CM

## 2022-11-21 DIAGNOSIS — I27.20 PULMONARY HYPERTENSION: ICD-10-CM

## 2022-11-21 DIAGNOSIS — R09.02 HYPOXIA: ICD-10-CM

## 2022-11-21 LAB
GLUCOSE BLDC GLUCOMTR-MCNC: 152 MG/DL (ref 70–130)
HCT VFR BLDA CALC: 38 % (ref 38–51)
HGB BLDA-MCNC: 12.9 G/DL (ref 12–17)
SAO2 % BLDA: 65 % (ref 95–98)

## 2022-11-21 PROCEDURE — C1894 INTRO/SHEATH, NON-LASER: HCPCS

## 2022-11-21 PROCEDURE — 82962 GLUCOSE BLOOD TEST: CPT

## 2022-11-21 PROCEDURE — 85018 HEMOGLOBIN: CPT

## 2022-11-21 PROCEDURE — 82810 BLOOD GASES O2 SAT ONLY: CPT

## 2022-11-21 PROCEDURE — 85014 HEMATOCRIT: CPT

## 2022-11-21 PROCEDURE — 93451 RIGHT HEART CATH: CPT | Performed by: STUDENT IN AN ORGANIZED HEALTH CARE EDUCATION/TRAINING PROGRAM

## 2022-11-21 PROCEDURE — C1894 INTRO/SHEATH, NON-LASER: HCPCS | Performed by: STUDENT IN AN ORGANIZED HEALTH CARE EDUCATION/TRAINING PROGRAM

## 2022-11-21 RX ORDER — SODIUM CHLORIDE 0.9 % (FLUSH) 0.9 %
10 SYRINGE (ML) INJECTION AS NEEDED
Status: DISCONTINUED | OUTPATIENT
Start: 2022-11-21 | End: 2022-11-21 | Stop reason: HOSPADM

## 2022-11-21 RX ORDER — SODIUM CHLORIDE 9 MG/ML
75 INJECTION, SOLUTION INTRAVENOUS CONTINUOUS
Status: DISCONTINUED | OUTPATIENT
Start: 2022-11-21 | End: 2022-11-21 | Stop reason: HOSPADM

## 2022-11-21 RX ORDER — SODIUM CHLORIDE 9 MG/ML
40 INJECTION, SOLUTION INTRAVENOUS AS NEEDED
Status: DISCONTINUED | OUTPATIENT
Start: 2022-11-21 | End: 2022-11-21 | Stop reason: HOSPADM

## 2022-11-21 RX ORDER — SODIUM CHLORIDE 0.9 % (FLUSH) 0.9 %
10 SYRINGE (ML) INJECTION EVERY 12 HOURS SCHEDULED
Status: DISCONTINUED | OUTPATIENT
Start: 2022-11-21 | End: 2022-11-21 | Stop reason: HOSPADM

## 2022-11-21 RX ORDER — ACETAMINOPHEN 325 MG/1
650 TABLET ORAL EVERY 4 HOURS PRN
Status: DISCONTINUED | OUTPATIENT
Start: 2022-11-21 | End: 2022-11-21 | Stop reason: HOSPADM

## 2022-11-21 RX ADMIN — SODIUM CHLORIDE 75 ML/HR: 9 INJECTION, SOLUTION INTRAVENOUS at 08:05

## 2022-11-21 NOTE — DISCHARGE INSTRUCTIONS
[facility]  [address] [place] [address]    Right Heart Cath After Care    Refer to this sheet in the next few weeks. These instructions provide you with information on caring for yourself after your procedure. Your caregiver may also give you more specific instructions. Your treatment has been planned according to current medical practices, but problems sometimes occur. Call your caregiver if you have any problems or questions after your procedure.    Home Care Instructions:  You may shower the day after the procedure. Remove the bandage (dressing) and gently wash the site with plain soap and water. Gently pat the site dry. You may apply a band aid daily for 2 days if desired.    Do not apply powder or lotion to the site until the site is completely healed.  Do not submerge the affected site in water until the site is completely healed.   No heavy lifting today.   Inspect the site at least twice daily. You may notice some bruising at the site..     If you received sedation a responsible adult should be with you for the first 24 hours after you arrive home. Do not make any important legal decisions or sign legal papers for 24 hours.  Do not drink alcohol for 24 hours.  Do not operate machinery or power tools for 24 hours. You may drive 24 hours after the procedure unless otherwise instructed by your caregiver.        Call Your Doctor if:   You have unusual pain at the puncture site.  You have redness, warmth, swelling, or pain at the puncture site.  You have drainage (other than a small amount of blood on the dressing).  `You have chills or a fever > 101.  Your arm becomes pale or dark, cool, tingly, or numb.  You develop chest pain, shortness of breath, feel faint or pass out.    You have heavy bleeding from the site, hold pressure on the site for 20 minutes.  If the bleeding stops, apply a fresh bandage and call your cardiologist.  However, if you        continue to have bleeding, call 911  and continue to apply pressure to the site.   You have any symptoms of a stroke.  Remember BE FAST  B-balance. Sudden trouble walking or loss of balance.  E-eyes.  Sudden changes in how you see or a sudden onset of a very bad headache.   F-face. Sudden weakness or loss of feeling of the face or facial droop on one side.   A-arms Sudden weakness or numbness in one arm.  One arm drifts down if they are both held out in front of you. This happens suddenly and usually on one side of the body.   S-speech.  Sudden trouble speaking, slurred speech or trouble understanding what are saying.   T-time  Time to call emergency services.  Write down the symptoms and the time they started.

## 2022-11-21 NOTE — PROGRESS NOTES
Due to [facility] findings I have placed referral to the heart failure clinic for further evaluation of a likely combined GRP II and III pHTN

## 2022-11-23 ENCOUNTER — LAB (OUTPATIENT)
Dept: FAMILY MEDICINE CLINIC | Facility: CLINIC | Age: 72
End: 2022-11-23

## 2022-11-23 DIAGNOSIS — E11.9 TYPE 2 DIABETES MELLITUS WITHOUT COMPLICATION, WITHOUT LONG-TERM CURRENT USE OF INSULIN: ICD-10-CM

## 2022-11-23 DIAGNOSIS — E55.9 VITAMIN D DEFICIENCY: ICD-10-CM

## 2022-11-23 DIAGNOSIS — E78.00 PURE HYPERCHOLESTEROLEMIA: ICD-10-CM

## 2022-11-23 LAB
25(OH)D3 SERPL-MCNC: 22.2 NG/ML (ref 30–100)
ALBUMIN SERPL-MCNC: 4.3 G/DL (ref 3.5–5.2)
ALBUMIN/GLOB SERPL: 1.1 G/DL
ALP SERPL-CCNC: 80 U/L (ref 39–117)
ALT SERPL W P-5'-P-CCNC: 21 U/L (ref 1–33)
ANION GAP SERPL CALCULATED.3IONS-SCNC: 11.7 MMOL/L (ref 5–15)
AST SERPL-CCNC: 28 U/L (ref 1–32)
BILIRUB SERPL-MCNC: 0.6 MG/DL (ref 0–1.2)
BUN SERPL-MCNC: 9 MG/DL (ref 8–23)
BUN/CREAT SERPL: 11 (ref 7–25)
CALCIUM SPEC-SCNC: 9.5 MG/DL (ref 8.6–10.5)
CHLORIDE SERPL-SCNC: 101 MMOL/L (ref 98–107)
CHOLEST SERPL-MCNC: 137 MG/DL (ref 0–200)
CO2 SERPL-SCNC: 24.3 MMOL/L (ref 22–29)
CREAT SERPL-MCNC: 0.82 MG/DL (ref 0.57–1)
DEPRECATED RDW RBC AUTO: 42.6 FL (ref 37–54)
EGFRCR SERPLBLD CKD-EPI 2021: 76.6 ML/MIN/1.73
ERYTHROCYTE [DISTWIDTH] IN BLOOD BY AUTOMATED COUNT: 14.7 % (ref 12.3–15.4)
GLOBULIN UR ELPH-MCNC: 4 GM/DL
GLUCOSE SERPL-MCNC: 149 MG/DL (ref 65–99)
HBA1C MFR BLD: 7.7 % (ref 4.8–5.6)
HCT VFR BLD AUTO: 40.6 % (ref 34–46.6)
HDLC SERPL-MCNC: 41 MG/DL (ref 40–60)
HGB BLD-MCNC: 13 G/DL (ref 12–15.9)
LDLC SERPL CALC-MCNC: 74 MG/DL (ref 0–100)
LDLC/HDLC SERPL: 1.75 {RATIO}
MCH RBC QN AUTO: 25.2 PG (ref 26.6–33)
MCHC RBC AUTO-ENTMCNC: 32 G/DL (ref 31.5–35.7)
MCV RBC AUTO: 78.7 FL (ref 79–97)
PLATELET # BLD AUTO: 280 10*3/MM3 (ref 140–450)
PMV BLD AUTO: 10.7 FL (ref 6–12)
POTASSIUM SERPL-SCNC: 3.9 MMOL/L (ref 3.5–5.2)
PROT SERPL-MCNC: 8.3 G/DL (ref 6–8.5)
RBC # BLD AUTO: 5.16 10*6/MM3 (ref 3.77–5.28)
SODIUM SERPL-SCNC: 137 MMOL/L (ref 136–145)
TRIGL SERPL-MCNC: 122 MG/DL (ref 0–150)
VLDLC SERPL-MCNC: 22 MG/DL (ref 5–40)
WBC NRBC COR # BLD: 10.41 10*3/MM3 (ref 3.4–10.8)

## 2022-11-23 PROCEDURE — 80061 LIPID PANEL: CPT | Performed by: INTERNAL MEDICINE

## 2022-11-23 PROCEDURE — 83036 HEMOGLOBIN GLYCOSYLATED A1C: CPT | Performed by: INTERNAL MEDICINE

## 2022-11-23 PROCEDURE — 80053 COMPREHEN METABOLIC PANEL: CPT | Performed by: INTERNAL MEDICINE

## 2022-11-23 PROCEDURE — 82306 VITAMIN D 25 HYDROXY: CPT | Performed by: INTERNAL MEDICINE

## 2022-11-23 PROCEDURE — 36415 COLL VENOUS BLD VENIPUNCTURE: CPT | Performed by: INTERNAL MEDICINE

## 2022-11-23 PROCEDURE — 85027 COMPLETE CBC AUTOMATED: CPT | Performed by: INTERNAL MEDICINE

## 2022-11-29 ENCOUNTER — LAB (OUTPATIENT)
Dept: LAB | Facility: HOSPITAL | Age: 72
End: 2022-11-29

## 2022-11-29 ENCOUNTER — HOSPITAL ENCOUNTER (OUTPATIENT)
Dept: CARDIOLOGY | Facility: HOSPITAL | Age: 72
Discharge: HOME | End: 2022-11-29

## 2022-11-29 VITALS
DIASTOLIC BLOOD PRESSURE: 76 MMHG | BODY MASS INDEX: 40.87 KG/M2 | HEIGHT: 64 IN | HEART RATE: 75 BPM | WEIGHT: 239.4 LBS | SYSTOLIC BLOOD PRESSURE: 116 MMHG | OXYGEN SATURATION: 92 %

## 2022-11-29 VITALS
OXYGEN SATURATION: 92 % | HEART RATE: 75 BPM | DIASTOLIC BLOOD PRESSURE: 76 MMHG | BODY MASS INDEX: 41.07 KG/M2 | WEIGHT: 239.4 LBS | SYSTOLIC BLOOD PRESSURE: 116 MMHG

## 2022-11-29 DIAGNOSIS — R06.09 DYSPNEA ON EXERTION: ICD-10-CM

## 2022-11-29 DIAGNOSIS — Z91.89 RISK FACTORS FOR OBSTRUCTIVE SLEEP APNEA: ICD-10-CM

## 2022-11-29 DIAGNOSIS — I27.20 PULMONARY HYPERTENSION: Primary | ICD-10-CM

## 2022-11-29 DIAGNOSIS — I27.20 PULMONARY HYPERTENSION: ICD-10-CM

## 2022-11-29 DIAGNOSIS — Q20.8 ABNORMALITY OF RIGHT VENTRICLE OF HEART: ICD-10-CM

## 2022-11-29 DIAGNOSIS — E66.01 OBESITY, MORBID, BMI 40.0-49.9: ICD-10-CM

## 2022-11-29 LAB — NT-PROBNP SERPL-MCNC: 239 PG/ML (ref 0–900)

## 2022-11-29 PROCEDURE — G0463 HOSPITAL OUTPT CLINIC VISIT: HCPCS

## 2022-11-29 PROCEDURE — 94618 PULMONARY STRESS TESTING: CPT

## 2022-11-29 PROCEDURE — 99215 OFFICE O/P EST HI 40 MIN: CPT | Performed by: INTERNAL MEDICINE

## 2022-11-29 PROCEDURE — 83880 ASSAY OF NATRIURETIC PEPTIDE: CPT

## 2022-11-29 PROCEDURE — 36415 COLL VENOUS BLD VENIPUNCTURE: CPT

## 2022-11-29 NOTE — PROGRESS NOTES
[facility]      Patient Name: Sally Rivera  :1950  Age: 72 y.o.  Sex: female  Referring Provider: Cory Christensen MD   Primary Cardiologist: Cory Christensen  Encounter Provider:  Serjio Morataya, PharmD      Chief Complaint:   Chief Complaint   Patient presents with   • Congestive Heart Failure       HPI:  72 YOF with PMH significant for HTN, HLD, T2DM, GERD, recent diagnosis of pulmonary interstitial disease, and PH. The patient had a mammogram in  which revealed a nodule in the armpit which revealed a benign tumor. In 2022, a CT chest was performed which revealed multiple nodules in both lungs and ground glass opacities. In addition, the CT revealed an enlarged PA indicating pulmonary HTN. A repeat CT was done in 2022, which revealed the nodules had improved and were clearing up but still GGOs observed. Another CT was performed in 2022, which showed the nodules had improved. Echo in 2022 revealed right ventricle enlargement and thickening likely due to elevated pressures in lungs. Repeat CT with contrast in 2022 showed improvement in nodules and lymph nodes, but still indicated elevated pressures in PA. O2 saturation in lungs from PFTs showed very low DLCO. The patient was recently seen by Dr. Christensen and reported SOB over the past several months (started in 2022). The patient had a COVID infection in 2021 and follows with Dr. Berry from local pulmonary care for restrictive lung disease.  During the office visit with cardiology, the patient was placed on 2L nasal cannula and O2 saturations improved from 87% to 94-95% and felt improved after oxygen delivery. A 6 minute walk test revealed an O2 saturation of 86% and the patient was subsequently placed on 2L nasal cannula of continuous oxygen. On , the patient's pioglitazone (Actos) was discontinued (due to right heart strain) and the patient was started on  Jardiance. In addition, the patient's metformin was changed to extended release due to GI symptoms. The patient had a right heart cath performed on 11/21/22 with findings showing PH. Hemodynamics from the study are below. The patient presents to the HF/PH clinic today for evaluation of PH. The patient is eager to learn more regarding her catheter results. The patient reports at clinic visit today that the oxygen has helped her tremendously. The patient states she does have to sleep upright in order to breathe through nose. However, she did complain that she experienced urinary symptoms consistent with a yeast infection after starting Jardiance and subsequently discontinued this on her own. She feels the GI symptoms have improved on extended release Metformin.     Hemodynamics:  Right Atrium: Mean of 12 mmHg  Right Ventricle: 43/11 mmHg  Pulmonary Artery: 56/21/31 mmHg  Pulmonary Wedge: Mean of 16 mmHg  Cardiac Output/Index: 3.91 L/min 1.84 L/min/m2  PA Sat: 65%  AO Sat: 92%  PVR: 3.9 Wood units      Current Regimen:  Heart Failure  · Furosemide 40 mg daily  · Jardiance 10 mg daily (patient stopped taking today)    Other CV Meds  · Atorvastatin 40 mg daily  · Aspirin 325 mg daily  · Lopressor 50 mg BID    Medication Use:  Adherence: good  Past hx of medication use/intolerance:  Affordability: [employer] PPO Part D and Traditional Medicare      Diet:  Defer to future visit      Social History:  Tobacco use: Former (quit in 1990)  EtOH use: None  Illicit drug use: None  Exercise: limited due to current issues      Immunization Status:  Pneumococcal: PCV13 in 2018, PPSV23 in 2008 --> Due for PPSV23 - However, patient believes she already received this.  Influenza: Patient states already received for 2022  COVID-19: Pfizer primary series (3/25/21 and 4/15/21); monovalent booster on 1/15/2022  Bivalent booster: Due      OBJECTIVE:    /76 (BP Location: Right arm, Patient Position: Sitting)   Pulse 75   Ht 162.6 cm  "(64.02\")   Wt 109 kg (239 lb 6.4 oz)   SpO2 92%   BMI 41.07 kg/m²     Body mass index is 41.07 kg/m².  Wt Readings from Last 1 Encounters:   11/29/22 109 kg (239 lb 6.4 oz)       Lab Review:  Renal Function: Estimated Creatinine Clearance: 75.9 mL/min (by C-G formula based on SCr of 0.82 mg/dL).    No results found for: PROBNP    Results for orders placed during the hospital encounter of 10/31/22    Adult Transthoracic Echo Complete W/ Cont if Necessary Per Protocol    Interpretation Summary  •  Left ventricular systolic function is normal. Calculated left ventricular EF = 57% Normal left ventricular wall thickness noted. The left ventricular cavity is borderline dilated. All left ventricular wall segments contract normally. Left ventricular diastolic function was indeterminate.  •  The left atrial cavity is moderately dilated.  •  The right atrial cavity is mildly dilated.  •  There is thickening of the aortic valve. The aortic valve appears trileaflet. Trace aortic valve regurgitation is present. No aortic valve stenosis is present.  •  Severe mitral annular calcification is present. Mild to moderate mitral valve regurgitation is present. No significant mitral valve stenosis is present.  •  Mild to moderate tricuspid valve regurgitation is present. Estimated right ventricular systolic pressure from tricuspid regurgitation is moderately elevated (45-55 mmHg). Calculated right ventricular systolic pressure from tricuspid regurgitation is 49 mmHg.      ASSESSMENT/PLAN OF CARE:    1. Pulmonary Hypertension  · Patient recently diagnosed with PH with mPAP > 20 mmHg  · Patients hemodynamics from study revealed a mean PAP of 31 mmHg, PAWP of 16 mmHg, and PVR of 3.9  · Need to workup cause of PH  · Need VQ scan to eliminate possibility of undissolved clots  · Need to do sleep study to rule out sleep apnea  · Pulmonary vein obstruction problem?  · Could be possibility given nodules, opacities, enlarged lymph nodes on " previous CT scans  · However, nodules and lymph nodes have cleared up so not likely, but still a possibility  · Bronchoscopy?  · Dr. Martínez will discuss with pulmonologist's thought on this  · If all negative from above, could be idiopathic for which we would treat with medications.  · Will follow up on results of above studies and determine next steps if treatment is indicated    2. Diabetes (recent A1C of 7.7% on 11/23/2022)  · Patient is currently managed on metformin and Jardiance  · Patient's pioglitazone was recently discontinued and patient was started on Jardiance  · However, patient experiencing urinary issues after starting and she subsequently discontinued on own  · Discussed with patient that she will likely need an additional agent for her diabetes given this has been discontinued  · Patient will discuss with PCP tomorrow (11/30)   · Would recommend considering a GLP1-RA given the patient's weight and further A1C lowering effects   · Priced out Trulicity and Ozempic and both are $30/month for the patient  · Defer to PCP    3. Immunizations  · Patient qualifies for PPSV23 given PCV13 given in 2018 after patient turned 66  · Patient believes she already received but did not see record of this.  · Patient will follow up with retail pharmacy  · Patient qualifies for COVID-19 booster  · Discussed with patient the importance of this vaccine given her current comorbidities and history of COVID-19 infection  · Patient plans to discuss with PCP and will consider    Thank you for allowing me to participate in the care of your patient,     Serjio Morataya, [facility]  11/29/22  10:47 EST

## 2022-11-29 NOTE — PROGRESS NOTES
Heart Failure & Pulmonary Arterial Hypertension Clinic  [facility]  Heron Martínez M.D., Ph.D., [facility]       Cory Christensen MD  [address]  [facility] [address]  [place] [address]    Thank you for asking me to see Sally Rivera for PH.    History of Present Illness  This is a 72 y.o. female with:    1. PAH  A. RHC 2022:  Hemodynamics:  Right Atrium: Mean of 12 mmHg  Right Ventricle: 43/11 mmHg  Pulmonary Artery: 56/21/31 mmHg  Pulmonary Wedge: Mean of 16 mmHg  Cardiac Output/Index: 3.91 L/min 1.84 L/min/m2  PA Sat: 65%  AO Sat: 92%  PVR: 3.9 Wood units    2. RV abnormality    Sally Rivera is a 72 y.o. female who presents for today for PH.  The patient is typically seen by Enrique Kelley MD.  Patient's primary cardiologist is Dr. Christensen.     The patient is referred today for further evaluation and management of an abnormal 2D TTE and RHC consistent with pulmonary hypertension and abnormal right ventricle.  The patient has seen pulmonary medicine in the past.  Last spirometry showed FEV1/FVC of 89% of predicted.  FVC was 54% of predicted.  DLCO was 53% of predicted.  TLC was 68% of predicted and consistent with restrictive physiology.  Patient also has a history of abnormal CT scans.    In April 2022 she was found to have multiple nodular opacities with the largest of 2.1 cm.  There was bilateral increased septal thickening and some areas of GGOs.  Though, this improved on her most recent CT scan.  She had a CTA recently which showed no evidence of pulmonary embolism.  Pulmonary artery was dilated.  She remained with some areas of GGOs, but had decreased LAD from prior.  No history of VTE/PE.  No VQ scan.  No history of autoimmune or rheumatic diseases.  No family history of cardiomyopathy or pulmonary hypertension.    2D TTE was obtained and showed normal left ventricular function with indeterminate diastolic function.  Right ventricle was interpreted as normal.  It was interpreted as  mild to moderate mitral regurgitation and mild to moderate tricuspid regurgitation with a PA systolic pressure of 49 mmHg.  She  was seen by cardiology, Dr. Christensen.  She was sent for the CTA of the chest with contrast, as aforementioned.  She was also sent for a RHC which has been completed.  Primary complaint at that time was worsening exertional dyspnea and exercise intolerance.    RHC was performed and showed RA pressure of 12.  RV was 43/11.  PA pressure was 56/21 with a mean of 31.  Wedge was elevated to 15 mmHg.  PVR was abnormal at 3.9 Wood units. VD reactivity testing was not performed.  Fluid loading was not performed.    Lastly, she does report one particular history of a possible autoimmune disease.  He was having some issues with oral ulcerations and issues with the skin on the outside of her teeth.  He apparently had an extensive work-up with an oral surgeon.  She tells me that she was told that this was some type of autoimmune disease and she was placed on tacrolimus.        Review of Systems - Review of Systems   Cardiovascular: Positive for dyspnea on exertion. Negative for chest pain, claudication, cyanosis, irregular heartbeat, leg swelling, near-syncope, orthopnea, palpitations, paroxysmal nocturnal dyspnea and syncope.   Respiratory: Positive for cough and shortness of breath. Negative for hemoptysis, sputum production and wheezing.    All other systems reviewed and are negative.       All other systems were reviewed and were negative.    Patient Active Problem List   Diagnosis   • Chronic coronary artery disease   • Gastroesophageal reflux disease   • Hyperlipidemia   • Nonalcoholic fatty liver disease   • Atrial paroxysmal tachycardia (HCC)   • Type 2 diabetes mellitus with hyperglycemia, without long-term current use of insulin (HCC)   • History of rectal polypectomy   • Diverticulosis of large intestine without hemorrhage   • High risk medication use   • Obesity, morbid, BMI 40.0-49.9 (HCC)    • History of colon polyps   • Clinical diagnosis of COVID-19   • Pneumonia   • Lung nodule   • Pulmonary hypertension (HCC)   • Hypoxia   • Abnormality of right ventricle of heart       family history includes Diabetes in her father; Heart disease in her father; No Known Problems in her mother.     reports that she quit smoking about 31 years ago. Her smoking use included cigarettes. She has never used smokeless tobacco. She reports that she does not drink alcohol and does not use drugs.    No Known Allergies      Current Outpatient Medications:   •  aspirin 325 MG tablet, Take 325 mg by mouth daily., Disp: , Rfl:   •  atorvastatin (LIPITOR) 40 MG tablet, Take 1 tablet by mouth Daily. 200001, Disp: 90 tablet, Rfl: 3  •  dilTIAZem (CARDIZEM) 120 MG tablet, Take 1 tablet by mouth 3 (Three) Times a Day., Disp: 270 tablet, Rfl: 3  •  empagliflozin (JARDIANCE) 10 MG tablet tablet, Take 1 tablet by mouth Daily., Disp: 30 tablet, Rfl: 11  •  furosemide (LASIX) 40 MG tablet, Take 1 tablet by mouth Daily. 200001, Disp: 90 tablet, Rfl: 3  •  metFORMIN (FORTAMET) 1000 MG (OSM) 24 hr tablet, Take 1 tablet by mouth 2 (Two) Times a Day With Meals., Disp: 60 tablet, Rfl: 11  •  metoprolol tartrate (LOPRESSOR) 50 MG tablet, TAKE 1 TABLET BY MOUTH TWO TIMES A DAY , Disp: 180 tablet, Rfl: 3  •  omeprazole (priLOSEC) 20 MG capsule, Take 1 capsule by mouth Daily., Disp: 90 capsule, Rfl: 1  •  potassium chloride (K-DUR,KLOR-CON) 10 MEQ CR tablet, Take 1 tablet by mouth Daily. 200001, Disp: 90 tablet, Rfl: 3  •  tacrolimus (PROGRAF), Take 1 mg by mouth Every 12 (Twelve) Hours., Disp: , Rfl:       Physical Exam:  I have reviewed the patient's current vital signs as documented in the patient's EMR.   Vitals:    11/29/22 1022   BP: 116/76   Pulse: 75   SpO2: 92%     Body mass index is 41.07 kg/m².       11/29/22  1022   Weight: 109 kg (239 lb 6.4 oz)      Pulse Pressure: normal  Pulse Ox: Normal  on 2 liters/min via nasal  cannula  General: alert, appears stated age and cooperative     Body Habitus: obese    HEENT: Head: Normocephalic, no lesions, without obvious abnormality. No arcus senilis, xanthelasma or xanthomas.  No malar flush, proptosis, xanthomas or malar flush.   Neuro: alert, oriented x3  speech normal in context and clarity  memory intact grossly  Pulses: 2+ and symmetric  JVP: Volume/Pulsation: Normal.  Normal x and y descent.  Waveforms: Normal waveforms with a and x. V waves are normal.    Physiology: Appropriate inspiratory decrease.  No Kussmaul's. No Olive's.   Carotid Exam: no bruit normal pulsation bilaterally   Carotid Volume: normal.     Subclavian Bruit: absent  Vertebral Bruit: absent  Respirations: no increased work of breathing     Pulmonary:Normal     Precordium: Normal impulses. P2 is not palpable.  RV Heave: absent  LV Heave: absent  [place]:  normal size and placement  Palpable S4: absent.  Heart rate: normal    Heart Rhythm: regular     Heart Sounds: S1: normal intensity  S2: normal intensity  S3: absent   S4: absent  Opening Snap: absent    A2-OS:  no  Pericardial Rub:  Absent: Yes     Ejection click: None      Murmurs:  absent   Extremity: moves all extremities equally.     DATA REVIEWED:       ---------------------------------------------------  TTE/LENNY:  Results for orders placed during the hospital encounter of 10/31/22    Adult Transthoracic Echo Complete W/ Cont if Necessary Per Protocol    Interpretation Summary  •  Left ventricular systolic function is normal. Calculated left ventricular EF = 57% Normal left ventricular wall thickness noted. The left ventricular cavity is borderline dilated. All left ventricular wall segments contract normally. Left ventricular diastolic function was indeterminate.  •  The left atrial cavity is moderately dilated.  •  The right atrial cavity is mildly dilated.  •  There is thickening of the aortic valve. The aortic valve appears trileaflet. Trace aortic valve  regurgitation is present. No aortic valve stenosis is present.  •  Severe mitral annular calcification is present. Mild to moderate mitral valve regurgitation is present. No significant mitral valve stenosis is present.  •  Mild to moderate tricuspid valve regurgitation is present. Estimated right ventricular systolic pressure from tricuspid regurgitation is moderately elevated (45-55 mmHg). Calculated right ventricular systolic pressure from tricuspid regurgitation is 49 mmHg.                        My interpretation of the TTE is below.     LVEF is 57%.  Diastolic function is indeterminate.  The right ventricle is dilated with significant RVH.  Right ventricle systolic function is at least mildly diminished.  Pulmonary venous waveforms show S<D.  TR is present with pulmonary hypertension.  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    -----------------------------------------------------  RHC:                          Right atrial pressure is elevated with normal waveform.  Normal respiratory variation with normal inspiratory decrease.    RV pressure is abnormal with abnormal dP/dT less than 400    PA pressure is elevated.    DPG is in-proportion    Maryanne is abnormal at 2.9  -                  -----------------------------------------------------  CT:                   CTA performed November 11, 2022.  I personally reviewed the images of the CT scan.  My personal interpretation is below.      -Adequate opacification of the pulmonary arteries.  No evidence of pulmonary embolism.  Pulmonary artery is dilated.  Scattered GGOs.  Mediastinal LAD, though improved from prior scan April 2022.    ----------------------------------------------------    PFTs:        I interpreted the PFTs.  The patient's FEV1/FVC is 89% of predicted.  The patient at 83 and 54% of predicted and low.  The patient's TLC is 68% of predicted and below and consistent with a restrictive pattern.  There is a low DLCO.  Restrictive physiology is  present.  --------------------------------------------------------------------------------------------------    CV Risk Assessment:  The 10-year ASCVD risk score (Jj GARCÍA, et al., 2019) is: 15.5%    Values used to calculate the score:      Age: 72 years      Sex: Female      Is Non- : No      Diabetic: Yes      Tobacco smoker: No      Systolic Blood Pressure: 116 mmHg      Is BP treated: No      HDL Cholesterol: 41 mg/dL      Total Cholesterol: 137 mg/dL       Prior Ischemia Evaluation:  None known    Laboratory evaluations:    Lab Results   Component Value Date    GLUCOSE 149 (H) 11/23/2022    BUN 9 11/23/2022    CREATININE 0.82 11/23/2022    EGFRIFNONA 58 (L) 11/09/2021    EGFRIFAFRI 67 11/09/2021    BCR 11.0 11/23/2022    K 3.9 11/23/2022    CO2 24.3 11/23/2022    CALCIUM 9.5 11/23/2022    PROTENTOTREF 7.7 04/04/2022    ALBUMIN 4.30 11/23/2022    LABIL2 1.3 04/04/2022    AST 28 11/23/2022    ALT 21 11/23/2022     Lab Results   Component Value Date    WBC 10.41 11/23/2022    HGB 13.0 11/23/2022    HCT 40.6 11/23/2022    MCV 78.7 (L) 11/23/2022     11/23/2022     Lab Results   Component Value Date    CHOL 137 11/23/2022    CHLPL 156 04/04/2022    TRIG 122 11/23/2022    HDL 41 11/23/2022    LDL 74 11/23/2022     No results found for: TSH, R7WRCQW, Y5KIHAA, THYROIDAB  No results found for: CKTOTAL, CKMB, CKMBINDEX, TROPONINI, TROPONINT  Lab Results   Component Value Date    HGBA1C 7.70 (H) 11/23/2022     No results found for: DDIMER  Lab Results   Component Value Date    ALT 21 11/23/2022     Lab Results   Component Value Date    HGBA1C 7.70 (H) 11/23/2022    HGBA1C 7.7 (H) 04/04/2022    HGBA1C 7.9 (H) 11/09/2021     Lab Results   Component Value Date    MICROALBUR 35.2 05/10/2021    CREATININE 0.82 11/23/2022     Lab Results   Component Value Date    TIBC 583 06/02/2020    FERRITIN 68 08/03/2020     No results found for: INR, PROTIME    PAH RISK ASSESSMENT:      1. Pulmonary  hypertension (HCC). PAH/PVH. WHO Group 1, possibly 3 (CHRISTIAN), need to exclude 4.; FC: III.  RV status: Abnormal:.  PFTs: Restrictive with decreased DLCO. . MPAP: 31. Maryanne: 2.9. Exercise pulse oximetry showed a minimum SpO2 = 86% on room air.  The KCCQ-12 was updated at the visit today. The Aguilar Index was updated today. Most recent score: 6.     Today, the Patient appears euvolemic.. Perfusion status: excellent.  Last 6MWT: Limited Community Ambulator (0.4-0.8 m/s).   There is not evidence of decompensation. Oxygenation: {PUL OXYGENATION:54364. is on oxygen at 2 L/min per nasal cannula..     The patient presents with PAH.  I did review her RHC waveforms.  Her mean PCWP is around 15 mmHg.  Fluid challenge was not provided but I cannot exclude exercise-induced pulmonary hypertension as a consequence of HFpEF.  However, her 2D TTE has an indeterminate diastolic function, no evidence of LVH, pulmonary venous flows were not consistent with left atrial hypertension.  I suspect that she has precapillary PH.  She certainly would be an unusual presentation, given her age, for a Group 1 diagnosis, though this is not impossible.  I am suspicious that she has an element of CHRISTIAN.  I also considered PVOD as an etiology.  She also will need a VQ scan to exclude group 4.    I did consider PVOD as an etiology. I  reviewed the CT scan.  CT scan from April 18, 2022 showed multiple nodular opacities bilaterally with septal thickening.  There are areas of  GGOs.  No dependent areas of consolidation or pleural effusions.  There is mediastinal lymphadenopathy.  Mediastinal lymphadenopathy improved on her most recent chest CTA.  However, she remains with GGO's.  I think given the fact that her nodules have improved and her lymphadenopathy have improved that this likely is not PVOD, but certainly will discuss this possibility with Dr. Berry.     Today, I discussed the evaluation, treatment, and usual course of pulmonary hypertension.  All  questions were answered.  I recommended that we continue active clinical surveillance.  Signs and symptoms of worsening pulmonary hypertension and right ventricular failure were discussed.  Handouts were provided in discharge paperwork.     Additional Studies Ordered Today:  -V/Q Scan and Sleep referral  -BNP;   -She also needs a non- invasive ischemia evaluation.  I sent a message to her primary cardiologist.    Medications To Be Continued/Ordered/Adjusted Today:   -Pending work-up    I have asked the patient that if they were to move to be certain they see someone with expertise in PAH. These providers can be located at [email or web address].        2. Abnormality of right ventricle of heart.  NYHA stage B; FC-III.      RV is dilated. RVEF is abnormal.  RV CI is Abnormal:. RV status: RV ADAPTED-REMODELED with an ESVi of <; Maryanne is 2.9.  TAPSE/PASP ratio is markedly abnormal and consistent with RV-PA uncoupling.     The patient's abnormal right ventricle is primarily from pulmonary hypertension.  He likely will need to be placed on an MRA to improve FC and adverse remodeling.  Will perform initial work-up, as above.  Will consider MRA at her next appointment.    -I have asked the patient to contact me for any worsening symptoms concerning for progressive right ventricular failure.   3. Obesity, morbid, BMI 40.0-49.9 (HCC). The BMI is Body mass index is 41.07 kg/m². ACC/AHA guidelines recommend that height, weight and BMI is calculated at visits.  The patient has been provided with information regarding the elevated risk of cardiovascular disease, type 2 diabetes and all-cause mortality.  Sustained weight loss of 3-5% is likely to result in clinically meaningful reductions in triglycerides, hemoglobin A1c, blood glucose, and the risk of developing type 2 diabetes.  The greater the amount of weight loss typically the greater reduction in blood pressure, reduction and need for medications to control blood  pressure, improvements in blood glucose and lipids.  Handout was provided on the patient's BMI.  The patient has been encouraged to follow-up with their primary care provider.       4. Risks for CHRISTIAN. Sleep referral.          ORDERS PLACED TODAY:  Orders Placed This Encounter   Procedures   • NM Lung Quantitative Differential Function Ventilation Perfusion   • proBNP   • Ambulatory Referral to Sleep Medicine          MEDS ORDERED TODAY:    No orders of the defined types were placed in this encounter.         Return in about 2 months (around 1/29/2023).        This document has been electronically signed by Heron Martínez MD PhD on November 29, 2022 11:45 EST        Heron Martínez M.D., Ph.D., [facility]  System Medical Director for HF and PAH

## 2022-11-29 NOTE — ADDENDUM NOTE
Encounter addended by: Sharona Thomas MA on: 11/29/2022 11:47 AM   Actions taken: Charge Capture section accepted

## 2022-11-30 ENCOUNTER — OFFICE VISIT (OUTPATIENT)
Dept: FAMILY MEDICINE CLINIC | Facility: CLINIC | Age: 72
End: 2022-11-30

## 2022-11-30 VITALS
TEMPERATURE: 98 F | HEART RATE: 86 BPM | SYSTOLIC BLOOD PRESSURE: 126 MMHG | WEIGHT: 241 LBS | DIASTOLIC BLOOD PRESSURE: 80 MMHG | OXYGEN SATURATION: 97 % | HEIGHT: 64 IN | BODY MASS INDEX: 41.15 KG/M2

## 2022-11-30 DIAGNOSIS — E11.65 TYPE 2 DIABETES MELLITUS WITH HYPERGLYCEMIA, WITHOUT LONG-TERM CURRENT USE OF INSULIN: Primary | ICD-10-CM

## 2022-11-30 DIAGNOSIS — I27.20 PULMONARY HYPERTENSION: ICD-10-CM

## 2022-11-30 DIAGNOSIS — E78.5 HYPERLIPIDEMIA: ICD-10-CM

## 2022-11-30 DIAGNOSIS — R60.1 GENERALIZED EDEMA: ICD-10-CM

## 2022-11-30 DIAGNOSIS — K21.9 GASTROESOPHAGEAL REFLUX DISEASE WITHOUT ESOPHAGITIS: ICD-10-CM

## 2022-11-30 DIAGNOSIS — I25.10 CHRONIC CORONARY ARTERY DISEASE: ICD-10-CM

## 2022-11-30 PROCEDURE — 99214 OFFICE O/P EST MOD 30 MIN: CPT | Performed by: INTERNAL MEDICINE

## 2022-11-30 RX ORDER — ATORVASTATIN CALCIUM 40 MG/1
40 TABLET, FILM COATED ORAL DAILY
Qty: 90 TABLET | Refills: 3 | Status: SHIPPED | OUTPATIENT
Start: 2022-11-30

## 2022-11-30 RX ORDER — GLIMEPIRIDE 1 MG/1
1 TABLET ORAL
Qty: 30 TABLET | Refills: 3 | Status: SHIPPED | OUTPATIENT
Start: 2022-11-30 | End: 2023-01-16

## 2022-11-30 RX ORDER — METOPROLOL TARTRATE 50 MG/1
50 TABLET, FILM COATED ORAL 2 TIMES DAILY
Qty: 180 TABLET | Refills: 3 | Status: SHIPPED | OUTPATIENT
Start: 2022-11-30

## 2022-11-30 RX ORDER — METFORMIN HYDROCHLORIDE EXTENDED-RELEASE TABLETS 1000 MG/1
1000 TABLET, FILM COATED, EXTENDED RELEASE ORAL 2 TIMES DAILY WITH MEALS
Qty: 60 TABLET | Refills: 11 | Status: SHIPPED | OUTPATIENT
Start: 2022-11-30 | End: 2023-01-08 | Stop reason: ALTCHOICE

## 2022-11-30 RX ORDER — POTASSIUM CHLORIDE 750 MG/1
10 TABLET, EXTENDED RELEASE ORAL DAILY
Qty: 90 TABLET | Refills: 3 | Status: SHIPPED | OUTPATIENT
Start: 2022-11-30 | End: 2023-01-23

## 2022-11-30 RX ORDER — DILTIAZEM HYDROCHLORIDE 120 MG/1
120 TABLET, FILM COATED ORAL 3 TIMES DAILY
Qty: 270 TABLET | Refills: 3 | Status: SHIPPED | OUTPATIENT
Start: 2022-11-30

## 2022-11-30 RX ORDER — FUROSEMIDE 40 MG/1
40 TABLET ORAL DAILY
Qty: 90 TABLET | Refills: 3 | Status: SHIPPED | OUTPATIENT
Start: 2022-11-30

## 2022-11-30 RX ORDER — OMEPRAZOLE 20 MG/1
20 CAPSULE, DELAYED RELEASE ORAL DAILY
Qty: 90 CAPSULE | Refills: 1 | Status: SHIPPED | OUTPATIENT
Start: 2022-11-30

## 2022-11-30 NOTE — PROGRESS NOTES
"Chief Complaint  4 months f/u labs to go over    Subjective        Sally Rivera presents to [facility]  History of Present Illness patient was seen for diabetes.  Patient's blood sugars been running in the 180s.  Patient was placed on Jardiance 25 mg daily but was getting yeast infections.  Patient does have Medicare and was put on Amaryl 1 mg daily with metformin extended release 1000 mg daily.  Patient is checking her drug coverage to see if she can get Ozempic.  Patient will follow-up in 2 months.  Patient's blood pressures been running 120s over 80s.  Patient will continue diltiazem 120 mg 3 times daily, Toprol tartrate 50 mg twice daily.  Patient's lipids treated with diet exercise and atorvastatin 40 mg daily.  Triglycerides 187, HDL 41, LDL 83.  Patient will continue present treatment.  Patient's gastroesophageal reflux is treated with omeprazole 20 mg daily.  Patient will continue treatment.  Patient had generalized edema was put on furosemide 40 mg daily by her cardiologist.  Patient was also diagnosed with pulmonary hypertension.  Patient is undergoing sleep apnea study.  Patient does have a history of coronary artery disease and does follow his cardiologist.    Dictated utilizing Dragon dictation. If there are questions or for further clarification, please contact me.    Objective   Vital Signs:  Blood Pressure 126/80   Pulse 86   Temperature 98 °F (36.7 °C)   Height 162.6 cm (64\")   Weight 109 kg (241 lb)   Oxygen Saturation 97%   Body Mass Index 41.37 kg/m²   Estimated body mass index is 41.37 kg/m² as calculated from the following:    Height as of this encounter: 162.6 cm (64\").    Weight as of this encounter: 109 kg (241 lb).          Physical Exam  Vitals and nursing note reviewed.   Constitutional:       Appearance: Normal appearance. She is well-developed.   HENT:      Head: Normocephalic and atraumatic.      Nose: Nose normal.      Mouth/Throat:      " Mouth: Mucous membranes are moist.      Pharynx: Oropharynx is clear.   Eyes:      Extraocular Movements: Extraocular movements intact.      Conjunctiva/sclera: Conjunctivae normal.      Pupils: Pupils are equal, round, and reactive to light.   Cardiovascular:      Rate and Rhythm: Normal rate and regular rhythm.      Heart sounds: Normal heart sounds. No murmur heard.    No friction rub. No gallop.   Pulmonary:      Effort: Pulmonary effort is normal. No respiratory distress.      Breath sounds: Normal breath sounds. No stridor. No wheezing, rhonchi or rales.   Chest:      Chest wall: No tenderness.   Abdominal:      General: Bowel sounds are normal.      Palpations: Abdomen is soft.   Musculoskeletal:         General: Normal range of motion.      Cervical back: Normal range of motion and neck supple.   Skin:     General: Skin is warm and dry.   Neurological:      General: No focal deficit present.      Mental Status: She is alert and oriented to person, place, and time. Mental status is at baseline.   Psychiatric:         Mood and Affect: Mood normal.         Behavior: Behavior normal.         Thought Content: Thought content normal.         Judgment: Judgment normal.        Result Review :                Assessment and Plan  #1 Amaryl 1 mg daily #2 monitor blood pressure blood sugar #3 follow-up in 2 months  Diagnoses and all orders for this visit:    1. Type 2 diabetes mellitus with hyperglycemia, without long-term current use of insulin (HCC) (Primary)    2. Chronic coronary artery disease  -     metoprolol tartrate (LOPRESSOR) 50 MG tablet; Take 1 tablet by mouth 2 (Two) Times a Day.  Dispense: 180 tablet; Refill: 3  -     dilTIAZem (CARDIZEM) 120 MG tablet; Take 1 tablet by mouth 3 (Three) Times a Day.  Dispense: 270 tablet; Refill: 3    3. Pulmonary hypertension (HCC)  -     metFORMIN (FORTAMET) 1000 MG (OSM) 24 hr tablet; Take 1 tablet by mouth 2 (Two) Times a Day With Meals.  Dispense: 60 tablet; Refill:  11    4. Gastroesophageal reflux disease without esophagitis  -     omeprazole (priLOSEC) 20 MG capsule; Take 1 capsule by mouth Daily.  Dispense: 90 capsule; Refill: 1    5. Hyperlipidemia  -     atorvastatin (LIPITOR) 40 MG tablet; Take 1 tablet by mouth Daily. 200001  Dispense: 90 tablet; Refill: 3    6. Generalized edema  -     furosemide (LASIX) 40 MG tablet; Take 1 tablet by mouth Daily. 200001  Dispense: 90 tablet; Refill: 3    Other orders  -     potassium chloride (K-DUR,KLOR-CON) 10 MEQ CR tablet; Take 1 tablet by mouth Daily. 200001  Dispense: 90 tablet; Refill: 3  -     glimepiride (Amaryl) 1 MG tablet; Take 1 tablet by mouth Every Morning Before Breakfast.  Dispense: 30 tablet; Refill: 3             Follow Up   Return in about 2 months (around 1/30/2023), or if symptoms worsen or fail to improve, for Recheck.  Patient was given instructions and counseling regarding her condition or for health maintenance advice. Please see specific information pulled into the AVS if appropriate.

## 2022-11-30 NOTE — ADDENDUM NOTE
Encounter addended by: Sharona Thomas MA on: 11/30/2022 10:28 AM   Actions taken: Charge Capture section accepted

## 2022-12-01 ENCOUNTER — TELEPHONE (OUTPATIENT)
Dept: FAMILY MEDICINE CLINIC | Facility: CLINIC | Age: 72
End: 2022-12-01

## 2022-12-01 ENCOUNTER — TELEPHONE (OUTPATIENT)
Dept: CARDIOLOGY | Facility: HOSPITAL | Age: 72
End: 2022-12-01

## 2022-12-01 NOTE — TELEPHONE ENCOUNTER
Caller: Sally Rivera    Relationship: Self    Best call back number: [phone]    What is the best time to reach you: ANYTIME    Who are you requesting to speak with (clinical staff, provider,  specific staff member): CLINICAL STAFF     What was the call regarding: PATIENT STATES THAT SHE WAS TOLD BY DR IZQUIERDO TO FOLLOW UP FIRST THING THIS MORNING ABOUT HER MEDICATIONS.     PATIENT IS REQUESTING TO SPEAK WITH THE CLINICAL STAFF REGARDING THIS MATTER.     PLEASE ADVISE     Do you require a callback: YES

## 2022-12-01 NOTE — TELEPHONE ENCOUNTER
----- Message from Heron Martínez MD PhD sent at 11/30/2022  8:33 AM EST -----  Please let her know her BNP was normal.

## 2022-12-08 DIAGNOSIS — I27.20 PULMONARY HYPERTENSION: Primary | ICD-10-CM

## 2022-12-16 ENCOUNTER — HOSPITAL ENCOUNTER (OUTPATIENT)
Dept: NUCLEAR MEDICINE | Facility: HOSPITAL | Age: 72
Discharge: HOME | End: 2022-12-16

## 2022-12-16 ENCOUNTER — HOSPITAL ENCOUNTER (OUTPATIENT)
Dept: GENERAL RADIOLOGY | Facility: HOSPITAL | Age: 72
Discharge: HOME | End: 2022-12-16
Admitting: INTERNAL MEDICINE

## 2022-12-16 DIAGNOSIS — I27.20 PULMONARY HYPERTENSION: Primary | ICD-10-CM

## 2022-12-16 PROCEDURE — A9540 TC99M MAA: HCPCS | Performed by: INTERNAL MEDICINE

## 2022-12-16 PROCEDURE — 78580 LUNG PERFUSION IMAGING: CPT

## 2022-12-16 PROCEDURE — 0 TECHNETIUM ALBUMIN AGGREGATED: Performed by: INTERNAL MEDICINE

## 2022-12-16 PROCEDURE — 71046 X-RAY EXAM CHEST 2 VIEWS: CPT

## 2022-12-16 RX ADMIN — KIT FOR THE PREPARATION OF TECHNETIUM TC 99M ALBUMIN AGGREGATED 1 DOSE: 2.5 INJECTION, POWDER, FOR SOLUTION INTRAVENOUS at 09:21

## 2022-12-22 ENCOUNTER — TELEPHONE (OUTPATIENT)
Dept: CARDIOLOGY | Facility: HOSPITAL | Age: 72
End: 2022-12-22

## 2022-12-22 NOTE — TELEPHONE ENCOUNTER
----- Message from Heron Martínez MD PhD sent at 12/20/2022  1:41 PM EST -----  Please let her know that her nuclear medicine scan showed no evidence of pulmonary embolism.

## 2023-01-05 ENCOUNTER — TELEPHONE (OUTPATIENT)
Dept: FAMILY MEDICINE CLINIC | Facility: CLINIC | Age: 73
End: 2023-01-05
Payer: MEDICARE

## 2023-01-05 NOTE — TELEPHONE ENCOUNTER
Caller: Sally Rivera    Relationship: Self    Best call back number: [phone]    What is the best time to reach you: ANYTIME    Who are you requesting to speak with (clinical staff, provider,  specific staff member): CLINICAL STAFF     What was the call regarding: PATIENT STATES THAT SHE WOULD LIKE TO CHANGE FROM TIME RELEASE METFORMIN TO THE REGULAR METFORMIN.     PATIENT STATES THAT THIS IS A COST RELATED ISSUE.     PLEASE ADVISE     Do you require a callback: YES

## 2023-01-06 NOTE — TELEPHONE ENCOUNTER
You were refilling due to cardio. Giving her first script so she saw you and you said youd cont. But she cant afford it. Wants plain. Advise?

## 2023-01-12 NOTE — TELEPHONE ENCOUNTER
Caller: Sally Rivera    Relationship: Self    Best call back number: [phone]    Requested Prescriptions:   Requested Prescriptions     Pending Prescriptions Disp Refills   • Dulaglutide 0.75 MG/0.5ML solution pen-injector 0.5 mL 3     Sig: Inject 0.75 mg under the skin into the appropriate area as directed 1 (One) Time Per Week.        Pharmacy where request should be sent: [facility] - [facility] [address] [facility] - [phone] [facility] [phone]      Additional details provided by patient: PATIENT IS CHANGING PHARMACY FOR THIS MEDICATION. SHE CAN GET IT CHEAPER THROUGH MAIL ORDER    Does the patient have less than a 3 day supply:  [] Yes  [x] No    Would you like a call back once the refill request has been completed: [] Yes [x] No    If the office needs to give you a call back, can they leave a voicemail: [] Yes [x] No    Burak Rocha Rep   01/12/23 13:41 EST

## 2023-01-16 ENCOUNTER — HOSPITAL ENCOUNTER (OUTPATIENT)
Dept: CARDIOLOGY | Facility: HOSPITAL | Age: 73
Discharge: HOME | End: 2023-01-16
Payer: MEDICARE

## 2023-01-16 VITALS
BODY MASS INDEX: 40.8 KG/M2 | OXYGEN SATURATION: 95 % | HEART RATE: 53 BPM | WEIGHT: 239 LBS | SYSTOLIC BLOOD PRESSURE: 124 MMHG | HEIGHT: 64 IN | DIASTOLIC BLOOD PRESSURE: 60 MMHG

## 2023-01-16 DIAGNOSIS — I50.32 CHRONIC HEART FAILURE WITH PRESERVED EJECTION FRACTION: ICD-10-CM

## 2023-01-16 DIAGNOSIS — E66.01 OBESITY, MORBID, BMI 40.0-49.9: ICD-10-CM

## 2023-01-16 DIAGNOSIS — Z91.89 RISK FACTORS FOR OBSTRUCTIVE SLEEP APNEA: ICD-10-CM

## 2023-01-16 DIAGNOSIS — I27.20 PULMONARY HYPERTENSION: Primary | ICD-10-CM

## 2023-01-16 PROCEDURE — G0463 HOSPITAL OUTPT CLINIC VISIT: HCPCS

## 2023-01-16 PROCEDURE — 99214 OFFICE O/P EST MOD 30 MIN: CPT | Performed by: INTERNAL MEDICINE

## 2023-01-16 RX ORDER — TACROLIMUS 5 MG/1
1 CAPSULE ORAL EVERY 12 HOURS SCHEDULED
Qty: 1 BOTTLE | Refills: 0
Start: 2023-01-16 | End: 2023-01-16

## 2023-01-16 RX ORDER — SPIRONOLACTONE 25 MG/1
25 TABLET ORAL DAILY
Qty: 30 TABLET | Refills: 0 | Status: SHIPPED | OUTPATIENT
Start: 2023-01-16 | End: 2023-02-07

## 2023-01-16 NOTE — PROGRESS NOTES
Heart Failure & Pulmonary Arterial Hypertension Clinic  [facility]  Heron Martínez M.D., Ph.D., [facility]         History of Present Illness  This is a 72 y.o. female with:    1. PAH  A. RHC 2022:  Hemodynamics:  Right Atrium: Mean of 12 mmHg  Right Ventricle: 43/11 mmHg  Pulmonary Artery: 56/21/31 mmHg  Pulmonary Wedge: Mean of 16 mmHg  Cardiac Output/Index: 3.91 L/min 1.84 L/min/m2  PA Sat: 65%  AO Sat: 92%  PVR: 3.9 Wood units    2. RV abnormality    Sally Rviera is a 72 y.o. female who presents for today for PH.  The patient is typically seen by Enrique Kelley MD.  Patient's primary cardiologist is Dr. Christensen.      The patient has seen pulmonary medicine in the past.  Last spirometry showed FEV1/FVC of 89% of predicted.  FVC was 54% of predicted.  DLCO was 53% of predicted.  TLC was 68% of predicted and consistent with restrictive physiology.  Patient also has a history of abnormal CT scans.    In April 2022 she was found to have multiple nodular opacities with the largest of 2.1 cm.  There was bilateral increased septal thickening and some areas of GGOs.  Though, this improved on her most recent CT scan.  She had a CTA recently which showed no evidence of pulmonary embolism.  Pulmonary artery was dilated.  She remained with some areas of GGOs, but had decreased LAD from prior.  No history of VTE/PE.  No VQ scan.  No history of autoimmune or rheumatic diseases.  No family history of cardiomyopathy or pulmonary hypertension.    2D TTE was obtained and showed normal left ventricular function with indeterminate diastolic function.  Right ventricle was interpreted as normal.  It was interpreted as mild to moderate mitral regurgitation and mild to moderate tricuspid regurgitation with a PA systolic pressure of 49 mmHg.  She  was seen by cardiology, Dr. Christensen.  She was sent for the CTA of the chest with contrast, as aforementioned.  She was also sent for a RHC which has been completed.   "Primary complaint at that time was worsening exertional dyspnea and exercise intolerance.    RHC was performed and showed RA pressure of 12.  RV was 43/11.  PA pressure was 56/21 with a mean of 31.  Wedge was elevated to 15 mmHg.  PVR was abnormal at 3.9 Wood units. VD reactivity testing was not performed.  Fluid loading was not performed.    At her last appt, she was referred for PSG and VQ scan. VQ scan returned without evidence of CTEPH. Sleep referral is pending, but scheduled. She reports she is \"feeling better.\" Her breathing has improved with Oxygen. She gets very worn out after ADLs, such as showering.         Review of Systems - Review of Systems   Cardiovascular: Positive for dyspnea on exertion. Negative for chest pain, claudication, cyanosis, irregular heartbeat, leg swelling, near-syncope, orthopnea, palpitations, paroxysmal nocturnal dyspnea and syncope.   Respiratory: Positive for cough and shortness of breath. Negative for hemoptysis, sputum production and wheezing.    All other systems reviewed and are negative.       All other systems were reviewed and were negative.    Patient Active Problem List   Diagnosis   • Chronic coronary artery disease   • Gastroesophageal reflux disease   • Hyperlipidemia   • Nonalcoholic fatty liver disease   • Atrial paroxysmal tachycardia (HCC)   • Type 2 diabetes mellitus with hyperglycemia, without long-term current use of insulin (HCC)   • History of rectal polypectomy   • Diverticulosis of large intestine without hemorrhage   • High risk medication use   • Obesity, morbid, BMI 40.0-49.9 (HCC)   • History of colon polyps   • Clinical diagnosis of COVID-19   • Pneumonia   • Lung nodule   • Pulmonary hypertension (HCC)   • Hypoxia   • Abnormality of right ventricle of heart   • Risk factors for obstructive sleep apnea   • Chronic heart failure with preserved ejection fraction (HCC)       family history includes Diabetes in her father; Heart disease in her father; No " Known Problems in her mother.     reports that she quit smoking about 32 years ago. Her smoking use included cigarettes. She has never used smokeless tobacco. She reports that she does not drink alcohol and does not use drugs.    No Known Allergies      Current Outpatient Medications:   •  aspirin 325 MG tablet, Take 325 mg by mouth daily., Disp: , Rfl:   •  atorvastatin (LIPITOR) 40 MG tablet, Take 1 tablet by mouth Daily. 200001, Disp: 90 tablet, Rfl: 3  •  dilTIAZem (CARDIZEM) 120 MG tablet, Take 1 tablet by mouth 3 (Three) Times a Day., Disp: 270 tablet, Rfl: 3  •  Dulaglutide 0.75 MG/0.5ML solution pen-injector, Inject 0.75 mg under the skin into the appropriate area as directed 1 (One) Time Per Week., Disp: 0.5 mL, Rfl: 3  •  furosemide (LASIX) 40 MG tablet, Take 1 tablet by mouth Daily. 200001, Disp: 90 tablet, Rfl: 3  •  metFORMIN (Glucophage) 500 MG tablet, Take 1 tablet by mouth 2 (Two) Times a Day With Meals., Disp: 60 tablet, Rfl: 3  •  metoprolol tartrate (LOPRESSOR) 50 MG tablet, Take 1 tablet by mouth 2 (Two) Times a Day., Disp: 180 tablet, Rfl: 3  •  O2 (OXYGEN), Inhale 2 L/min. 24 -7, Disp: , Rfl:   •  omeprazole (priLOSEC) 20 MG capsule, Take 1 capsule by mouth Daily., Disp: 90 capsule, Rfl: 1  •  potassium chloride (K-DUR,KLOR-CON) 10 MEQ CR tablet, Take 1 tablet by mouth Daily. 200001, Disp: 90 tablet, Rfl: 3  •  spironolactone (Aldactone) 25 MG tablet, Take 1 tablet by mouth Daily., Disp: 30 tablet, Rfl: 0      Physical Exam:  I have reviewed the patient's current vital signs as documented in the patient's EMR.   Vitals:    01/16/23 0904   BP: 124/60   Pulse: 53   SpO2: 95%     Body mass index is 41.02 kg/m².       01/16/23 0904   Weight: 108 kg (239 lb)      Physical Exam  Vitals (PP-HIGH) reviewed.   Constitutional:       General: She is not in acute distress.     Appearance: Normal appearance. She is obese. She is not ill-appearing, toxic-appearing or diaphoretic.   HENT:      Head:  Normocephalic and atraumatic.   Neck:      Vascular: No JVD.      Comments: JVP less than 6 cm of water with normal waveforms.  Cardiovascular:      Rate and Rhythm: Regular rhythm. Bradycardia present.      Chest Wall: PMI is not displaced. No thrill.      Heart sounds: Normal heart sounds and S1 normal. Heart sounds not distant. No murmur heard.    No friction rub. No gallop. No S3 or S4 sounds.      Comments: S2 accentuated  Pulmonary:      Effort: Pulmonary effort is normal. No respiratory distress.      Breath sounds: No stridor. Examination of the right-upper field reveals wheezing. Examination of the left-upper field reveals wheezing. Examination of the right-middle field reveals wheezing. Examination of the left-middle field reveals wheezing. Wheezing present. No rhonchi or rales.   Musculoskeletal:      Right lower leg: No edema.      Left lower leg: No edema.   Skin:     General: Skin is warm and dry.   Neurological:      General: No focal deficit present.      Mental Status: She is alert and oriented to person, place, and time. Mental status is at baseline.   Psychiatric:         Mood and Affect: Mood normal.         Behavior: Behavior normal. Behavior is cooperative.         Thought Content: Thought content normal.         Judgment: Judgment normal.         DATA REVIEWED:       ---------------------------------------------------  TTE/LENNY:  Results for orders placed during the hospital encounter of 10/31/22    Adult Transthoracic Echo Complete W/ Cont if Necessary Per Protocol    Interpretation Summary  •  Left ventricular systolic function is normal. Calculated left ventricular EF = 57% Normal left ventricular wall thickness noted. The left ventricular cavity is borderline dilated. All left ventricular wall segments contract normally. Left ventricular diastolic function was indeterminate.  •  The left atrial cavity is moderately dilated.  •  The right atrial cavity is mildly dilated.  •  There is  thickening of the aortic valve. The aortic valve appears trileaflet. Trace aortic valve regurgitation is present. No aortic valve stenosis is present.  •  Severe mitral annular calcification is present. Mild to moderate mitral valve regurgitation is present. No significant mitral valve stenosis is present.  •  Mild to moderate tricuspid valve regurgitation is present. Estimated right ventricular systolic pressure from tricuspid regurgitation is moderately elevated (45-55 mmHg). Calculated right ventricular systolic pressure from tricuspid regurgitation is 49 mmHg.        My interpretation of the TTE is below.     LVEF is 57%.  The RV is dilated.  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    -----------------------------------------------------  RHC:                          Interpretation today: Right atrial pressure is elevated with normal waveforms.  Normal inspiratory decrease.  Abnormal RV dP/dt.  PA pressure is elevated.        -----------------------------------------------------    ----------------------------------------------------    PFTs:        I interpreted the PFTs.  T this is consistent with a restrictive pattern.    --------------------------------------------------------------------------------------------------      Laboratory evaluations:    Lab Results   Component Value Date    GLUCOSE 149 (H) 11/23/2022    BUN 9 11/23/2022    CREATININE 0.82 11/23/2022    EGFRIFNONA 58 (L) 11/09/2021    EGFRIFAFRI 67 11/09/2021    BCR 11.0 11/23/2022    K 3.9 11/23/2022    CO2 24.3 11/23/2022    CALCIUM 9.5 11/23/2022    PROTENTOTREF 7.7 04/04/2022    ALBUMIN 4.30 11/23/2022    LABIL2 1.3 04/04/2022    AST 28 11/23/2022    ALT 21 11/23/2022     Lab Results   Component Value Date    WBC 10.41 11/23/2022    HGB 13.0 11/23/2022    HCT 40.6 11/23/2022    MCV 78.7 (L) 11/23/2022     11/23/2022     Lab Results   Component Value Date    CHOL 137 11/23/2022    CHLPL 156 04/04/2022    TRIG 122 11/23/2022    HDL 41  11/23/2022    LDL 74 11/23/2022     No results found for: TSH, G0SCSJD, S8VWYBS, THYROIDAB  No results found for: CKTOTAL, CKMB, CKMBINDEX, TROPONINI, TROPONINT  Lab Results   Component Value Date    HGBA1C 7.70 (H) 11/23/2022     No results found for: DDIMER  Lab Results   Component Value Date    ALT 21 11/23/2022     Lab Results   Component Value Date    HGBA1C 7.70 (H) 11/23/2022    HGBA1C 7.7 (H) 04/04/2022    HGBA1C 7.9 (H) 11/09/2021     Lab Results   Component Value Date    MICROALBUR 35.2 05/10/2021    CREATININE 0.82 11/23/2022     Lab Results   Component Value Date    TIBC 583 06/02/2020    FERRITIN 68 08/03/2020     No results found for: INR, PROTIME    PAH RISK ASSESSMENT:            1. Pulmonary hypertension (HCC). PAH/PVH. WHO Group Likely 2/3; FC: III.  RV status: Abnormal:.  PFTs: Restrictive with decreased DLCO. . MPAP: 31. Maryanne: 2.9. Exercise pulse oximetry showed a minimum SpO2 = 86% on room air.  The KCCQ-12 was updated at the visit today. The Aguilar Index was updated today. Most recent score: 6.     Today, the patient appears euvolemic.  Perfusion status is excellent.  She is a limited community ambulator on her 6MWT.  No evidence of decompensation.  The patient is on oxygen at 2 L/min nasal cannula.    The patient's PCWP was at the cutoff of 15 mmHg.  RHC did not include exercise or IV fluid challenge.  Her HFpEF score today indicates she has a 96.5% probability of HFpEF.  VQ scan showed no evidence of CHF.    I would like to optimize her HFpEF regimen.  VQ scan is pending.  Sleep referral is pending.      Additional Studies Ordered Today:  -Sleep referral    I have asked the patient that if they were to move to be certain they see someone with expertise in PAH. These providers can be located at [email or web address].        2. HFpEF.  NYHA stage C; functional class III.  Today, euvolemic and perfused.  -Continue antihypertensive regimen  -START MRA given her degree of right ventricular  remodeling.  -Low-sodium and fluid restricted diet.  -She has had UTIs in the past with SGLT2 inhibitors     3. Abnormality of right ventricle of heart.  NYHA stage C; FC-III.      RV is dilated. RVEF is abnormal.  RV CI is Abnormal:. RV status: RV ADAPTED-REMODELED with an ESVi of <; Maryanne is 2.9.  TAPSE/PASP ratio is markedly abnormal and consistent with RV-PA uncoupling.     The patient's abnormal right ventricle is primarily from pulmonary hypertension.  I would like to START Aldactone today.  She can return to clinic in 1 week with a Chem-7.  She can continue her current dose of potassium supplementation which is 10 mEq a day.  I suspect that we can discontinue that at her next appointment.   4. Obesity, morbid, BMI 40.0-49.9 (HCC). The BMI is Body mass index is 41.02 kg/m². ACC/AHA guidelines recommend that height, weight and BMI is calculated at visits.    The patient has been encouraged to follow-up with their primary care provider.     5. Risks for CHRISTIAN. Sleep referral pending.          ORDERS PLACED TODAY:  No orders of the defined types were placed in this encounter.         MEDS ORDERED TODAY:    New Medications Ordered This Visit   Medications   • spironolactone (Aldactone) 25 MG tablet     Sig: Take 1 tablet by mouth Daily.     Dispense:  30 tablet     Refill:  0          Return in about 1 week (around 1/23/2023).            This document has been electronically signed by Heron Martínez MD PhD on January 16, 2023 09:29 EST      Heron Martínez M.D., Ph.D., [facility]  [facility] Medical Director for HF and PAH

## 2023-01-16 NOTE — PROGRESS NOTES
[facility]      Patient Name: Sally Rivera  :1950  Age: 72 y.o.  Sex: female  Referring Provider: Cory Christensen MD   Primary Cardiologist: Cory Christensen  Encounter Provider: Regina Norris, PharmD, BCACP      Chief Complaint:   Chief Complaint   Patient presents with   • Congestive Heart Failure       HPI:  72 YOF with PMH significant for HTN, HLD, T2DM, GERD, recent diagnosis of pulmonary interstitial disease, and PH. She has completed a PH workup since her last visit. She has a sleep study scheduled for the beginning of February. Overall, she has been doing well at home. She does wear oxygen during the day and feels this helps tremendously. She does have dyspnea with exertion but overall, feels better since her last visit. She is checking her BG at home, 4x/week. She checks FBG 2x/week which has been 150-160 and she checks randomly the other 2x/week which is around 180. She was previously on Jardiance but had a yeast infection x 1 which resolved with Monistat OTC. She had switched to metformin ER due to diarrhea and saw minimal improvement. However, she recently had to switch back to IR due to cost. When asked, she states she was prescribed the 1000 mg ER tablets which unfortunately are never covered by insurance. I discussed the 500 mg ER tablets are covered if she desires to switch back. She did have Prograf on her medication list that looked like it was entered as a oral capsule. This was reviewed and she actually takes this as an oral suspension as a swish and spit. She uses [facility] in [facility] for the compound. She follow with this physician 1x/year.      Current Regimen:  Heart Failure  · Furosemide 40 mg daily  · Lopressor 50 mg BID      Other CV Meds  · Atorvastatin 40 mg daily  · Aspirin 325 mg daily  · Diltiazem 120 mg TID  · KCl 10 mEq daily      Medication Use:  Adherence: good  Past hx of medication use/intolerance: Jardiance (yeast  "infections)  Affordability: [employer] PPO Part D and Traditional Medicare      Diet:  Defer to future visit      Social History:  Tobacco use: Former (quit in 1990)  EtOH use: None  Illicit drug use: None  Exercise: improving but limited due to current comorbid conditions      Immunization Status:  Pneumococcal: PCV13 in 2018, PPSV23 in 2008  Influenza: Patient states already received for 2022  COVID-19: Pfizer primary series (3/25/21 and 4/15/21); monovalent booster on 1/15/2022  Bivalent booster: Due      OBJECTIVE:    /60 (BP Location: Right arm, Patient Position: Sitting)   Pulse 53   Ht 162.6 cm (64.02\")   Wt 108 kg (239 lb)   SpO2 95%   BMI 41.02 kg/m²     Body mass index is 41.07 kg/m².  Wt Readings from Last 1 Encounters:   11/29/22 109 kg (239 lb 6.4 oz)       Lab Review:  Renal Function: Estimated Creatinine Clearance: 75.9 mL/min (by C-G formula based on SCr of 0.82 mg/dL).    No results found for: PROBNP    Results for orders placed during the hospital encounter of 10/31/22    Adult Transthoracic Echo Complete W/ Cont if Necessary Per Protocol    Interpretation Summary  •  Left ventricular systolic function is normal. Calculated left ventricular EF = 57% Normal left ventricular wall thickness noted. The left ventricular cavity is borderline dilated. All left ventricular wall segments contract normally. Left ventricular diastolic function was indeterminate.  •  The left atrial cavity is moderately dilated.  •  The right atrial cavity is mildly dilated.  •  There is thickening of the aortic valve. The aortic valve appears trileaflet. Trace aortic valve regurgitation is present. No aortic valve stenosis is present.  •  Severe mitral annular calcification is present. Mild to moderate mitral valve regurgitation is present. No significant mitral valve stenosis is present.  •  Mild to moderate tricuspid valve regurgitation is present. Estimated right ventricular systolic pressure from tricuspid " regurgitation is moderately elevated (45-55 mmHg). Calculated right ventricular systolic pressure from tricuspid regurgitation is 49 mmHg.      ASSESSMENT/PLAN OF CARE:    1. Pulmonary Hypertension/HFpEF (EF 57%)  · Patient's workup has been negative thus far. Sleep study scheduled for beginning of February  · Start spironolactone 25 mg daily. Reviewed MOA/dosing/side effects. Repeat BMP in 1 week and may be able to stop patient's KCl supplement  · Continue furosemide 40 mg daily  · Continue Lopressor 50 mg twice daily  · Patient did not tolerate Jardiance due to yeast infection resolved by Monistat. Given better BG control with addition of Trulicity, could trial Farxiga at a future visit. We typically see less urinary issues with Farxiga clinically  · Defer in-depth discussion on diet and exercise to future visit once medications are optimized      2. Diabetes (recent A1C of 7.7% on 11/23/2022)  · Goal A1c is 7%. Patient slightly above goal, but BG readings at home are improving  · Continue metformin 500 mg (2 tablets) twice daily. Did discuss with patient the 500 mg ER tablets are covered by insurance if she wanted to switch back to ER release  · Continue Trulicity 0.75 mg once weekly. She has been on this for 7 weeks. Advised patient to discuss with PCP about increasing to 1.5 mg once weekly      3. Vitamin D deficiency   · Last Vit D level was 22.2 on 11/23/22. Goal vit D is 30 or above  · Advised patient to discuss with Dr. Kelley, but also recommended she could start with 1000 IU daily OTC and get a repeat level        Thank you for allowing me to participate in the care of your patient,       Regina Norris, PharmD, BCACP  [facility]  Phone: [phone]

## 2023-01-16 NOTE — ADDENDUM NOTE
Encounter addended by: Regina Norris PharmD on: 1/16/2023 12:49 PM   Actions taken: Charge Capture section accepted

## 2023-01-23 ENCOUNTER — HOSPITAL ENCOUNTER (OUTPATIENT)
Dept: CARDIOLOGY | Facility: HOSPITAL | Age: 73
Discharge: HOME | End: 2023-01-23
Payer: MEDICARE

## 2023-01-23 ENCOUNTER — LAB (OUTPATIENT)
Dept: LAB | Facility: HOSPITAL | Age: 73
End: 2023-01-23
Payer: MEDICARE

## 2023-01-23 VITALS
OXYGEN SATURATION: 94 % | HEART RATE: 85 BPM | BODY MASS INDEX: 40.6 KG/M2 | HEIGHT: 64 IN | SYSTOLIC BLOOD PRESSURE: 130 MMHG | DIASTOLIC BLOOD PRESSURE: 70 MMHG | WEIGHT: 237.8 LBS

## 2023-01-23 DIAGNOSIS — I27.20 PULMONARY HYPERTENSION: Primary | ICD-10-CM

## 2023-01-23 DIAGNOSIS — I50.32 CHRONIC HEART FAILURE WITH PRESERVED EJECTION FRACTION: ICD-10-CM

## 2023-01-23 DIAGNOSIS — Z91.89 RISK FACTORS FOR OBSTRUCTIVE SLEEP APNEA: ICD-10-CM

## 2023-01-23 DIAGNOSIS — I50.32 CHRONIC DIASTOLIC HEART FAILURE: Primary | ICD-10-CM

## 2023-01-23 DIAGNOSIS — E66.01 OBESITY, MORBID, BMI 40.0-49.9: ICD-10-CM

## 2023-01-23 DIAGNOSIS — Q20.8 ABNORMALITY OF RIGHT VENTRICLE OF HEART: ICD-10-CM

## 2023-01-23 LAB
ANION GAP SERPL CALCULATED.3IONS-SCNC: 10.7 MMOL/L (ref 5–15)
BUN SERPL-MCNC: 8 MG/DL (ref 8–23)
BUN/CREAT SERPL: 11.1 (ref 7–25)
CALCIUM SPEC-SCNC: 9.4 MG/DL (ref 8.6–10.5)
CHLORIDE SERPL-SCNC: 101 MMOL/L (ref 98–107)
CO2 SERPL-SCNC: 28.3 MMOL/L (ref 22–29)
CREAT SERPL-MCNC: 0.72 MG/DL (ref 0.57–1)
EGFRCR SERPLBLD CKD-EPI 2021: 89.5 ML/MIN/1.73
GLUCOSE SERPL-MCNC: 131 MG/DL (ref 65–99)
POTASSIUM SERPL-SCNC: 3.7 MMOL/L (ref 3.5–5.2)
SODIUM SERPL-SCNC: 140 MMOL/L (ref 136–145)
WHOLE BLOOD HOLD SPECIMEN: NORMAL

## 2023-01-23 PROCEDURE — 36415 COLL VENOUS BLD VENIPUNCTURE: CPT

## 2023-01-23 PROCEDURE — 99214 OFFICE O/P EST MOD 30 MIN: CPT | Performed by: INTERNAL MEDICINE

## 2023-01-23 PROCEDURE — 80048 BASIC METABOLIC PNL TOTAL CA: CPT

## 2023-01-23 RX ORDER — MELATONIN
1000 DAILY
COMMUNITY

## 2023-01-23 NOTE — PROGRESS NOTES
Heart Failure & Pulmonary Arterial Hypertension [facility]  [facility]  Heron Martínez M.D., Ph.D., [facility]         History of Present Illness  This is a 72 y.o. female with:    1. PAH  A. RHC 2022:  Hemodynamics:  Right Atrium: Mean of 12 mmHg  Right Ventricle: 43/11 mmHg  Pulmonary Artery: 56/21/31 mmHg  Pulmonary Wedge: Mean of 16 mmHg  Cardiac Output/Index: 3.91 L/min 1.84 L/min/m2  PA Sat: 65%  AO Sat: 92%  PVR: 3.9 Wood units    2. RV abnormality    Sally Rivera is a 72 y.o. female who presents for today for PH.  The patient is typically seen by Enrique Kelley MD.  Patient's primary cardiologist is Dr. Christensen.      The patient has seen pulmonary medicine in the past.  Last spirometry showed FEV1/FVC of 89% of predicted.  FVC was 54% of predicted.  DLCO was 53% of predicted.  TLC was 68% of predicted and consistent with restrictive physiology.  Patient also has a history of abnormal CT scans.    In April 2022 she was found to have multiple nodular opacities with the largest of 2.1 cm.  There was bilateral increased septal thickening and some areas of GGOs.  Though, this improved on her most recent CT scan.  She had a CTA recently which showed no evidence of pulmonary embolism.  Pulmonary artery was dilated.  She remained with some areas of GGOs, but had decreased LAD from prior.  No history of VTE/PE.  No VQ scan.  No history of autoimmune or rheumatic diseases.  No family history of cardiomyopathy or pulmonary hypertension.    2D TTE was obtained and showed normal left ventricular function with indeterminate diastolic function.  Right ventricle was interpreted as normal.  It was interpreted as mild to moderate mitral regurgitation and mild to moderate tricuspid regurgitation with a PA systolic pressure of 49 mmHg.  She  was seen by cardiology, Dr. Christensen.  She was sent for the CTA of the chest with contrast, as aforementioned.  She was also sent for a RHC which has been completed.   Primary complaint at that time was worsening exertional dyspnea and exercise intolerance.    RHC was performed and showed RA pressure of 12.  RV was 43/11.  PA pressure was 56/21 with a mean of 31.  Wedge was elevated to 15 mmHg.  PVR was abnormal at 3.9 Wood units. VD reactivity testing was not performed.  Fluid loading was not performed.    The patient returns to the clinic today.  She has a referral for sleep medicine the first week of February.  She does plan on making this appointment.  At her last visit she was started on spironolactone at 25 mg.  She reports that she was able to obtain this medication without any issues.  She denies adverse effects.  She thinks that she is feeling better.  She reports that her breathing has improved, especially with the addition of oxygen.  She continues to wear oxygen 2 L nasal cannula.  She still reports exercise fatigue.  She reports that she gets worn out after small events like showering.  No ascites, or early abdominal satiety.  No dizziness or lightheadedness.          Review of Systems - Review of Systems   Cardiovascular: Positive for dyspnea on exertion. Negative for chest pain, claudication, cyanosis, irregular heartbeat, leg swelling, near-syncope, orthopnea, palpitations, paroxysmal nocturnal dyspnea and syncope.   Respiratory: Positive for cough and shortness of breath. Negative for hemoptysis, sputum production and wheezing.    All other systems reviewed and are negative.       All other systems were reviewed and were negative.    Patient Active Problem List   Diagnosis   • Chronic coronary artery disease   • Gastroesophageal reflux disease   • Hyperlipidemia   • Nonalcoholic fatty liver disease   • Atrial paroxysmal tachycardia (HCC)   • Type 2 diabetes mellitus with hyperglycemia, without long-term current use of insulin (HCC)   • History of rectal polypectomy   • Diverticulosis of large intestine without hemorrhage   • High risk medication use   • Obesity,  morbid, BMI 40.0-49.9 (HCC)   • History of colon polyps   • Clinical diagnosis of COVID-19   • Pneumonia   • Lung nodule   • Pulmonary hypertension (HCC)   • Hypoxia   • Abnormality of right ventricle of heart   • Risk factors for obstructive sleep apnea   • Chronic heart failure with preserved ejection fraction (HCC)       family history includes Diabetes in her father; Heart disease in her father; No Known Problems in her mother.     reports that she quit smoking about 32 years ago. Her smoking use included cigarettes. She has never used smokeless tobacco. She reports that she does not drink alcohol and does not use drugs.    No Known Allergies      Current Outpatient Medications:   •  aspirin 325 MG tablet, Take 325 mg by mouth daily., Disp: , Rfl:   •  atorvastatin (LIPITOR) 40 MG tablet, Take 1 tablet by mouth Daily. 200001, Disp: 90 tablet, Rfl: 3  •  cholecalciferol (VITAMIN D3) 25 MCG (1000 UT) tablet, Take 1,000 Units by mouth Daily., Disp: , Rfl:   •  dilTIAZem (CARDIZEM) 120 MG tablet, Take 1 tablet by mouth 3 (Three) Times a Day., Disp: 270 tablet, Rfl: 3  •  Dulaglutide 0.75 MG/0.5ML solution pen-injector, Inject 0.75 mg under the skin into the appropriate area as directed 1 (One) Time Per Week., Disp: 0.5 mL, Rfl: 3  •  furosemide (LASIX) 40 MG tablet, Take 1 tablet by mouth Daily. 200001, Disp: 90 tablet, Rfl: 3  •  metFORMIN (Glucophage) 500 MG tablet, Take 1 tablet by mouth 2 (Two) Times a Day With Meals. (Patient taking differently: Take 500 mg by mouth 2 (Two) Times a Day With Meals. 500mg in AM and 1000mg in PM), Disp: 60 tablet, Rfl: 3  •  metoprolol tartrate (LOPRESSOR) 50 MG tablet, Take 1 tablet by mouth 2 (Two) Times a Day., Disp: 180 tablet, Rfl: 3  •  O2 (OXYGEN), Inhale 2 L/min. 24 -7, Disp: , Rfl:   •  omeprazole (priLOSEC) 20 MG capsule, Take 1 capsule by mouth Daily., Disp: 90 capsule, Rfl: 1  •  potassium chloride (K-DUR,KLOR-CON) 10 MEQ CR tablet, Take 1 tablet by mouth Daily.  200001, Disp: 90 tablet, Rfl: 3  •  spironolactone (Aldactone) 25 MG tablet, Take 1 tablet by mouth Daily., Disp: 30 tablet, Rfl: 0      Physical Exam:  I have reviewed the patient's current vital signs as documented in the patient's EMR.   Vitals:    01/23/23 0948   BP: 130/70   Pulse: 85   SpO2: 94%     Body mass index is 40.82 kg/m².       01/23/23 0948   Weight: 108 kg (237 lb 12.8 oz)      Physical Exam  Vitals (PP-HIGH) reviewed.   Constitutional:       General: She is not in acute distress.     Appearance: Normal appearance. She is obese. She is not ill-appearing, toxic-appearing or diaphoretic.      Comments: Wearing 2LNC Oxygen   HENT:      Head: Normocephalic and atraumatic.   Neck:      Vascular: No JVD.      Comments: JVP less than 6 cm of water with normal waveforms.  Cardiovascular:      Rate and Rhythm: Regular rhythm. Bradycardia present.      Chest Wall: PMI is not displaced. No thrill.      Heart sounds: Normal heart sounds and S1 normal. Heart sounds not distant. No murmur heard.    No friction rub. No gallop. No S3 or S4 sounds.      Comments: S2 accentuated  Pulmonary:      Effort: Pulmonary effort is normal. No respiratory distress.      Breath sounds: No stridor. No wheezing, rhonchi or rales.   Musculoskeletal:      Right lower leg: No edema.      Left lower leg: No edema.   Skin:     General: Skin is warm and dry.   Neurological:      General: No focal deficit present.      Mental Status: She is alert and oriented to person, place, and time. Mental status is at baseline.   Psychiatric:         Mood and Affect: Mood normal.         Behavior: Behavior normal. Behavior is cooperative.         Thought Content: Thought content normal.         Judgment: Judgment normal.         DATA REVIEWED:       ---------------------------------------------------  TTE/LENNY:  Results for orders placed during the hospital encounter of 10/31/22    Adult Transthoracic Echo Complete W/ Cont if Necessary Per  Protocol    Interpretation Summary  •  Left ventricular systolic function is normal. Calculated left ventricular EF = 57% Normal left ventricular wall thickness noted. The left ventricular cavity is borderline dilated. All left ventricular wall segments contract normally. Left ventricular diastolic function was indeterminate.  •  The left atrial cavity is moderately dilated.  •  The right atrial cavity is mildly dilated.  •  There is thickening of the aortic valve. The aortic valve appears trileaflet. Trace aortic valve regurgitation is present. No aortic valve stenosis is present.  •  Severe mitral annular calcification is present. Mild to moderate mitral valve regurgitation is present. No significant mitral valve stenosis is present.  •  Mild to moderate tricuspid valve regurgitation is present. Estimated right ventricular systolic pressure from tricuspid regurgitation is moderately elevated (45-55 mmHg). Calculated right ventricular systolic pressure from tricuspid regurgitation is 49 mmHg.        My interpretation of the TTE is below.     LVEF is 57%.  The RV is dilated with evidence of RV-PA uncoupling      -----------------------------------------------------  RHC:                          Interpretation today: Right atrial pressure is elevated with normal waveforms and normal inspiratory decrease.  Abnormal right ventricle.  Pulmonary hypertension.         ----------------------------------------------------    PFTs:        I interpreted the PFTs.  Restrictive physiology.  --------------------------------------------------------------------------------------------------      Laboratory evaluations:    Lab Results   Component Value Date    GLUCOSE 149 (H) 11/23/2022    BUN 9 11/23/2022    CREATININE 0.82 11/23/2022    EGFRIFNONA 58 (L) 11/09/2021    EGFRIFAFRI 67 11/09/2021    BCR 11.0 11/23/2022    K 3.9 11/23/2022    CO2 24.3 11/23/2022    CALCIUM 9.5 11/23/2022    PROTENTOTREF 7.7 04/04/2022    ALBUMIN 4.30  11/23/2022    LABIL2 1.3 04/04/2022    AST 28 11/23/2022    ALT 21 11/23/2022     Lab Results   Component Value Date    WBC 10.41 11/23/2022    HGB 13.0 11/23/2022    HCT 40.6 11/23/2022    MCV 78.7 (L) 11/23/2022     11/23/2022     Lab Results   Component Value Date    CHOL 137 11/23/2022    CHLPL 156 04/04/2022    TRIG 122 11/23/2022    HDL 41 11/23/2022    LDL 74 11/23/2022     No results found for: TSH, B3QSLFQ, E6RYFIW, THYROIDAB  No results found for: CKTOTAL, CKMB, CKMBINDEX, TROPONINI, TROPONINT  Lab Results   Component Value Date    HGBA1C 7.70 (H) 11/23/2022     No results found for: DDIMER  Lab Results   Component Value Date    ALT 21 11/23/2022     Lab Results   Component Value Date    HGBA1C 7.70 (H) 11/23/2022    HGBA1C 7.7 (H) 04/04/2022    HGBA1C 7.9 (H) 11/09/2021     Lab Results   Component Value Date    MICROALBUR 35.2 05/10/2021    CREATININE 0.82 11/23/2022     Lab Results   Component Value Date    TIBC 583 06/02/2020    FERRITIN 68 08/03/2020     No results found for: INR, PROTIME    PAH RISK ASSESSMENT:            1. Pulmonary hypertension (HCC). PAH/PVH. WHO Group Likely 2/3; FC: III.  RV status: Abnormal:.  PFTs: Restrictive with decreased DLCO. . MPAP: 31. Maryanne: 2.9. Exercise pulse oximetry showed a minimum SpO2 = 86% on room air.  The KCCQ-12 was updated at the visit today. The Aguilar Index was updated today. Most recent score: 6.     Today, she appears euvolemic.  Perfusion status is excellent.  She is a limited community ambulator based on her 6MWT.  She continues to use oxygen at 2 L/min nasal cannula.  No evidence of decompensation.    She was diagnosed with HFpEF.  VQ scan showed no evidence of CTEPH.  Sleep referral is pending.    For now, continue optimization of HFpEF regimen, as below.  Follow-up with sleep medicine results.      I have asked the patient that if they were to move to be certain they see someone with expertise in PAH. These providers can be located at  [email or web address].        2. HFpEF.  NYHA stage C; functional class III.  Today, euvolemic and perfused.  Clinical trajectory was reviewed today and is stable.    -Continue antihypertensive regimen  -Aldactone 25 mg.  Chem-7 today.  Can likely discontinue potassium supplementation based on labs today.  Call with results.  - Low-sodium and fluid restricted diet.  -Yeast infection in the past with SGLT2 inhibitors, so they were not prescribed today     3. Abnormality of right ventricle of heart.  NYHA stage C; FC-III.      RV is dilated. RVEF is abnormal.  RV CI is Abnormal:. RV status: RV ADAPTED-REMODELED with an ESVi of <; Maryanne is 2.9.  TAPSE/PASP ratio is markedly abnormal and consistent with RV-PA uncoupling.     Patient's abnormal right ventricle is from pulmonary hypertension.  Continue Aldactone.   4. Obesity, morbid, BMI 40.0-49.9 ([facility]). The BMI is Body mass index is 40.82 kg/m².  Handout provided.  Follow-up with PCP.     5. Risks for CHRISTIAN.  Sleep referral pending.         ORDERS PLACED TODAY:  Orders Placed This Encounter   Procedures   • Basic Metabolic Panel          MEDS ORDERED TODAY:    No orders of the defined types were placed in this encounter.         Return in about 1 month (around 2/23/2023).          This document has been electronically signed by Heron Martínez MD PhD on January 23, 2023 10:20 EST        Heron Martínez M.D., Ph.D., [facility]  System Medical Director for HF and PAH

## 2023-01-24 ENCOUNTER — TRANSCRIBE ORDERS (OUTPATIENT)
Dept: SLEEP MEDICINE | Facility: HOSPITAL | Age: 73
End: 2023-01-24
Payer: MEDICARE

## 2023-01-24 DIAGNOSIS — G47.33 OSA (OBSTRUCTIVE SLEEP APNEA): Primary | ICD-10-CM

## 2023-02-06 ENCOUNTER — HOSPITAL ENCOUNTER (OUTPATIENT)
Dept: SLEEP MEDICINE | Facility: HOSPITAL | Age: 73
Discharge: HOME | End: 2023-02-06
Admitting: INTERNAL MEDICINE
Payer: MEDICARE

## 2023-02-06 DIAGNOSIS — G47.33 OSA (OBSTRUCTIVE SLEEP APNEA): ICD-10-CM

## 2023-02-06 PROCEDURE — 95810 POLYSOM 6/> YRS 4/> PARAM: CPT

## 2023-02-07 ENCOUNTER — OFFICE VISIT (OUTPATIENT)
Dept: FAMILY MEDICINE CLINIC | Facility: CLINIC | Age: 73
End: 2023-02-07
Payer: MEDICARE

## 2023-02-07 VITALS
OXYGEN SATURATION: 90 % | SYSTOLIC BLOOD PRESSURE: 130 MMHG | TEMPERATURE: 98.7 F | HEIGHT: 64 IN | BODY MASS INDEX: 39.91 KG/M2 | HEART RATE: 85 BPM | DIASTOLIC BLOOD PRESSURE: 68 MMHG | WEIGHT: 233.8 LBS

## 2023-02-07 DIAGNOSIS — E11.65 TYPE 2 DIABETES MELLITUS WITH HYPERGLYCEMIA, WITHOUT LONG-TERM CURRENT USE OF INSULIN: Primary | ICD-10-CM

## 2023-02-07 DIAGNOSIS — K21.00 GASTROESOPHAGEAL REFLUX DISEASE WITH ESOPHAGITIS WITHOUT HEMORRHAGE: ICD-10-CM

## 2023-02-07 DIAGNOSIS — E78.00 PURE HYPERCHOLESTEROLEMIA: ICD-10-CM

## 2023-02-07 PROCEDURE — 99214 OFFICE O/P EST MOD 30 MIN: CPT | Performed by: INTERNAL MEDICINE

## 2023-02-07 RX ORDER — SPIRONOLACTONE 25 MG/1
TABLET ORAL
Qty: 30 TABLET | Refills: 0 | Status: SHIPPED | OUTPATIENT
Start: 2023-02-07 | End: 2023-03-08

## 2023-02-07 RX ORDER — GLIMEPIRIDE 1 MG/1
1 TABLET ORAL
Qty: 90 TABLET | Refills: 1 | Status: SHIPPED | OUTPATIENT
Start: 2023-02-07

## 2023-02-08 NOTE — PROGRESS NOTES
"Chief Complaint  go over meds/ b/w and if increased trulicty send 90 day to [facility]        Sally Rivera presents to [facility]  History of Present Illness patient was seen for diabetes.  Blood sugars been fluctuating 200-120.  Patient has been taking Trulicity 0.75 mg weekly, metformin 500 mg twice daily.  Patient has been put on Amaryl 1 mg daily and patient was instructed to follow diabetic diet and low impact exercise.  Patient will monitor blood sugar and follow-up in 2 months.  Patient was instructed if unable to eat do not take the Amaryl.  Patient's lipids been treated with diet exercise and atorvastatin 40 mg daily.  Triglycerides 122, HDL 41, LDL 74.  Patient will continue present treatment.  Patient is gastroesophageal reflux has been treated with omeprazole 20 mg daily.  Patient will continue present treatment.    Dictated utilizing Dragon dictation. If there are questions or for further clarification, please contact me.    Objective   Vital Signs:  Blood Pressure 130/68   Pulse 85   Temperature 98.7 °F (37.1 °C)   Height 162.6 cm (64\")   Weight 106 kg (233 lb 12.8 oz)   Oxygen Saturation 90%   Body Mass Index 40.13 kg/m²   Estimated body mass index is 40.13 kg/m² as calculated from the following:    Height as of this encounter: 162.6 cm (64\").    Weight as of this encounter: 106 kg (233 lb 12.8 oz).             Physical Exam  Vitals and nursing note reviewed.   Constitutional:       Appearance: Normal appearance. She is well-developed.   HENT:      Head: Normocephalic and atraumatic.      Nose: Nose normal.      Mouth/Throat:      Mouth: Mucous membranes are moist.      Pharynx: Oropharynx is clear.   Eyes:      Extraocular Movements: Extraocular movements intact.      Conjunctiva/sclera: Conjunctivae normal.      Pupils: Pupils are equal, round, and reactive to light.   Cardiovascular:      Rate and Rhythm: Normal rate and regular rhythm.    "   Heart sounds: Normal heart sounds. No murmur heard.    No friction rub. No gallop.   Pulmonary:      Effort: Pulmonary effort is normal. No respiratory distress.      Breath sounds: Normal breath sounds. No stridor. No wheezing, rhonchi or rales.   Chest:      Chest wall: No tenderness.   Abdominal:      General: Bowel sounds are normal.      Palpations: Abdomen is soft.   Musculoskeletal:         General: Normal range of motion.      Cervical back: Normal range of motion and neck supple.   Skin:     General: Skin is warm and dry.   Neurological:      General: No focal deficit present.      Mental Status: She is alert and oriented to person, place, and time. Mental status is at baseline.   Psychiatric:         Mood and Affect: Mood normal.         Behavior: Behavior normal.         Thought Content: Thought content normal.         Judgment: Judgment normal.        Result Review :                   Assessment and Plan  #1 continue monitoring blood pressure blood sugar #2 Amaryl 1 mg daily #3 follow-up in 2 months  Diagnoses and all orders for this visit:    1. Type 2 diabetes mellitus with hyperglycemia, without long-term current use of insulin (HCC) (Primary)    2. Pure hypercholesterolemia    3. Gastroesophageal reflux disease with esophagitis without hemorrhage    Other orders  -     glimepiride (Amaryl) 1 MG tablet; Take 1 tablet by mouth Every Morning Before Breakfast.  Dispense: 90 tablet; Refill: 1             Follow Up   Return in about 2 months (around 4/7/2023), or if symptoms worsen or fail to improve, for Recheck.  Patient was given instructions and counseling regarding her condition or for health maintenance advice. Please see specific information pulled into the AVS if appropriate.

## 2023-02-10 DIAGNOSIS — J98.4 RESTRICTIVE LUNG DISEASE: ICD-10-CM

## 2023-02-10 DIAGNOSIS — I27.20 PULMONARY HYPERTENSION: ICD-10-CM

## 2023-02-10 DIAGNOSIS — G47.34 SLEEP RELATED HYPOXIA: Primary | ICD-10-CM

## 2023-02-27 ENCOUNTER — HOSPITAL ENCOUNTER (OUTPATIENT)
Dept: CARDIOLOGY | Facility: HOSPITAL | Age: 73
Discharge: HOME | End: 2023-02-27
Admitting: INTERNAL MEDICINE
Payer: MEDICARE

## 2023-02-27 VITALS
BODY MASS INDEX: 39.91 KG/M2 | OXYGEN SATURATION: 95 % | DIASTOLIC BLOOD PRESSURE: 83 MMHG | HEIGHT: 64 IN | WEIGHT: 233.8 LBS | HEART RATE: 77 BPM | SYSTOLIC BLOOD PRESSURE: 138 MMHG

## 2023-02-27 DIAGNOSIS — Q20.8 ABNORMALITY OF RIGHT VENTRICLE OF HEART: ICD-10-CM

## 2023-02-27 DIAGNOSIS — I50.32 CHRONIC HEART FAILURE WITH PRESERVED EJECTION FRACTION: ICD-10-CM

## 2023-02-27 DIAGNOSIS — I27.20 PULMONARY HYPERTENSION: Primary | ICD-10-CM

## 2023-02-27 PROCEDURE — G0463 HOSPITAL OUTPT CLINIC VISIT: HCPCS

## 2023-02-27 PROCEDURE — 99214 OFFICE O/P EST MOD 30 MIN: CPT | Performed by: INTERNAL MEDICINE

## 2023-02-27 NOTE — PROGRESS NOTES
Heart Failure & Pulmonary Arterial Hypertension [facility]  [place]  Heron Martínez M.D., Ph.D., [facility]         History of Present Illness  This is a 73 y.o. female with:    1. PAH  A. RHC 2022:  Hemodynamics:  Right Atrium: Mean of 12 mmHg  Right Ventricle: 43/11 mmHg  Pulmonary Artery: 56/21/31 mmHg  Pulmonary Wedge: Mean of 16 mmHg  Cardiac Output/Index: 3.91 L/min 1.84 L/min/m2  PA Sat: 65%  AO Sat: 92%  PVR: 3.9 Wood units    2. RV abnormality    Sally Rivera is a 73 y.o. female who presents for today for PH.  The patient is typically seen by Enrique Kelley MD.  Patient's primary cardiologist is Dr. Christensen.      The patient has seen pulmonary medicine in the past.  Last spirometry showed FEV1/FVC of 89% of predicted.  FVC was 54% of predicted.  DLCO was 53% of predicted.  TLC was 68% of predicted and consistent with restrictive physiology.  Patient also has a history of abnormal CT scans.    In April 2022 she was found to have multiple nodular opacities with the largest of 2.1 cm.  There was bilateral increased septal thickening and some areas of GGOs.  Though, this improved on her most recent CT scan.  She had a CTA recently which showed no evidence of pulmonary embolism.  Pulmonary artery was dilated.  She remained with some areas of GGOs, but had decreased LAD from prior.  No history of VTE/PE.  No VQ scan.  No history of autoimmune or rheumatic diseases.  No family history of cardiomyopathy or pulmonary hypertension.    2D TTE was obtained and showed normal left ventricular function with indeterminate diastolic function.  Right ventricle was interpreted as normal.  It was interpreted as mild to moderate mitral regurgitation and mild to moderate tricuspid regurgitation with a PA systolic pressure of 49 mmHg.  She  was seen by cardiology, Dr. Christensen.  She was sent for the CTA of the chest with contrast, as aforementioned.  She was also sent for a RHC which has been completed.   Primary complaint at that time was worsening exertional dyspnea and exercise intolerance.    RHC was performed and showed RA pressure of 12.  RV was 43/11.  PA pressure was 56/21 with a mean of 31.  Wedge was elevated to 15 mmHg.  PVR was abnormal at 3.9 Wood units. VD reactivity testing was not performed.  Fluid loading was not performed.    The patient returns to clinic today.  She saw sleep medicine earlier this month.  She was started on Aldactone.  She continues to wear oxygen.  She reports stable exercise intolerance.  Denies adverse effects of medications.  No ascites or early abdominal satiety.  No orthopnea, PND, or lower extremity edema.  She reports no emergency department or inpatient admissions.        Review of Systems - Review of Systems   Cardiovascular: Positive for dyspnea on exertion. Negative for chest pain, claudication, cyanosis, irregular heartbeat, leg swelling, near-syncope, orthopnea, palpitations, paroxysmal nocturnal dyspnea and syncope.   Respiratory: Positive for cough and shortness of breath. Negative for hemoptysis, sputum production and wheezing.    All other systems reviewed and are negative.       All other systems were reviewed and were negative.    Patient Active Problem List   Diagnosis   • Chronic coronary artery disease   • Gastroesophageal reflux disease   • Hyperlipidemia   • Nonalcoholic fatty liver disease   • Atrial paroxysmal tachycardia (HCC)   • Type 2 diabetes mellitus with hyperglycemia, without long-term current use of insulin (HCC)   • History of rectal polypectomy   • Diverticulosis of large intestine without hemorrhage   • High risk medication use   • Obesity, morbid, BMI 40.0-49.9 (HCC)   • History of colon polyps   • Clinical diagnosis of COVID-19   • Pneumonia   • Lung nodule   • Pulmonary hypertension (HCC)   • Hypoxia   • Abnormality of right ventricle of heart   • Risk factors for obstructive sleep apnea   • Chronic heart failure with preserved ejection  fraction (HCC)       family history includes Diabetes in her father; Heart disease in her father; No Known Problems in her mother.     reports that she quit smoking about 32 years ago. Her smoking use included cigarettes. She has never used smokeless tobacco. She reports that she does not drink alcohol and does not use drugs.    No Known Allergies      Current Outpatient Medications:   •  aspirin 325 MG tablet, Take 325 mg by mouth daily., Disp: , Rfl:   •  atorvastatin (LIPITOR) 40 MG tablet, Take 1 tablet by mouth Daily. 200001, Disp: 90 tablet, Rfl: 3  •  cholecalciferol (VITAMIN D3) 25 MCG (1000 UT) tablet, Take 1,000 Units by mouth Daily., Disp: , Rfl:   •  dilTIAZem (CARDIZEM) 120 MG tablet, Take 1 tablet by mouth 3 (Three) Times a Day., Disp: 270 tablet, Rfl: 3  •  Dulaglutide 0.75 MG/0.5ML solution pen-injector, Inject 0.75 mg under the skin into the appropriate area as directed 1 (One) Time Per Week., Disp: 0.5 mL, Rfl: 3  •  furosemide (LASIX) 40 MG tablet, Take 1 tablet by mouth Daily. 200001, Disp: 90 tablet, Rfl: 3  •  glimepiride (Amaryl) 1 MG tablet, Take 1 tablet by mouth Every Morning Before Breakfast., Disp: 90 tablet, Rfl: 1  •  metFORMIN (Glucophage) 500 MG tablet, Take 1 tablet by mouth 2 (Two) Times a Day With Meals. (Patient taking differently: Take 1,000 mg by mouth 2 (Two) Times a Day With Meals.), Disp: 60 tablet, Rfl: 3  •  metoprolol tartrate (LOPRESSOR) 50 MG tablet, Take 1 tablet by mouth 2 (Two) Times a Day., Disp: 180 tablet, Rfl: 3  •  Moderna COVID-19 Bival Booster 50 MCG/0.5ML suspension, , Disp: , Rfl:   •  O2 (OXYGEN), Inhale 2 L/min. 24 -7, Disp: , Rfl:   •  omeprazole (priLOSEC) 20 MG capsule, Take 1 capsule by mouth Daily., Disp: 90 capsule, Rfl: 1  •  spironolactone (ALDACTONE) 25 MG tablet, TAKE 1 TABLET BY MOUTH EVERY DAY, Disp: 30 tablet, Rfl: 0      Physical Exam:  I have reviewed the patient's current vital signs as documented in the patient's EMR.   Vitals:    02/27/23  1003   BP: 138/83   Pulse: 77   SpO2: 95%     Body mass index is 40.11 kg/m².       02/27/23  1003   Weight: 106 kg (233 lb 12.8 oz)      Physical Exam  Vitals (PP-HIGH) reviewed.   Constitutional:       General: She is not in acute distress.     Appearance: Normal appearance. She is obese. She is not ill-appearing, toxic-appearing or diaphoretic.      Comments: Wearing 2LNC Oxygen   HENT:      Head: Normocephalic and atraumatic.   Neck:      Vascular: No JVD.      Comments: JVP less than 6 cm of water with normal waveforms.  Cardiovascular:      Rate and Rhythm: Normal rate and regular rhythm.      Chest Wall: PMI is not displaced. No thrill.      Heart sounds: Normal heart sounds and S1 normal. Heart sounds not distant. No murmur heard.    No friction rub. No gallop. No S3 or S4 sounds.      Comments: S2 loud  Pulmonary:      Effort: Pulmonary effort is normal. No respiratory distress.      Breath sounds: No stridor. No wheezing, rhonchi or rales.   Musculoskeletal:      Right lower leg: No edema.      Left lower leg: No edema.   Skin:     General: Skin is warm and dry.   Neurological:      General: No focal deficit present.      Mental Status: She is alert and oriented to person, place, and time. Mental status is at baseline.   Psychiatric:         Mood and Affect: Mood normal.         Behavior: Behavior normal. Behavior is cooperative.         Thought Content: Thought content normal.         Judgment: Judgment normal.         DATA REVIEWED:       ---------------------------------------------------  TTE/LENNY:  Results for orders placed during the hospital encounter of 10/31/22    Adult Transthoracic Echo Complete W/ Cont if Necessary Per Protocol    Interpretation Summary  •  Left ventricular systolic function is normal. Calculated left ventricular EF = 57% Normal left ventricular wall thickness noted. The left ventricular cavity is borderline dilated. All left ventricular wall segments contract normally. Left  ventricular diastolic function was indeterminate.  •  The left atrial cavity is moderately dilated.  •  The right atrial cavity is mildly dilated.  •  There is thickening of the aortic valve. The aortic valve appears trileaflet. Trace aortic valve regurgitation is present. No aortic valve stenosis is present.  •  Severe mitral annular calcification is present. Mild to moderate mitral valve regurgitation is present. No significant mitral valve stenosis is present.  •  Mild to moderate tricuspid valve regurgitation is present. Estimated right ventricular systolic pressure from tricuspid regurgitation is moderately elevated (45-55 mmHg). Calculated right ventricular systolic pressure from tricuspid regurgitation is 49 mmHg.        My interpretation of the TTE is below.     LVEF is 57%.  The right ventricle is dilated with normal RVEF.  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        -----------------------------------------------------  RHC:                          Interpretation today: Right atrial pressure is elevated with normal waveforms and normal inspiratory decrease.  Abnormal right ventricle.  Pulmonary hypertension.         ----------------------------------------------------    PFTs:        I interpreted the PFTs.  Restrictive physiology.  --------------------------------------------------------------------------------------------------      Laboratory evaluations:    Lab Results   Component Value Date    GLUCOSE 131 (H) 01/23/2023    BUN 8 01/23/2023    CREATININE 0.72 01/23/2023    EGFRIFNONA 58 (L) 11/09/2021    EGFRIFAFRI 67 11/09/2021    BCR 11.1 01/23/2023    K 3.7 01/23/2023    CO2 28.3 01/23/2023    CALCIUM 9.4 01/23/2023    PROTENTOTREF 7.7 04/04/2022    ALBUMIN 4.30 11/23/2022    LABIL2 1.3 04/04/2022    AST 28 11/23/2022    ALT 21 11/23/2022     Lab Results   Component Value Date    WBC 10.41 11/23/2022    HGB 13.0 11/23/2022    HCT 40.6 11/23/2022    MCV 78.7 (L) 11/23/2022     11/23/2022      Lab Results   Component Value Date    CHOL 137 11/23/2022    CHLPL 156 04/04/2022    TRIG 122 11/23/2022    HDL 41 11/23/2022    LDL 74 11/23/2022     No results found for: TSH, K7BSKAS, U0YJOYJ, THYROIDAB  No results found for: CKTOTAL, CKMB, CKMBINDEX, TROPONINI, TROPONINT  Lab Results   Component Value Date    HGBA1C 7.70 (H) 11/23/2022     No results found for: DDIMER  Lab Results   Component Value Date    ALT 21 11/23/2022     Lab Results   Component Value Date    HGBA1C 7.70 (H) 11/23/2022    HGBA1C 7.7 (H) 04/04/2022    HGBA1C 7.9 (H) 11/09/2021     Lab Results   Component Value Date    MICROALBUR 35.2 05/10/2021    CREATININE 0.72 01/23/2023     Lab Results   Component Value Date    TIBC 583 06/02/2020    FERRITIN 68 08/03/2020     No results found for: INR, PROTIME    PAH RISK ASSESSMENT:            1. Pulmonary hypertension (HCC). PAH/PVH. WHO Group Likely 2/3; FC: III.  RV status: Abnormal:.  PFTs: Restrictive with decreased DLCO. . MPAP: 31. Maryanne: 2.9. Exercise pulse oximetry showed a minimum SpO2 = 86% on room air.  The KCCQ-12 was updated at the visit today. The Aguilar Index was updated today. Most recent score: 6.     VQ scan showed no evidence of CTEPH.      -For now, continue optimization of HFpEF regimen, as below.  -No current indication for PAH-specific medications.      I have asked the patient that if they were to move to be certain they see someone with expertise in PAH. These providers can be located at [email or web address].        2. HFpEF.  NYHA stage C; functional class III.  Today, euvolemic and perfused.  Clinical trajectory was reviewed today and is stable.    - Continue current antihypertensive regimen  - Aldactone 25 mg  -Yeast infection in the past with SGLT2 inhibitors, so they were not prescribed today.    Lifestyle Advice:   - call office if I gain more than 2 pounds in one day or 5 pounds in one week  - keep legs up while sitting  - use salt in moderation  - watch for  swelling in feet, ankles and legs every day  - weigh myself daily  -call for concerning s/sx       3. Abnormality of right ventricle of heart.  NYHA stage C; FC-III.      RV is dilated. RVEF is abnormal.  RV CI is Abnormal:. RV status: RV ADAPTED-REMODELED with an ESVi of <; Maryanne is 2.9.  TAPSE/PASP ratio is markedly abnormal and consistent with RV-PA uncoupling.     Patient's abnormal right ventricle is from pulmonary hypertension.      -Continue Aldactone with quarterly assessment of GFR.    -Will plan on limited 2D TTE this summer to reevaluate         ORDERS PLACED TODAY:  No orders of the defined types were placed in this encounter.         MEDS ORDERED TODAY:    No orders of the defined types were placed in this encounter.         Return in about 3 months (around 5/27/2023).            This document has been electronically signed by Heron Martínez MD PhD on February 27, 2023 10:19 EST      Heron Martínez M.D., Ph.D., [facility]  System Medical Director for HF and PAH

## 2023-02-27 NOTE — ADDENDUM NOTE
Encounter addended by: Sharona Thomas MA on: 2/27/2023 10:33 AM   Actions taken: Charge Capture section accepted

## 2023-03-08 RX ORDER — SPIRONOLACTONE 25 MG/1
TABLET ORAL
Qty: 30 TABLET | Refills: 0 | Status: SHIPPED | OUTPATIENT
Start: 2023-03-08 | End: 2023-03-24

## 2023-03-09 NOTE — TELEPHONE ENCOUNTER
Caller: Sally Rivera    Relationship: Self    Best call back number: [phone]    Requested Prescriptions:   Requested Prescriptions     Pending Prescriptions Disp Refills   • metFORMIN (Glucophage) 500 MG tablet       Sig: Take 2 tablets by mouth 2 (Two) Times a Day With Meals.        Pharmacy where request should be sent: [id number] - [facility] [address] [facility] [facility] [phone] [address] [phone] FX     Does the patient have less than a 3 day supply:  [] Yes  [x] No    Would you like a call back once the refill request has been completed: [] Yes [x] No    If the office needs to give you a call back, can they leave a voicemail: [] Yes [x] No    Burak Martinez Rep   03/09/23 11:48 EST

## 2023-03-13 ENCOUNTER — TELEPHONE (OUTPATIENT)
Dept: SLEEP MEDICINE | Facility: HOSPITAL | Age: 73
End: 2023-03-13
Payer: MEDICARE

## 2023-03-24 RX ORDER — SPIRONOLACTONE 25 MG/1
TABLET ORAL
Qty: 30 TABLET | Refills: 0 | Status: SHIPPED | OUTPATIENT
Start: 2023-03-24 | End: 2023-04-04

## 2023-04-03 ENCOUNTER — OFFICE VISIT (OUTPATIENT)
Dept: FAMILY MEDICINE CLINIC | Facility: CLINIC | Age: 73
End: 2023-04-03
Payer: MEDICARE

## 2023-04-03 VITALS
HEART RATE: 78 BPM | BODY MASS INDEX: 39.61 KG/M2 | SYSTOLIC BLOOD PRESSURE: 128 MMHG | OXYGEN SATURATION: 92 % | WEIGHT: 232 LBS | HEIGHT: 64 IN | TEMPERATURE: 97.4 F | RESPIRATION RATE: 20 BRPM | DIASTOLIC BLOOD PRESSURE: 78 MMHG

## 2023-04-03 DIAGNOSIS — I50.32 CHRONIC HEART FAILURE WITH PRESERVED EJECTION FRACTION: ICD-10-CM

## 2023-04-03 DIAGNOSIS — E11.65 TYPE 2 DIABETES MELLITUS WITH HYPERGLYCEMIA, WITHOUT LONG-TERM CURRENT USE OF INSULIN: ICD-10-CM

## 2023-04-03 DIAGNOSIS — D72.829 LEUKOCYTOSIS, UNSPECIFIED TYPE: ICD-10-CM

## 2023-04-03 DIAGNOSIS — Z76.89 ENCOUNTER TO ESTABLISH CARE: Primary | ICD-10-CM

## 2023-04-03 DIAGNOSIS — E55.9 VITAMIN D DEFICIENCY: ICD-10-CM

## 2023-04-03 DIAGNOSIS — E78.00 PURE HYPERCHOLESTEROLEMIA: ICD-10-CM

## 2023-04-03 DIAGNOSIS — I25.10 CHRONIC CORONARY ARTERY DISEASE: ICD-10-CM

## 2023-04-03 DIAGNOSIS — D75.1 ERYTHROCYTOSIS: ICD-10-CM

## 2023-04-03 DIAGNOSIS — Q20.8 ABNORMALITY OF RIGHT VENTRICLE OF HEART: ICD-10-CM

## 2023-04-03 NOTE — PROGRESS NOTES
"Chief Complaint  Establish Care (From Dr Kelley) and Diabetes    Subjective        Sally Rivera presents to [facility]  History of Present Illness  Patient is a 73-year-old female, here to establish care and off boarding Dr. Kelley. With chronic CAD, hyperlipidemia, pulmonary hypertension, CHF, and abnormal right ventricle of heart, patient follows Dr. Martínez from [facility] heart failure clinic, Dr. Christensen from [facility], and Dr. Berry with pulm.  Patient wears 2 LNC at all times and has apt with Dr. Berry on 4/10/2023.  With DM2, last labs completed 11/23/2022 reveal HA1C 7.7, glucose 149, vitamin D deficiency noted.  Patient is prescribed metformin 1000 mg twice daily with meals, glimepiride 1 mg tablet daily, and Trulicity 0.75 mg subcu weekly.  Today, patient reports her average fasting blood sugar, average about 130s-180s. Patient is fasting today due for repeat labs.    Objective   Vital Signs:  /78 (BP Location: Left arm, Patient Position: Sitting, Cuff Size: Large Adult)   Pulse 78   Temp 97.4 °F (36.3 °C) (Infrared)   Resp 20   Ht 162.6 cm (64.02\")   Wt 105 kg (232 lb)   SpO2 92%   BMI 39.80 kg/m²   Estimated body mass index is 39.8 kg/m² as calculated from the following:    Height as of this encounter: 162.6 cm (64.02\").    Weight as of this encounter: 105 kg (232 lb).             Physical Exam  Constitutional:       General: She is awake.      Appearance: Normal appearance.   HENT:      Head: Normocephalic and atraumatic.      Nose: Nose normal.   Eyes:      Extraocular Movements: Extraocular movements intact.      Conjunctiva/sclera: Conjunctivae normal.      Pupils: Pupils are equal, round, and reactive to light.   Cardiovascular:      Rate and Rhythm: Normal rate and regular rhythm.      Pulses: Normal pulses.      Heart sounds: Normal heart sounds.   Pulmonary:      Effort: Pulmonary effort is normal.      Breath sounds: Normal air entry. " Examination of the right-lower field reveals wheezing and rales. Examination of the left-lower field reveals wheezing and rales. Wheezing and rales present.   Skin:     General: Skin is warm and dry.   Neurological:      General: No focal deficit present.      Mental Status: She is alert and oriented to person, place, and time. Mental status is at baseline.   Psychiatric:         Attention and Perception: Attention normal.         Behavior: Behavior normal. Behavior is cooperative.        Result Review :                   Assessment and Plan   Diagnoses and all orders for this visit:    1. Encounter to establish care (Primary)    2. Chronic coronary artery disease  -     CBC & Differential  -     Comprehensive Metabolic Panel  -     Lipid Panel  -     TSH Rfx On Abnormal To Free T4    3. Pure hypercholesterolemia  -     CBC & Differential  -     Comprehensive Metabolic Panel  -     Lipid Panel  -     TSH Rfx On Abnormal To Free T4  -     Hemoglobin A1c  -     MicroAlbumin, Urine, Random - Urine, Clean Catch    4. Chronic heart failure with preserved ejection fraction  -     CBC & Differential  -     TSH Rfx On Abnormal To Free T4    5. Abnormality of right ventricle of heart    6. Type 2 diabetes mellitus with hyperglycemia, without long-term current use of insulin  -     CBC & Differential  -     Comprehensive Metabolic Panel  -     Lipid Panel  -     Hemoglobin A1c  -     MicroAlbumin, Urine, Random - Urine, Clean Catch    7. Vitamin D deficiency  -     Vitamin D,25-Hydroxy    Our office will call you or you will see a note on your Visual[employer] odin when your in-office blood work results.  If HA1C still greater than 7, we will increase your Trulicity dose.    Patient agrees with plan of care and understands instructions. Call if worsening symptoms or any problems or concerns.          Follow Up   Return if symptoms worsen or fail to improve.  Patient was given instructions and counseling regarding her condition or for  health maintenance advice. Please see specific information pulled into the AVS if appropriate.

## 2023-04-03 NOTE — PATIENT INSTRUCTIONS
Our office will call you or you will see a note on your Veezeon odin when your in-office blood work results.  If HA1C still greater than 7, we will increase your Trulicity dose.    Patient agrees with plan of care and understands instructions. Call if worsening symptoms or any problems or concerns.

## 2023-04-04 LAB
25(OH)D3+25(OH)D2 SERPL-MCNC: 30 NG/ML (ref 30–100)
ALBUMIN SERPL-MCNC: 4.9 G/DL (ref 3.5–5.2)
ALBUMIN/GLOB SERPL: 1.4 G/DL
ALP SERPL-CCNC: 75 U/L (ref 39–117)
ALT SERPL-CCNC: 33 U/L (ref 1–33)
AST SERPL-CCNC: 38 U/L (ref 1–32)
BASOPHILS # BLD AUTO: 0.08 10*3/MM3 (ref 0–0.2)
BASOPHILS NFR BLD AUTO: 0.7 % (ref 0–1.5)
BILIRUB SERPL-MCNC: 0.6 MG/DL (ref 0–1.2)
BUN SERPL-MCNC: 14 MG/DL (ref 8–23)
BUN/CREAT SERPL: 14.7 (ref 7–25)
CALCIUM SERPL-MCNC: 9.8 MG/DL (ref 8.6–10.5)
CHLORIDE SERPL-SCNC: 102 MMOL/L (ref 98–107)
CHOLEST SERPL-MCNC: 156 MG/DL (ref 0–200)
CO2 SERPL-SCNC: 25.9 MMOL/L (ref 22–29)
CREAT SERPL-MCNC: 0.95 MG/DL (ref 0.57–1)
EGFRCR SERPLBLD CKD-EPI 2021: 63.8 ML/MIN/1.73
EOSINOPHIL # BLD AUTO: 0.37 10*3/MM3 (ref 0–0.4)
EOSINOPHIL NFR BLD AUTO: 3 % (ref 0.3–6.2)
ERYTHROCYTE [DISTWIDTH] IN BLOOD BY AUTOMATED COUNT: 15.3 % (ref 12.3–15.4)
GLOBULIN SER CALC-MCNC: 3.4 GM/DL
GLUCOSE SERPL-MCNC: 97 MG/DL (ref 65–99)
HBA1C MFR BLD: 6.7 % (ref 4.8–5.6)
HCT VFR BLD AUTO: 42.4 % (ref 34–46.6)
HDLC SERPL-MCNC: 40 MG/DL (ref 40–60)
HGB BLD-MCNC: 13.4 G/DL (ref 12–15.9)
IMM GRANULOCYTES # BLD AUTO: 0.03 10*3/MM3 (ref 0–0.05)
IMM GRANULOCYTES NFR BLD AUTO: 0.2 % (ref 0–0.5)
LDLC SERPL CALC-MCNC: 90 MG/DL (ref 0–100)
LYMPHOCYTES # BLD AUTO: 3.98 10*3/MM3 (ref 0.7–3.1)
LYMPHOCYTES NFR BLD AUTO: 32.5 % (ref 19.6–45.3)
MCH RBC QN AUTO: 24.7 PG (ref 26.6–33)
MCHC RBC AUTO-ENTMCNC: 31.6 G/DL (ref 31.5–35.7)
MCV RBC AUTO: 78.2 FL (ref 79–97)
MICROALBUMIN UR-MCNC: <3 UG/ML
MONOCYTES # BLD AUTO: 0.7 10*3/MM3 (ref 0.1–0.9)
MONOCYTES NFR BLD AUTO: 5.7 % (ref 5–12)
NEUTROPHILS # BLD AUTO: 7.09 10*3/MM3 (ref 1.7–7)
NEUTROPHILS NFR BLD AUTO: 57.9 % (ref 42.7–76)
NRBC BLD AUTO-RTO: 0 /100 WBC (ref 0–0.2)
PLATELET # BLD AUTO: 260 10*3/MM3 (ref 140–450)
POTASSIUM SERPL-SCNC: 4 MMOL/L (ref 3.5–5.2)
PROT SERPL-MCNC: 8.3 G/DL (ref 6–8.5)
RBC # BLD AUTO: 5.42 10*6/MM3 (ref 3.77–5.28)
SODIUM SERPL-SCNC: 140 MMOL/L (ref 136–145)
TRIGL SERPL-MCNC: 147 MG/DL (ref 0–150)
TSH SERPL DL<=0.005 MIU/L-ACNC: 2.59 UIU/ML (ref 0.27–4.2)
VLDLC SERPL CALC-MCNC: 26 MG/DL (ref 5–40)
WBC # BLD AUTO: 12.25 10*3/MM3 (ref 3.4–10.8)

## 2023-04-04 RX ORDER — SPIRONOLACTONE 25 MG/1
TABLET ORAL
Qty: 30 TABLET | Refills: 0 | Status: SHIPPED | OUTPATIENT
Start: 2023-04-04

## 2023-04-17 LAB
BASOPHILS # BLD AUTO: 0.07 10*3/MM3 (ref 0–0.2)
BASOPHILS NFR BLD AUTO: 0.6 % (ref 0–1.5)
EOSINOPHIL # BLD AUTO: 0.28 10*3/MM3 (ref 0–0.4)
EOSINOPHIL NFR BLD AUTO: 2.6 % (ref 0.3–6.2)
ERYTHROCYTE [DISTWIDTH] IN BLOOD BY AUTOMATED COUNT: 15.1 % (ref 12.3–15.4)
HCT VFR BLD AUTO: 38.9 % (ref 34–46.6)
HGB BLD-MCNC: 12.8 G/DL (ref 12–15.9)
IMM GRANULOCYTES # BLD AUTO: 0.02 10*3/MM3 (ref 0–0.05)
IMM GRANULOCYTES NFR BLD AUTO: 0.2 % (ref 0–0.5)
LYMPHOCYTES # BLD AUTO: 3.61 10*3/MM3 (ref 0.7–3.1)
LYMPHOCYTES NFR BLD AUTO: 33.3 % (ref 19.6–45.3)
MCH RBC QN AUTO: 25.8 PG (ref 26.6–33)
MCHC RBC AUTO-ENTMCNC: 32.9 G/DL (ref 31.5–35.7)
MCV RBC AUTO: 78.4 FL (ref 79–97)
MONOCYTES # BLD AUTO: 0.65 10*3/MM3 (ref 0.1–0.9)
MONOCYTES NFR BLD AUTO: 6 % (ref 5–12)
NEUTROPHILS # BLD AUTO: 6.21 10*3/MM3 (ref 1.7–7)
NEUTROPHILS NFR BLD AUTO: 57.3 % (ref 42.7–76)
NRBC BLD AUTO-RTO: 0 /100 WBC (ref 0–0.2)
PLATELET # BLD AUTO: 235 10*3/MM3 (ref 140–450)
RBC # BLD AUTO: 4.96 10*6/MM3 (ref 3.77–5.28)
WBC # BLD AUTO: 10.84 10*3/MM3 (ref 3.4–10.8)

## 2023-05-30 ENCOUNTER — HOSPITAL ENCOUNTER (OUTPATIENT)
Dept: CARDIOLOGY | Facility: HOSPITAL | Age: 73
Discharge: HOME | End: 2023-05-30
Admitting: INTERNAL MEDICINE

## 2023-05-30 VITALS
BODY MASS INDEX: 39.27 KG/M2 | SYSTOLIC BLOOD PRESSURE: 102 MMHG | HEIGHT: 64 IN | OXYGEN SATURATION: 90 % | WEIGHT: 230 LBS | HEART RATE: 70 BPM | DIASTOLIC BLOOD PRESSURE: 62 MMHG

## 2023-05-30 DIAGNOSIS — I27.20 PULMONARY HYPERTENSION: Primary | ICD-10-CM

## 2023-05-30 DIAGNOSIS — I50.32 CHRONIC HEART FAILURE WITH PRESERVED EJECTION FRACTION: ICD-10-CM

## 2023-05-30 DIAGNOSIS — Q20.8 ABNORMALITY OF RIGHT VENTRICLE OF HEART: ICD-10-CM

## 2023-05-30 PROCEDURE — G0463 HOSPITAL OUTPT CLINIC VISIT: HCPCS

## 2023-05-30 NOTE — PROGRESS NOTES
Heart Failure & Pulmonary Arterial Hypertension Clinic  [facility]  Heron Martínez M.D., Ph.D., [facility]         History of Present Illness  This is a 73 y.o. female with:    1. PAH  A. RHC 2022:  Hemodynamics:  Right Atrium: Mean of 12 mmHg  Right Ventricle: 43/11 mmHg  Pulmonary Artery: 56/21/31 mmHg  Pulmonary Wedge: Mean of 16 mmHg  Cardiac Output/Index: 3.91 L/min 1.84 L/min/m2  PA Sat: 65%  AO Sat: 92%  PVR: 3.9 Wood units    2. RV abnormality    Sally Rivera is a 73 y.o. female who presents for today for PH.  The patient is typically seen by Enrique Kelley MD.  Patient's primary cardiologist is Dr. Christensen.      The patient has seen pulmonary medicine in the past.  Last spirometry showed FEV1/FVC of 89% of predicted.  FVC was 54% of predicted.  DLCO was 53% of predicted.  TLC was 68% of predicted and consistent with restrictive physiology.  Patient also has a history of abnormal CT scans.    In April 2022 she was found to have multiple nodular opacities with the largest of 2.1 cm.  There was bilateral increased septal thickening and some areas of GGOs.  Though, this improved on her most recent CT scan.  She had a CTA recently which showed no evidence of pulmonary embolism.  Pulmonary artery was dilated.  She remained with some areas of GGOs, but had decreased LAD from prior.  No history of VTE/PE.  No VQ scan.  No history of autoimmune or rheumatic diseases.  No family history of cardiomyopathy or pulmonary hypertension.    2D TTE was obtained and showed normal left ventricular function with indeterminate diastolic function.  Right ventricle was interpreted as normal.  It was interpreted as mild to moderate mitral regurgitation and mild to moderate tricuspid regurgitation with a PA systolic pressure of 49 mmHg.  She  was seen by cardiology, Dr. Christensen.  She was sent for the CTA of the chest with contrast, as aforementioned.  She was also sent for a RHC which has been completed.   Primary complaint at that time was worsening exertional dyspnea and exercise intolerance.    RHC was performed and showed RA pressure of 12.  RV was 43/11.  PA pressure was 56/21 with a mean of 31.  Wedge was elevated to 15 mmHg.  PVR was abnormal at 3.9 Wood units. VD reactivity testing was not performed.  Fluid loading was not performed.    The patient returns to clinic today.  She reports that she has been doing well.  She is prescribed LTOT.  She continues to wear this.  She continues to see sleep medicine.  No emergency department or inpatient visits.  She is able to obtain and afford her medications.  Denies adverse effects.  Today, no orthopnea, PND, lower extremity edema, dizziness, lightheadedness, early abdominal satiety, or ascites.          Review of Systems - Review of Systems   Cardiovascular: Positive for dyspnea on exertion. Negative for chest pain, claudication, cyanosis, irregular heartbeat, leg swelling, near-syncope, orthopnea, palpitations, paroxysmal nocturnal dyspnea and syncope.   Respiratory: Positive for cough and shortness of breath. Negative for hemoptysis, sputum production and wheezing.    All other systems reviewed and are negative.       All other systems were reviewed and were negative.    Patient Active Problem List   Diagnosis   • Chronic coronary artery disease   • Gastroesophageal reflux disease   • Hyperlipidemia   • Nonalcoholic fatty liver disease   • Atrial paroxysmal tachycardia   • Type 2 diabetes mellitus with hyperglycemia, without long-term current use of insulin   • History of rectal polypectomy   • Diverticulosis of large intestine without hemorrhage   • High risk medication use   • Obesity, morbid, BMI 40.0-49.9   • History of colon polyps   • Clinical diagnosis of COVID-19   • Pneumonia   • Lung nodule   • Pulmonary hypertension   • Hypoxia   • Abnormality of right ventricle of heart   • Risk factors for obstructive sleep apnea   • Chronic heart failure with  preserved ejection fraction       family history includes Diabetes in her father; Heart disease in her father; No Known Problems in her mother.     reports that she quit smoking about 33 years ago. Her smoking use included cigarettes. She started smoking about 52 years ago. She has a 20.00 pack-year smoking history. She has never used smokeless tobacco. She reports that she does not drink alcohol and does not use drugs.    No Known Allergies      Current Outpatient Medications:   •  aspirin 325 MG tablet, Take 1 tablet by mouth Daily., Disp: , Rfl:   •  atorvastatin (LIPITOR) 40 MG tablet, Take 1 tablet by mouth Daily. 200001, Disp: 90 tablet, Rfl: 3  •  cholecalciferol (VITAMIN D3) 25 MCG (1000 UT) tablet, Take 1 tablet by mouth Daily., Disp: , Rfl:   •  dilTIAZem (CARDIZEM) 120 MG tablet, Take 1 tablet by mouth 3 (Three) Times a Day., Disp: 270 tablet, Rfl: 3  •  Dulaglutide 0.75 MG/0.5ML solution pen-injector, Inject 0.75 mg under the skin into the appropriate area as directed 1 (One) Time Per Week., Disp: 0.5 mL, Rfl: 3  •  furosemide (LASIX) 40 MG tablet, Take 1 tablet by mouth Daily. 200001, Disp: 90 tablet, Rfl: 3  •  glimepiride (Amaryl) 1 MG tablet, Take 1 tablet by mouth Every Morning Before Breakfast., Disp: 90 tablet, Rfl: 1  •  metFORMIN (Glucophage) 500 MG tablet, Take 2 tablets by mouth 2 (Two) Times a Day With Meals., Disp: 180 tablet, Rfl: 1  •  O2 (OXYGEN), Inhale 2 L/min. 24 -7, Disp: , Rfl:   •  omeprazole (priLOSEC) 20 MG capsule, Take 1 capsule by mouth Daily., Disp: 90 capsule, Rfl: 1  •  spironolactone (ALDACTONE) 25 MG tablet, TAKE 1 TABLET BY MOUTH EVERY DAY, Disp: 30 tablet, Rfl: 0  •  metoprolol tartrate (LOPRESSOR) 50 MG tablet, Take 1 tablet by mouth 2 (Two) Times a Day., Disp: 180 tablet, Rfl: 3  •  Moderna COVID-19 Bival Booster 50 MCG/0.5ML suspension, , Disp: , Rfl:       Physical Exam:  I have reviewed the patient's current vital signs as documented in the patient's EMR.   Vitals:     05/30/23 1013   BP: 102/62   Pulse: 70   SpO2: 90%     Body mass index is 39.46 kg/m².       05/30/23  1013   Weight: 104 kg (230 lb)      Physical Exam  Vitals (PP-HIGH) reviewed.   Constitutional:       General: She is not in acute distress.     Appearance: Normal appearance. She is obese. She is not ill-appearing, toxic-appearing or diaphoretic.      Comments: Wearing 2LNC Oxygen   HENT:      Head: Normocephalic and atraumatic.   Neck:      Vascular: No JVD.      Comments: JVP less than 6 cm of water with normal waveforms.  Cardiovascular:      Rate and Rhythm: Normal rate and regular rhythm.      Chest Wall: PMI is not displaced. No thrill.      Heart sounds: Normal heart sounds and S1 normal. Heart sounds not distant. No murmur heard.    No friction rub. No gallop. No S3 or S4 sounds.      Comments: S2 loud  Pulmonary:      Effort: Pulmonary effort is normal. No tachypnea, bradypnea, accessory muscle usage, prolonged expiration, respiratory distress or retractions.      Breath sounds: Normal breath sounds and air entry. No stridor, decreased air movement or transmitted upper airway sounds. No wheezing, rhonchi or rales.   Musculoskeletal:      Right lower leg: No edema.      Left lower leg: No edema.   Skin:     General: Skin is warm and dry.   Neurological:      General: No focal deficit present.      Mental Status: She is alert and oriented to person, place, and time. Mental status is at baseline.   Psychiatric:         Mood and Affect: Mood normal.         Behavior: Behavior normal. Behavior is cooperative.         Thought Content: Thought content normal.         Judgment: Judgment normal.         DATA REVIEWED:       ---------------------------------------------------  TTE/LENNY:  Results for orders placed during the hospital encounter of 10/31/22    Adult Transthoracic Echo Complete W/ Cont if Necessary Per Protocol    Interpretation Summary  •  Left ventricular systolic function is normal. Calculated  left ventricular EF = 57% Normal left ventricular wall thickness noted. The left ventricular cavity is borderline dilated. All left ventricular wall segments contract normally. Left ventricular diastolic function was indeterminate.  •  The left atrial cavity is moderately dilated.  •  The right atrial cavity is mildly dilated.  •  There is thickening of the aortic valve. The aortic valve appears trileaflet. Trace aortic valve regurgitation is present. No aortic valve stenosis is present.  •  Severe mitral annular calcification is present. Mild to moderate mitral valve regurgitation is present. No significant mitral valve stenosis is present.  •  Mild to moderate tricuspid valve regurgitation is present. Estimated right ventricular systolic pressure from tricuspid regurgitation is moderately elevated (45-55 mmHg). Calculated right ventricular systolic pressure from tricuspid regurgitation is 49 mmHg.        My interpretation of the TTE is below.     LVEF 57%.  Right ventricle is dilated with normal RVEF.  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  Abnormal right ventricle.  Pulmonary hypertension.          ----------------------------------------------------    PFTs:        I interpreted the PFTs.  Restrictive physiology  --------------------------------------------------------------------------------------------------      Laboratory evaluations:    Lab Results   Component Value Date    GLUCOSE 97 04/03/2023    BUN 14 04/03/2023    CREATININE 0.95 04/03/2023    EGFRIFNONA 58 (L) 11/09/2021    EGFRIFAFRI 67 11/09/2021    BCR 14.7 04/03/2023    K 4.0 04/03/2023    CO2 25.9 04/03/2023    CALCIUM 9.8 04/03/2023    PROTENTOTREF 8.3 04/03/2023    ALBUMIN 4.9 04/03/2023    LABIL2 1.4 04/03/2023    AST 38 (H) 04/03/2023    ALT  33 04/03/2023     Lab Results   Component Value Date    WBC 10.84 (H) 04/17/2023    HGB 12.8 04/17/2023    HCT 38.9 04/17/2023    MCV 78.4 (L) 04/17/2023     04/17/2023     Lab Results   Component Value Date    CHOL 137 11/23/2022    CHLPL 156 04/03/2023    TRIG 147 04/03/2023    HDL 40 04/03/2023    LDL 90 04/03/2023     Lab Results   Component Value Date    TSH 2.590 04/03/2023     No results found for: CKTOTAL, CKMB, CKMBINDEX, TROPONINI, TROPONINT  Lab Results   Component Value Date    HGBA1C 6.70 (H) 04/03/2023     No results found for: DDIMER  Lab Results   Component Value Date    ALT 33 04/03/2023     Lab Results   Component Value Date    HGBA1C 6.70 (H) 04/03/2023    HGBA1C 7.70 (H) 11/23/2022    HGBA1C 7.7 (H) 04/04/2022     Lab Results   Component Value Date    MICROALBUR <3.0 04/03/2023    CREATININE 0.95 04/03/2023     Lab Results   Component Value Date    TIBC 583 06/02/2020    FERRITIN 68 08/03/2020     No results found for: INR, PROTIME    PAH RISK ASSESSMENT:            1. Pulmonary hypertension (HCC). PAH/PVH. WHO Group 2; FC: III.  RV status: Abnormal:.  PFTs: Restrictive with decreased DLCO. . MPAP: 31. Maryanne: 2.9. Exercise pulse oximetry showed a minimum SpO2 = 86% on room air.  The Aguilar Index was updated today. Most recent score: 6.     VQ scan showed no evidence of CTEPH.  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 She does have HFpEF.  With that said, her PSG had no significant sleep apnea.  CT scan did have some GGOs, which have improved.  She may very well have a group 1 component.  However, she currently has marginal hemodynamics.  I do not think she will tolerate addition of any PAH-specific medications at this time.  For now, I favor treatment of her heart failure.  We will repeat a limited 2D echocardiogram this fall to assess her right ventricular status.  Certainly, if she has had any interval negative remodeling could consider PDE 5 inhibitors.      -Continue treatment of HFpEF, as  below  - No current indication for PAH-specific medications; additionally, she has marginal HDs at this point.     I have asked the patient that if they were to move to be certain they see someone with expertise in PAH. These providers can be located at [email or web address].        2. HFpEF.  NYHA stage C; functional class III.  Today, euvolemic and perfused.  Clinical trajectory was reviewed today and is stable.    - Continue current antihypertensive regiment  - Aldactone 25 mg.  Quarterly assessment of GFR.  Most recent labs were reviewed from last month and are acceptable.  -Vaginal candidiasis in the past, so SGLT2 inhibitors are not prescribed    Lifestyle Advice:   - call office if I gain more than 2 pounds in one day or 5 pounds in one week  - keep legs up while sitting  - use salt in moderation  - watch for swelling in feet, ankles and legs every day  - weigh myself daily  -call for concerning s/sx         3. Abnormality of right ventricle of heart.  NYHA stage C; FC-III.      RV is dilated. RVEF is abnormal.  RV CI is Abnormal:. RV status: RV ADAPTED-REMODELED with an ESVi of <; Maryanne is 2.9.  TAPSE/PASP ratio is markedly abnormal and consistent with RV-PA uncoupling.     Patient's abnormal right ventricle is from pulmonary hypertension.    -Continue Aldactone, as above  - We will plan on a limited 2D TTE in October to reevaluate           ORDERS PLACED TODAY:  No orders of the defined types were placed in this encounter.         MEDS ORDERED TODAY:    No orders of the defined types were placed in this encounter.         Return in about 3 months (around 8/30/2023).          This document has been electronically signed by Heron Martínez MD PhD on May 30, 2023 10:21 EDT        Heron Martníez M.D., Ph.D., [facility]  System Medical Director for HF and PAH

## 2023-05-30 NOTE — LETTER
May 30, 2023       No Recipients    Patient: Sally Rivera   YOB: 1950   Date of Visit: 5/30/2023       Dear Dr. Vlilarreal Recipients:    Thank you for referring Sally Rivera to me for evaluation. Below are the relevant portions of my assessment and plan of care.    If you have questions, please do not hesitate to call me. I look forward to following Sally along with you.         Sincerely,        Heron Martínez MD PhD        CC:   No Recipients    Heron Martínez MD PhD  05/30/23 1024  Addendum  Heart Failure & Pulmonary Arterial Hypertension Clinic  [facility]  Heron Martínez M.D., Ph.D., [facility]         History of Present Illness  This is a 73 y.o. female with:    1. PAH  A. RHC 2022:  Hemodynamics:  Right Atrium: Mean of 12 mmHg  Right Ventricle: 43/11 mmHg  Pulmonary Artery: 56/21/31 mmHg  Pulmonary Wedge: Mean of 16 mmHg  Cardiac Output/Index: 3.91 L/min 1.84 L/min/m2  PA Sat: 65%  AO Sat: 92%  PVR: 3.9 Wood units    2. RV abnormality    Sally Rivera is a 73 y.o. female who presents for today for PH.  The patient is typically seen by Enrique Kelley MD.  Patient's primary cardiologist is Dr. Christensen.      The patient has seen pulmonary medicine in the past.  Last spirometry showed FEV1/FVC of 89% of predicted.  FVC was 54% of predicted.  DLCO was 53% of predicted.  TLC was 68% of predicted and consistent with restrictive physiology.  Patient also has a history of abnormal CT scans.    In April 2022 she was found to have multiple nodular opacities with the largest of 2.1 cm.  There was bilateral increased septal thickening and some areas of GGOs.  Though, this improved on her most recent CT scan.  She had a CTA recently which showed no evidence of pulmonary embolism.  Pulmonary artery was dilated.  She remained with some areas of GGOs, but had decreased LAD from prior.  No history of VTE/PE.  No VQ scan.  No history of autoimmune or rheumatic  diseases.  No family history of cardiomyopathy or pulmonary hypertension.    2D TTE was obtained and showed normal left ventricular function with indeterminate diastolic function.  Right ventricle was interpreted as normal.  It was interpreted as mild to moderate mitral regurgitation and mild to moderate tricuspid regurgitation with a PA systolic pressure of 49 mmHg.  She  was seen by cardiology, Dr. Christensen.  She was sent for the CTA of the chest with contrast, as aforementioned.  She was also sent for a RHC which has been completed.  Primary complaint at that time was worsening exertional dyspnea and exercise intolerance.    RHC was performed and showed RA pressure of 12.  RV was 43/11.  PA pressure was 56/21 with a mean of 31.  Wedge was elevated to 15 mmHg.  PVR was abnormal at 3.9 Wood units. VD reactivity testing was not performed.  Fluid loading was not performed.    The patient returns to clinic today.  She reports that she has been doing well.  She is prescribed LTOT.  She continues to wear this.  She continues to see sleep medicine.  No emergency department or inpatient visits.  She is able to obtain and afford her medications.  Denies adverse effects.  Today, no orthopnea, PND, lower extremity edema, dizziness, lightheadedness, early abdominal satiety, or ascites.          Review of Systems - Review of Systems   Cardiovascular: Positive for dyspnea on exertion. Negative for chest pain, claudication, cyanosis, irregular heartbeat, leg swelling, near-syncope, orthopnea, palpitations, paroxysmal nocturnal dyspnea and syncope.   Respiratory: Positive for cough and shortness of breath. Negative for hemoptysis, sputum production and wheezing.    All other systems reviewed and are negative.       All other systems were reviewed and were negative.    Patient Active Problem List   Diagnosis   • Chronic coronary artery disease   • Gastroesophageal reflux disease   • Hyperlipidemia   • Nonalcoholic fatty liver disease    • Atrial paroxysmal tachycardia   • Type 2 diabetes mellitus with hyperglycemia, without long-term current use of insulin   • History of rectal polypectomy   • Diverticulosis of large intestine without hemorrhage   • High risk medication use   • Obesity, morbid, BMI 40.0-49.9   • History of colon polyps   • Clinical diagnosis of COVID-19   • Pneumonia   • Lung nodule   • Pulmonary hypertension   • Hypoxia   • Abnormality of right ventricle of heart   • Risk factors for obstructive sleep apnea   • Chronic heart failure with preserved ejection fraction       family history includes Diabetes in her father; Heart disease in her father; No Known Problems in her mother.     reports that she quit smoking about 33 years ago. Her smoking use included cigarettes. She started smoking about 52 years ago. She has a 20.00 pack-year smoking history. She has never used smokeless tobacco. She reports that she does not drink alcohol and does not use drugs.    No Known Allergies      Current Outpatient Medications:   •  aspirin 325 MG tablet, Take 1 tablet by mouth Daily., Disp: , Rfl:   •  atorvastatin (LIPITOR) 40 MG tablet, Take 1 tablet by mouth Daily. 200001, Disp: 90 tablet, Rfl: 3  •  cholecalciferol (VITAMIN D3) 25 MCG (1000 UT) tablet, Take 1 tablet by mouth Daily., Disp: , Rfl:   •  dilTIAZem (CARDIZEM) 120 MG tablet, Take 1 tablet by mouth 3 (Three) Times a Day., Disp: 270 tablet, Rfl: 3  •  Dulaglutide 0.75 MG/0.5ML solution pen-injector, Inject 0.75 mg under the skin into the appropriate area as directed 1 (One) Time Per Week., Disp: 0.5 mL, Rfl: 3  •  furosemide (LASIX) 40 MG tablet, Take 1 tablet by mouth Daily. 200001, Disp: 90 tablet, Rfl: 3  •  glimepiride (Amaryl) 1 MG tablet, Take 1 tablet by mouth Every Morning Before Breakfast., Disp: 90 tablet, Rfl: 1  •  metFORMIN (Glucophage) 500 MG tablet, Take 2 tablets by mouth 2 (Two) Times a Day With Meals., Disp: 180 tablet, Rfl: 1  •  O2 (OXYGEN), Inhale 2 L/min. 24  -7, Disp: , Rfl:   •  omeprazole (priLOSEC) 20 MG capsule, Take 1 capsule by mouth Daily., Disp: 90 capsule, Rfl: 1  •  spironolactone (ALDACTONE) 25 MG tablet, TAKE 1 TABLET BY MOUTH EVERY DAY, Disp: 30 tablet, Rfl: 0  •  metoprolol tartrate (LOPRESSOR) 50 MG tablet, Take 1 tablet by mouth 2 (Two) Times a Day., Disp: 180 tablet, Rfl: 3  •  Moderna COVID-19 Bival Booster 50 MCG/0.5ML suspension, , Disp: , Rfl:       Physical Exam:  I have reviewed the patient's current vital signs as documented in the patient's EMR.   Vitals:    05/30/23 1013   BP: 102/62   Pulse: 70   SpO2: 90%     Body mass index is 39.46 kg/m².       05/30/23  1013   Weight: 104 kg (230 lb)      Physical Exam  Vitals (PP-HIGH) reviewed.   Constitutional:       General: She is not in acute distress.     Appearance: Normal appearance. She is obese. She is not ill-appearing, toxic-appearing or diaphoretic.      Comments: Wearing 2LNC Oxygen   HENT:      Head: Normocephalic and atraumatic.   Neck:      Vascular: No JVD.      Comments: JVP less than 6 cm of water with normal waveforms.  Cardiovascular:      Rate and Rhythm: Normal rate and regular rhythm.      Chest Wall: PMI is not displaced. No thrill.      Heart sounds: Normal heart sounds and S1 normal. Heart sounds not distant. No murmur heard.    No friction rub. No gallop. No S3 or S4 sounds.      Comments: S2 loud  Pulmonary:      Effort: Pulmonary effort is normal. No tachypnea, bradypnea, accessory muscle usage, prolonged expiration, respiratory distress or retractions.      Breath sounds: Normal breath sounds and air entry. No stridor, decreased air movement or transmitted upper airway sounds. No wheezing, rhonchi or rales.   Musculoskeletal:      Right lower leg: No edema.      Left lower leg: No edema.   Skin:     General: Skin is warm and dry.   Neurological:      General: No focal deficit present.      Mental Status: She is alert and oriented to person, place, and time. Mental status is  at baseline.   Psychiatric:         Mood and Affect: Mood normal.         Behavior: Behavior normal. Behavior is cooperative.         Thought Content: Thought content normal.         Judgment: Judgment normal.         DATA REVIEWED:       ---------------------------------------------------  TTE/LENNY:  Results for orders placed during the hospital encounter of 10/31/22    Adult Transthoracic Echo Complete W/ Cont if Necessary Per Protocol    Interpretation Summary  •  Left ventricular systolic function is normal. Calculated left ventricular EF = 57% Normal left ventricular wall thickness noted. The left ventricular cavity is borderline dilated. All left ventricular wall segments contract normally. Left ventricular diastolic function was indeterminate.  •  The left atrial cavity is moderately dilated.  •  The right atrial cavity is mildly dilated.  •  There is thickening of the aortic valve. The aortic valve appears trileaflet. Trace aortic valve regurgitation is present. No aortic valve stenosis is present.  •  Severe mitral annular calcification is present. Mild to moderate mitral valve regurgitation is present. No significant mitral valve stenosis is present.  •  Mild to moderate tricuspid valve regurgitation is present. Estimated right ventricular systolic pressure from tricuspid regurgitation is moderately elevated (45-55 mmHg). Calculated right ventricular systolic pressure from tricuspid regurgitation is 49 mmHg.        My interpretation of the TTE is below.     LVEF 57%.  Right ventricle is dilated with normal RVEF.  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  Abnormal right ventricle.  Pulmonary hypertension.          ----------------------------------------------------    PFTs:        I interpreted the PFTs.   Restrictive physiology  --------------------------------------------------------------------------------------------------      Laboratory evaluations:    Lab Results   Component Value Date    GLUCOSE 97 04/03/2023    BUN 14 04/03/2023    CREATININE 0.95 04/03/2023    EGFRIFNONA 58 (L) 11/09/2021    EGFRIFAFRI 67 11/09/2021    BCR 14.7 04/03/2023    K 4.0 04/03/2023    CO2 25.9 04/03/2023    CALCIUM 9.8 04/03/2023    PROTENTOTREF 8.3 04/03/2023    ALBUMIN 4.9 04/03/2023    LABIL2 1.4 04/03/2023    AST 38 (H) 04/03/2023    ALT 33 04/03/2023     Lab Results   Component Value Date    WBC 10.84 (H) 04/17/2023    HGB 12.8 04/17/2023    HCT 38.9 04/17/2023    MCV 78.4 (L) 04/17/2023     04/17/2023     Lab Results   Component Value Date    CHOL 137 11/23/2022    CHLPL 156 04/03/2023    TRIG 147 04/03/2023    HDL 40 04/03/2023    LDL 90 04/03/2023     Lab Results   Component Value Date    TSH 2.590 04/03/2023     No results found for: CKTOTAL, CKMB, CKMBINDEX, TROPONINI, TROPONINT  Lab Results   Component Value Date    HGBA1C 6.70 (H) 04/03/2023     No results found for: DDIMER  Lab Results   Component Value Date    ALT 33 04/03/2023     Lab Results   Component Value Date    HGBA1C 6.70 (H) 04/03/2023    HGBA1C 7.70 (H) 11/23/2022    HGBA1C 7.7 (H) 04/04/2022     Lab Results   Component Value Date    MICROALBUR <3.0 04/03/2023    CREATININE 0.95 04/03/2023     Lab Results   Component Value Date    TIBC 583 06/02/2020    FERRITIN 68 08/03/2020     No results found for: INR, PROTIME    PAH RISK ASSESSMENT:            1. Pulmonary hypertension (HCC). PAH/PVH. WHO Group 2; FC: III.  RV status: Abnormal:.  PFTs: Restrictive with decreased DLCO. . MPAP: 31. Maryanne: 2.9. Exercise pulse oximetry showed a minimum SpO2 = 86% on room air.  The Aguilar Index was updated today. Most recent score: 6.     VQ scan showed no evidence of CTEPH.  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 She does have HFpEF.  With that said, her PSG had no  significant sleep apnea.  CT scan did have some GGOs, which have improved.  She may very well have a group 1 component.  However, she currently has marginal hemodynamics.  I do not think she will tolerate addition of any PAH-specific medications at this time.  For now, I favor treatment of her heart failure.  We will repeat a limited 2D echocardiogram this fall to assess her right ventricular status.  Certainly, if she has had any interval negative remodeling could consider PDE 5 inhibitors.      -Continue treatment of HFpEF, as below  - No current indication for PAH-specific medications; additionally, she has marginal HDs at this point.     I have asked the patient that if they were to move to be certain they see someone with expertise in PAH. These providers can be located at [email or web address].        2. HFpEF.  NYHA stage C; functional class III.  Today, euvolemic and perfused.  Clinical trajectory was reviewed today and is stable.    - Continue current antihypertensive regiment  - Aldactone 25 mg.  Quarterly assessment of GFR.  Most recent labs were reviewed from last month and are acceptable.  -Vaginal candidiasis in the past, so SGLT2 inhibitors are not prescribed    Lifestyle Advice:   - call office if I gain more than 2 pounds in one day or 5 pounds in one week  - keep legs up while sitting  - use salt in moderation  - watch for swelling in feet, ankles and legs every day  - weigh myself daily  -call for concerning s/sx         3. Abnormality of right ventricle of heart.  NYHA stage C; FC-III.      RV is dilated. RVEF is abnormal.  RV CI is Abnormal:. RV status: RV ADAPTED-REMODELED with an ESVi of <; Maryanne is 2.9.  TAPSE/PASP ratio is markedly abnormal and consistent with RV-PA uncoupling.     Patient's abnormal right ventricle is from pulmonary hypertension.    -Continue Aldactone, as above  - We will plan on a limited 2D TTE in October to reevaluate           ORDERS PLACED TODAY:  No orders of  the defined types were placed in this encounter.         MEDS ORDERED TODAY:    No orders of the defined types were placed in this encounter.         Return in about 3 months (around 8/30/2023).          This document has been electronically signed by Heron Martínez MD PhD on May 30, 2023 10:21 EDT        Heron Martínez M.D., Ph.D., [facility]  System Medical Director for HF and PAH

## 2023-05-30 NOTE — ADDENDUM NOTE
Encounter addended by: Sharona Thomas MA on: 5/30/2023 1:20 PM   Actions taken: Charge Capture section accepted

## 2023-05-30 NOTE — PROGRESS NOTES
"[facility] Failure Clinic      Patient Name: Sally Rivera  :1950  Age: 72 y.o.  Sex: female  Referring Provider: Cory Christensen MD   Primary Cardiologist: Cory Christensen  Encounter Provider: Regina Norris, PharmD, BCACP      Chief Complaint:   Chief Complaint   Patient presents with   • Congestive Heart Failure       HPI:  72 YOF with PMH significant for HTN, HLD, T2DM, GERD, recent diagnosis of pulmonary interstitial disease, and PH. She comes in today for her 3-month f/u. She feels well overall and symptoms are stable. She does not report any dizziness/lightheadedness. She notices some lower extremity edema later in the day if she does not elevate her legs. She had recent lab work which looked good.      Current Regimen:  Heart Failure  · Furosemide 40 mg daily  · Lopressor 50 mg BID  · Spironolactone 25 mg daily      Other CV Meds  · Atorvastatin 40 mg daily  · Aspirin 325 mg daily  · Diltiazem 120 mg TID  · KCl 10 mEq daily      Medication Use:  Adherence: good  Past hx of medication use/intolerance: Jardiance (yeast infection)  Affordability: [employer] PPO Part D and Traditional Medicare      Diet:  Reviewed limiting sodium to <2000 mg/day      Social History:  Tobacco use: Former (quit in )  EtOH use: None  Illicit drug use: None  Exercise: depends on the day  household activities      Immunization Status:  Pneumococcal: PCV13 in 2018, PPSV23 in 2008  Influenza: Patient states already received for   COVID-19: Pfizer primary series (3/25/21 and 4/15/21); monovalent booster on 1/15/2022  Bivalent booster: 2022      OBJECTIVE:    /62 (BP Location: Left arm, Patient Position: Sitting)   Pulse 70   Ht 162.6 cm (64.02\")   Wt 104 kg (230 lb)   SpO2 90%   BMI 39.46 kg/m²       Lab Review:  Renal Function: Estimated Creatinine Clearance: 63.8 mL/min (by C-G formula based on SCr of 0.95 mg/dL).    No results found for: PROBNP    Results for orders placed during the " hospital encounter of 10/31/22    Adult Transthoracic Echo Complete W/ Cont if Necessary Per Protocol    Interpretation Summary  •  Left ventricular systolic function is normal. Calculated left ventricular EF = 57% Normal left ventricular wall thickness noted. The left ventricular cavity is borderline dilated. All left ventricular wall segments contract normally. Left ventricular diastolic function was indeterminate.  •  The left atrial cavity is moderately dilated.  •  The right atrial cavity is mildly dilated.  •  There is thickening of the aortic valve. The aortic valve appears trileaflet. Trace aortic valve regurgitation is present. No aortic valve stenosis is present.  •  Severe mitral annular calcification is present. Mild to moderate mitral valve regurgitation is present. No significant mitral valve stenosis is present.  •  Mild to moderate tricuspid valve regurgitation is present. Estimated right ventricular systolic pressure from tricuspid regurgitation is moderately elevated (45-55 mmHg). Calculated right ventricular systolic pressure from tricuspid regurgitation is 49 mmHg.      ASSESSMENT/PLAN OF CARE:    1. Pulmonary Hypertension/HFpEF (EF 57%)  · Patient has remained stable since her last visit. She is tolerating medications well  · Continue spironolactone 25 mg daily  · Continue furosemide 40 mg daily  · Continue Lopressor 50 mg twice daily  · Patient did not tolerate Jardiance due to yeast infection resolved by Monistat  · Reviewed limiting sodium to <2000 mg/day. She does monitor this at home      Thank you for allowing me to participate in the care of your patient,       Regina Norris, PharmD, BCACP  [facility] Failure [facility]  Phone: [phone]

## 2023-06-06 RX ORDER — SPIRONOLACTONE 25 MG/1
TABLET ORAL
Qty: 30 TABLET | Refills: 0 | Status: SHIPPED | OUTPATIENT
Start: 2023-06-06

## 2023-06-06 NOTE — TELEPHONE ENCOUNTER
Last Office Visit: 05/30/2023  Labs: 04/03/2023  Next Follow-up appointment: 08/30/2023      Reviewed by:    Tom Plaza DNP, MSN, RN  RN Clinical Coordinator  [facility] Heart Failure [facility]

## 2023-06-07 ENCOUNTER — TELEPHONE (OUTPATIENT)
Dept: FAMILY MEDICINE CLINIC | Facility: CLINIC | Age: 73
End: 2023-06-07
Payer: MEDICARE

## 2023-06-07 DIAGNOSIS — K21.9 GASTROESOPHAGEAL REFLUX DISEASE WITHOUT ESOPHAGITIS: ICD-10-CM

## 2023-06-08 RX ORDER — OMEPRAZOLE 20 MG/1
20 CAPSULE, DELAYED RELEASE ORAL DAILY
Qty: 90 CAPSULE | Refills: 1 | Status: SHIPPED | OUTPATIENT
Start: 2023-06-08

## 2023-07-25 DIAGNOSIS — E11.65 TYPE 2 DIABETES MELLITUS WITH HYPERGLYCEMIA, WITHOUT LONG-TERM CURRENT USE OF INSULIN: Primary | ICD-10-CM

## 2023-08-07 ENCOUNTER — HOSPITAL ENCOUNTER (OUTPATIENT)
Dept: CT IMAGING | Facility: HOSPITAL | Age: 73
Discharge: HOME | End: 2023-08-07
Admitting: INTERNAL MEDICINE
Payer: MEDICARE

## 2023-08-07 DIAGNOSIS — J98.4 RESTRICTIVE LUNG DISEASE: ICD-10-CM

## 2023-08-07 PROCEDURE — 71250 CT THORAX DX C-: CPT

## 2023-08-11 RX ORDER — GLIMEPIRIDE 1 MG/1
1 TABLET ORAL
Qty: 90 TABLET | Refills: 1 | Status: SHIPPED | OUTPATIENT
Start: 2023-08-11

## 2023-08-30 ENCOUNTER — HOSPITAL ENCOUNTER (OUTPATIENT)
Dept: CARDIOLOGY | Facility: HOSPITAL | Age: 73
Discharge: HOME | End: 2023-08-30
Admitting: INTERNAL MEDICINE
Payer: MEDICARE

## 2023-08-30 VITALS
DIASTOLIC BLOOD PRESSURE: 65 MMHG | OXYGEN SATURATION: 92 % | HEART RATE: 80 BPM | SYSTOLIC BLOOD PRESSURE: 100 MMHG | BODY MASS INDEX: 38.96 KG/M2 | HEIGHT: 64 IN | WEIGHT: 228.2 LBS

## 2023-08-30 DIAGNOSIS — I27.20 PULMONARY HYPERTENSION: Primary | ICD-10-CM

## 2023-08-30 DIAGNOSIS — I50.32 CHRONIC HEART FAILURE WITH PRESERVED EJECTION FRACTION: ICD-10-CM

## 2023-08-30 DIAGNOSIS — R09.02 HYPOXIA: ICD-10-CM

## 2023-08-30 DIAGNOSIS — Q20.8 ABNORMALITY OF RIGHT VENTRICLE OF HEART: ICD-10-CM

## 2023-08-30 LAB
ANION GAP SERPL CALCULATED.3IONS-SCNC: 14.9 MMOL/L (ref 5–15)
BUN SERPL-MCNC: 16 MG/DL (ref 8–23)
BUN/CREAT SERPL: 16.3 (ref 7–25)
CALCIUM SPEC-SCNC: 9.2 MG/DL (ref 8.6–10.5)
CHLORIDE SERPL-SCNC: 101 MMOL/L (ref 98–107)
CO2 SERPL-SCNC: 22.1 MMOL/L (ref 22–29)
CREAT SERPL-MCNC: 0.98 MG/DL (ref 0.57–1)
EGFRCR SERPLBLD CKD-EPI 2021: 61.5 ML/MIN/1.73
GLUCOSE SERPL-MCNC: 134 MG/DL (ref 65–99)
NT-PROBNP SERPL-MCNC: 499 PG/ML (ref 0–900)
POTASSIUM SERPL-SCNC: 3.8 MMOL/L (ref 3.5–5.2)
SODIUM SERPL-SCNC: 138 MMOL/L (ref 136–145)

## 2023-08-30 PROCEDURE — 83880 ASSAY OF NATRIURETIC PEPTIDE: CPT | Performed by: INTERNAL MEDICINE

## 2023-08-30 PROCEDURE — G0463 HOSPITAL OUTPT CLINIC VISIT: HCPCS

## 2023-08-30 PROCEDURE — 80048 BASIC METABOLIC PNL TOTAL CA: CPT | Performed by: INTERNAL MEDICINE

## 2023-08-30 RX ORDER — SPIRONOLACTONE 25 MG/1
TABLET ORAL
Qty: 30 TABLET | Refills: 1 | Status: SHIPPED | OUTPATIENT
Start: 2023-08-30

## 2023-08-30 NOTE — TELEPHONE ENCOUNTER
Left message to return our call.       Tom Plaza DNP, MSN, RN  RN Clinical Coordinator  [facility] Failure [facility]

## 2023-08-30 NOTE — PROGRESS NOTES
Heart Failure & Pulmonary Arterial Hypertension Clinic  [facility]  Heron Martínez M.D., Ph.D., [facility]         History of Present Illness  This is a 73 y.o. female with:    1. PAH  A. RHC 2022:  Hemodynamics:  Right Atrium: Mean of 12 mmHg  Right Ventricle: 43/11 mmHg  Pulmonary Artery: 56/21/31 mmHg  Pulmonary Wedge: Mean of 16 mmHg  Cardiac Output/Index: 3.91 L/min 1.84 L/min/m2  PA Sat: 65%  AO Sat: 92%  PVR: 3.9 Wood units    2. RV abnormality    Sally Rivera is a 73 y.o. female who presents for today for PH.  The patient is typically seen by Enrique Kelley MD.  Patient's primary cardiologist is Dr. Christensne.      The patient has seen pulmonary medicine in the past.  Last spirometry showed FEV1/FVC of 89% of predicted.  FVC was 54% of predicted.  DLCO was 53% of predicted.  TLC was 68% of predicted and consistent with restrictive physiology.  Patient also has a history of abnormal CT scans.    In April 2022 she was found to have multiple nodular opacities with the largest of 2.1 cm.  There was bilateral increased septal thickening and some areas of GGOs.  Though, this improved on her most recent CT scan.  She had a CTA recently which showed no evidence of pulmonary embolism.  Pulmonary artery was dilated.  She remained with some areas of GGOs, but had decreased LAD from prior.  No history of VTE/PE.  No VQ scan.  No history of autoimmune or rheumatic diseases.  No family history of cardiomyopathy or pulmonary hypertension.    2D TTE was obtained and showed normal left ventricular function with indeterminate diastolic function.  Right ventricle was interpreted as normal.  It was interpreted as mild to moderate mitral regurgitation and mild to moderate tricuspid regurgitation with a PA systolic pressure of 49 mmHg.  She  was seen by cardiology, Dr. Christensen.  She was sent for the CTA of the chest with contrast, as aforementioned.  She was also sent for a RHC which has been completed.   Primary complaint at that time was worsening exertional dyspnea and exercise intolerance.    RHC was performed and showed RA pressure of 12.  RV was 43/11.  PA pressure was 56/21 with a mean of 31.  Wedge was elevated to 15 mmHg.  PVR was abnormal at 3.9 Wood units. VD reactivity testing was not performed.  Fluid loading was not performed.    The patient return to clinic today.  She remains wearing LTOT.  She continues to see sleep medicine.  She reports she is doing well.  Stable exercise intolerance.  No emergency department visits or inpatient admissions.  Today, denies orthopnea, PND, lower extremity edema, dizziness, lightheadedness, early abdominal satiety, or ascites.            Review of Systems - Review of Systems   Cardiovascular:  Positive for dyspnea on exertion. Negative for chest pain, claudication, cyanosis, irregular heartbeat, leg swelling, near-syncope, orthopnea, palpitations, paroxysmal nocturnal dyspnea and syncope.   Respiratory:  Positive for cough and shortness of breath. Negative for hemoptysis, sputum production and wheezing.    All other systems reviewed and are negative.     All other systems were reviewed and were negative.    Patient Active Problem List   Diagnosis    Chronic coronary artery disease    Gastroesophageal reflux disease    Hyperlipidemia    Nonalcoholic fatty liver disease    Atrial paroxysmal tachycardia    Type 2 diabetes mellitus with hyperglycemia, without long-term current use of insulin    History of rectal polypectomy    Diverticulosis of large intestine without hemorrhage    High risk medication use    Obesity, morbid, BMI 40.0-49.9    History of colon polyps    Clinical diagnosis of COVID-19    Pneumonia    Lung nodule    Pulmonary hypertension    Hypoxia    Abnormality of right ventricle of heart    Risk factors for obstructive sleep apnea    Chronic heart failure with preserved ejection fraction       family history includes Diabetes in her father; Heart disease  in her father; No Known Problems in her mother.     reports that she quit smoking about 33 years ago. Her smoking use included cigarettes. She started smoking about 52 years ago. She has a 20.00 pack-year smoking history. She has never used smokeless tobacco. She reports that she does not drink alcohol and does not use drugs.    No Known Allergies      Current Outpatient Medications:     aspirin 325 MG tablet, Take 1 tablet by mouth Daily., Disp: , Rfl:     atorvastatin (LIPITOR) 40 MG tablet, Take 1 tablet by mouth Daily. 200001, Disp: 90 tablet, Rfl: 3    cholecalciferol (VITAMIN D3) 25 MCG (1000 UT) tablet, Take 1 tablet by mouth Daily., Disp: , Rfl:     dilTIAZem (CARDIZEM) 120 MG tablet, Take 1 tablet by mouth 3 (Three) Times a Day., Disp: 270 tablet, Rfl: 3    Dulaglutide 0.75 MG/0.5ML solution pen-injector, Inject 0.75 mg under the skin into the appropriate area as directed 1 (One) Time Per Week., Disp: 2 mL, Rfl: 3    furosemide (LASIX) 40 MG tablet, Take 1 tablet by mouth Daily. 200001, Disp: 90 tablet, Rfl: 3    glimepiride (Amaryl) 1 MG tablet, Take 1 tablet by mouth Every Morning Before Breakfast., Disp: 90 tablet, Rfl: 1    metFORMIN (GLUCOPHAGE) 500 MG tablet, TAKE 2 TABLETS BY MOUTH 2 TIMES A DAY WITH MEALS., Disp: 180 tablet, Rfl: 0    metoprolol tartrate (LOPRESSOR) 50 MG tablet, Take 1 tablet by mouth 2 (Two) Times a Day., Disp: 180 tablet, Rfl: 3    O2 (OXYGEN), Inhale 2 L/min. 24 -7, Disp: , Rfl:     omeprazole (priLOSEC) 20 MG capsule, Take 1 capsule by mouth Daily., Disp: 90 capsule, Rfl: 1    spironolactone (ALDACTONE) 25 MG tablet, TAKE 1 TABLET BY MOUTH EVERY DAY, Disp: 30 tablet, Rfl: 0      Physical Exam:  I have reviewed the patient's current vital signs as documented in the patient's EMR.   Vitals:    08/30/23 0951   BP: 100/65   Pulse: 80   SpO2: 92%     Body mass index is 39.15 kg/mý.       08/30/23 0951   Weight: 104 kg (228 lb 3.2 oz)      Physical Exam  Vitals (PP-HIGH) reviewed.    Constitutional:       General: She is not in acute distress.     Appearance: Normal appearance. She is obese. She is not ill-appearing, toxic-appearing or diaphoretic.      Comments: Wearing 2LNC Oxygen   HENT:      Head: Normocephalic and atraumatic.   Neck:      Vascular: No JVD.      Comments: JVP less than 6 cm of water with normal waveforms.  Cardiovascular:      Rate and Rhythm: Normal rate and regular rhythm.      Chest Wall: PMI is not displaced. No thrill.      Heart sounds: Normal heart sounds and S1 normal. Heart sounds not distant. No murmur heard.    No friction rub. No gallop. No S3 or S4 sounds.      Comments: S2 loud  Pulmonary:      Effort: Pulmonary effort is normal. No tachypnea, bradypnea, accessory muscle usage, prolonged expiration, respiratory distress or retractions.      Breath sounds: Normal air entry. No stridor, decreased air movement or transmitted upper airway sounds. No wheezing, rhonchi or rales.   Musculoskeletal:      Right lower leg: No edema.      Left lower leg: No edema.   Skin:     General: Skin is warm and dry.   Neurological:      General: No focal deficit present.      Mental Status: She is alert and oriented to person, place, and time. Mental status is at baseline.   Psychiatric:         Mood and Affect: Mood normal.         Behavior: Behavior normal. Behavior is cooperative.         Thought Content: Thought content normal.         Judgment: Judgment normal.       DATA REVIEWED:       ---------------------------------------------------  TTE/LENNY:  Results for orders placed during the hospital encounter of 10/31/22    Adult Transthoracic Echo Complete W/ Cont if Necessary Per Protocol    Interpretation Summary    Left ventricular systolic function is normal. Calculated left ventricular EF = 57% Normal left ventricular wall thickness noted. The left ventricular cavity is borderline dilated. All left ventricular wall segments contract normally. Left ventricular diastolic  function was indeterminate.    The left atrial cavity is moderately dilated.    The right atrial cavity is mildly dilated.    There is thickening of the aortic valve. The aortic valve appears trileaflet. Trace aortic valve regurgitation is present. No aortic valve stenosis is present.    Severe mitral annular calcification is present. Mild to moderate mitral valve regurgitation is present. No significant mitral valve stenosis is present.    Mild to moderate tricuspid valve regurgitation is present. Estimated right ventricular systolic pressure from tricuspid regurgitation is moderately elevated (45-55 mmHg). Calculated right ventricular systolic pressure from tricuspid regurgitation is 49 mmHg.        My interpretation of the TTE is below.     LVEF is 57%.  Right ventricle is dilated with normal RVEF.  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            -----------------------------------------------------  RHC:                          Interpretation today: Right atrial pressure is elevated with normal waveforms and normal inspiratory decrease.  Abnormal right ventricle.  Pulmonary hypertension.          ----------------------------------------------------    PFTs:        I interpreted the PFTs.  Restrictive physiology  --------------------------------------------------------------------------------------------------      Laboratory evaluations:    Lab Results   Component Value Date    GLUCOSE 97 04/03/2023    BUN 14 04/03/2023    CREATININE 0.95 04/03/2023    EGFRIFNONA 58 (L) 11/09/2021    EGFRIFAFRI 67 11/09/2021    BCR 14.7 04/03/2023    K 4.0 04/03/2023    CO2 25.9 04/03/2023    CALCIUM 9.8 04/03/2023    PROTENTOTREF 8.3 04/03/2023    ALBUMIN 4.9 04/03/2023    LABIL2 1.4 04/03/2023    AST 38 (H) 04/03/2023    ALT 33 04/03/2023     Lab Results   Component Value Date    WBC 10.84 (H) 04/17/2023    HGB 12.8 04/17/2023    HCT 38.9 04/17/2023    MCV 78.4 (L) 04/17/2023     04/17/2023     Lab Results    Component Value Date    CHOL 137 11/23/2022    CHLPL 156 04/03/2023    TRIG 147 04/03/2023    HDL 40 04/03/2023    LDL 90 04/03/2023     Lab Results   Component Value Date    TSH 2.590 04/03/2023     No results found for: CKTOTAL, CKMB, CKMBINDEX, TROPONINI, TROPONINT  Lab Results   Component Value Date    HGBA1C 6.6 (H) 07/24/2023     No results found for: DDIMER  Lab Results   Component Value Date    ALT 33 04/03/2023     Lab Results   Component Value Date    HGBA1C 6.6 (H) 07/24/2023    HGBA1C 6.70 (H) 04/03/2023    HGBA1C 7.70 (H) 11/23/2022     Lab Results   Component Value Date    MICROALBUR <3.0 04/03/2023    CREATININE 0.95 04/03/2023     Lab Results   Component Value Date    TIBC 583 06/02/2020    FERRITIN 68 08/03/2020     No results found for: INR, PROTIME    PAH RISK ASSESSMENT:            1. Pulmonary hypertension (HCC). PAH/PVH. WHO Group  1, 2, 3: primarily more 1 and 3 ; FC: III.  RV status: Abnormal:.  PFTs:  Restrictive with decreased DLCO.  . MPAP: 31. Maryanne: 2.9. Exercise pulse oximetry showed a minimum SpO2 = 86% on room air.  The Aguilar Index was updated today. Most recent score: 6.     VQ scan showed no evidence of CTEPH.  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 She does have HFpEF.  With that said, her PSG had no significant sleep apnea.  CT scan did have some GGOs, which have improved.  She may very well have a group 1 component.  However, she currently has marginal hemodynamics.  I do not think she will tolerate the addition of any PAH-specific therapies.  For now, I favor treatment of her heart failure.  I will repeat a limited 2D TTE to assess her right ventricular status.      Her most recent CT scan shows more dilatation of her PA. I agree it is larger, but unable to measure accurately due to lack of IV contrast.     -Continue treatment of HFpEF, as below  - No current indication for PAH-specific medications  - Obtain limited 2D TTE to evaluate RV status and pulmonary pressures: Could  consider Tyvaso      I have asked the patient that if they were to move to be certain they see someone with expertise in PAH. These providers can be located at [email or web address].        2. HFpEF.  NYHA stage C; functional class III.  Today, euvolemic and perfused.  Clinical trajectory was reviewed today and is stable.    - Continue antihypertensive regimen: SHe is on CCB and BB for HTN. We could D/C these, down-titrate if we need to consider PAH-specific meds.   - Aldactone 25 mg.  Quarterly assessment of GFR  - SGLT2 inhibitors are not prescribed due to vaginal candidiasis     Lifestyle Advice:   - call office if I gain more than 2 pounds in one day or 5 pounds in one week  - keep legs up while sitting  - use salt in moderation  - watch for swelling in feet, ankles and legs every day  - weigh myself daily  -call for concerning s/sx         3. Abnormality of right ventricle of heart.  NYHA stage  C ; FC-III.      RV is dilated. RVEF is abnormal.  RV CI is Abnormal:. RV status: RV ADAPTED-REMODELED with an ESVi of <; Maryanne is  2.9 .  TAPSE/PASP ratio is markedly abnormal and consistent with RV-PA uncoupling.     Patient's abnormal right ventricle is from pulmonary hypertension.    -Update 2D TTE  - Continue Aldactone       4. Hypoxia. LTOT. Follow-up pulm.         ORDERS PLACED TODAY:  Orders Placed This Encounter   Procedures    Basic Metabolic Panel    proBNP    Adult Transthoracic Echo Limited W/ Cont if Necessary Per Protocol          MEDS ORDERED TODAY:    No orders of the defined types were placed in this encounter.         Return for 3 months with TTE.          This document has been electronically signed by Heron Martínez MD PhD on August 30, 2023 10:05 EDT        Heron Martínez M.D., Ph.D., [facility]  System Medical Director for HF and PAH

## 2023-08-30 NOTE — ADDENDUM NOTE
Encounter addended by: Sharona Thomas MA on: 8/30/2023 11:32 AM   Actions taken: Charge Capture section accepted

## 2023-08-30 NOTE — LETTER
August 30, 2023       No Recipients    Patient: Sally Rivera   YOB: 1950   Date of Visit: 8/30/2023     Dear Enrique Kelley MD:       Thank you for referring Sally Rivera to me for evaluation. Below are the relevant portions of my assessment and plan of care.    If you have questions, please do not hesitate to call me. I look forward to following Sally along with you.         Sincerely,        Heron Martínez MD PhD        CC:   No Recipients    Heron Martínez MD PhD  08/30/23 1004  Incomplete  Heart Failure & Pulmonary Arterial Hypertension Clinic  [facility]  Heron Martínez M.D., Ph.D., [facility]         History of Present Illness  This is a 73 y.o. female with:    1. PAH  A. RHC 2022:  Hemodynamics:  Right Atrium: Mean of 12 mmHg  Right Ventricle: 43/11 mmHg  Pulmonary Artery: 56/21/31 mmHg  Pulmonary Wedge: Mean of 16 mmHg  Cardiac Output/Index: 3.91 L/min 1.84 L/min/m2  PA Sat: 65%  AO Sat: 92%  PVR: 3.9 Wood units    2. RV abnormality    Sally Rivera is a 73 y.o. female who presents for today for PH.  The patient is typically seen by Enrique Kelley MD.  Patient's primary cardiologist is Dr. Christensen.      The patient has seen pulmonary medicine in the past.  Last spirometry showed FEV1/FVC of 89% of predicted.  FVC was 54% of predicted.  DLCO was 53% of predicted.  TLC was 68% of predicted and consistent with restrictive physiology.  Patient also has a history of abnormal CT scans.    In April 2022 she was found to have multiple nodular opacities with the largest of 2.1 cm.  There was bilateral increased septal thickening and some areas of GGOs.  Though, this improved on her most recent CT scan.  She had a CTA recently which showed no evidence of pulmonary embolism.  Pulmonary artery was dilated.  She remained with some areas of GGOs, but had decreased LAD from prior.  No history of VTE/PE.  No VQ scan.  No history of autoimmune or  rheumatic diseases.  No family history of cardiomyopathy or pulmonary hypertension.    2D TTE was obtained and showed normal left ventricular function with indeterminate diastolic function.  Right ventricle was interpreted as normal.  It was interpreted as mild to moderate mitral regurgitation and mild to moderate tricuspid regurgitation with a PA systolic pressure of 49 mmHg.  She  was seen by cardiology, Dr. Christensen.  She was sent for the CTA of the chest with contrast, as aforementioned.  She was also sent for a RHC which has been completed.  Primary complaint at that time was worsening exertional dyspnea and exercise intolerance.    RHC was performed and showed RA pressure of 12.  RV was 43/11.  PA pressure was 56/21 with a mean of 31.  Wedge was elevated to 15 mmHg.  PVR was abnormal at 3.9 Wood units. VD reactivity testing was not performed.  Fluid loading was not performed.    The patient return to clinic today.  She remains wearing LTOT.  She continues to see sleep medicine.  She reports she is doing well.  Stable exercise intolerance.  No emergency department visits or inpatient admissions.  Today, denies orthopnea, PND, lower extremity edema, dizziness, lightheadedness, early abdominal satiety, or ascites.            Review of Systems - Review of Systems   Cardiovascular:  Positive for dyspnea on exertion. Negative for chest pain, claudication, cyanosis, irregular heartbeat, leg swelling, near-syncope, orthopnea, palpitations, paroxysmal nocturnal dyspnea and syncope.   Respiratory:  Positive for cough and shortness of breath. Negative for hemoptysis, sputum production and wheezing.    All other systems reviewed and are negative.     All other systems were reviewed and were negative.    Patient Active Problem List   Diagnosis    Chronic coronary artery disease    Gastroesophageal reflux disease    Hyperlipidemia    Nonalcoholic fatty liver disease    Atrial paroxysmal tachycardia    Type 2 diabetes  mellitus with hyperglycemia, without long-term current use of insulin    History of rectal polypectomy    Diverticulosis of large intestine without hemorrhage    High risk medication use    Obesity, morbid, BMI 40.0-49.9    History of colon polyps    Clinical diagnosis of COVID-19    Pneumonia    Lung nodule    Pulmonary hypertension    Hypoxia    Abnormality of right ventricle of heart    Risk factors for obstructive sleep apnea    Chronic heart failure with preserved ejection fraction       family history includes Diabetes in her father; Heart disease in her father; No Known Problems in her mother.     reports that she quit smoking about 33 years ago. Her smoking use included cigarettes. She started smoking about 52 years ago. She has a 20.00 pack-year smoking history. She has never used smokeless tobacco. She reports that she does not drink alcohol and does not use drugs.    No Known Allergies      Current Outpatient Medications:     aspirin 325 MG tablet, Take 1 tablet by mouth Daily., Disp: , Rfl:     atorvastatin (LIPITOR) 40 MG tablet, Take 1 tablet by mouth Daily. 200001, Disp: 90 tablet, Rfl: 3    cholecalciferol (VITAMIN D3) 25 MCG (1000 UT) tablet, Take 1 tablet by mouth Daily., Disp: , Rfl:     dilTIAZem (CARDIZEM) 120 MG tablet, Take 1 tablet by mouth 3 (Three) Times a Day., Disp: 270 tablet, Rfl: 3    Dulaglutide 0.75 MG/0.5ML solution pen-injector, Inject 0.75 mg under the skin into the appropriate area as directed 1 (One) Time Per Week., Disp: 2 mL, Rfl: 3    furosemide (LASIX) 40 MG tablet, Take 1 tablet by mouth Daily. 200001, Disp: 90 tablet, Rfl: 3    glimepiride (Amaryl) 1 MG tablet, Take 1 tablet by mouth Every Morning Before Breakfast., Disp: 90 tablet, Rfl: 1    metFORMIN (GLUCOPHAGE) 500 MG tablet, TAKE 2 TABLETS BY MOUTH 2 TIMES A DAY WITH MEALS., Disp: 180 tablet, Rfl: 0    metoprolol tartrate (LOPRESSOR) 50 MG tablet, Take 1 tablet by mouth 2 (Two) Times a Day.,  Disp: 180 tablet, Rfl: 3    O2 (OXYGEN), Inhale 2 L/min. 24 -7, Disp: , Rfl:     omeprazole (priLOSEC) 20 MG capsule, Take 1 capsule by mouth Daily., Disp: 90 capsule, Rfl: 1    spironolactone (ALDACTONE) 25 MG tablet, TAKE 1 TABLET BY MOUTH EVERY DAY, Disp: 30 tablet, Rfl: 0      Physical Exam:  I have reviewed the patient's current vital signs as documented in the patient's EMR.   Vitals:    08/30/23 0951   BP: 100/65   Pulse: 80   SpO2: 92%     Body mass index is 39.15 kg/mý.       08/30/23  0951   Weight: 104 kg (228 lb 3.2 oz)      Physical Exam  Vitals (PP-HIGH) reviewed.   Constitutional:       General: She is not in acute distress.     Appearance: Normal appearance. She is obese. She is not ill-appearing, toxic-appearing or diaphoretic.      Comments: Wearing 2LNC Oxygen   HENT:      Head: Normocephalic and atraumatic.   Neck:      Vascular: No JVD.      Comments: JVP less than 6 cm of water with normal waveforms.  Cardiovascular:      Rate and Rhythm: Normal rate and regular rhythm.      Chest Wall: PMI is not displaced. No thrill.      Heart sounds: Normal heart sounds and S1 normal. Heart sounds not distant. No murmur heard.    No friction rub. No gallop. No S3 or S4 sounds.      Comments: S2 loud  Pulmonary:      Effort: Pulmonary effort is normal. No tachypnea, bradypnea, accessory muscle usage, prolonged expiration, respiratory distress or retractions.      Breath sounds: Normal air entry. No stridor, decreased air movement or transmitted upper airway sounds. No wheezing, rhonchi or rales.   Musculoskeletal:      Right lower leg: No edema.      Left lower leg: No edema.   Skin:     General: Skin is warm and dry.   Neurological:      General: No focal deficit present.      Mental Status: She is alert and oriented to person, place, and time. Mental status is at baseline.   Psychiatric:         Mood and Affect: Mood normal.         Behavior: Behavior normal. Behavior is cooperative.         Thought  Content: Thought content normal.         Judgment: Judgment normal.       DATA REVIEWED:       ---------------------------------------------------  TTE/LENNY:  Results for orders placed during the hospital encounter of 10/31/22    Adult Transthoracic Echo Complete W/ Cont if Necessary Per Protocol    Interpretation Summary    Left ventricular systolic function is normal. Calculated left ventricular EF = 57% Normal left ventricular wall thickness noted. The left ventricular cavity is borderline dilated. All left ventricular wall segments contract normally. Left ventricular diastolic function was indeterminate.    The left atrial cavity is moderately dilated.    The right atrial cavity is mildly dilated.    There is thickening of the aortic valve. The aortic valve appears trileaflet. Trace aortic valve regurgitation is present. No aortic valve stenosis is present.    Severe mitral annular calcification is present. Mild to moderate mitral valve regurgitation is present. No significant mitral valve stenosis is present.    Mild to moderate tricuspid valve regurgitation is present. Estimated right ventricular systolic pressure from tricuspid regurgitation is moderately elevated (45-55 mmHg). Calculated right ventricular systolic pressure from tricuspid regurgitation is 49 mmHg.        My interpretation of the TTE is below.     LVEF is 57%.  Right ventricle is dilated with normal RVEF.  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            -----------------------------------------------------  RHC:                          Interpretation today: Right atrial pressure is elevated with normal waveforms and normal inspiratory decrease.  Abnormal right ventricle.  Pulmonary hypertension.          ----------------------------------------------------    PFTs:        I interpreted the PFTs.  Restrictive physiology  --------------------------------------------------------------------------------------------------      Laboratory  evaluations:    Lab Results   Component Value Date    GLUCOSE 97 04/03/2023    BUN 14 04/03/2023    CREATININE 0.95 04/03/2023    EGFRIFNONA 58 (L) 11/09/2021    EGFRIFAFRI 67 11/09/2021    BCR 14.7 04/03/2023    K 4.0 04/03/2023    CO2 25.9 04/03/2023    CALCIUM 9.8 04/03/2023    PROTENTOTREF 8.3 04/03/2023    ALBUMIN 4.9 04/03/2023    LABIL2 1.4 04/03/2023    AST 38 (H) 04/03/2023    ALT 33 04/03/2023     Lab Results   Component Value Date    WBC 10.84 (H) 04/17/2023    HGB 12.8 04/17/2023    HCT 38.9 04/17/2023    MCV 78.4 (L) 04/17/2023     04/17/2023     Lab Results   Component Value Date    CHOL 137 11/23/2022    CHLPL 156 04/03/2023    TRIG 147 04/03/2023    HDL 40 04/03/2023    LDL 90 04/03/2023     Lab Results   Component Value Date    TSH 2.590 04/03/2023     No results found for: CKTOTAL, CKMB, CKMBINDEX, TROPONINI, TROPONINT  Lab Results   Component Value Date    HGBA1C 6.6 (H) 07/24/2023     No results found for: DDIMER  Lab Results   Component Value Date    ALT 33 04/03/2023     Lab Results   Component Value Date    HGBA1C 6.6 (H) 07/24/2023    HGBA1C 6.70 (H) 04/03/2023    HGBA1C 7.70 (H) 11/23/2022     Lab Results   Component Value Date    MICROALBUR <3.0 04/03/2023    CREATININE 0.95 04/03/2023     Lab Results   Component Value Date    TIBC 583 06/02/2020    FERRITIN 68 08/03/2020     No results found for: INR, PROTIME    PAH RISK ASSESSMENT:            1. Pulmonary hypertension (HCC). PAH/PVH. WHO Group  1, 2, 3: primarily more 1 and 3 ; FC: III.  RV status: Abnormal:.  PFTs:  Restrictive with decreased DLCO.  . MPAP: 31. Maryanne: 2.9. Exercise pulse oximetry showed a minimum SpO2 = 86% on room air.  The Aguilar Index was updated today. Most recent score: 6.     VQ scan showed no evidence of CTEPH.  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 She does have HFpEF.  With that said, her PSG had no significant sleep apnea.  CT scan did have some GGOs, which have improved.  She may very well have a group 1  component.  However, she currently has marginal hemodynamics.  I do not think she will tolerate the addition of any PAH-specific therapies.  For now, I favor treatment of her heart failure.  I will repeat a limited 2D TTE to assess her right ventricular status.      Her most recent CT scan shows more dilatation of her PA. I agree it is larger, but unable to measure accurately due to lack of IV contrast.     -Continue treatment of HFpEF, as below  - No current indication for PAH-specific medications  - Obtain limited 2D TTE to evaluate RV status and pulmonary pressures: Could consider Tyvaso      I have asked the patient that if they were to move to be certain they see someone with expertise in PAH. These providers can be located at [email or web address].        2. HFpEF.  NYHA stage C; functional class III.  Today, euvolemic and perfused.  Clinical trajectory was reviewed today and is stable.    - Continue antihypertensive regimen: SHe is on CCB and BB for HTN. We could D/C these, down-titrate if we need to consider PAH-specific meds.   - Aldactone 25 mg.  Quarterly assessment of GFR  - SGLT2 inhibitors are not prescribed due to vaginal candidiasis     Lifestyle Advice:   - call office if I gain more than 2 pounds in one day or 5 pounds in one week  - keep legs up while sitting  - use salt in moderation  - watch for swelling in feet, ankles and legs every day  - weigh myself daily  -call for concerning s/sx         3. Abnormality of right ventricle of heart.  NYHA stage  C ; FC-III.      RV is dilated. RVEF is abnormal.  RV CI is Abnormal:. RV status: RV ADAPTED-REMODELED with an ESVi of <; Maryanne is  2.9 .  TAPSE/PASP ratio is markedly abnormal and consistent with RV-PA uncoupling.     Patient's abnormal right ventricle is from pulmonary hypertension.    -Update 2D TTE  - Continue Aldactone       4. Hypoxia. LTOT. Follow-up pulm.         ORDERS PLACED TODAY:  Orders Placed This Encounter   Procedures    Basic  Metabolic Panel    proBNP    Adult Transthoracic Echo Limited W/ Cont if Necessary Per Protocol          MEDS ORDERED TODAY:    No orders of the defined types were placed in this encounter.         No follow-ups on file.      Heron Martínez M.D., Ph.D., [facility]  System Medical Director for HF and PAH        Heron Martínez MD PhD  08/30/23 0956  Incomplete  Heart Failure & Pulmonary Arterial Hypertension Clinic  [facility]  Heron Martínez M.D., Ph.D., [facility]         History of Present Illness  This is a 73 y.o. female with:    1. PAH  A. RH 2022:  Hemodynamics:  Right Atrium: Mean of 12 mmHg  Right Ventricle: 43/11 mmHg  Pulmonary Artery: 56/21/31 mmHg  Pulmonary Wedge: Mean of 16 mmHg  Cardiac Output/Index: 3.91 L/min 1.84 L/min/m2  PA Sat: 65%  AO Sat: 92%  PVR: 3.9 Wood units    2. RV abnormality    Sally Rivera is a 73 y.o. female who presents for today for PH.  The patient is typically seen by Enrique Kelley MD.  Patient's primary cardiologist is Dr. Christensen.      The patient has seen pulmonary medicine in the past.  Last spirometry showed FEV1/FVC of 89% of predicted.  FVC was 54% of predicted.  DLCO was 53% of predicted.  TLC was 68% of predicted and consistent with restrictive physiology.  Patient also has a history of abnormal CT scans.    In April 2022 she was found to have multiple nodular opacities with the largest of 2.1 cm.  There was bilateral increased septal thickening and some areas of GGOs.  Though, this improved on her most recent CT scan.  She had a CTA recently which showed no evidence of pulmonary embolism.  Pulmonary artery was dilated.  She remained with some areas of GGOs, but had decreased LAD from prior.  No history of VTE/PE.  No VQ scan.  No history of autoimmune or rheumatic diseases.  No family history of cardiomyopathy or pulmonary hypertension.    2D TTE was obtained and showed normal left  ventricular function with indeterminate diastolic function.  Right ventricle was interpreted as normal.  It was interpreted as mild to moderate mitral regurgitation and mild to moderate tricuspid regurgitation with a PA systolic pressure of 49 mmHg.  She  was seen by cardiology, Dr. Christensen.  She was sent for the CTA of the chest with contrast, as aforementioned.  She was also sent for a RHC which has been completed.  Primary complaint at that time was worsening exertional dyspnea and exercise intolerance.    RHC was performed and showed RA pressure of 12.  RV was 43/11.  PA pressure was 56/21 with a mean of 31.  Wedge was elevated to 15 mmHg.  PVR was abnormal at 3.9 Wood units. VD reactivity testing was not performed.  Fluid loading was not performed.    The patient return to clinic today.  She remains wearing LTOT.  She continues to see sleep medicine.  She reports she is doing well.  Stable exercise intolerance.  No emergency department visits or inpatient admissions.  Today, denies orthopnea, PND, lower extremity edema, dizziness, lightheadedness, early abdominal satiety, or ascites.            Review of Systems - Review of Systems   Cardiovascular:  Positive for dyspnea on exertion. Negative for chest pain, claudication, cyanosis, irregular heartbeat, leg swelling, near-syncope, orthopnea, palpitations, paroxysmal nocturnal dyspnea and syncope.   Respiratory:  Positive for cough and shortness of breath. Negative for hemoptysis, sputum production and wheezing.    All other systems reviewed and are negative.     All other systems were reviewed and were negative.    Patient Active Problem List   Diagnosis    Chronic coronary artery disease    Gastroesophageal reflux disease    Hyperlipidemia    Nonalcoholic fatty liver disease    Atrial paroxysmal tachycardia    Type 2 diabetes mellitus with hyperglycemia, without long-term current use of insulin    History of rectal polypectomy    Diverticulosis of large  intestine without hemorrhage    High risk medication use    Obesity, morbid, BMI 40.0-49.9    History of colon polyps    Clinical diagnosis of COVID-19    Pneumonia    Lung nodule    Pulmonary hypertension    Hypoxia    Abnormality of right ventricle of heart    Risk factors for obstructive sleep apnea    Chronic heart failure with preserved ejection fraction       family history includes Diabetes in her father; Heart disease in her father; No Known Problems in her mother.     reports that she quit smoking about 33 years ago. Her smoking use included cigarettes. She started smoking about 52 years ago. She has a 20.00 pack-year smoking history. She has never used smokeless tobacco. She reports that she does not drink alcohol and does not use drugs.    No Known Allergies      Current Outpatient Medications:     aspirin 325 MG tablet, Take 1 tablet by mouth Daily., Disp: , Rfl:     atorvastatin (LIPITOR) 40 MG tablet, Take 1 tablet by mouth Daily. 200001, Disp: 90 tablet, Rfl: 3    cholecalciferol (VITAMIN D3) 25 MCG (1000 UT) tablet, Take 1 tablet by mouth Daily., Disp: , Rfl:     dilTIAZem (CARDIZEM) 120 MG tablet, Take 1 tablet by mouth 3 (Three) Times a Day., Disp: 270 tablet, Rfl: 3    Dulaglutide 0.75 MG/0.5ML solution pen-injector, Inject 0.75 mg under the skin into the appropriate area as directed 1 (One) Time Per Week., Disp: 2 mL, Rfl: 3    furosemide (LASIX) 40 MG tablet, Take 1 tablet by mouth Daily. 200001, Disp: 90 tablet, Rfl: 3    glimepiride (Amaryl) 1 MG tablet, Take 1 tablet by mouth Every Morning Before Breakfast., Disp: 90 tablet, Rfl: 1    metFORMIN (GLUCOPHAGE) 500 MG tablet, TAKE 2 TABLETS BY MOUTH 2 TIMES A DAY WITH MEALS., Disp: 180 tablet, Rfl: 0    metoprolol tartrate (LOPRESSOR) 50 MG tablet, Take 1 tablet by mouth 2 (Two) Times a Day., Disp: 180 tablet, Rfl: 3    O2 (OXYGEN), Inhale 2 L/min. 24 -7, Disp: , Rfl:     omeprazole (priLOSEC) 20 MG capsule, Take 1 capsule  by mouth Daily., Disp: 90 capsule, Rfl: 1    spironolactone (ALDACTONE) 25 MG tablet, TAKE 1 TABLET BY MOUTH EVERY DAY, Disp: 30 tablet, Rfl: 0      Physical Exam:  I have reviewed the patient's current vital signs as documented in the patient's EMR.   Vitals:    08/30/23 0951   BP: 100/65   Pulse: 80   SpO2: 92%     Body mass index is 39.15 kg/mý.       08/30/23  0951   Weight: 104 kg (228 lb 3.2 oz)      Physical Exam  Vitals (PP-HIGH) reviewed.   Constitutional:       General: She is not in acute distress.     Appearance: Normal appearance. She is obese. She is not ill-appearing, toxic-appearing or diaphoretic.      Comments: Wearing 2LNC Oxygen   HENT:      Head: Normocephalic and atraumatic.   Neck:      Vascular: No JVD.      Comments: JVP less than 6 cm of water with normal waveforms.  Cardiovascular:      Rate and Rhythm: Normal rate and regular rhythm.      Chest Wall: PMI is not displaced. No thrill.      Heart sounds: Normal heart sounds and S1 normal. Heart sounds not distant. No murmur heard.    No friction rub. No gallop. No S3 or S4 sounds.      Comments: S2 loud  Pulmonary:      Effort: Pulmonary effort is normal. No tachypnea, bradypnea, accessory muscle usage, prolonged expiration, respiratory distress or retractions.      Breath sounds: Normal air entry. No stridor, decreased air movement or transmitted upper airway sounds. No wheezing, rhonchi or rales.   Musculoskeletal:      Right lower leg: No edema.      Left lower leg: No edema.   Skin:     General: Skin is warm and dry.   Neurological:      General: No focal deficit present.      Mental Status: She is alert and oriented to person, place, and time. Mental status is at baseline.   Psychiatric:         Mood and Affect: Mood normal.         Behavior: Behavior normal. Behavior is cooperative.         Thought Content: Thought content normal.         Judgment: Judgment normal.       DATA REVIEWED:        ---------------------------------------------------  TTE/LENNY:  Results for orders placed during the hospital encounter of 10/31/22    Adult Transthoracic Echo Complete W/ Cont if Necessary Per Protocol    Interpretation Summary    Left ventricular systolic function is normal. Calculated left ventricular EF = 57% Normal left ventricular wall thickness noted. The left ventricular cavity is borderline dilated. All left ventricular wall segments contract normally. Left ventricular diastolic function was indeterminate.    The left atrial cavity is moderately dilated.    The right atrial cavity is mildly dilated.    There is thickening of the aortic valve. The aortic valve appears trileaflet. Trace aortic valve regurgitation is present. No aortic valve stenosis is present.    Severe mitral annular calcification is present. Mild to moderate mitral valve regurgitation is present. No significant mitral valve stenosis is present.    Mild to moderate tricuspid valve regurgitation is present. Estimated right ventricular systolic pressure from tricuspid regurgitation is moderately elevated (45-55 mmHg). Calculated right ventricular systolic pressure from tricuspid regurgitation is 49 mmHg.        My interpretation of the TTE is below.     LVEF is 57%.  Right ventricle is dilated with normal RVEF.  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            -----------------------------------------------------  RHC:                          Interpretation today: Right atrial pressure is elevated with normal waveforms and normal inspiratory decrease.  Abnormal right ventricle.  Pulmonary hypertension.          ----------------------------------------------------    PFTs:        I interpreted the PFTs.  Restrictive physiology  --------------------------------------------------------------------------------------------------      Laboratory evaluations:    Lab Results   Component Value Date    GLUCOSE 97 04/03/2023    BUN 14  04/03/2023    CREATININE 0.95 04/03/2023    EGFRIFNONA 58 (L) 11/09/2021    EGFRIFAFRI 67 11/09/2021    BCR 14.7 04/03/2023    K 4.0 04/03/2023    CO2 25.9 04/03/2023    CALCIUM 9.8 04/03/2023    PROTENTOTREF 8.3 04/03/2023    ALBUMIN 4.9 04/03/2023    LABIL2 1.4 04/03/2023    AST 38 (H) 04/03/2023    ALT 33 04/03/2023     Lab Results   Component Value Date    WBC 10.84 (H) 04/17/2023    HGB 12.8 04/17/2023    HCT 38.9 04/17/2023    MCV 78.4 (L) 04/17/2023     04/17/2023     Lab Results   Component Value Date    CHOL 137 11/23/2022    CHLPL 156 04/03/2023    TRIG 147 04/03/2023    HDL 40 04/03/2023    LDL 90 04/03/2023     Lab Results   Component Value Date    TSH 2.590 04/03/2023     No results found for: CKTOTAL, CKMB, CKMBINDEX, TROPONINI, TROPONINT  Lab Results   Component Value Date    HGBA1C 6.6 (H) 07/24/2023     No results found for: DDIMER  Lab Results   Component Value Date    ALT 33 04/03/2023     Lab Results   Component Value Date    HGBA1C 6.6 (H) 07/24/2023    HGBA1C 6.70 (H) 04/03/2023    HGBA1C 7.70 (H) 11/23/2022     Lab Results   Component Value Date    MICROALBUR <3.0 04/03/2023    CREATININE 0.95 04/03/2023     Lab Results   Component Value Date    TIBC 583 06/02/2020    FERRITIN 68 08/03/2020     No results found for: INR, PROTIME    PAH RISK ASSESSMENT:            1. Pulmonary hypertension (HCC). PAH/PVH. WHO Group 2; FC: III.  RV status: Abnormal:.  PFTs:  Restrictive with decreased DLCO.  . MPAP: 31. Maryanne: 2.9. Exercise pulse oximetry showed a minimum SpO2 = 86% on room air.  The Aguilar Index was updated today. Most recent score: 6.     VQ scan showed no evidence of CTEPH.  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 She does have HFpEF.  With that said, her PSG had no significant sleep apnea.  CT scan did have some GGOs, which have improved.  She may very well have a group 1 component.  However, she currently has marginal hemodynamics.  I do not think she will tolerate the addition of any  PAH-specific therapies.  For now, I favor treatment of her heart failure.  I will repeat a limited 2D TTE to assess her right ventricular status.      -Continue treatment of HFpEF, as below  - No current indication for PAH-specific medications  - Obtain limited 2D TTE to evaluate RV status and pulmonary pressures      I have asked the patient that if they were to move to be certain they see someone with expertise in PAH. These providers can be located at [email or web address].        2. HFpEF.  NYHA stage C; functional class III.  Today, euvolemic and perfused.  Clinical trajectory was reviewed today and is stable.    - Continue antihypertensive regimen  - Aldactone 25 mg.  Quarterly assessment of GFR  - SGLT2 inhibitors are not prescribed due to vaginal candidiasis     Lifestyle Advice:   - call office if I gain more than 2 pounds in one day or 5 pounds in one week  - keep legs up while sitting  - use salt in moderation  - watch for swelling in feet, ankles and legs every day  - weigh myself daily  -call for concerning s/sx         3. Abnormality of right ventricle of heart.  NYHA stage  C ; FC-III.      RV is dilated. RVEF is abnormal.  RV CI is Abnormal:. RV status: RV ADAPTED-REMODELED with an ESVi of <; Maryanne is  2.9 .  TAPSE/PASP ratio is markedly abnormal and consistent with RV-PA uncoupling.     Patient's abnormal right ventricle is from pulmonary hypertension.    -Update 2D TTE  - Continue Aldactone       4. Hypoxia. LTOT. Follow-up pulm.         ORDERS PLACED TODAY:  Orders Placed This Encounter   Procedures    Basic Metabolic Panel    proBNP    Adult Transthoracic Echo Limited W/ Cont if Necessary Per Protocol          MEDS ORDERED TODAY:    No orders of the defined types were placed in this encounter.         No follow-ups on file.      Heron Martínez M.D., Ph.D., [facility]  System Medical Director for HF and PAH

## 2023-09-05 ENCOUNTER — OFFICE VISIT (OUTPATIENT)
Dept: FAMILY MEDICINE CLINIC | Facility: CLINIC | Age: 73
End: 2023-09-05
Payer: MEDICARE

## 2023-09-05 VITALS
TEMPERATURE: 98.4 F | SYSTOLIC BLOOD PRESSURE: 124 MMHG | OXYGEN SATURATION: 93 % | HEART RATE: 71 BPM | WEIGHT: 229 LBS | DIASTOLIC BLOOD PRESSURE: 80 MMHG | HEIGHT: 64 IN | BODY MASS INDEX: 39.09 KG/M2

## 2023-09-05 DIAGNOSIS — E78.00 PURE HYPERCHOLESTEROLEMIA: ICD-10-CM

## 2023-09-05 DIAGNOSIS — I50.32 CHRONIC HEART FAILURE WITH PRESERVED EJECTION FRACTION: ICD-10-CM

## 2023-09-05 DIAGNOSIS — Z12.31 ENCOUNTER FOR SCREENING MAMMOGRAM FOR MALIGNANT NEOPLASM OF BREAST: ICD-10-CM

## 2023-09-05 DIAGNOSIS — Z76.89 ENCOUNTER TO ESTABLISH CARE: ICD-10-CM

## 2023-09-05 DIAGNOSIS — E11.65 TYPE 2 DIABETES MELLITUS WITH HYPERGLYCEMIA, WITHOUT LONG-TERM CURRENT USE OF INSULIN: Primary | ICD-10-CM

## 2023-09-05 PROCEDURE — 99214 OFFICE O/P EST MOD 30 MIN: CPT

## 2023-09-05 PROCEDURE — 3044F HG A1C LEVEL LT 7.0%: CPT

## 2023-09-05 NOTE — PROGRESS NOTES
"Chief Complaint  Establish Care (Pt is here today to establish care, pt presents diabetes and hypertension.), Diabetes, and Hypertension    Subjective        Sally Rivera presents to [facility]    History of Present Illness  73 year old female presents to establish care. Pt is new to me, previously seen by Dr. Kelley. Pt states she has to leave [facility] due to contract with [employer]. Has appt beginning of October. Would like to wait and have labs ran then. Pt has Type 2 diabetes. Hemoglobin A1c on 7/24/23 was 6.6. Pt is seen at [facility] heart failure clinic. Echo was ordered at last appt on 8/30/23. Pt states Echo is later this year. Mammogram ordered for screening for breast cancer. Pt has hypercholesterolemia. Pt currently on lipitor 40mg. Last lipid panel normal on 4/3/23.     Objective   Vital Signs:  /80   Pulse 71   Temp 98.4 °F (36.9 °C)   Ht 162.6 cm (64.02\")   Wt 104 kg (229 lb)   SpO2 93%   BMI 39.28 kg/m²   Estimated body mass index is 39.28 kg/m² as calculated from the following:    Height as of this encounter: 162.6 cm (64.02\").    Weight as of this encounter: 104 kg (229 lb).       Class 2 Severe Obesity (BMI >=35 and <=39.9). Obesity-related health conditions include the following: diabetes mellitus, GERD, and heart failure . Obesity is unchanged. BMI is is above average; BMI management plan is completed. We discussed portion control and increasing exercise.      Physical Exam  Constitutional:       Appearance: Normal appearance.   HENT:      Head: Normocephalic.   Eyes:      Conjunctiva/sclera: Conjunctivae normal.      Pupils: Pupils are equal, round, and reactive to light.   Cardiovascular:      Rate and Rhythm: Normal rate and regular rhythm.      Pulses: Normal pulses.   Pulmonary:      Effort: Pulmonary effort is normal.      Breath sounds: Normal breath sounds.   Skin:     General: Skin is warm.   Neurological:      General: No " focal deficit present.      Mental Status: She is alert and oriented to person, place, and time.   Psychiatric:         Mood and Affect: Mood normal.         Behavior: Behavior normal.        Result Review :  The following data was reviewed by: KHALIF Salinas on 09/05/2023:  Common labs          4/17/2023    09:48 7/24/2023    12:07 8/30/2023    10:22   Common Labs   Glucose   134    BUN   16    Creatinine   0.98    Sodium   138    Potassium   3.8    Chloride   101    Calcium   9.2    WBC 10.84      Hemoglobin 12.8      Hematocrit 38.9      Platelets 235      Hemoglobin A1C  6.6       Data reviewed : Cardiology studies  heart failure clinic and labs             Assessment and Plan   Diagnoses and all orders for this visit:    1. Type 2 diabetes mellitus with hyperglycemia, without long-term current use of insulin (Primary)  Comments:  Last A1c 6.6 on 7/24/23.    2. Pure hypercholesterolemia    3. Encounter to establish care    4. Chronic heart failure with preserved ejection fraction  Comments:  Echo ordered 8/30    5. Encounter for screening mammogram for malignant neoplasm of breast  -     Mammo screening digital tomosynthesis bilateral w CAD; Future             Follow Up   No follow-ups on file.  Patient was given instructions and counseling regarding her condition or for health maintenance advice. Please see specific information pulled into the AVS if appropriate.

## 2023-10-05 DIAGNOSIS — E11.65 TYPE 2 DIABETES MELLITUS WITH HYPERGLYCEMIA, WITHOUT LONG-TERM CURRENT USE OF INSULIN: ICD-10-CM

## 2023-10-05 NOTE — TELEPHONE ENCOUNTER
Caller: Sally Rivera    Relationship: Self    Best call back number: [phone]     Requested Prescriptions:   Requested Prescriptions     Pending Prescriptions Disp Refills    Dulaglutide 0.75 MG/0.5ML solution pen-injector 2 mL 3     Sig: Inject 0.75 mg under the skin into the appropriate area as directed 1 (One) Time Per Week.        Pharmacy where request should be sent: [facility] - [facility] [address] - [phone] [facility] [phone] FX     Last office visit with prescribing clinician: 9/5/2023   Last telemedicine visit with prescribing clinician: Visit date not found   Next office visit with prescribing clinician: Visit date not found       Does the patient have less than a 3 day supply:  [] Yes  [x] No    Would you like a call back once the refill request has been completed: [] Yes [x] No    If the office needs to give you a call back, can they leave a voicemail: [] Yes [x] No    Burak Martinez Rep   10/05/23 14:18 EDT

## 2023-10-11 DIAGNOSIS — K21.9 GASTROESOPHAGEAL REFLUX DISEASE WITHOUT ESOPHAGITIS: ICD-10-CM

## 2023-10-11 RX ORDER — OMEPRAZOLE 20 MG/1
20 CAPSULE, DELAYED RELEASE ORAL DAILY
Qty: 90 CAPSULE | Refills: 2 | Status: SHIPPED | OUTPATIENT
Start: 2023-10-11

## 2023-11-01 RX ORDER — SPIRONOLACTONE 25 MG/1
TABLET ORAL
Qty: 30 TABLET | Refills: 0 | Status: SHIPPED | OUTPATIENT
Start: 2023-11-01

## 2023-11-27 ENCOUNTER — HOSPITAL ENCOUNTER (OUTPATIENT)
Dept: CARDIOLOGY | Facility: HOSPITAL | Age: 73
Discharge: HOME | End: 2023-11-27
Admitting: INTERNAL MEDICINE
Payer: MEDICARE

## 2023-11-27 VITALS — WEIGHT: 229 LBS | HEIGHT: 64 IN | BODY MASS INDEX: 39.09 KG/M2

## 2023-11-27 DIAGNOSIS — I27.20 PULMONARY HYPERTENSION: ICD-10-CM

## 2023-11-27 PROCEDURE — 93356 MYOCRD STRAIN IMG SPCKL TRCK: CPT

## 2023-11-27 PROCEDURE — 93325 DOPPLER ECHO COLOR FLOW MAPG: CPT

## 2023-11-27 PROCEDURE — 93308 TTE F-UP OR LMTD: CPT

## 2023-11-27 PROCEDURE — 93321 DOPPLER ECHO F-UP/LMTD STD: CPT

## 2023-11-28 LAB
BH CV ECHO MEAS - PA ACC TIME: 0.1 SEC
BH CV ECHO MEAS - PA V2 MAX: 85.3 CM/SEC
BH CV ECHO MEAS - PI END-D VEL: 139.6 CM/SEC
BH CV ECHO MEAS - RAP SYSTOLE: 3 MMHG
BH CV ECHO MEAS - RV MAX PG: 1.22 MMHG
BH CV ECHO MEAS - RV V1 MAX: 55.3 CM/SEC
BH CV ECHO MEAS - RV V1 VTI: 11.4 CM
BH CV ECHO MEAS - RVSP: 42.7 MMHG
BH CV ECHO MEAS - TAPSE (>1.6): 1.59 CM
BH CV ECHO MEAS - TR MAX PG: 39.7 MMHG
BH CV ECHO MEAS - TR MAX VEL: 315.2 CM/SEC
BH CV ECHO MEAS RV FREE WALL STRAIN: -18.3 %
BH CV XLRA - RV BASE: 3.5 CM
BH CV XLRA - RV LENGTH: 7.8 CM
BH CV XLRA - RV MID: 3.5 CM

## 2023-11-30 RX ORDER — SPIRONOLACTONE 25 MG/1
TABLET ORAL
Qty: 30 TABLET | Refills: 0 | Status: SHIPPED | OUTPATIENT
Start: 2023-11-30

## 2023-12-11 RX ORDER — SPIRONOLACTONE 25 MG/1
TABLET ORAL
Qty: 30 TABLET | Refills: 1 | Status: SHIPPED | OUTPATIENT
Start: 2023-12-11

## 2023-12-18 DIAGNOSIS — E11.65 TYPE 2 DIABETES MELLITUS WITH HYPERGLYCEMIA, WITHOUT LONG-TERM CURRENT USE OF INSULIN: ICD-10-CM

## 2023-12-19 ENCOUNTER — HOSPITAL ENCOUNTER (OUTPATIENT)
Dept: CARDIOLOGY | Facility: HOSPITAL | Age: 73
Discharge: HOME | End: 2023-12-19
Payer: MEDICARE

## 2023-12-19 ENCOUNTER — LAB (OUTPATIENT)
Dept: LAB | Facility: HOSPITAL | Age: 73
End: 2023-12-19
Payer: MEDICARE

## 2023-12-19 VITALS
OXYGEN SATURATION: 94 % | WEIGHT: 226.8 LBS | HEART RATE: 65 BPM | HEIGHT: 64 IN | DIASTOLIC BLOOD PRESSURE: 79 MMHG | SYSTOLIC BLOOD PRESSURE: 123 MMHG | BODY MASS INDEX: 38.72 KG/M2

## 2023-12-19 DIAGNOSIS — R09.02 HYPOXIA: ICD-10-CM

## 2023-12-19 DIAGNOSIS — Q20.8 ABNORMALITY OF RIGHT VENTRICLE OF HEART: ICD-10-CM

## 2023-12-19 DIAGNOSIS — I50.32 CHRONIC HEART FAILURE WITH PRESERVED EJECTION FRACTION: ICD-10-CM

## 2023-12-19 DIAGNOSIS — I27.20 PULMONARY HYPERTENSION: Primary | ICD-10-CM

## 2023-12-19 LAB
ANION GAP SERPL CALCULATED.3IONS-SCNC: 15.2 MMOL/L (ref 5–15)
BUN SERPL-MCNC: 26 MG/DL (ref 8–23)
BUN/CREAT SERPL: 21.7 (ref 7–25)
CALCIUM SPEC-SCNC: 10 MG/DL (ref 8.6–10.5)
CHLORIDE SERPL-SCNC: 102 MMOL/L (ref 98–107)
CO2 SERPL-SCNC: 24.8 MMOL/L (ref 22–29)
CREAT SERPL-MCNC: 1.2 MG/DL (ref 0.57–1)
EGFRCR SERPLBLD CKD-EPI 2021: 48.2 ML/MIN/1.73
GLUCOSE SERPL-MCNC: 90 MG/DL (ref 65–99)
POTASSIUM SERPL-SCNC: 4.1 MMOL/L (ref 3.5–5.2)
SODIUM SERPL-SCNC: 142 MMOL/L (ref 136–145)

## 2023-12-19 PROCEDURE — 36415 COLL VENOUS BLD VENIPUNCTURE: CPT | Performed by: INTERNAL MEDICINE

## 2023-12-19 PROCEDURE — 80048 BASIC METABOLIC PNL TOTAL CA: CPT | Performed by: INTERNAL MEDICINE

## 2023-12-19 PROCEDURE — G0463 HOSPITAL OUTPT CLINIC VISIT: HCPCS

## 2023-12-19 RX ORDER — DILTIAZEM HYDROCHLORIDE 120 MG/1
120 TABLET, FILM COATED ORAL 2 TIMES DAILY
COMMUNITY

## 2023-12-19 RX ORDER — DULAGLUTIDE 0.75 MG/.5ML
INJECTION, SOLUTION SUBCUTANEOUS
Qty: 12 ML | Refills: 3 | Status: SHIPPED | OUTPATIENT
Start: 2023-12-19

## 2023-12-19 NOTE — PROGRESS NOTES
Heart Failure & Pulmonary Arterial Hypertension Clinic  [facility]  Heron Martínez M.D., Ph.D., [facility]         History of Present Illness  This is a 73 y.o. female with:    1. PAH  A. RHC 2022:  Hemodynamics:  Right Atrium: Mean of 12 mmHg  Right Ventricle: 43/11 mmHg  Pulmonary Artery: 56/21/31 mmHg  Pulmonary Wedge: Mean of 16 mmHg  Cardiac Output/Index: 3.91 L/min 1.84 L/min/m2  PA Sat: 65%  AO Sat: 92%  PVR: 3.9 Wood units    2. RV abnormality    Sally Rivera is a 73 y.o. female who presents for today for PH.  The patient is typically seen by Edgar Mayers.  Patient's primary cardiologist is Dr. Christensen.      The patient has seen pulmonary medicine in the past.  Last spirometry showed FEV1/FVC of 89% of predicted.  FVC was 54% of predicted.  DLCO was 53% of predicted.  TLC was 68% of predicted and consistent with restrictive physiology.  Patient also has a history of abnormal CT scans.    In April 2022 she was found to have multiple nodular opacities with the largest of 2.1 cm.  There was bilateral increased septal thickening and some areas of GGOs.  Though, this improved on her most recent CT scan.  She had a CTA recently which showed no evidence of pulmonary embolism.  Pulmonary artery was dilated.  She remained with some areas of GGOs, but had decreased LAD from prior.  No history of VTE/PE.  No VQ scan.  No history of autoimmune or rheumatic diseases.  No family history of cardiomyopathy or pulmonary hypertension.    2D TTE was obtained and showed normal left ventricular function with indeterminate diastolic function.  Right ventricle was interpreted as normal.  It was interpreted as mild to moderate mitral regurgitation and mild to moderate tricuspid regurgitation with a PA systolic pressure of 49 mmHg.  She  was seen by cardiology, Dr. Christensen.  She was sent for the CTA of the chest with contrast, as aforementioned.  She was also sent for a RHC which has been completed.  Primary  complaint at that time was worsening exertional dyspnea and exercise intolerance.    RHC was performed and showed RA pressure of 12.  RV was 43/11.  PA pressure was 56/21 with a mean of 31.  Wedge was elevated to 15 mmHg.  PVR was abnormal at 3.9 Wood units. VD reactivity testing was not performed.  Fluid loading was not performed.    Patient returns to clinic today.  She continues to wear LTOT.  She see sleep medicine.  She reports that she is doing well.  Stable exercise intolerance.  Denies orthopnea, PND, lower extremity edema, dizziness, lightheadedness, early abdominal satiety, and ascites.         Review of Systems - Review of Systems   Cardiovascular:  Positive for dyspnea on exertion. Negative for chest pain, claudication, cyanosis, irregular heartbeat, leg swelling, near-syncope, orthopnea, palpitations, paroxysmal nocturnal dyspnea and syncope.   Respiratory:  Positive for cough and shortness of breath. Negative for hemoptysis, sputum production and wheezing.    All other systems reviewed and are negative.       All other systems were reviewed and were negative.    Patient Active Problem List   Diagnosis    Chronic coronary artery disease    Gastroesophageal reflux disease    Hyperlipidemia    Nonalcoholic fatty liver disease    Atrial paroxysmal tachycardia    Type 2 diabetes mellitus with hyperglycemia, without long-term current use of insulin    History of rectal polypectomy    Diverticulosis of large intestine without hemorrhage    High risk medication use    Obesity, morbid, BMI 40.0-49.9    History of colon polyps    Clinical diagnosis of COVID-19    Pneumonia    Lung nodule    Pulmonary hypertension    Hypoxia    Abnormality of right ventricle of heart    Risk factors for obstructive sleep apnea    Chronic heart failure with preserved ejection fraction       family history includes Diabetes in her father; Heart disease in her father; No Known Problems in her mother.     reports that she quit  smoking about 33 years ago. Her smoking use included cigarettes. She started smoking about 53 years ago. She has a 20.00 pack-year smoking history. She has never used smokeless tobacco. She reports that she does not drink alcohol and does not use drugs.    No Known Allergies      Current Outpatient Medications:     aspirin 325 MG tablet, Take 1 tablet by mouth Daily., Disp: , Rfl:     atorvastatin (LIPITOR) 40 MG tablet, Take 1 tablet by mouth Daily. 200001, Disp: 90 tablet, Rfl: 3    cholecalciferol (VITAMIN D3) 25 MCG (1000 UT) tablet, Take 1 tablet by mouth Daily., Disp: , Rfl:     dilTIAZem (CARDIZEM) 120 MG tablet, Take 1 tablet by mouth 2 (Two) Times a Day., Disp: , Rfl:     Dulaglutide (Trulicity) 0.75 MG/0.5ML solution pen-injector, INJECT 0.75MG (1 PEN) UNDER THE SKIN EVERY WEEK, Disp: 12 mL, Rfl: 3    furosemide (LASIX) 40 MG tablet, Take 1 tablet by mouth Daily. 200001, Disp: 90 tablet, Rfl: 3    metFORMIN (GLUCOPHAGE) 500 MG tablet, TAKE 2 TABLETS BY MOUTH 2 TIMES A DAY WITH MEALS, Disp: 180 tablet, Rfl: 0    metoprolol tartrate (LOPRESSOR) 50 MG tablet, Take 1 tablet by mouth 2 (Two) Times a Day., Disp: 180 tablet, Rfl: 3    O2 (OXYGEN), Inhale 2 L/min. 24 -7, Disp: , Rfl:     omeprazole (priLOSEC) 20 MG capsule, TAKE 1 CAPSULE BY MOUTH EVERY DAY, Disp: 90 capsule, Rfl: 2    spironolactone (ALDACTONE) 25 MG tablet, TAKE 1 TABLET BY MOUTH EVERY DAY, Disp: 30 tablet, Rfl: 1      Physical Exam:  I have reviewed the patient's current vital signs as documented in the patient's EMR.   Vitals:    12/19/23 1404   BP: 123/79   Pulse: 65   SpO2: 94%       Body mass index is 38.91 kg/m².       12/19/23  1404   Weight: 103 kg (226 lb 12.8 oz)        Physical Exam  Vitals (PP-HIGH) reviewed.   Constitutional:       General: She is not in acute distress.     Appearance: Normal appearance. She is obese. She is not ill-appearing, toxic-appearing or diaphoretic.      Comments: Wearing 2LNC Oxygen   HENT:      Head:  Normocephalic and atraumatic.   Neck:      Vascular: No hepatojugular reflux or JVD.      Comments: JVP less than 6 cm of water with normal waveforms.  Cardiovascular:      Rate and Rhythm: Normal rate and regular rhythm.      Chest Wall: PMI is not displaced. No thrill.      Heart sounds: Normal heart sounds and S1 normal. Heart sounds not distant. No murmur heard.     No friction rub. No gallop. No S3 or S4 sounds.      Comments: S2 loud  Pulmonary:      Effort: Pulmonary effort is normal. No tachypnea, bradypnea, accessory muscle usage, prolonged expiration, respiratory distress or retractions.      Breath sounds: Normal air entry. No stridor, decreased air movement or transmitted upper airway sounds. No wheezing, rhonchi or rales.   Musculoskeletal:      Right lower leg: No edema.      Left lower leg: No edema.   Skin:     General: Skin is warm and dry.   Neurological:      General: No focal deficit present.      Mental Status: She is alert and oriented to person, place, and time. Mental status is at baseline.   Psychiatric:         Behavior: Behavior is cooperative.         DATA REVIEWED:       ---------------------------------------------------  TTE/LENNY:  Results for orders placed during the hospital encounter of 11/27/23    Adult Transthoracic Echo Limited W/ Cont if Necessary Per Protocol    Interpretation Summary    Left ventricular systolic function is normal. Left ventricular ejection fraction appears to be 51 - 55%.    Normal right ventricular wall thickness and septal motion noted. The right ventricular cavity is mild to moderately dilated. Borderline low right ventricular systolic function noted.    Estimated RV systolic pressure from tricuspid regurgitation is mildly elevated (35-45 mmHg). Mild pulmonary hypertension is present.        My interpretation of the TTE is below.     LVEF is 55%.  RV is dilated with borderline low  "RVEF.          -----------------------------------------------------  RHC:                          Interpretation today: Right atrial pressure is elevated with normal waveforms and normal inspiratory decrease.  Abnormal right ventricle.  Pulmonary hypertension.          ----------------------------------------------------    PFTs:        I interpreted the PFTs.  Restrictive physiology  --------------------------------------------------------------------------------------------------      Laboratory evaluations:    Lab Results   Component Value Date    GLUCOSE 134 (H) 08/30/2023    BUN 16 08/30/2023    CREATININE 0.98 08/30/2023    EGFRIFNONA 58 (L) 11/09/2021    EGFRIFAFRI 67 11/09/2021    BCR 16.3 08/30/2023    K 3.8 08/30/2023    CO2 22.1 08/30/2023    CALCIUM 9.2 08/30/2023    PROTENTOTREF 8.3 04/03/2023    ALBUMIN 4.9 04/03/2023    LABIL2 1.4 04/03/2023    AST 38 (H) 04/03/2023    ALT 33 04/03/2023     Lab Results   Component Value Date    WBC 10.84 (H) 04/17/2023    HGB 12.8 04/17/2023    HCT 38.9 04/17/2023    MCV 78.4 (L) 04/17/2023     04/17/2023     Lab Results   Component Value Date    CHOL 137 11/23/2022    CHLPL 156 04/03/2023    TRIG 147 04/03/2023    HDL 40 04/03/2023    LDL 90 04/03/2023     Lab Results   Component Value Date    TSH 2.590 04/03/2023     No results found for: \"CKTOTAL\", \"CKMB\", \"CKMBINDEX\", \"TROPONINI\", \"TROPONINT\"  Lab Results   Component Value Date    HGBA1C 6.6 (H) 07/24/2023     No results found for: \"DDIMER\"  Lab Results   Component Value Date    ALT 33 04/03/2023     Lab Results   Component Value Date    HGBA1C 6.6 (H) 07/24/2023    HGBA1C 6.70 (H) 04/03/2023    HGBA1C 7.70 (H) 11/23/2022     Lab Results   Component Value Date    MICROALBUR <3.0 04/03/2023    CREATININE 0.98 08/30/2023     Lab Results   Component Value Date    TIBC 583 06/02/2020    FERRITIN 68 08/03/2020     No results found for: \"INR\", \"PROTIME\"    PAH RISK ASSESSMENT:            1. Pulmonary " hypertension (HCC). PAH/PVH. WHO Group  1, 2, 3: primarily more 1 and 3 ; FC: III.  RV status: Abnormal:.  PFTs:  Restrictive with decreased DLCO.  . MPAP: 31. Maryanne: 2.9. Exercise pulse oximetry showed a minimum SpO2 = 86% on room air.  The Aguilar Index was updated today. Most recent score: 6.     VQ scan showed no evidence of CTEPH.  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 She does have HFpEF.  With that said, her PSG had no significant sleep apnea.  CT scan did have some GGOs, which have improved.  She may very well have a group 1 component.      However, she has marginal hemodynamics.  I do not think she will tolerate the addition of any PAH-specific therapies.      -Continue treatment of HFpEF, as below  - No current indication for PAH-specific medicines    I have asked the patient that if they were to move to be certain they see someone with expertise in PAH. These providers can be located at [email or web address].        2. HFpEF.  NYHA stage C; functional class III.  Today, euvolemic and perfused.  Clinical trajectory was reviewed today and is stable.      -Continue antihypertensive regimen  - Spironolactone 25 mg with quarterly assessment of GFR.  - SGLT2 inhibitors are not prescribed due to recurrent vaginal candidiasis   Lifestyle Advice:   - call office if I gain more than 2 pounds in one day or 5 pounds in one week  - keep legs up while sitting  - use salt in moderation  - watch for swelling in feet, ankles and legs every day  - weigh myself daily  -call for concerning s/sx         3. Abnormality of right ventricle of heart.  NYHA stage  C ; FC-III.      RV is dilated. RVEF is abnormal.  RV CI is Abnormal:. RV status: RV ADAPTED-REMODELED with an ESVi of <; Maryanne is  2.9 .  TAPSE/PASP ratio is markedly abnormal and consistent with RV-PA uncoupling.     Patient's abnormal right ventricle is from pulmonary hypertension.    -Continue spironolactone       4. Hypoxia. LTOT.  Follow-up with pulmonary, as  scheduled.         ORDERS PLACED TODAY:  Orders Placed This Encounter   Procedures    Basic Metabolic Panel          MEDS ORDERED TODAY:    No orders of the defined types were placed in this encounter.         Return in about 3 months (around 3/19/2024).          This document has been electronically signed by Heron Martínez MD PhD on December 19, 2023 14:29 EST      Heron Martínez M.D., Ph.D., [facility]  System Medical Director for HF and PAH

## 2023-12-19 NOTE — LETTER
December 19, 2023       No Recipients    Patient: Sally Rivera   YOB: 1950   Date of Visit: 12/19/2023     Dear Edgar Mayers:       Thank you for referring Sally Rivera to me for evaluation. Below are the relevant portions of my assessment and plan of care.    If you have questions, please do not hesitate to call me. I look forward to following Sally along with you.         Sincerely,        Heron Martínez MD PhD        CC:   No Recipients    Heron Martínez MD PhD  12/19/23 1430  Signed  Heart Failure & Pulmonary Arterial Hypertension Clinic  [facility]  Heron Martínez M.D., Ph.D., [facility]         History of Present Illness  This is a 73 y.o. female with:    1. PAH  A. RHC 2022:  Hemodynamics:  Right Atrium: Mean of 12 mmHg  Right Ventricle: 43/11 mmHg  Pulmonary Artery: 56/21/31 mmHg  Pulmonary Wedge: Mean of 16 mmHg  Cardiac Output/Index: 3.91 L/min 1.84 L/min/m2  PA Sat: 65%  AO Sat: 92%  PVR: 3.9 Wood units    2. RV abnormality    Sally Rivera is a 73 y.o. female who presents for today for PH.  The patient is typically seen by Edgar Mayers.  Patient's primary cardiologist is Dr. Christensen.      The patient has seen pulmonary medicine in the past.  Last spirometry showed FEV1/FVC of 89% of predicted.  FVC was 54% of predicted.  DLCO was 53% of predicted.  TLC was 68% of predicted and consistent with restrictive physiology.  Patient also has a history of abnormal CT scans.    In April 2022 she was found to have multiple nodular opacities with the largest of 2.1 cm.  There was bilateral increased septal thickening and some areas of GGOs.  Though, this improved on her most recent CT scan.  She had a CTA recently which showed no evidence of pulmonary embolism.  Pulmonary artery was dilated.  She remained with some areas of GGOs, but had decreased LAD from prior.  No history of VTE/PE.  No VQ scan.  No history of autoimmune or rheumatic diseases.   No family history of cardiomyopathy or pulmonary hypertension.    2D TTE was obtained and showed normal left ventricular function with indeterminate diastolic function.  Right ventricle was interpreted as normal.  It was interpreted as mild to moderate mitral regurgitation and mild to moderate tricuspid regurgitation with a PA systolic pressure of 49 mmHg.  She  was seen by cardiology, Dr. Christensen.  She was sent for the CTA of the chest with contrast, as aforementioned.  She was also sent for a RHC which has been completed.  Primary complaint at that time was worsening exertional dyspnea and exercise intolerance.    RHC was performed and showed RA pressure of 12.  RV was 43/11.  PA pressure was 56/21 with a mean of 31.  Wedge was elevated to 15 mmHg.  PVR was abnormal at 3.9 Wood units. VD reactivity testing was not performed.  Fluid loading was not performed.    Patient returns to clinic today.  She continues to wear LTOT.  She see sleep medicine.  She reports that she is doing well.  Stable exercise intolerance.  Denies orthopnea, PND, lower extremity edema, dizziness, lightheadedness, early abdominal satiety, and ascites.         Review of Systems - Review of Systems   Cardiovascular:  Positive for dyspnea on exertion. Negative for chest pain, claudication, cyanosis, irregular heartbeat, leg swelling, near-syncope, orthopnea, palpitations, paroxysmal nocturnal dyspnea and syncope.   Respiratory:  Positive for cough and shortness of breath. Negative for hemoptysis, sputum production and wheezing.    All other systems reviewed and are negative.       All other systems were reviewed and were negative.    Patient Active Problem List   Diagnosis   • Chronic coronary artery disease   • Gastroesophageal reflux disease   • Hyperlipidemia   • Nonalcoholic fatty liver disease   • Atrial paroxysmal tachycardia   • Type 2 diabetes mellitus with hyperglycemia, without long-term current use of insulin   • History of rectal  polypectomy   • Diverticulosis of large intestine without hemorrhage   • High risk medication use   • Obesity, morbid, BMI 40.0-49.9   • History of colon polyps   • Clinical diagnosis of COVID-19   • Pneumonia   • Lung nodule   • Pulmonary hypertension   • Hypoxia   • Abnormality of right ventricle of heart   • Risk factors for obstructive sleep apnea   • Chronic heart failure with preserved ejection fraction       family history includes Diabetes in her father; Heart disease in her father; No Known Problems in her mother.     reports that she quit smoking about 33 years ago. Her smoking use included cigarettes. She started smoking about 53 years ago. She has a 20.00 pack-year smoking history. She has never used smokeless tobacco. She reports that she does not drink alcohol and does not use drugs.    No Known Allergies      Current Outpatient Medications:   •  aspirin 325 MG tablet, Take 1 tablet by mouth Daily., Disp: , Rfl:   •  atorvastatin (LIPITOR) 40 MG tablet, Take 1 tablet by mouth Daily. 200001, Disp: 90 tablet, Rfl: 3  •  cholecalciferol (VITAMIN D3) 25 MCG (1000 UT) tablet, Take 1 tablet by mouth Daily., Disp: , Rfl:   •  dilTIAZem (CARDIZEM) 120 MG tablet, Take 1 tablet by mouth 2 (Two) Times a Day., Disp: , Rfl:   •  Dulaglutide (Trulicity) 0.75 MG/0.5ML solution pen-injector, INJECT 0.75MG (1 PEN) UNDER THE SKIN EVERY WEEK, Disp: 12 mL, Rfl: 3  •  furosemide (LASIX) 40 MG tablet, Take 1 tablet by mouth Daily. 200001, Disp: 90 tablet, Rfl: 3  •  metFORMIN (GLUCOPHAGE) 500 MG tablet, TAKE 2 TABLETS BY MOUTH 2 TIMES A DAY WITH MEALS, Disp: 180 tablet, Rfl: 0  •  metoprolol tartrate (LOPRESSOR) 50 MG tablet, Take 1 tablet by mouth 2 (Two) Times a Day., Disp: 180 tablet, Rfl: 3  •  O2 (OXYGEN), Inhale 2 L/min. 24 -7, Disp: , Rfl:   •  omeprazole (priLOSEC) 20 MG capsule, TAKE 1 CAPSULE BY MOUTH EVERY DAY, Disp: 90 capsule, Rfl: 2  •  spironolactone (ALDACTONE) 25 MG tablet, TAKE 1 TABLET BY MOUTH EVERY DAY,  Disp: 30 tablet, Rfl: 1      Physical Exam:  I have reviewed the patient's current vital signs as documented in the patient's EMR.   Vitals:    12/19/23 1404   BP: 123/79   Pulse: 65   SpO2: 94%       Body mass index is 38.91 kg/m².       12/19/23  1404   Weight: 103 kg (226 lb 12.8 oz)        Physical Exam  Vitals (PP-HIGH) reviewed.   Constitutional:       General: She is not in acute distress.     Appearance: Normal appearance. She is obese. She is not ill-appearing, toxic-appearing or diaphoretic.      Comments: Wearing 2LNC Oxygen   HENT:      Head: Normocephalic and atraumatic.   Neck:      Vascular: No hepatojugular reflux or JVD.      Comments: JVP less than 6 cm of water with normal waveforms.  Cardiovascular:      Rate and Rhythm: Normal rate and regular rhythm.      Chest Wall: PMI is not displaced. No thrill.      Heart sounds: Normal heart sounds and S1 normal. Heart sounds not distant. No murmur heard.     No friction rub. No gallop. No S3 or S4 sounds.      Comments: S2 loud  Pulmonary:      Effort: Pulmonary effort is normal. No tachypnea, bradypnea, accessory muscle usage, prolonged expiration, respiratory distress or retractions.      Breath sounds: Normal air entry. No stridor, decreased air movement or transmitted upper airway sounds. No wheezing, rhonchi or rales.   Musculoskeletal:      Right lower leg: No edema.      Left lower leg: No edema.   Skin:     General: Skin is warm and dry.   Neurological:      General: No focal deficit present.      Mental Status: She is alert and oriented to person, place, and time. Mental status is at baseline.   Psychiatric:         Behavior: Behavior is cooperative.         DATA REVIEWED:       ---------------------------------------------------  TTE/LENNY:  Results for orders placed during the hospital encounter of 11/27/23    Adult Transthoracic Echo Limited W/ Cont if Necessary Per Protocol    Interpretation Summary  •  Left ventricular systolic function is  "normal. Left ventricular ejection fraction appears to be 51 - 55%.  •  Normal right ventricular wall thickness and septal motion noted. The right ventricular cavity is mild to moderately dilated. Borderline low right ventricular systolic function noted.  •  Estimated RV systolic pressure from tricuspid regurgitation is mildly elevated (35-45 mmHg). Mild pulmonary hypertension is present.        My interpretation of the TTE is below.     LVEF is 55%.  RV is dilated with borderline low RVEF.          -----------------------------------------------------  RHC:                          Interpretation today: Right atrial pressure is elevated with normal waveforms and normal inspiratory decrease.  Abnormal right ventricle.  Pulmonary hypertension.          ----------------------------------------------------    PFTs:        I interpreted the PFTs.  Restrictive physiology  --------------------------------------------------------------------------------------------------      Laboratory evaluations:    Lab Results   Component Value Date    GLUCOSE 134 (H) 08/30/2023    BUN 16 08/30/2023    CREATININE 0.98 08/30/2023    EGFRIFNONA 58 (L) 11/09/2021    EGFRIFAFRI 67 11/09/2021    BCR 16.3 08/30/2023    K 3.8 08/30/2023    CO2 22.1 08/30/2023    CALCIUM 9.2 08/30/2023    PROTENTOTREF 8.3 04/03/2023    ALBUMIN 4.9 04/03/2023    LABIL2 1.4 04/03/2023    AST 38 (H) 04/03/2023    ALT 33 04/03/2023     Lab Results   Component Value Date    WBC 10.84 (H) 04/17/2023    HGB 12.8 04/17/2023    HCT 38.9 04/17/2023    MCV 78.4 (L) 04/17/2023     04/17/2023     Lab Results   Component Value Date    CHOL 137 11/23/2022    CHLPL 156 04/03/2023    TRIG 147 04/03/2023    HDL 40 04/03/2023    LDL 90 04/03/2023     Lab Results   Component Value Date    TSH 2.590 04/03/2023     No results found for: \"CKTOTAL\", \"CKMB\", \"CKMBINDEX\", \"TROPONINI\", \"TROPONINT\"  Lab Results   Component Value Date    HGBA1C 6.6 (H) 07/24/2023     No results " "found for: \"DDIMER\"  Lab Results   Component Value Date    ALT 33 04/03/2023     Lab Results   Component Value Date    HGBA1C 6.6 (H) 07/24/2023    HGBA1C 6.70 (H) 04/03/2023    HGBA1C 7.70 (H) 11/23/2022     Lab Results   Component Value Date    MICROALBUR <3.0 04/03/2023    CREATININE 0.98 08/30/2023     Lab Results   Component Value Date    TIBC 583 06/02/2020    FERRITIN 68 08/03/2020     No results found for: \"INR\", \"PROTIME\"    PAH RISK ASSESSMENT:            1. Pulmonary hypertension (HCC). PAH/PVH. WHO Group  1, 2, 3: primarily more 1 and 3 ; FC: III.  RV status: Abnormal:.  PFTs:  Restrictive with decreased DLCO.  . MPAP: 31. Maryanne: 2.9. Exercise pulse oximetry showed a minimum SpO2 = 86% on room air.  The Aguilar Index was updated today. Most recent score: 6.     VQ scan showed no evidence of CTEPH.  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 She does have HFpEF.  With that said, her PSG had no significant sleep apnea.  CT scan did have some GGOs, which have improved.  She may very well have a group 1 component.      However, she has marginal hemodynamics.  I do not think she will tolerate the addition of any PAH-specific therapies.      -Continue treatment of HFpEF, as below  - No current indication for PAH-specific medicines    I have asked the patient that if they were to move to be certain they see someone with expertise in PAH. These providers can be located at [email or web address].        2. HFpEF.  NYHA stage C; functional class III.  Today, euvolemic and perfused.  Clinical trajectory was reviewed today and is stable.      -Continue antihypertensive regimen  - Spironolactone 25 mg with quarterly assessment of GFR.  - SGLT2 inhibitors are not prescribed due to recurrent vaginal candidiasis   Lifestyle Advice:   - call office if I gain more than 2 pounds in one day or 5 pounds in one week  - keep legs up while sitting  - use salt in moderation  - watch for swelling in feet, ankles and legs every day  - weigh " myself daily  -call for concerning s/sx         3. Abnormality of right ventricle of heart.  NYHA stage  C ; FC-III.      RV is dilated. RVEF is abnormal.  RV CI is Abnormal:. RV status: RV ADAPTED-REMODELED with an ESVi of <; Maryanne is  2.9 .  TAPSE/PASP ratio is markedly abnormal and consistent with RV-PA uncoupling.     Patient's abnormal right ventricle is from pulmonary hypertension.    -Continue spironolactone       4. Hypoxia. LTOT.  Follow-up with pulmonary, as scheduled.         ORDERS PLACED TODAY:  Orders Placed This Encounter   Procedures   • Basic Metabolic Panel          MEDS ORDERED TODAY:    No orders of the defined types were placed in this encounter.         Return in about 3 months (around 3/19/2024).          This document has been electronically signed by Heron Martínez MD PhD on December 19, 2023 14:29 EST      Heron Martínez M.D., Ph.D., [facility]  System Medical Director for HF and PAH

## 2023-12-19 NOTE — ADDENDUM NOTE
Encounter addended by: Sharona Thomas MA on: 12/19/2023 4:17 PM   Actions taken: Charge Capture section accepted

## 2023-12-21 ENCOUNTER — TELEPHONE (OUTPATIENT)
Dept: CARDIOLOGY | Facility: HOSPITAL | Age: 73
End: 2023-12-21
Payer: MEDICARE

## 2023-12-21 NOTE — TELEPHONE ENCOUNTER
----- Message from Heron Martínez MD PhD sent at 12/20/2023  8:13 AM EST -----  Her labs are overall stable, though she did experience a slight decline in her kidney function.  This is just something we can monitor for now.

## 2024-03-04 RX ORDER — SPIRONOLACTONE 25 MG/1
25 TABLET ORAL DAILY
Qty: 30 TABLET | Refills: 0 | Status: SHIPPED | OUTPATIENT
Start: 2024-03-04

## 2024-03-19 ENCOUNTER — HOSPITAL ENCOUNTER (OUTPATIENT)
Dept: CARDIOLOGY | Facility: HOSPITAL | Age: 74
Discharge: HOME | End: 2024-03-19
Admitting: INTERNAL MEDICINE
Payer: MEDICARE

## 2024-03-19 VITALS
HEART RATE: 69 BPM | SYSTOLIC BLOOD PRESSURE: 129 MMHG | DIASTOLIC BLOOD PRESSURE: 55 MMHG | WEIGHT: 222.6 LBS | OXYGEN SATURATION: 94 % | HEIGHT: 64 IN | BODY MASS INDEX: 38 KG/M2

## 2024-03-19 DIAGNOSIS — Z71.89 ADVANCE CARE PLANNING: ICD-10-CM

## 2024-03-19 DIAGNOSIS — I27.20 PULMONARY HYPERTENSION: Primary | ICD-10-CM

## 2024-03-19 DIAGNOSIS — Q20.8 ABNORMALITY OF RIGHT VENTRICLE OF HEART: ICD-10-CM

## 2024-03-19 DIAGNOSIS — I50.32 CHRONIC HEART FAILURE WITH PRESERVED EJECTION FRACTION: ICD-10-CM

## 2024-03-19 PROCEDURE — 94726 PLETHYSMOGRAPHY LUNG VOLUMES: CPT | Performed by: INTERNAL MEDICINE

## 2024-03-19 PROCEDURE — G0463 HOSPITAL OUTPT CLINIC VISIT: HCPCS

## 2024-03-19 RX ORDER — FUROSEMIDE 20 MG/1
20 TABLET ORAL DAILY
COMMUNITY
Start: 2024-02-29 | End: 2024-03-19

## 2024-03-19 RX ORDER — SPIRONOLACTONE 25 MG/1
25 TABLET ORAL DAILY
Qty: 90 TABLET | Refills: 1 | Status: SHIPPED | OUTPATIENT
Start: 2024-03-19

## 2024-03-19 RX ORDER — FUROSEMIDE 20 MG/1
20 TABLET ORAL DAILY
Qty: 90 TABLET | Refills: 1 | Status: SHIPPED | OUTPATIENT
Start: 2024-03-19

## 2024-03-19 NOTE — ADDENDUM NOTE
Encounter addended by: Sharona Thomas MA on: 3/19/2024 10:54 AM   Actions taken: Charge Capture section accepted

## 2024-03-19 NOTE — ADDENDUM NOTE
Encounter addended by: Heron Martínez MD PhD on: 3/19/2024 10:42 AM   Actions taken: Care Teams modified, Clinical Note Signed

## 2024-03-19 NOTE — PROGRESS NOTES
Heart Failure & Pulmonary Arterial Hypertension Clinic  [facility]  Heron Martínez M.D., Ph.D., [facility]         History of Present Illness  This is a 74 y.o. female with:    1. PAH  A. RHC 2022:  Hemodynamics:  Right Atrium: Mean of 12 mmHg  Right Ventricle: 43/11 mmHg  Pulmonary Artery: 56/21/31 mmHg  Pulmonary Wedge: Mean of 16 mmHg  Cardiac Output/Index: 3.91 L/min 1.84 L/min/m2  PA Sat: 65%  AO Sat: 92%  PVR: 3.9 Wood units    2. RV abnormality    Sally Rivera is a 74 y.o. female who presents for today for PH.  The patient is typically seen by Edgar Mayers.  Patient's primary cardiologist is Dr. Christensen.      The patient has seen pulmonary medicine in the past.  Last spirometry showed FEV1/FVC of 89% of predicted.  FVC was 54% of predicted.  DLCO was 53% of predicted.  TLC was 68% of predicted and consistent with restrictive physiology.  Patient also has a history of abnormal CT scans.    In April 2022 she was found to have multiple nodular opacities with the largest of 2.1 cm.  There was bilateral increased septal thickening and some areas of GGOs.  Though, this improved on her most recent CT scan.  She had a CTA recently which showed no evidence of pulmonary embolism.  Pulmonary artery was dilated.  She remained with some areas of GGOs, but had decreased LAD from prior.  No history of VTE/PE.  No VQ scan.  No history of autoimmune or rheumatic diseases.  No family history of cardiomyopathy or pulmonary hypertension. 2D TTE was obtained and showed normal left ventricular function with indeterminate diastolic function.  Right ventricle was interpreted as normal.  It was interpreted as mild to moderate mitral regurgitation and mild to moderate tricuspid regurgitation with a PA systolic pressure of 49 mmHg.  She  was seen by cardiology, Dr. Christensen.  She was sent for the CTA of the chest with contrast, as aforementioned.  She was also sent for a RHC which has been completed.  Primary  complaint at that time was worsening exertional dyspnea and exercise intolerance.    RHC was performed and showed RA pressure of 12.  RV was 43/11.  PA pressure was 56/21 with a mean of 31.  Wedge was elevated to 15 mmHg.  PVR was abnormal at 3.9 Wood units. VD reactivity testing was not performed.  Fluid loading was not performed.    The patient returns to the clinic today.  She continues to see sleep medicine and pulmonary medicine.  She continues to wear LTOT.  Today, she reports stable exercise intolerance.  She remains on spironolactone at 25 mg daily.  She is able to obtain and afford her medications.  Denies adverse effects.  No orthopnea, PND, lower extremity edema, ascites, early abdominal satiety.    Review of Systems - Review of Systems   Cardiovascular:  Positive for dyspnea on exertion.   Respiratory:  Positive for cough and shortness of breath.    All other systems reviewed and are negative.       All other systems were reviewed and were negative.    Patient Active Problem List   Diagnosis    Chronic coronary artery disease    Gastroesophageal reflux disease    Hyperlipidemia    Nonalcoholic fatty liver disease    Atrial paroxysmal tachycardia    Type 2 diabetes mellitus with hyperglycemia, without long-term current use of insulin    History of rectal polypectomy    Diverticulosis of large intestine without hemorrhage    High risk medication use    Obesity, morbid, BMI 40.0-49.9    History of colon polyps    Clinical diagnosis of COVID-19    Pneumonia    Lung nodule    Pulmonary hypertension    Hypoxia    Abnormality of right ventricle of heart    Risk factors for obstructive sleep apnea    Chronic heart failure with preserved ejection fraction    Advance care planning       family history includes Diabetes in her father; Heart disease in her father; No Known Problems in her mother.     reports that she quit smoking about 34 years ago. Her smoking use included cigarettes. She started smoking about 53  years ago. She has a 20 pack-year smoking history. She has never used smokeless tobacco. She reports that she does not drink alcohol and does not use drugs.    No Known Allergies      Current Outpatient Medications:     aspirin 325 MG tablet, Take 1 tablet by mouth Daily., Disp: , Rfl:     atorvastatin (LIPITOR) 40 MG tablet, Take 1 tablet by mouth Daily. 200001, Disp: 90 tablet, Rfl: 3    cholecalciferol (VITAMIN D3) 25 MCG (1000 UT) tablet, Take 1 tablet by mouth Daily., Disp: , Rfl:     dilTIAZem (CARDIZEM) 120 MG tablet, Take 1 tablet by mouth 2 (Two) Times a Day., Disp: , Rfl:     Dulaglutide (Trulicity) 0.75 MG/0.5ML solution pen-injector, INJECT 0.75MG (1 PEN) UNDER THE SKIN EVERY WEEK, Disp: 12 mL, Rfl: 3    metFORMIN (GLUCOPHAGE) 500 MG tablet, TAKE 2 TABLETS BY MOUTH 2 TIMES A DAY WITH MEALS, Disp: 180 tablet, Rfl: 0    metoprolol tartrate (LOPRESSOR) 25 MG tablet, Take 1 tablet by mouth 2 (Two) Times a Day., Disp: , Rfl:     O2 (OXYGEN), Inhale 2 L/min. 24 -7, Disp: , Rfl:     omeprazole (priLOSEC) 20 MG capsule, TAKE 1 CAPSULE BY MOUTH EVERY DAY, Disp: 90 capsule, Rfl: 2    furosemide (LASIX) 20 MG tablet, Take 1 tablet by mouth Daily., Disp: 90 tablet, Rfl: 1    spironolactone (ALDACTONE) 25 MG tablet, Take 1 tablet by mouth Daily., Disp: 90 tablet, Rfl: 1      Physical Exam:  I have reviewed the patient's current vital signs as documented in the patient's EMR.   Vitals:    03/19/24 1014   BP: 129/55   Pulse: 69   SpO2: 94%         Body mass index is 38.19 kg/m².       03/19/24  1014   Weight: 101 kg (222 lb 9.6 oz)          Vitals reviewed.   Constitutional:       General: Not in acute distress.     Appearance: Normal and healthy appearance. Well-developed, well-groomed and not in distress. Not ill-appearing, toxic-appearing or diaphoretic.      Interventions: Not intubated.  Eyes:      Conjunctiva/sclera: Conjunctivae normal.      Pupils: Pupils are equal, round, and reactive to light.   Neck:       Vascular: No hepatojugular reflux, JVD or JVR. JVD normal with 6 cm of water.   Pulmonary:      Effort: Pulmonary effort is normal. No tachypnea, bradypnea, accessory muscle usage, prolonged expiration, respiratory distress or retractions. Not intubated.      Breath sounds: Normal breath sounds and air entry. No stridor, decreased air movement or transmitted upper airway sounds. No decreased breath sounds. No wheezing. No rhonchi. No rales.   Cardiovascular:      Regular rhythm. S1 with normal intensity. loud S2.       Murmurs: There is no murmur.      No gallop.  No click. No rub.   Neurological:      General: No focal deficit present.      Mental Status: Alert and oriented to person, place and time.   Psychiatric:         Behavior: Behavior is cooperative.           DATA REVIEWED:       ---------------------------------------------------  TTE/LENNY:  Results for orders placed during the hospital encounter of 11/27/23    Adult Transthoracic Echo Limited W/ Cont if Necessary Per Protocol    Interpretation Summary    Left ventricular systolic function is normal. Left ventricular ejection fraction appears to be 51 - 55%.    Normal right ventricular wall thickness and septal motion noted. The right ventricular cavity is mild to moderately dilated. Borderline low right ventricular systolic function noted.    Estimated RV systolic pressure from tricuspid regurgitation is mildly elevated (35-45 mmHg). Mild pulmonary hypertension is present.        My interpretation of the TTE is below.     LVEF is 55%.  RV is dilated with borderline low RVEF.  Mild pulmonary hypertension.        -----------------------------------------------------  RHC:                          Interpretation today: Right atrial pressure is elevated with normal waveforms and normal inspiratory decrease.  Abnormal right ventricle.  Pulmonary hypertension.          ----------------------------------------------------    PFTs:        I interpreted the PFTs.   "Restrictive physiology  --------------------------------------------------------------------------------------------------      Laboratory evaluations:    Lab Results   Component Value Date    GLUCOSE 90 12/19/2023    BUN 26 (H) 12/19/2023    CREATININE 1.20 (H) 12/19/2023    EGFRIFNONA 58 (L) 11/09/2021    EGFRIFAFRI 67 11/09/2021    BCR 21.7 12/19/2023    K 4.1 12/19/2023    CO2 24.8 12/19/2023    CALCIUM 10.0 12/19/2023    PROTENTOTREF 8.3 04/03/2023    ALBUMIN 4.9 04/03/2023    LABIL2 1.4 04/03/2023    AST 38 (H) 04/03/2023    ALT 33 04/03/2023     Lab Results   Component Value Date    WBC 10.84 (H) 04/17/2023    HGB 12.8 04/17/2023    HCT 38.9 04/17/2023    MCV 78.4 (L) 04/17/2023     04/17/2023     Lab Results   Component Value Date    CHOL 137 11/23/2022    CHLPL 156 04/03/2023    TRIG 147 04/03/2023    HDL 40 04/03/2023    LDL 90 04/03/2023     Lab Results   Component Value Date    TSH 2.590 04/03/2023     No results found for: \"CKTOTAL\", \"CKMB\", \"CKMBINDEX\", \"TROPONINI\", \"TROPONINT\"  Lab Results   Component Value Date    HGBA1C 6.6 (H) 07/24/2023     No results found for: \"DDIMER\"  Lab Results   Component Value Date    ALT 33 04/03/2023     Lab Results   Component Value Date    HGBA1C 6.6 (H) 07/24/2023    HGBA1C 6.70 (H) 04/03/2023    HGBA1C 7.70 (H) 11/23/2022     Lab Results   Component Value Date    MICROALBUR <3.0 04/03/2023    CREATININE 1.20 (H) 12/19/2023     Lab Results   Component Value Date    TIBC 583 06/02/2020    FERRITIN 68 08/03/2020     No results found for: \"INR\", \"PROTIME\"    PAH RISK ASSESSMENT:      ReDs Vest    Performed by: Heron Martínez MD PhD  Authorized by: Heron Martínez MD PhD    ReDS value:  34          1. Pulmonary hypertension (HCC). PAH/PVH. WHO Group  1, 2, 3: primarily more 1 and 3 ; FC: III.  RV status: Abnormal:.  PFTs:  Restrictive with decreased DLCO.  . MPAP: 31. Maryanne: 2.9. Exercise pulse oximetry showed a minimum SpO2 = 86% on room air.  The " Aguilar Index was updated today. Most recent score: 6.     VQ scan showed no evidence of CTEPH.  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 She does have HFpEF.  With that said, her PSG had no significant sleep apnea.  CT scan did have some GGOs, which have improved.  She may very well have a group 1 component.      She continues to have marginal hemodynamics.  I do not think she will tolerate the addition of any PAH-specific therapies at this point.    - Continue treatment of HFpEF, as below  - No current indication for PAH-specific medicines    I have asked the patient that if they were to move to be certain they see someone with expertise in PAH. These providers can be located at [email or web address].        2. HFpEF.  NYHA stage C; functional class III.  Today, she is euvolemic and perfused.  Clinical trajectory was reviewed today and is stable.    - Continue antihypertensive regimen  - Spironolactone 25 mg.  She has been on MRA therapy now at this point for over 1 year.  We can go to bi-annual surveillance.  - SGLT2 inhibitors are not prescribed due to recurrent vaginal candidiasis   Lifestyle Advice:   - call office if I gain more than 2 pounds in one day or 5 pounds in one week  - keep legs up while sitting  - use salt in moderation  - watch for swelling in feet, ankles and legs every day  - weigh myself daily  -call for concerning s/sx         3. Abnormality of right ventricle of heart.  NYHA stage  C ; FC-III.      RV is dilated. RVEF is abnormal.  RV CI is Abnormal:. RV status: RV ADAPTED-REMODELED with an ESVi of <; Maryanne is  2.9 .  TAPSE/PASP ratio is markedly abnormal and consistent with RV-PA uncoupling.     The patient's abnormal right ventricle is primarily from pulmonary hypertension.    - Continue spironolactone, as above       4. Advance Care Planning   ACP discussion was held with the patient during this visit. Patient has an advance directive in EMR which is still valid.      Advance Care  Planning:   A 16 minute discussion was had voluntarily with the patient regarding goals of care, explaining advanced directives, including the [place] [facility] DNR order, as well as the MOST form.  This discussion was had because of the patient's diagnosis of PAH/HFpEF.  I did spend some time explaining to the patient that if they wished to be DNR/DNI when admitted to the hospital that this needs to be made clear to the healthcare team. A DNR/DNI would need to be completed.  I've also encouraged the patient to discuss with their family their wishes.  I specifically discussed the MOST form.  I also explained how that in [place] [facility] only respects a [place] [facility] DNR form.  The patient would have to have an original [place] [facility] DNR order. Copies are not allowed.      For the [place] [facility] DNR form I did discuss two options for signing and validation: Option one is to take the form with and sign it in the presence of a Notary. The second option is to sign the form with two witnesses who are not related to the pt.   I also spent time explaining that in [place] [facility] are required to have physical possession of the EMS DNR order, as well as verify the identity of the individual.  Up to this point, resuscitation may begin.  As such, I strongly recommended that this form be prominently displayed, as well as discuss their wishes with family members and caregiver.      Aside from DNR/DNI, I did spend some time asking the patient's wishes in case they were admitted to our facility.  I spent some time describing full care versus limited care.  I did discuss mechanical ventilation and intubation.  I discussed the use of antibiotics, feeding tubes and IV fluids.     -The patient desires their CODE STATUS to be full code. She desires FULL CODE for issues deemed to be reversible, but does desire to be DNR in issues and situations that are not reversible. No LONG-TERM ventilation.   -does not want to complete a MOST form today.  -does not  want to complete a [place] [facility] DNR form today.  -does desire full scope of treatment including intubation, mechanical ventilation, defibrillation, cardioversion, IV fluids and medical treatment. Again, for situations that re reversible.     -does not desire comfort measures only. HOWEVER, in an irreversible situation she would desire comfort care.   -does not desire long-term IV fluids  -does not desire long-term feeding tube placement  -does not desire PEG placement  -does desire dialysis short-term     These decisions made today were made by an adult patient with decisional capacity.               ORDERS PLACED TODAY:  Orders Placed This Encounter   Procedures    ReDs Vest          MEDS ORDERED TODAY:    New Medications Ordered This Visit   Medications    furosemide (LASIX) 20 MG tablet     Sig: Take 1 tablet by mouth Daily.     Dispense:  90 tablet     Refill:  1    spironolactone (ALDACTONE) 25 MG tablet     Sig: Take 1 tablet by mouth Daily.     Dispense:  90 tablet     Refill:  1          Return in about 3 months (around 6/19/2024).          This document has been electronically signed by Heron Martínez MD PhD on March 19, 2024 10:38 EDT      Heron Martínez M.D., Ph.D., [facility]  System Medical Director for HF and PAH

## 2024-03-19 NOTE — LETTER
March 19, 2024       No Recipients    Patient: Sally Rivera   YOB: 1950   Date of Visit: 3/19/2024     Dear Edgar Mayers:       Thank you for referring Sally Rivera to me for evaluation. Below are the relevant portions of my assessment and plan of care.    If you have questions, please do not hesitate to call me. I look forward to following Sally along with you.         Sincerely,        Heron Martínez MD PhD        CC:   No Recipients    Heron Martínez MD PhD  03/19/24 1038  Signed  Heart Failure [facility] Pul[facility]pert[facility]  [facility]  Heron Martínez M.D., Ph.D., [facility]         History of Present Illness  This is a 74 y.o. female with:    1. PAH  A. RHC 2022:  Hemodynamics:  Right Atrium: Mean of 12 mmHg  Right Ventricle: 43/11 mmHg  Pulmonary Artery: 56/21/31 mmHg  Pulmonary Wedge: Mean of 16 mmHg  Cardiac Output/Index: 3.91 L/min 1.84 L/min/m2  PA Sat: 65%  AO Sat: 92%  PVR: 3.9 Wood units    2. RV abnormality    Sally Rivera is a 74 y.o. female who presents for today for PH.  The patient is typically seen by Edgar Mayers.  Patient's primary cardiologist is Dr. Christensen.      The patient has seen pulmonary medicine in the past.  Last spirometry showed FEV1/FVC of 89% of predicted.  FVC was 54% of predicted.  DLCO was 53% of predicted.  TLC was 68% of predicted and consistent with restrictive physiology.  Patient also has a history of abnormal CT scans.    In April 2022 she was found to have multiple nodular opacities with the largest of 2.1 cm.  There was bilateral increased septal thickening and some areas of GGOs.  Though, this improved on her most recent CT scan.  She had a CTA recently which showed no evidence of pulmonary embolism.  Pulmonary artery was dilated.  She remained with some areas of GGOs, but had decreased LAD from prior.  No history of VTE/PE.  No VQ scan.  No history of autoimmune or rheumatic diseases.  No  family history of cardiomyopathy or pulmonary hypertension. 2D TTE was obtained and showed normal left ventricular function with indeterminate diastolic function.  Right ventricle was interpreted as normal.  It was interpreted as mild to moderate mitral regurgitation and mild to moderate tricuspid regurgitation with a PA systolic pressure of 49 mmHg.  She  was seen by cardiology, Dr. Christensen.  She was sent for the CTA of the chest with contrast, as aforementioned.  She was also sent for a RHC which has been completed.  Primary complaint at that time was worsening exertional dyspnea and exercise intolerance.    RHC was performed and showed RA pressure of 12.  RV was 43/11.  PA pressure was 56/21 with a mean of 31.  Wedge was elevated to 15 mmHg.  PVR was abnormal at 3.9 Wood units. VD reactivity testing was not performed.  Fluid loading was not performed.    The patient returns to the clinic today.  She continues to see sleep medicine and pulmonary medicine.  She continues to wear LTOT.  Today, she reports stable exercise intolerance.  She remains on spironolactone at 25 mg daily.  She is able to obtain and afford her medications.  Denies adverse effects.  No orthopnea, PND, lower extremity edema, ascites, early abdominal satiety.    Review of Systems - Review of Systems   Cardiovascular:  Positive for dyspnea on exertion.   Respiratory:  Positive for cough and shortness of breath.    All other systems reviewed and are negative.       All other systems were reviewed and were negative.    Patient Active Problem List   Diagnosis   • Chronic coronary artery disease   • Gastroesophageal reflux disease   • Hyperlipidemia   • Nonalcoholic fatty liver disease   • Atrial paroxysmal tachycardia   • Type 2 diabetes mellitus with hyperglycemia, without long-term current use of insulin   • History of rectal polypectomy   • Diverticulosis of large intestine without hemorrhage   • High risk medication use   • Obesity, morbid, BMI  40.0-49.9   • History of colon polyps   • Clinical diagnosis of COVID-19   • Pneumonia   • Lung nodule   • Pulmonary hypertension   • Hypoxia   • Abnormality of right ventricle of heart   • Risk factors for obstructive sleep apnea   • Chronic heart failure with preserved ejection fraction   • Advance care planning       family history includes Diabetes in her father; Heart disease in her father; No Known Problems in her mother.     reports that she quit smoking about 34 years ago. Her smoking use included cigarettes. She started smoking about 53 years ago. She has a 20 pack-year smoking history. She has never used smokeless tobacco. She reports that she does not drink alcohol and does not use drugs.    No Known Allergies      Current Outpatient Medications:   •  aspirin 325 MG tablet, Take 1 tablet by mouth Daily., Disp: , Rfl:   •  atorvastatin (LIPITOR) 40 MG tablet, Take 1 tablet by mouth Daily. 200001, Disp: 90 tablet, Rfl: 3  •  cholecalciferol (VITAMIN D3) 25 MCG (1000 UT) tablet, Take 1 tablet by mouth Daily., Disp: , Rfl:   •  dilTIAZem (CARDIZEM) 120 MG tablet, Take 1 tablet by mouth 2 (Two) Times a Day., Disp: , Rfl:   •  Dulaglutide (Trulicity) 0.75 MG/0.5ML solution pen-injector, INJECT 0.75MG (1 PEN) UNDER THE SKIN EVERY WEEK, Disp: 12 mL, Rfl: 3  •  metFORMIN (GLUCOPHAGE) 500 MG tablet, TAKE 2 TABLETS BY MOUTH 2 TIMES A DAY WITH MEALS, Disp: 180 tablet, Rfl: 0  •  metoprolol tartrate (LOPRESSOR) 25 MG tablet, Take 1 tablet by mouth 2 (Two) Times a Day., Disp: , Rfl:   •  O2 (OXYGEN), Inhale 2 L/min. 24 -7, Disp: , Rfl:   •  omeprazole (priLOSEC) 20 MG capsule, TAKE 1 CAPSULE BY MOUTH EVERY DAY, Disp: 90 capsule, Rfl: 2  •  furosemide (LASIX) 20 MG tablet, Take 1 tablet by mouth Daily., Disp: 90 tablet, Rfl: 1  •  spironolactone (ALDACTONE) 25 MG tablet, Take 1 tablet by mouth Daily., Disp: 90 tablet, Rfl: 1      Physical Exam:  I have reviewed the patient's current vital signs as documented in the  patient's EMR.   Vitals:    03/19/24 1014   BP: 129/55   Pulse: 69   SpO2: 94%         Body mass index is 38.19 kg/m².       03/19/24  1014   Weight: 101 kg (222 lb 9.6 oz)          Vitals reviewed.   Constitutional:       General: Not in acute distress.     Appearance: Normal and healthy appearance. Well-developed, well-groomed and not in distress. Not ill-appearing, toxic-appearing or diaphoretic.      Interventions: Not intubated.  Eyes:      Conjunctiva/sclera: Conjunctivae normal.      Pupils: Pupils are equal, round, and reactive to light.   Neck:      Vascular: No hepatojugular reflux, JVD or JVR. JVD normal with 6 cm of water.   Pulmonary:      Effort: Pulmonary effort is normal. No tachypnea, bradypnea, accessory muscle usage, prolonged expiration, respiratory distress or retractions. Not intubated.      Breath sounds: Normal breath sounds and air entry. No stridor, decreased air movement or transmitted upper airway sounds. No decreased breath sounds. No wheezing. No rhonchi. No rales.   Cardiovascular:      Regular rhythm. S1 with normal intensity. loud S2.       Murmurs: There is no murmur.      No gallop.  No click. No rub.   Neurological:      General: No focal deficit present.      Mental Status: Alert and oriented to person, place and time.   Psychiatric:         Behavior: Behavior is cooperative.           DATA REVIEWED:       ---------------------------------------------------  TTE/LENNY:  Results for orders placed during the hospital encounter of 11/27/23    Adult Transthoracic Echo Limited W/ Cont if Necessary Per Protocol    Interpretation Summary  •  Left ventricular systolic function is normal. Left ventricular ejection fraction appears to be 51 - 55%.  •  Normal right ventricular wall thickness and septal motion noted. The right ventricular cavity is mild to moderately dilated. Borderline low right ventricular systolic function noted.  •  Estimated RV systolic pressure from tricuspid  "regurgitation is mildly elevated (35-45 mmHg). Mild pulmonary hypertension is present.        My interpretation of the TTE is below.     LVEF is 55%.  RV is dilated with borderline low RVEF.  Mild pulmonary hypertension.        -----------------------------------------------------  RHC:                          Interpretation today: Right atrial pressure is elevated with normal waveforms and normal inspiratory decrease.  Abnormal right ventricle.  Pulmonary hypertension.          ----------------------------------------------------    PFTs:        I interpreted the PFTs.  Restrictive physiology  --------------------------------------------------------------------------------------------------      Laboratory evaluations:    Lab Results   Component Value Date    GLUCOSE 90 12/19/2023    BUN 26 (H) 12/19/2023    CREATININE 1.20 (H) 12/19/2023    EGFRIFNONA 58 (L) 11/09/2021    EGFRIFAFRI 67 11/09/2021    BCR 21.7 12/19/2023    K 4.1 12/19/2023    CO2 24.8 12/19/2023    CALCIUM 10.0 12/19/2023    PROTENTOTREF 8.3 04/03/2023    ALBUMIN 4.9 04/03/2023    LABIL2 1.4 04/03/2023    AST 38 (H) 04/03/2023    ALT 33 04/03/2023     Lab Results   Component Value Date    WBC 10.84 (H) 04/17/2023    HGB 12.8 04/17/2023    HCT 38.9 04/17/2023    MCV 78.4 (L) 04/17/2023     04/17/2023     Lab Results   Component Value Date    CHOL 137 11/23/2022    CHLPL 156 04/03/2023    TRIG 147 04/03/2023    HDL 40 04/03/2023    LDL 90 04/03/2023     Lab Results   Component Value Date    TSH 2.590 04/03/2023     No results found for: \"CKTOTAL\", \"CKMB\", \"CKMBINDEX\", \"TROPONINI\", \"TROPONINT\"  Lab Results   Component Value Date    HGBA1C 6.6 (H) 07/24/2023     No results found for: \"DDIMER\"  Lab Results   Component Value Date    ALT 33 04/03/2023     Lab Results   Component Value Date    HGBA1C 6.6 (H) 07/24/2023    HGBA1C 6.70 (H) 04/03/2023    HGBA1C 7.70 (H) 11/23/2022     Lab Results   Component Value Date    MICROALBUR <3.0 04/03/2023    " "CREATININE 1.20 (H) 12/19/2023     Lab Results   Component Value Date    TIBC 583 06/02/2020    FERRITIN 68 08/03/2020     No results found for: \"INR\", \"PROTIME\"    PAH RISK ASSESSMENT:      ReDs Vest    Performed by: Heron Martínez MD PhD  Authorized by: Heron Martínez MD PhD    ReDS value:  34          1. Pulmonary hypertension (HCC). PAH/PVH. WHO Group  1, 2, 3: primarily more 1 and 3 ; FC: III.  RV status: Abnormal:.  PFTs:  Restrictive with decreased DLCO.  . MPAP: 31. Maryanne: 2.9. Exercise pulse oximetry showed a minimum SpO2 = 86% on room air.  The Aguilar Index was updated today. Most recent score: 6.     VQ scan showed no evidence of CTEPH.  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 She does have HFpEF.  With that said, her PSG had no significant sleep apnea.  CT scan did have some GGOs, which have improved.  She may very well have a group 1 component.      She continues to have marginal hemodynamics.  I do not think she will tolerate the addition of any PAH-specific therapies at this point.    - Continue treatment of HFpEF, as below  - No current indication for PAH-specific medicines    I have asked the patient that if they were to move to be certain they see someone with expertise in PAH. These providers can be located at [email or web address].        2. HFpEF.  NYHA stage C; functional class III.  Today, she is euvolemic and perfused.  Clinical trajectory was reviewed today and is stable.    - Continue antihypertensive regimen  - Spironolactone 25 mg.  She has been on MRA therapy now at this point for over 1 year.  We can go to bi-annual surveillance.  - SGLT2 inhibitors are not prescribed due to recurrent vaginal candidiasis   Lifestyle Advice:   - call office if I gain more than 2 pounds in one day or 5 pounds in one week  - keep legs up while sitting  - use salt in moderation  - watch for swelling in feet, ankles and legs every day  - weigh myself daily  -call for concerning s/sx         3. " Abnormality of right ventricle of heart.  NYHA stage  C ; FC-III.      RV is dilated. RVEF is abnormal.  RV CI is Abnormal:. RV status: RV ADAPTED-REMODELED with an ESVi of <; Maryanne is  2.9 .  TAPSE/PASP ratio is markedly abnormal and consistent with RV-PA uncoupling.     The patient's abnormal right ventricle is primarily from pulmonary hypertension.    - Continue spironolactone, as above       4. Advance Care Planning   ACP discussion was held with the patient during this visit. Patient has an advance directive in EMR which is still valid.      Advance Care Planning:   A 16 minute discussion was had voluntarily with the patient regarding goals of care, explaining advanced directives, including the [place] [facility] DNR order, as well as the MOST form.  This discussion was had because of the patient's diagnosis of PAH/HFpEF.  I did spend some time explaining to the patient that if they wished to be DNR/DNI when admitted to the hospital that this needs to be made clear to the healthcare team. A DNR/DNI would need to be completed.  I've also encouraged the patient to discuss with their family their wishes.  I specifically discussed the MOST form.  I also explained how that in [place] [facility] only respects a [place] [facility] DNR form.  The patient would have to have an original [place] [facility] DNR order. Copies are not allowed.      For the [place] [facility] DNR form I did discuss two options for signing and validation: Option one is to take the form with and sign it in the presence of a Notary. The second option is to sign the form with two witnesses who are not related to the pt.   I also spent time explaining that in [place] [facility] are required to have physical possession of the EMS DNR order, as well as verify the identity of the individual.  Up to this point, resuscitation may begin.  As such, I strongly recommended that this form be prominently displayed, as well as discuss their wishes with family members and caregiver.       Aside from DNR/DNI, I did spend some time asking the patient's wishes in case they were admitted to our facility.  I spent some time describing full care versus limited care.  I did discuss mechanical ventilation and intubation.  I discussed the use of antibiotics, feeding tubes and IV fluids.     -The patient desires their CODE STATUS to be full code. She desires FULL CODE for issues deemed to be reversible, but does desire to be DNR in issues and situations that are not reversible. No LONG-TERM ventilation.   -does not want to complete a MOST form today.  -does not want to complete a [facility] DNR form today.  -does desire full scope of treatment including intubation, mechanical ventilation, defibrillation, cardioversion, IV fluids and medical treatment. Again, for situations that re reversible.     -does not desire comfort measures only. HOWEVER, in an irreversible situation she would desire comfort care.   -does not desire long-term IV fluids  -does not desire long-term feeding tube placement  -does not desire PEG placement  -does desire dialysis short-term     These decisions made today were made by an adult patient with decisional capacity.               ORDERS PLACED TODAY:  Orders Placed This Encounter   Procedures   • ReDs Vest          MEDS ORDERED TODAY:    New Medications Ordered This Visit   Medications   • furosemide (LASIX) 20 MG tablet     Sig: Take 1 tablet by mouth Daily.     Dispense:  90 tablet     Refill:  1   • spironolactone (ALDACTONE) 25 MG tablet     Sig: Take 1 tablet by mouth Daily.     Dispense:  90 tablet     Refill:  1          Return in about 3 months (around 6/19/2024).          This document has been electronically signed by Heron Martínez MD PhD on March 19, 2024 10:38 EDT      Heron Martínez M.D., Ph.D., [facility]  System Medical Director for HF and PAH

## 2024-03-23 ENCOUNTER — APPOINTMENT (OUTPATIENT)
Dept: GENERAL RADIOLOGY | Facility: HOSPITAL | Age: 74
End: 2024-03-23
Payer: MEDICARE

## 2024-03-23 ENCOUNTER — HOSPITAL ENCOUNTER (EMERGENCY)
Facility: HOSPITAL | Age: 74
Discharge: HOME | End: 2024-03-23
Attending: EMERGENCY MEDICINE
Payer: MEDICARE

## 2024-03-23 VITALS
HEIGHT: 64 IN | OXYGEN SATURATION: 97 % | DIASTOLIC BLOOD PRESSURE: 67 MMHG | HEART RATE: 66 BPM | WEIGHT: 222.6 LBS | BODY MASS INDEX: 38 KG/M2 | SYSTOLIC BLOOD PRESSURE: 119 MMHG | RESPIRATION RATE: 20 BRPM | TEMPERATURE: 97.8 F

## 2024-03-23 DIAGNOSIS — J96.11 CHRONIC RESPIRATORY FAILURE WITH HYPOXIA: ICD-10-CM

## 2024-03-23 DIAGNOSIS — U07.1 COVID-19 VIRUS INFECTION: Primary | ICD-10-CM

## 2024-03-23 LAB
ALBUMIN SERPL-MCNC: 4.1 G/DL (ref 3.5–5.2)
ALBUMIN/GLOB SERPL: 1.2 G/DL
ALP SERPL-CCNC: 68 U/L (ref 39–117)
ALT SERPL W P-5'-P-CCNC: 16 U/L (ref 1–33)
ANION GAP SERPL CALCULATED.3IONS-SCNC: 15.9 MMOL/L (ref 5–15)
AST SERPL-CCNC: 24 U/L (ref 1–32)
B PARAPERT DNA SPEC QL NAA+PROBE: NOT DETECTED
B PERT DNA SPEC QL NAA+PROBE: NOT DETECTED
BASOPHILS # BLD AUTO: 0.07 10*3/MM3 (ref 0–0.2)
BASOPHILS NFR BLD AUTO: 0.8 % (ref 0–1.5)
BILIRUB SERPL-MCNC: 0.7 MG/DL (ref 0–1.2)
BUN SERPL-MCNC: 15 MG/DL (ref 8–23)
BUN/CREAT SERPL: 14.3 (ref 7–25)
C PNEUM DNA NPH QL NAA+NON-PROBE: NOT DETECTED
CALCIUM SPEC-SCNC: 8.7 MG/DL (ref 8.6–10.5)
CHLORIDE SERPL-SCNC: 100 MMOL/L (ref 98–107)
CO2 SERPL-SCNC: 20.1 MMOL/L (ref 22–29)
CREAT SERPL-MCNC: 1.05 MG/DL (ref 0.57–1)
D-LACTATE SERPL-SCNC: 2.4 MMOL/L (ref 0.5–2)
DEPRECATED RDW RBC AUTO: 40.8 FL (ref 37–54)
EGFRCR SERPLBLD CKD-EPI 2021: 56.2 ML/MIN/1.73
EOSINOPHIL # BLD AUTO: 0.19 10*3/MM3 (ref 0–0.4)
EOSINOPHIL NFR BLD AUTO: 2.2 % (ref 0.3–6.2)
ERYTHROCYTE [DISTWIDTH] IN BLOOD BY AUTOMATED COUNT: 14.2 % (ref 12.3–15.4)
FLUAV SUBTYP SPEC NAA+PROBE: NOT DETECTED
FLUBV RNA ISLT QL NAA+PROBE: NOT DETECTED
GLOBULIN UR ELPH-MCNC: 3.4 GM/DL
GLUCOSE SERPL-MCNC: 100 MG/DL (ref 65–99)
HADV DNA SPEC NAA+PROBE: NOT DETECTED
HCOV 229E RNA SPEC QL NAA+PROBE: NOT DETECTED
HCOV HKU1 RNA SPEC QL NAA+PROBE: NOT DETECTED
HCOV NL63 RNA SPEC QL NAA+PROBE: NOT DETECTED
HCOV OC43 RNA SPEC QL NAA+PROBE: NOT DETECTED
HCT VFR BLD AUTO: 38 % (ref 34–46.6)
HGB BLD-MCNC: 12.2 G/DL (ref 12–15.9)
HMPV RNA NPH QL NAA+NON-PROBE: NOT DETECTED
HPIV1 RNA ISLT QL NAA+PROBE: NOT DETECTED
HPIV2 RNA SPEC QL NAA+PROBE: NOT DETECTED
HPIV3 RNA NPH QL NAA+PROBE: NOT DETECTED
HPIV4 P GENE NPH QL NAA+PROBE: NOT DETECTED
IMM GRANULOCYTES # BLD AUTO: 0.02 10*3/MM3 (ref 0–0.05)
IMM GRANULOCYTES NFR BLD AUTO: 0.2 % (ref 0–0.5)
LYMPHOCYTES # BLD AUTO: 3.19 10*3/MM3 (ref 0.7–3.1)
LYMPHOCYTES NFR BLD AUTO: 36.6 % (ref 19.6–45.3)
M PNEUMO IGG SER IA-ACNC: NOT DETECTED
MCH RBC QN AUTO: 25.6 PG (ref 26.6–33)
MCHC RBC AUTO-ENTMCNC: 32.1 G/DL (ref 31.5–35.7)
MCV RBC AUTO: 79.7 FL (ref 79–97)
MONOCYTES # BLD AUTO: 0.6 10*3/MM3 (ref 0.1–0.9)
MONOCYTES NFR BLD AUTO: 6.9 % (ref 5–12)
NEUTROPHILS NFR BLD AUTO: 4.65 10*3/MM3 (ref 1.7–7)
NEUTROPHILS NFR BLD AUTO: 53.3 % (ref 42.7–76)
NRBC BLD AUTO-RTO: 0 /100 WBC (ref 0–0.2)
NT-PROBNP SERPL-MCNC: 265 PG/ML (ref 0–900)
PLATELET # BLD AUTO: 234 10*3/MM3 (ref 140–450)
PMV BLD AUTO: 9.1 FL (ref 6–12)
POTASSIUM SERPL-SCNC: 3.9 MMOL/L (ref 3.5–5.2)
PROCALCITONIN SERPL-MCNC: 0.04 NG/ML (ref 0–0.25)
PROT SERPL-MCNC: 7.5 G/DL (ref 6–8.5)
RBC # BLD AUTO: 4.77 10*6/MM3 (ref 3.77–5.28)
RHINOVIRUS RNA SPEC NAA+PROBE: NOT DETECTED
RSV RNA NPH QL NAA+NON-PROBE: NOT DETECTED
SARS-COV-2 RNA NPH QL NAA+NON-PROBE: DETECTED
SODIUM SERPL-SCNC: 136 MMOL/L (ref 136–145)
TROPONIN T SERPL HS-MCNC: 15 NG/L
WBC NRBC COR # BLD AUTO: 8.72 10*3/MM3 (ref 3.4–10.8)

## 2024-03-23 PROCEDURE — 83605 ASSAY OF LACTIC ACID: CPT | Performed by: EMERGENCY MEDICINE

## 2024-03-23 PROCEDURE — 99284 EMERGENCY DEPT VISIT MOD MDM: CPT

## 2024-03-23 PROCEDURE — 93005 ELECTROCARDIOGRAM TRACING: CPT | Performed by: EMERGENCY MEDICINE

## 2024-03-23 PROCEDURE — 71045 X-RAY EXAM CHEST 1 VIEW: CPT

## 2024-03-23 PROCEDURE — 0202U NFCT DS 22 TRGT SARS-COV-2: CPT | Performed by: EMERGENCY MEDICINE

## 2024-03-23 PROCEDURE — 84145 PROCALCITONIN (PCT): CPT | Performed by: EMERGENCY MEDICINE

## 2024-03-23 PROCEDURE — 25810000003 LACTATED RINGERS SOLUTION: Performed by: EMERGENCY MEDICINE

## 2024-03-23 PROCEDURE — 85025 COMPLETE CBC W/AUTO DIFF WBC: CPT | Performed by: EMERGENCY MEDICINE

## 2024-03-23 PROCEDURE — 84484 ASSAY OF TROPONIN QUANT: CPT | Performed by: EMERGENCY MEDICINE

## 2024-03-23 PROCEDURE — 93010 ELECTROCARDIOGRAM REPORT: CPT | Performed by: INTERNAL MEDICINE

## 2024-03-23 PROCEDURE — 83880 ASSAY OF NATRIURETIC PEPTIDE: CPT | Performed by: EMERGENCY MEDICINE

## 2024-03-23 PROCEDURE — 80053 COMPREHEN METABOLIC PANEL: CPT | Performed by: EMERGENCY MEDICINE

## 2024-03-23 RX ORDER — SODIUM CHLORIDE 0.9 % (FLUSH) 0.9 %
10 SYRINGE (ML) INJECTION AS NEEDED
Status: DISCONTINUED | OUTPATIENT
Start: 2024-03-23 | End: 2024-03-23 | Stop reason: HOSPADM

## 2024-03-23 RX ORDER — GUAIFENESIN AND DEXTROMETHORPHAN HYDROBROMIDE 600; 30 MG/1; MG/1
1 TABLET, EXTENDED RELEASE ORAL 2 TIMES DAILY PRN
Qty: 20 TABLET | Refills: 0 | Status: SHIPPED | OUTPATIENT
Start: 2024-03-23

## 2024-03-23 RX ADMIN — SODIUM CHLORIDE, POTASSIUM CHLORIDE, SODIUM LACTATE AND CALCIUM CHLORIDE 1000 ML: 600; 310; 30; 20 INJECTION, SOLUTION INTRAVENOUS at 13:56

## 2024-03-23 NOTE — DISCHARGE INSTRUCTIONS
Take medication as prescribed.  Follow-up with your primary care provider if symptoms do not improve in the next few days.  Return to the emergency department for worsening shortness of breath, fever, chest pain, or other concern.

## 2024-03-23 NOTE — ED PROVIDER NOTES
EMERGENCY DEPARTMENT ENCOUNTER    Room Number:  16/16  PCP: Edgar Mayers  Historian: Patient, spouse      HPI:  Chief Complaint: Cough, positive COVID test  A complete HPI/ROS/PMH/PSH/SH/FH are unobtainable due to: Nothing  Context: Sally Rivera is a 74 y.o. female with a medical history of pulmonary hypertension, hypertension, diabetes, hyperlipidemia and chronic hypoxic respiratory failure who presents to the ED c/o acute cough and a positive COVID test.  Patient complains of a nonproductive cough for the past few days.  She had a positive home COVID test 2 days ago.  She also reports increased fatigue and clear nasal drainage.  She has chronic dyspnea which is not significantly changed from baseline.  She is on 2 L of oxygen at all times.  Denies fever, chills, wheezing, chest pain, abdominal pain, vomiting, diarrhea, leg pain, or leg swelling.            PAST MEDICAL HISTORY  Active Ambulatory Problems     Diagnosis Date Noted    Chronic coronary artery disease 02/24/2016    Gastroesophageal reflux disease 02/24/2016    Hyperlipidemia 02/24/2016    Nonalcoholic fatty liver disease 02/24/2016    Atrial paroxysmal tachycardia 02/24/2016    Type 2 diabetes mellitus with hyperglycemia, without long-term current use of insulin 02/24/2016    History of rectal polypectomy 03/30/2017    Diverticulosis of large intestine without hemorrhage 09/24/2018    High risk medication use 09/24/2018    Obesity, morbid, BMI 40.0-49.9 06/02/2020    History of colon polyps 08/23/2021    Clinical diagnosis of COVID-19 09/17/2021    Pneumonia 07/29/2022    Lung nodule 07/29/2022    Pulmonary hypertension 11/11/2022    Hypoxia 11/11/2022    Abnormality of right ventricle of heart 11/29/2022    Risk factors for obstructive sleep apnea 01/16/2023    Chronic heart failure with preserved ejection fraction 01/16/2023    Advance care planning 03/19/2024     Resolved Ambulatory Problems     Diagnosis Date Noted    Hypertension  2016     Past Medical History:   Diagnosis Date    Acid reflux     Asthma 2022    CAD (coronary artery disease)     Diabetes mellitus type I     Diverticulitis     Elevated cholesterol     GERD (gastroesophageal reflux disease)     Heart attack     Liver disease          PAST SURGICAL HISTORY  Past Surgical History:   Procedure Laterality Date    CARDIAC CATHETERIZATION N/A 2022    Procedure: Right Heart Cath with vasodilator as indicated. pHTN workup;  Surgeon: Waqar Rush MD;  Location:  [facility];  Service: Cardiovascular;  Laterality: N/A;    CHOLECYSTECTOMY      COLON SURGERY      bowel resection for obstruction    COLONOSCOPY      Dr. Sky    COLONOSCOPY N/A 10/07/2021    Procedure: COLONOSCOPY TO CECUM WITH COLD POLYPECTOMY;  Surgeon: Facundo Klein Jr., MD;  Location:  [facility];  Service: General;  Laterality: N/A;  PRE- H/O COLON POLYPS  POST-COLON POLYP, DIVERTICULOSIS    HYSTERECTOMY      MOUTH BIOPSY      OOPHORECTOMY      TUBAL ABDOMINAL LIGATION      US GUIDED LYMPH NODE BIOPSY  2020         FAMILY HISTORY  Family History   Problem Relation Age of Onset    No Known Problems Mother     Diabetes Father     Heart disease Father          SOCIAL HISTORY  Social History     Socioeconomic History    Marital status:    Tobacco Use    Smoking status: Former     Current packs/day: 0.00     Average packs/day: 1 pack/day for 20.0 years (20.0 ttl pk-yrs)     Types: Cigarettes     Start date: 10/1/1970     Quit date: 1990     Years since quittin.1    Smokeless tobacco: Never   Vaping Use    Vaping status: Never Used   Substance and Sexual Activity    Alcohol use: No    Drug use: No    Sexual activity: Not Currently     Partners: Male         ALLERGIES  Patient has no known allergies.    REVIEW OF SYSTEMS  Review of Systems  Included in HPI  All systems reviewed and negative except for those discussed in HPI.      PHYSICAL  EXAM  ED Triage Vitals [03/23/24 1215]   Temp Heart Rate Resp BP SpO2   97.8 °F (36.6 °C) 75 18 -- 90 %      Temp src Heart Rate Source Patient Position BP Location FiO2 (%)   Tympanic -- -- -- --       Physical Exam      GENERAL: Awake, alert, oriented x 3.  Well-developed, well-nourished and nontoxic-appearing elderly female.  Resting comfortably in no acute distress  HENT: NCAT, nares patent  EYES: no scleral icterus  CV: regular rhythm, normal rate  RESPIRATORY: normal effort, clear to auscultation bilaterally  ABDOMEN: soft, nondistended, nontender  MUSCULOSKELETAL: Extremities are nontender with full range of motion.  No calf tenderness.  No pedal edema  NEURO: Speech is normal.  No facial droop.  Follows commands  PSYCH:  calm, cooperative  SKIN: warm, dry    Vital signs and nursing notes reviewed.          LAB RESULTS  Recent Results (from the past 24 hour(s))   ECG 12 Lead Dyspnea    Collection Time: 03/23/24 12:40 PM   Result Value Ref Range    QT Interval 432 ms    QTC Interval 450 ms   Comprehensive Metabolic Panel    Collection Time: 03/23/24 12:41 PM    Specimen: Blood   Result Value Ref Range    Glucose 100 (H) 65 - 99 mg/dL    BUN 15 8 - 23 mg/dL    Creatinine 1.05 (H) 0.57 - 1.00 mg/dL    Sodium 136 136 - 145 mmol/L    Potassium 3.9 3.5 - 5.2 mmol/L    Chloride 100 98 - 107 mmol/L    CO2 20.1 (L) 22.0 - 29.0 mmol/L    Calcium 8.7 8.6 - 10.5 mg/dL    Total Protein 7.5 6.0 - 8.5 g/dL    Albumin 4.1 3.5 - 5.2 g/dL    ALT (SGPT) 16 1 - 33 U/L    AST (SGOT) 24 1 - 32 U/L    Alkaline Phosphatase 68 39 - 117 U/L    Total Bilirubin 0.7 0.0 - 1.2 mg/dL    Globulin 3.4 gm/dL    A/G Ratio 1.2 g/dL    BUN/Creatinine Ratio 14.3 7.0 - 25.0    Anion Gap 15.9 (H) 5.0 - 15.0 mmol/L    eGFR 56.2 (L) >60.0 mL/min/1.73   Procalcitonin    Collection Time: 03/23/24 12:41 PM    Specimen: Blood   Result Value Ref Range    Procalcitonin 0.04 0.00 - 0.25 ng/mL   Respiratory Panel PCR w/COVID-19(SARS-CoV-2)  [facility], NP Swab in UTM/VTM, 2 HR TAT - Swab, Nasopharynx    Collection Time: 03/23/24 12:41 PM    Specimen: Nasopharynx; Swab   Result Value Ref Range    ADENOVIRUS, PCR Not Detected Not Detected    Coronavirus 229E Not Detected Not Detected    Coronavirus HKU1 Not Detected Not Detected    Coronavirus NL63 Not Detected Not Detected    Coronavirus OC43 Not Detected Not Detected    COVID19 Detected (C) Not Detected - Ref. Range    Human Metapneumovirus Not Detected Not Detected    Human Rhinovirus/Enterovirus Not Detected Not Detected    Influenza A PCR Not Detected Not Detected    Influenza B PCR Not Detected Not Detected    Parainfluenza Virus 1 Not Detected Not Detected    Parainfluenza Virus 2 Not Detected Not Detected    Parainfluenza Virus 3 Not Detected Not Detected    Parainfluenza Virus 4 Not Detected Not Detected    RSV, PCR Not Detected Not Detected    Bordetella pertussis pcr Not Detected Not Detected    Bordetella parapertussis PCR Not Detected Not Detected    Chlamydophila pneumoniae PCR Not Detected Not Detected    Mycoplasma pneumo by PCR Not Detected Not Detected   Lactic Acid, Plasma    Collection Time: 03/23/24 12:41 PM    Specimen: Blood   Result Value Ref Range    Lactate 2.4 (C) 0.5 - 2.0 mmol/L   Single High Sensitivity Troponin T    Collection Time: 03/23/24 12:41 PM    Specimen: Blood   Result Value Ref Range    HS Troponin T 15 (H) <14 ng/L   BNP    Collection Time: 03/23/24 12:41 PM    Specimen: Blood   Result Value Ref Range    proBNP 265.0 0.0 - 900.0 pg/mL   CBC Auto Differential    Collection Time: 03/23/24 12:41 PM    Specimen: Blood   Result Value Ref Range    WBC 8.72 3.40 - 10.80 10*3/mm3    RBC 4.77 3.77 - 5.28 10*6/mm3    Hemoglobin 12.2 12.0 - 15.9 g/dL    Hematocrit 38.0 34.0 - 46.6 %    MCV 79.7 79.0 - 97.0 fL    MCH 25.6 (L) 26.6 - 33.0 pg    MCHC 32.1 31.5 - 35.7 g/dL    RDW 14.2 12.3 - 15.4 %    RDW-SD 40.8 37.0 - 54.0 fl    MPV 9.1 6.0 - 12.0 fL     Platelets 234 140 - 450 10*3/mm3    Neutrophil % 53.3 42.7 - 76.0 %    Lymphocyte % 36.6 19.6 - 45.3 %    Monocyte % 6.9 5.0 - 12.0 %    Eosinophil % 2.2 0.3 - 6.2 %    Basophil % 0.8 0.0 - 1.5 %    Immature Grans % 0.2 0.0 - 0.5 %    Neutrophils, Absolute 4.65 1.70 - 7.00 10*3/mm3    Lymphocytes, Absolute 3.19 (H) 0.70 - 3.10 10*3/mm3    Monocytes, Absolute 0.60 0.10 - 0.90 10*3/mm3    Eosinophils, Absolute 0.19 0.00 - 0.40 10*3/mm3    Basophils, Absolute 0.07 0.00 - 0.20 10*3/mm3    Immature Grans, Absolute 0.02 0.00 - 0.05 10*3/mm3    nRBC 0.0 0.0 - 0.2 /100 WBC       Ordered the above labs and reviewed the results.        RADIOLOGY  XR Chest AP    Result Date: 3/23/2024  XR CHEST AP-  INDICATIONS: Cough and fever, COVID evaluation  TECHNIQUE: FRONTAL VIEW OF THE CHEST  COMPARISON: 12/16/2022  FINDINGS:  The heart is enlarged. Pulmonary vasculature is unremarkable. Minimal likely scarring in the left mid to lower lung. No focal pulmonary consolidation, pleural effusion, or pneumothorax. No acute osseous process.        No focal pulmonary consolidation. Cardiomegaly. Follow-up as indications persist.    This report was finalized on 3/23/2024 1:30 PM by Dr. Ernie Portillo M.D on Workstation: [facility]       Ordered the above noted radiological studies. Reviewed by me in PACS.            PROCEDURES  Procedures        OUTPATIENT MEDICATION MANAGEMENT:  Current Facility-Administered Medications Ordered in Epic   Medication Dose Route Frequency Provider Last Rate Last Admin    sodium chloride 0.9 % flush 10 mL  10 mL Intravenous PRN Randy Junior MD         Current Outpatient Medications Ordered in Epic   Medication Sig Dispense Refill    aspirin 325 MG tablet Take 1 tablet by mouth Daily.      atorvastatin (LIPITOR) 40 MG tablet Take 1 tablet by mouth Daily. 200001 90 tablet 3    cholecalciferol (VITAMIN D3) 25 MCG (1000 UT) tablet Take 1 tablet by mouth Daily.      dilTIAZem (CARDIZEM) 120 MG tablet Take 1  tablet by mouth 2 (Two) Times a Day.      Dulaglutide (Trulicity) 0.75 MG/0.5ML solution pen-injector INJECT 0.75MG (1 PEN) UNDER THE SKIN EVERY WEEK 12 mL 3    furosemide (LASIX) 20 MG tablet Take 1 tablet by mouth Daily. 90 tablet 1    guaifenesin-dextromethorphan 600-30 mg (MUCINEX DM)  MG tablet sustained-release 12 hour Take 1 tablet by mouth 2 (Two) Times a Day As Needed (Cough). 20 tablet 0    metFORMIN (GLUCOPHAGE) 500 MG tablet TAKE 2 TABLETS BY MOUTH 2 TIMES A DAY WITH MEALS 180 tablet 0    metoprolol tartrate (LOPRESSOR) 25 MG tablet Take 1 tablet by mouth 2 (Two) Times a Day.      O2 (OXYGEN) Inhale 2 L/min. 24 -7      omeprazole (priLOSEC) 20 MG capsule TAKE 1 CAPSULE BY MOUTH EVERY DAY 90 capsule 2    spironolactone (ALDACTONE) 25 MG tablet Take 1 tablet by mouth Daily. 90 tablet 1           MEDICATIONS GIVEN IN ER  Medications   sodium chloride 0.9 % flush 10 mL (has no administration in time range)   lactated ringers bolus 1,000 mL (1,000 mL Intravenous New Bag 3/23/24 1356)                   MEDICAL DECISION MAKING, PROGRESS, and CONSULTS    All labs have been independently reviewed by me.  All radiology studies have been reviewed by me and I have also reviewed the radiology report.   EKG's independently viewed and interpreted by me.  Discussion below represents my analysis of pertinent findings related to patient's condition, differential diagnosis, treatment plan and final disposition.      Additional sources:    - Discussed/ obtained information from independent historians: Spouse at bedside    - External (non-ED) record review: Patient had a right-sided cardiac cath November 2022 which showed mild precapillary hypertension.  Echocardiogram done in November 2023 showed an LV EF of 55%.    -Prescription drug monitoring program review:     N/A    - Chronic or social conditions impacting patient care (Social Determinants of Health): None          Orders placed during this visit:  Orders Placed  This Encounter   Procedures    Respiratory Panel PCR w/COVID-19(SARS-CoV-2) [facility], NP Swab in UTM/VTM, 2 HR TAT - Swab, Nasopharynx    XR Chest AP    Comprehensive Metabolic Panel    Procalcitonin    Lactic Acid, Plasma    Single High Sensitivity Troponin T    BNP    CBC Auto Differential    Monitor Blood Pressure    Continuous Pulse Oximetry    Oxygen Therapy- Nasal Cannula; Titrate 1-6 LPM Per SpO2; 90 - 95%    ECG 12 Lead Dyspnea    Insert Peripheral IV    CBC & Differential         Additional orders considered but not ordered:          Differential diagnosis includes, but is not limited to:    Differential diagnosis includes but is not limited to CHF, acute coronary syndrome, pulmonary embolism, pneumothorax, pneumonia, asthma/COPD, deconditioning, anemia, anxiety.         Independent interpretation of labs, radiology studies, and discussions with consultants:  ED Course as of 03/23/24 1440   Sat Mar 23, 2024   1300 EKG personally interpreted by me at 12:45 PM.  My personal interpretation is:      EKG time: 12:40 PM  Rhythm/Rate: Sinus rhythm with PVCs, rate 65  P waves and ME: Normal  QRS, axis: Normal  ST and T waves: Nonspecific ST/T wave changes in the lateral leads    Interpreted Contemporaneously by me, independently viewed  No prior available for comparison    [WH]   1306 WBC: 8.72 [WH]   1306 Hemoglobin: 12.2 [WH]   1306 Chest x-ray personally interpreted by me.  My personal interpretation is: Heart size is borderline.  There is a vague density in the right lower lobe.  No pleural effusion. [WH]   1332 Lactate(!!): 2.4  Patient will be given a 1 L IV fluid bolus. [WH]   1332 Creatinine(!): 1.05 [WH]   1332 CO2(!): 20.1 [WH]   1332 Anion Gap(!): 15.9 [WH]   1332 Procalcitonin: 0.04 [WH]   1346 COVID19(!!): Detected [WH]   1346 Per the radiologist, chest x-ray shows some minimal scarring in the left mid lower lung but is negative acute. [WH]   1435 Patient is resting comfortably.   Test results were discussed with her and her .  Oxygen saturation is 96% on 2 L.  Patient has had COVID in the past and has had multiple COVID vaccinations.  Shared decision making was discussed concerning treatment with Paxlovid.  I recommended holding off on Paxlovid and she was agreeable with this.  She will be given a prescription for cough medication.  Return precautions were discussed. [WH]   1435 MDM: Patient presented to ED complaining of nonproductive cough and a positive COVID test.  She is on 2 L of oxygen at all times due to pulmonary hypertension.  She denied worsening shortness of breath or chest pain.  Respiratory panel was positive for COVID.  Chest x-ray did not show any evidence of pneumonia.  Patient's O2 sats were in the mid to upper 90s on 2 L.  Initial lactic acid was mildly elevated but procalcitonin and white blood cell count were normal.  Patient was given IV fluids.  Plan is for symptomatic treatment with cough medication. [WH]      ED Course User Index  [WH] Randy Junior MD         COMPLEXITY OF CARE  Admission was considered but after careful review of the patient's presentation, physical examination, diagnostic results, and response to treatment the patient may be safely discharged with outpatient follow-up.      DIAGNOSIS  Final diagnoses:   COVID-19 virus infection   Chronic respiratory failure with hypoxia         DISPOSITION  DISCHARGE    Patient discharged in stable condition.    Reviewed implications of results, diagnosis, meds, responsibility to follow up, warning signs and symptoms of possible worsening, potential complications and reasons to return to ER, including increased shortness of breath, fever, chest pain, or other concern.    Patient/Family voiced understanding of above instructions.    Discussed plan for discharge, as there is no emergent indication for admission. Patient referred to primary care provider for BP management due to today's BP. Pt/family is  agreeable and understands need for follow up and repeat testing.  Pt is aware that discharge does not mean that nothing is wrong but it indicates no emergency is present that requires admission and they must continue care with follow-up as given below or physician of their choice.     FOLLOW-UP  Edgar Mayers  [address]  [address]  [facility] [address]  [phone]    Schedule an appointment as soon as possible for a visit            Medication List        New Prescriptions      guaifenesin-dextromethorphan 600-30 mg  MG tablet sustained-release 12 hour  Take 1 tablet by mouth 2 (Two) Times a Day As Needed (Cough).               Where to Get Your Medications        These medications were sent to [id number] - [place], [place] - [address] [facility] - [phone]  - [phone]   [address] [place] [address]      Phone: [phone]   guaifenesin-dextromethorphan 600-30 mg  MG tablet sustained-release 12 hour                   Latest Documented Vital Signs:  AS OF 14:40 EDT VITALS:    BP - 119/67  HR - 66  TEMP - 97.8 °F (36.6 °C) (Tympanic)  O2 SATS - 97%            --    Please note that portions of this were completed with a voice recognition program.       Note Disclaimer: At [facility], we believe that sharing information builds trust and better relationships. You are receiving this note because you are receiving care at [facility] or recently visited. It is possible you will see health information before a provider has talked with you about it. This kind of information can be easy to misunderstand. To help you fully understand what it means for your health, we urge you to discuss this note with your provider.             Randy Junior MD  03/23/24 9585

## 2024-03-25 LAB
QT INTERVAL: 432 MS
QTC INTERVAL: 450 MS

## 2024-06-17 ENCOUNTER — LAB (OUTPATIENT)
Dept: LAB | Facility: HOSPITAL | Age: 74
End: 2024-06-17
Payer: MEDICARE

## 2024-06-17 ENCOUNTER — HOSPITAL ENCOUNTER (OUTPATIENT)
Dept: CARDIOLOGY | Facility: HOSPITAL | Age: 74
Discharge: HOME | End: 2024-06-17
Payer: MEDICARE

## 2024-06-17 VITALS
HEART RATE: 62 BPM | DIASTOLIC BLOOD PRESSURE: 66 MMHG | OXYGEN SATURATION: 84 % | HEIGHT: 64 IN | WEIGHT: 226 LBS | SYSTOLIC BLOOD PRESSURE: 101 MMHG | BODY MASS INDEX: 38.58 KG/M2

## 2024-06-17 DIAGNOSIS — I50.32 CHRONIC HEART FAILURE WITH PRESERVED EJECTION FRACTION: Primary | ICD-10-CM

## 2024-06-17 DIAGNOSIS — R09.02 HYPOXIA: ICD-10-CM

## 2024-06-17 DIAGNOSIS — I27.20 PULMONARY HYPERTENSION: ICD-10-CM

## 2024-06-17 DIAGNOSIS — I50.32 CHRONIC HEART FAILURE WITH PRESERVED EJECTION FRACTION: ICD-10-CM

## 2024-06-17 LAB
ANION GAP SERPL CALCULATED.3IONS-SCNC: 10.5 MMOL/L (ref 5–15)
BUN SERPL-MCNC: 19 MG/DL (ref 8–23)
BUN/CREAT SERPL: 17.9 (ref 7–25)
CALCIUM SPEC-SCNC: 9.8 MG/DL (ref 8.6–10.5)
CHLORIDE SERPL-SCNC: 104 MMOL/L (ref 98–107)
CO2 SERPL-SCNC: 24.5 MMOL/L (ref 22–29)
CREAT SERPL-MCNC: 1.06 MG/DL (ref 0.57–1)
EGFRCR SERPLBLD CKD-EPI 2021: 55.6 ML/MIN/1.73
GLUCOSE SERPL-MCNC: 128 MG/DL (ref 65–99)
POTASSIUM SERPL-SCNC: 4.5 MMOL/L (ref 3.5–5.2)
SODIUM SERPL-SCNC: 139 MMOL/L (ref 136–145)

## 2024-06-17 PROCEDURE — 99214 OFFICE O/P EST MOD 30 MIN: CPT | Performed by: NURSE PRACTITIONER

## 2024-06-17 PROCEDURE — 94726 PLETHYSMOGRAPHY LUNG VOLUMES: CPT | Performed by: NURSE PRACTITIONER

## 2024-06-17 PROCEDURE — 80048 BASIC METABOLIC PNL TOTAL CA: CPT

## 2024-06-17 PROCEDURE — G0463 HOSPITAL OUTPT CLINIC VISIT: HCPCS

## 2024-06-17 PROCEDURE — 36415 COLL VENOUS BLD VENIPUNCTURE: CPT

## 2024-06-17 NOTE — LETTER
June 17, 2024       No Recipients    Patient: Sally Rivera   YOB: 1950   Date of Visit: 6/17/2024     Dear Cory Christensen MD:       Thank you for referring Sally Rivera to me for evaluation. Below are the relevant portions of my assessment and plan of care.    If you have questions, please do not hesitate to call me. I look forward to following Sally along with you.         Sincerely,        Jovanna ResendizKHALIF Moffett        CC:   No Recipients    LittletonJovanna packKHALIF pack  06/17/24 2137  Signed    [facility]    Cory Christensen MD  [address] [address]    Thank you for asking me to see Sally Rivera.     History of Present Illness    1. 1. PAH       RHC 2022:  Hemodynamics:  Right Atrium: Mean of 12 mmHg  Right Ventricle: 43/11 mmHg  Pulmonary Artery: 56/21/31 mmHg  Pulmonary Wedge: Mean of 16 mmHg  Cardiac Output/Index: 3.91 L/min 1.84 L/min/m2  PA Sat: 65%  AO Sat: 92%  PVR: 3.9 Wood units     2. RV abnormality    3. HFpEF    Subjective      Sally Jaysis a 74 y.o. female who presents today for follow-up of pulmonary hypertension, RV abnormality, and HFpEF.     Clinical HX:  Last spirometry showed FEV1/FVC of 89% of predicted.  FVC was 54% of predicted.  DLCO was 53% of predicted.  TLC was 68% of predicted and consistent with restrictive physiology.  Patient also has a history of abnormal CT scans.     In April 2022 she was found to have multiple nodular opacities with the largest of 2.1 cm.  There was bilateral increased septal thickening and some areas of GGOs.   Pulmonary artery was dilated.  No history of VTE/PE.  No VQ scan.  No history of autoimmune or rheumatic diseases.  No family history of cardiomyopathy or pulmonary hypertension. 2D TTE was obtained and showed normal left ventricular function with indeterminate diastolic function.  Right ventricle was interpreted as normal.  mild to moderate  mitral regurgitation and mild to moderate tricuspid regurgitation with a PA systolic pressure of 49 mmHg.   RHC which has been completed.  Primary complaint at that time was worsening exertional dyspnea and exercise intolerance.     RHC was performed and showed RA pressure of 12.  RV was 43/11.  PA pressure was 56/21 with a mean of 31.  Wedge was elevated to 15 mmHg.  PVR was abnormal at 3.9 Wood units. VD reactivity testing was not performed.  Fluid loading was not performed.     The patient returns to the clinic today for follow-up.  stable exercise intolerance.  Doing well on spironolactone at 25 mg daily.  Denies adverse or side effects.  No orthopnea, lower extremity edema, or abdominal distention.    She continues on LTOT-continuous 2 L/min O2 average SPO2 in the low 90s  She states she was told that she could take an extra lasix if she ever felt by her PCP, although states she has not needed to do this.      Review of Systems - Review of Systems   Constitutional: Negative for weight gain and weight loss.   Cardiovascular:  Negative for chest pain, dyspnea on exertion, leg swelling, orthopnea, paroxysmal nocturnal dyspnea and syncope.   Respiratory:  Positive for shortness of breath (chronic and unchanged). Negative for cough, sleep disturbances due to breathing and snoring.    Gastrointestinal:  Negative for bloating.   Neurological:  Negative for dizziness, light-headedness and weakness.          Patient Active Problem List   Diagnosis   • Chronic coronary artery disease   • Gastroesophageal reflux disease   • Hyperlipidemia   • Nonalcoholic fatty liver disease   • Atrial paroxysmal tachycardia   • Type 2 diabetes mellitus with hyperglycemia, without long-term current use of insulin   • History of rectal polypectomy   • Diverticulosis of large intestine without hemorrhage   • High risk medication use   • Obesity, morbid, BMI 40.0-49.9   • History of colon polyps   • Clinical diagnosis of COVID-19   •  Pneumonia   • Lung nodule   • Pulmonary hypertension   • Hypoxia   • Abnormality of right ventricle of heart   • Risk factors for obstructive sleep apnea   • Chronic heart failure with preserved ejection fraction   • Advance care planning     family history includes Diabetes in her father; Heart disease in her father; No Known Problems in her mother.   reports that she quit smoking about 34 years ago. Her smoking use included cigarettes. She started smoking about 53 years ago. She has a 20 pack-year smoking history. She has never used smokeless tobacco. She reports that she does not drink alcohol and does not use drugs.  No Known Allergies  Physical Activity: Not on file          Current Outpatient Medications:   •  aspirin 325 MG tablet, Take 1 tablet by mouth Daily., Disp: , Rfl:   •  atorvastatin (LIPITOR) 40 MG tablet, Take 1 tablet by mouth Daily. 200001, Disp: 90 tablet, Rfl: 3  •  cholecalciferol (VITAMIN D3) 25 MCG (1000 UT) tablet, Take 1 tablet by mouth Daily., Disp: , Rfl:   •  dilTIAZem (CARDIZEM) 120 MG tablet, Take 1 tablet by mouth 2 (Two) Times a Day., Disp: , Rfl:   •  Dulaglutide (Trulicity) 0.75 MG/0.5ML solution pen-injector, INJECT 0.75MG (1 PEN) UNDER THE SKIN EVERY WEEK, Disp: 12 mL, Rfl: 3  •  furosemide (LASIX) 20 MG tablet, Take 1 tablet by mouth Daily., Disp: 90 tablet, Rfl: 1  •  guaifenesin-dextromethorphan 600-30 mg (MUCINEX DM)  MG tablet sustained-release 12 hour, Take 1 tablet by mouth 2 (Two) Times a Day As Needed (Cough)., Disp: 20 tablet, Rfl: 0  •  metFORMIN (GLUCOPHAGE) 500 MG tablet, TAKE 2 TABLETS BY MOUTH 2 TIMES A DAY WITH MEALS, Disp: 180 tablet, Rfl: 0  •  metoprolol tartrate (LOPRESSOR) 25 MG tablet, Take 1 tablet by mouth 2 (Two) Times a Day., Disp: , Rfl:   •  O2 (OXYGEN), Inhale 2 L/min. 24 -7, Disp: , Rfl:   •  omeprazole (priLOSEC) 20 MG capsule, TAKE 1 CAPSULE BY MOUTH EVERY DAY, Disp: 90 capsule, Rfl: 2  •  spironolactone (ALDACTONE) 25 MG tablet, Take 1  tablet by mouth Daily., Disp: 90 tablet, Rfl: 1    Objective   Vital Sign Review:   Vitals:    06/17/24 0958   BP: 101/66   Pulse: 62   SpO2: (!) 84%     Body mass index is 38.79 kg/m².      06/17/24 0958   Weight: 103 kg (226 lb)     Physical Exam:  Constitutional:       Appearance: Normal and healthy appearance.   Neck:      Vascular: No carotid bruit or JVD. JVD normal.   Pulmonary:      Effort: Pulmonary effort is normal.      Breath sounds: Normal breath sounds.   Cardiovascular:      PMI at left midclavicular line. Normal rate. Regular rhythm.   Pulses:     Intact distal pulses.   Edema:     Peripheral edema absent.   Abdominal:      Palpations: Abdomen is soft.      Tenderness: There is no abdominal tenderness.   Skin:     General: Skin is warm and dry.   Neurological:      General: No focal deficit present.      Mental Status: Alert and oriented to person, place and time.   Psychiatric:         Behavior: Behavior is cooperative.          DATA REVIEWED:   EKG:      ---------------------------------------------------  ECHO:    Results for orders placed during the hospital encounter of 11/27/23    Adult Transthoracic Echo Limited W/ Cont if Necessary Per Protocol    Interpretation Summary  •  Left ventricular systolic function is normal. Left ventricular ejection fraction appears to be 51 - 55%.  •  Normal right ventricular wall thickness and septal motion noted. The right ventricular cavity is mild to moderately dilated. Borderline low right ventricular systolic function noted.  •  Estimated RV systolic pressure from tricuspid regurgitation is mildly elevated (35-45 mmHg). Mild pulmonary hypertension is present.          -----------------------------------------------------  RHC/LHC    Results for orders placed during the hospital encounter of 11/21/22    Cardiac Catheterization/Vascular Study    Conclusion  [facility]  CARDIAC CATHETERIZATION PROCEDURE REPORT    Patient: Sally Dunn  Miguel  : 1950  MRN: [id number]    Procedure Date:  22    Referring Physician:  Cory Christensen MD    Interventional Cardiologist:  Waqar Montoya MD    Indication:  Pulmonary hypertension      Clinical Presentation:  72-year-old lady past medical history hypertension, hyperlipidemia, recent diagnosis of pulmonary interstitial disease, found to have elevated RVSP on echocardiogram, referred for right heart catheterization for further evaluation pulmonary hypertension.    Procedure performed:  Diagnostic Right Heart Catheterization    Access Sites:  right basilic vein    Findings:  1. Hemodynamics:  Right Atrium: Mean of 12 mmHg  Right Ventricle: 43/11 mmHg  Pulmonary Artery: 56/21/31 mmHg  Pulmonary Wedge: Mean of 16 mmHg  Cardiac Output/Index: 3.91 L/min 1.84 L/min/m2  PA Sat: 65%  AO Sat: 92%  PVR: 3.9 Wood units      Conclusions:  Mild precapillary hypertension (pulmonary wedge right at the cutoff of 15)      Recommendations:  Continued follow-up with pulmonology and cardiology for further evaluation and care.      Procedure Status:  Elective    Procedure Details:  Informed consent was obtained with an explanation of the risk and benefits of the procedure. The patient was brought to the Cardiac Catheterization Laboratory and was prepped and draped in a standard sterile fashion. Lidocaine 2% was used to anesthetize the right antecubital fossa. An existing IV placed by the circulating nurse was exchange over a wire for a 5 Comoran sheath.    A 5 [place]an PA catheter was then advanced into the heart and out into the pulmonary artery. Hemodynamics were obtained in the pulmonary wedge, pulmonary artery, right ventricle, and right atrium positions.  Saturations were obtained from the pulmonary artery. The venous sheath was removed and hemostasis achieved with manual pressure.    Patient tolerated the procedure well without any complications, and transferred to the post procedure area for recovery in a stable  condition.      Complications:  None.    Estimated Blood Loss:  Minimal.    Waqar Montoya MD  [facility]  11/21/22  09:43 EST      ---------------------------------------------------------------------------  CXR/Imaging:     Imaging Results (Most Recent)       None                -----------------------------------------------------------------------------  CT:   No radiology results for the last 30 days.        --------------------------------------------------------------------------------------------------------------    Laboratory evaluations:  Renal Function: CrCl cannot be calculated (Patient's most recent lab result is older than the maximum 30 days allowed.).    Lab Results   Component Value Date    GLUCOSE 100 (H) 03/23/2024    BUN 15 03/23/2024    CREATININE 1.05 (H) 03/23/2024    EGFRIFNONA 58 (L) 11/09/2021    EGFRIFAFRI 67 11/09/2021    BCR 14.3 03/23/2024    K 3.9 03/23/2024    CO2 20.1 (L) 03/23/2024    CALCIUM 8.7 03/23/2024    PROTENTOTREF 8.3 04/03/2023    ALBUMIN 4.1 03/23/2024    LABIL2 1.4 04/03/2023    AST 24 03/23/2024    ALT 16 03/23/2024     Lab Results   Component Value Date    WBC 8.72 03/23/2024    HGB 12.2 03/23/2024    HCT 38.0 03/23/2024    MCV 79.7 03/23/2024     03/23/2024     Lab Results   Component Value Date    HGBA1C 6.6 (H) 07/24/2023     Lab Results   Component Value Date    HGBA1C 6.6 (H) 07/24/2023    HGBA1C 6.70 (H) 04/03/2023    HGBA1C 7.70 (H) 11/23/2022     Lab Results   Component Value Date    MICROALBUR <3.0 04/03/2023    CREATININE 1.05 (H) 03/23/2024     Lab Results   Component Value Date    TIBC 583 06/02/2020    FERRITIN 68 08/03/2020       Result Review:  I have personally reviewed the results from the time of this admission to 6/17/2024 08:23 EDT and agree with these findings:  [x]  Laboratory list / accordion  []  Microbiology  [x]  Radiology  [x]  EKG/Telemetry   [x]  Cardiology/Vascular   []  Pathology  [x]  Old records  []  Other:              ReDS VOLUMETRIC ASSESSMENT:  ReDs Vest    Performed by: Jovanna Proctor APRN  Authorized by: Jovanna Proctor APRN    ReDS value:  34  ReDS Value Description:  25-35 (green) = Optimal Volume Status      Assessment & Plan      1. Chronic heart failure with preserved ejection fraction    2. Pulmonary hypertension    3. Hypoxia      HFpEF-RV remodeling. Continue GDMT as tolerated/listed below, marginal HD. We will plan repeat Echo in 11/2024 (we will order at next visit)  Pulmonary Hypertension- plan per Dr Martínez-WHO Group  1, 2, 3: primarily more 1 and 3 ; PFTs:  Restrictive with decreased DLCO.  . MPAP: 31. Maryanne: 2.9. Exercise pulse oximetry showed a minimum SpO2 = 86% on room air. VQ scan showed no evidence of CTEPH.  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 She does have HFpEF.  no significant sleep apnea.  CT scan did have some GGOs, which have improved.  She may very well have a group 1 component.  Marginal HD and would not tolerate the addition of any PAH-specific therapies at this point.  Hypoxia-chronic respiratory failure. On LTOT, we rechecked her spo2 after resting in the room and improved to 91%-pt's baseline. She is no distress and at her baseline.            chronic  HFpEF.   Etiology: RV abnormality.    NYHA stage C FC-III     Clinical status was assessed and has remained stable.  Treatment intent: curative   ReDS reading was obtained today.  ReDS result: 34       Today, Patient appears euvolemic. and with perfusion. The patient's hemodynamics are currently acceptable. HR is: normal and is at goal. BP/MAP was reviewed and there is not room for medication up-titration.  LVEF: 51-55%.     GDMT Assessment:   GDMT changes recommended today: No changes to medication therapy.      BB:   continue Metoprolol tartrate  25mg bid  Monitor heart rate.  Please call the HF for HR<50, dizziness, lightheadedness, syncope    A/A/A:   Marginal HD do not allow at this time    [id number]:  Unable to take  due to vaginal candida infections    MRA:  The patient is FC-NYHA Class III and MRA is indicated.   continue Spironolactone  25mg daily  Recommended quarterly assessment of GFR and electrolytes for the first 12 months  Avoid salt substitutes containing potassium  Please hold this medication if diarrhea, vomiting, or infection with fever and sweating occurs      Diuretic Regimen:  -DIURETIC:   furosemide (LASIX) 20 mg every day  -Fluid restriction:   -requested  -2000 ml  -Patient has been asked to weigh daily and was provided with a printed diuretic strategy.  -Sodium restriction:   -1,500 mg Na restriction was discussed.      Labs/Diagnostics/Referrals:    Labs -Chem-7       Lifestyle Advice:   - call office if I gain more than 2 pounds in one day or 5 pounds in one week   - keep legs up while sitting   - use salt in moderation   - watch for swelling in feet, ankles and legs every day   - weigh myself daily   -call for concerning s/sx   -- activity or exercise based on tolerance encouraged     CODE STATUS: FULL              Return in about 3 months (around 9/17/2024) for Next scheduled follow up.                Thank you for allowing me to participate in the care of your patient,         KHALIF Arredondo  [facility]  06/17/24  08:23 EDT      **Jyoti Disclaimer:**  Much of this encounter note is an electronic transcription/translation of spoken language to printed text. The electronic translation of spoken language may permit erroneous, or at times, nonsensical words or phrases to be inadvertently transcribed. Although I have reviewed the note for such errors, some may still exist.    The information in this note was reviewed and updated during the visit to be as accurate as possible. As many patients have chronic medical problems, many of their individual problems and ongoing plans do not change significantly from visit to visit.  This information is correct and is consistent with my  treatment plan as of today's visit.

## 2024-06-17 NOTE — PROGRESS NOTES
[facility]    Cory Christensen MD  [address]  [address]  [place] [address]    Thank you for asking me to see Sally Rivera.     History of Present Illness    1. 1. PAH       RHC 2022:  Hemodynamics:  Right Atrium: Mean of 12 mmHg  Right Ventricle: 43/11 mmHg  Pulmonary Artery: 56/21/31 mmHg  Pulmonary Wedge: Mean of 16 mmHg  Cardiac Output/Index: 3.91 L/min 1.84 L/min/m2  PA Sat: 65%  AO Sat: 92%  PVR: 3.9 Wood units     2. RV abnormality    3. HFpEF    Subjective      Sally Antunez a 74 y.o. female who presents today for follow-up of pulmonary hypertension, RV abnormality, and HFpEF.     Clinical HX:  Last spirometry showed FEV1/FVC of 89% of predicted.  FVC was 54% of predicted.  DLCO was 53% of predicted.  TLC was 68% of predicted and consistent with restrictive physiology.  Patient also has a history of abnormal CT scans.     In April 2022 she was found to have multiple nodular opacities with the largest of 2.1 cm.  There was bilateral increased septal thickening and some areas of GGOs.   Pulmonary artery was dilated.  No history of VTE/PE.  No VQ scan.  No history of autoimmune or rheumatic diseases.  No family history of cardiomyopathy or pulmonary hypertension. 2D TTE was obtained and showed normal left ventricular function with indeterminate diastolic function.  Right ventricle was interpreted as normal.  mild to moderate mitral regurgitation and mild to moderate tricuspid regurgitation with a PA systolic pressure of 49 mmHg.   RHC which has been completed.  Primary complaint at that time was worsening exertional dyspnea and exercise intolerance.     RHC was performed and showed RA pressure of 12.  RV was 43/11.  PA pressure was 56/21 with a mean of 31.  Wedge was elevated to 15 mmHg.  PVR was abnormal at 3.9 Wood units. VD reactivity testing was not performed.  Fluid loading was not performed.     The patient returns to the clinic today for  follow-up.  stable exercise intolerance.  Doing well on spironolactone at 25 mg daily.  Denies adverse or side effects.  No orthopnea, lower extremity edema, or abdominal distention.    She continues on LTOT-continuous 2 L/min O2 average SPO2 in the low 90s  She states she was told that she could take an extra lasix if she ever felt by her PCP, although states she has not needed to do this.      Review of Systems - Review of Systems   Constitutional: Negative for weight gain and weight loss.   Cardiovascular:  Negative for chest pain, dyspnea on exertion, leg swelling, orthopnea, paroxysmal nocturnal dyspnea and syncope.   Respiratory:  Positive for shortness of breath (chronic and unchanged). Negative for cough, sleep disturbances due to breathing and snoring.    Gastrointestinal:  Negative for bloating.   Neurological:  Negative for dizziness, light-headedness and weakness.          Patient Active Problem List   Diagnosis    Chronic coronary artery disease    Gastroesophageal reflux disease    Hyperlipidemia    Nonalcoholic fatty liver disease    Atrial paroxysmal tachycardia    Type 2 diabetes mellitus with hyperglycemia, without long-term current use of insulin    History of rectal polypectomy    Diverticulosis of large intestine without hemorrhage    High risk medication use    Obesity, morbid, BMI 40.0-49.9    History of colon polyps    Clinical diagnosis of COVID-19    Pneumonia    Lung nodule    Pulmonary hypertension    Hypoxia    Abnormality of right ventricle of heart    Risk factors for obstructive sleep apnea    Chronic heart failure with preserved ejection fraction    Advance care planning     family history includes Diabetes in her father; Heart disease in her father; No Known Problems in her mother.   reports that she quit smoking about 34 years ago. Her smoking use included cigarettes. She started smoking about 53 years ago. She has a 20 pack-year smoking history. She has never used smokeless  tobacco. She reports that she does not drink alcohol and does not use drugs.  No Known Allergies  Physical Activity: Not on file          Current Outpatient Medications:     aspirin 325 MG tablet, Take 1 tablet by mouth Daily., Disp: , Rfl:     atorvastatin (LIPITOR) 40 MG tablet, Take 1 tablet by mouth Daily. 200001, Disp: 90 tablet, Rfl: 3    cholecalciferol (VITAMIN D3) 25 MCG (1000 UT) tablet, Take 1 tablet by mouth Daily., Disp: , Rfl:     dilTIAZem (CARDIZEM) 120 MG tablet, Take 1 tablet by mouth 2 (Two) Times a Day., Disp: , Rfl:     Dulaglutide (Trulicity) 0.75 MG/0.5ML solution pen-injector, INJECT 0.75MG (1 PEN) UNDER THE SKIN EVERY WEEK, Disp: 12 mL, Rfl: 3    furosemide (LASIX) 20 MG tablet, Take 1 tablet by mouth Daily., Disp: 90 tablet, Rfl: 1    guaifenesin-dextromethorphan 600-30 mg (MUCINEX DM)  MG tablet sustained-release 12 hour, Take 1 tablet by mouth 2 (Two) Times a Day As Needed (Cough)., Disp: 20 tablet, Rfl: 0    metFORMIN (GLUCOPHAGE) 500 MG tablet, TAKE 2 TABLETS BY MOUTH 2 TIMES A DAY WITH MEALS, Disp: 180 tablet, Rfl: 0    metoprolol tartrate (LOPRESSOR) 25 MG tablet, Take 1 tablet by mouth 2 (Two) Times a Day., Disp: , Rfl:     O2 (OXYGEN), Inhale 2 L/min. 24 -7, Disp: , Rfl:     omeprazole (priLOSEC) 20 MG capsule, TAKE 1 CAPSULE BY MOUTH EVERY DAY, Disp: 90 capsule, Rfl: 2    spironolactone (ALDACTONE) 25 MG tablet, Take 1 tablet by mouth Daily., Disp: 90 tablet, Rfl: 1    Objective   Vital Sign Review:   Vitals:    06/17/24 0958   BP: 101/66   Pulse: 62   SpO2: (!) 84%     Body mass index is 38.79 kg/m².      06/17/24 0958   Weight: 103 kg (226 lb)     Physical Exam:  Constitutional:       Appearance: Normal and healthy appearance.   Neck:      Vascular: No carotid bruit or JVD. JVD normal.   Pulmonary:      Effort: Pulmonary effort is normal.      Breath sounds: Normal breath sounds.   Cardiovascular:      PMI at left midclavicular line. Normal rate. Regular rhythm.    Pulses:     Intact distal pulses.   Edema:     Peripheral edema absent.   Abdominal:      Palpations: Abdomen is soft.      Tenderness: There is no abdominal tenderness.   Skin:     General: Skin is warm and dry.   Neurological:      General: No focal deficit present.      Mental Status: Alert and oriented to person, place and time.   Psychiatric:         Behavior: Behavior is cooperative.          DATA REVIEWED:   EKG:      ---------------------------------------------------  ECHO:    Results for orders placed during the hospital encounter of 23    Adult Transthoracic Echo Limited W/ Cont if Necessary Per Protocol    Interpretation Summary    Left ventricular systolic function is normal. Left ventricular ejection fraction appears to be 51 - 55%.    Normal right ventricular wall thickness and septal motion noted. The right ventricular cavity is mild to moderately dilated. Borderline low right ventricular systolic function noted.    Estimated RV systolic pressure from tricuspid regurgitation is mildly elevated (35-45 mmHg). Mild pulmonary hypertension is present.          -----------------------------------------------------  RHC/LHC    Results for orders placed during the hospital encounter of 22    Cardiac Catheterization/Vascular Study    Conclusion  [facility]  CARDIAC CATHETERIZATION PROCEDURE REPORT    Patient: Sally Rivera  : 1950  MRN: [id number]    Procedure Date:  22    Referring Physician:  Cory Christensen MD    Interventional Cardiologist:  Waqar Montoya MD    Indication:  Pulmonary hypertension      Clinical Presentation:  72-year-old lady past medical history hypertension, hyperlipidemia, recent diagnosis of pulmonary interstitial disease, found to have elevated RVSP on echocardiogram, referred for right heart catheterization for further evaluation pulmonary hypertension.    Procedure performed:  Diagnostic Right Heart Catheterization    Access  Sites:  right basilic vein    Findings:  1. Hemodynamics:  Right Atrium: Mean of 12 mmHg  Right Ventricle: 43/11 mmHg  Pulmonary Artery: 56/21/31 mmHg  Pulmonary Wedge: Mean of 16 mmHg  Cardiac Output/Index: 3.91 L/min 1.84 L/min/m2  PA Sat: 65%  AO Sat: 92%  PVR: 3.9 Wood units      Conclusions:  Mild precapillary hypertension (pulmonary wedge right at the cutoff of 15)      Recommendations:  Continued follow-up with pulmonology and cardiology for further evaluation and care.      Procedure Status:  Elective    Procedure Details:  Informed consent was obtained with an explanation of the risk and benefits of the procedure. The patient was brought to the Cardiac Catheterization Laboratory and was prepped and draped in a standard sterile fashion. Lidocaine 2% was used to anesthetize the right antecubital fossa. An existing IV placed by the circulating nurse was exchange over a wire for a 5 Malay sheath.    A 5 Malay PA catheter was then advanced into the heart and out into the pulmonary artery. Hemodynamics were obtained in the pulmonary wedge, pulmonary artery, right ventricle, and right atrium positions.  Saturations were obtained from the pulmonary artery. The venous sheath was removed and hemostasis achieved with manual pressure.    Patient tolerated the procedure well without any complications, and transferred to the post procedure area for recovery in a stable condition.      Complications:  None.    Estimated Blood Loss:  Minimal.    Waqar Montoya MD  [facility]  11/21/22  09:43 EST      ---------------------------------------------------------------------------  CXR/Imaging:     Imaging Results (Most Recent)       None                -----------------------------------------------------------------------------  CT:   No radiology results for the last 30 days.        --------------------------------------------------------------------------------------------------------------    Laboratory  evaluations:  Renal Function: CrCl cannot be calculated (Patient's most recent lab result is older than the maximum 30 days allowed.).    Lab Results   Component Value Date    GLUCOSE 100 (H) 03/23/2024    BUN 15 03/23/2024    CREATININE 1.05 (H) 03/23/2024    EGFRIFNONA 58 (L) 11/09/2021    EGFRIFAFRI 67 11/09/2021    BCR 14.3 03/23/2024    K 3.9 03/23/2024    CO2 20.1 (L) 03/23/2024    CALCIUM 8.7 03/23/2024    PROTENTOTREF 8.3 04/03/2023    ALBUMIN 4.1 03/23/2024    LABIL2 1.4 04/03/2023    AST 24 03/23/2024    ALT 16 03/23/2024     Lab Results   Component Value Date    WBC 8.72 03/23/2024    HGB 12.2 03/23/2024    HCT 38.0 03/23/2024    MCV 79.7 03/23/2024     03/23/2024     Lab Results   Component Value Date    HGBA1C 6.6 (H) 07/24/2023     Lab Results   Component Value Date    HGBA1C 6.6 (H) 07/24/2023    HGBA1C 6.70 (H) 04/03/2023    HGBA1C 7.70 (H) 11/23/2022     Lab Results   Component Value Date    MICROALBUR <3.0 04/03/2023    CREATININE 1.05 (H) 03/23/2024     Lab Results   Component Value Date    TIBC 583 06/02/2020    FERRITIN 68 08/03/2020       Result Review:  I have personally reviewed the results from the time of this admission to 6/17/2024 08:23 EDT and agree with these findings:  [x]  Laboratory list / accordion  []  Microbiology  [x]  Radiology  [x]  EKG/Telemetry   [x]  Cardiology/Vascular   []  Pathology  [x]  Old records  []  Other:             ReDS VOLUMETRIC ASSESSMENT:  ReDs Vest    Performed by: Jovanna Proctor APRN  Authorized by: Jovanna Proctor APRN    ReDS value:  34  ReDS Value Description:  25-35 (green) = Optimal Volume Status      Assessment & Plan      1. Chronic heart failure with preserved ejection fraction    2. Pulmonary hypertension    3. Hypoxia      HFpEF-RV remodeling. Continue GDMT as tolerated/listed below, marginal HD. We will plan repeat Echo in 11/2024 (we will order at next visit)  Pulmonary Hypertension- plan per Dr Martínez-WHO [facility]  1, 2, 3:  primarily more 1 and 3 ; PFTs:  Restrictive with decreased DLCO.  . MPAP: 31. Maryanne: 2.9. Exercise pulse oximetry showed a minimum SpO2 = 86% on room air. VQ scan showed no evidence of CTEPH.  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 She does have HFpEF.  no significant sleep apnea.  CT scan did have some GGOs, which have improved.  She may very well have a group 1 component.  Marginal HD and would not tolerate the addition of any PAH-specific therapies at this point.  Hypoxia-chronic respiratory failure. On LTOT, we rechecked her spo2 after resting in the room and improved to 91%-pt's baseline. She is no distress and at her baseline.            chronic  HFpEF.   Etiology: RV abnormality.    NYHA stage C FC-III     Clinical status was assessed and has remained stable.  Treatment intent: curative   ReDS reading was obtained today.  ReDS result: 34       Today, Patient appears euvolemic. and with perfusion. The patient's hemodynamics are currently acceptable. HR is: normal and is at goal. BP/MAP was reviewed and there is not room for medication up-titration.  LVEF: 51-55%.     GDMT Assessment:   GDMT changes recommended today: No changes to medication therapy.      BB:   continue Metoprolol tartrate  25mg bid  Monitor heart rate.  Please call the [facility] for HR<50, dizziness, lightheadedness, syncope    A/A/A:   Marginal HD do not allow at this time    LRG2O-M:  Unable to take due to vaginal candida infections    MRA:  The patient is FC-NYHA Class III and MRA is indicated.   continue Spironolactone  25mg daily  Recommended quarterly assessment of GFR and electrolytes for the first 12 months  Avoid salt substitutes containing potassium  Please hold this medication if diarrhea, vomiting, or infection with fever and sweating occurs      Diuretic Regimen:  -DIURETIC:   furosemide (LASIX) 20 mg every day  -Fluid restriction:   -requested  -2000 ml  -Patient has been asked to weigh daily and was provided with a printed diuretic  strategy.  -Sodium restriction:   -1,500 mg Na restriction was discussed.      Labs/Diagnostics/Referrals:    Labs -Chem-7       Lifestyle Advice:   - call office if I gain more than 2 pounds in one day or 5 pounds in one week   - keep legs up while sitting   - use salt in moderation   - watch for swelling in feet, ankles and legs every day   - weigh myself daily   -call for concerning s/sx   -- activity or exercise based on tolerance encouraged     CODE STATUS: FULL              Return in about 3 months (around 9/17/2024) for Next scheduled follow up.                Thank you for allowing me to participate in the care of your patient,         Jovanna Proctor, KHALIF  [facility]  06/17/24  08:23 EDT      **Jyoti Disclaimer:**  Much of this encounter note is an electronic transcription/translation of spoken language to printed text. The electronic translation of spoken language may permit erroneous, or at times, nonsensical words or phrases to be inadvertently transcribed. Although I have reviewed the note for such errors, some may still exist.    The information in this note was reviewed and updated during the visit to be as accurate as possible. As many patients have chronic medical problems, many of their individual problems and ongoing plans do not change significantly from visit to visit.  This information is correct and is consistent with my treatment plan as of today's visit.

## 2024-06-17 NOTE — PATIENT INSTRUCTIONS
Directions for when to call the clinic reviewed with the patient to include weight gain of 2 to 3 pounds in 24 hours, weight gain of 5 to 10 pounds within 7 days; worsening shortness of breath; worsening lower extremity edema or abdominal distention.

## 2024-06-18 NOTE — ADDENDUM NOTE
Encounter addended by: Sharona Thomas MA on: 6/18/2024 8:17 AM   Actions taken: Charge Capture section accepted

## 2024-08-17 ENCOUNTER — HOSPITAL ENCOUNTER (INPATIENT)
Facility: HOSPITAL | Age: 74
LOS: 2 days | Discharge: HOME HEALTH | DRG: 580 | End: 2024-08-20
Attending: STUDENT IN AN ORGANIZED HEALTH CARE EDUCATION/TRAINING PROGRAM | Admitting: INTERNAL MEDICINE
Payer: MEDICARE

## 2024-08-17 DIAGNOSIS — R79.89 ELEVATED LACTIC ACID LEVEL: ICD-10-CM

## 2024-08-17 DIAGNOSIS — D72.829 LEUKOCYTOSIS, UNSPECIFIED TYPE: ICD-10-CM

## 2024-08-17 DIAGNOSIS — Z86.39 HISTORY OF DIABETES MELLITUS: ICD-10-CM

## 2024-08-17 DIAGNOSIS — R11.2 NAUSEA AND VOMITING, UNSPECIFIED VOMITING TYPE: ICD-10-CM

## 2024-08-17 DIAGNOSIS — N18.9 CHRONIC RENAL IMPAIRMENT, UNSPECIFIED CKD STAGE: ICD-10-CM

## 2024-08-17 DIAGNOSIS — L02.419 AXILLARY ABSCESS: Primary | ICD-10-CM

## 2024-08-17 LAB
ALBUMIN SERPL-MCNC: 4.5 G/DL (ref 3.5–5.2)
ALBUMIN/GLOB SERPL: 1.3 G/DL
ALP SERPL-CCNC: 82 U/L (ref 39–117)
ALT SERPL W P-5'-P-CCNC: 17 U/L (ref 1–33)
ANION GAP SERPL CALCULATED.3IONS-SCNC: 16.9 MMOL/L (ref 5–15)
AST SERPL-CCNC: 21 U/L (ref 1–32)
BASOPHILS # BLD AUTO: 0.02 10*3/MM3 (ref 0–0.2)
BASOPHILS NFR BLD AUTO: 0.2 % (ref 0–1.5)
BILIRUB SERPL-MCNC: 0.5 MG/DL (ref 0–1.2)
BUN SERPL-MCNC: 12 MG/DL (ref 8–23)
BUN/CREAT SERPL: 10.1 (ref 7–25)
CALCIUM SPEC-SCNC: 9.7 MG/DL (ref 8.6–10.5)
CHLORIDE SERPL-SCNC: 97 MMOL/L (ref 98–107)
CO2 SERPL-SCNC: 20.1 MMOL/L (ref 22–29)
CREAT SERPL-MCNC: 1.19 MG/DL (ref 0.57–1)
D-LACTATE SERPL-SCNC: 1.6 MMOL/L (ref 0.5–2)
D-LACTATE SERPL-SCNC: 3 MMOL/L (ref 0.5–2)
DEPRECATED RDW RBC AUTO: 41.5 FL (ref 37–54)
EGFRCR SERPLBLD CKD-EPI 2021: 48.4 ML/MIN/1.73
EOSINOPHIL # BLD AUTO: 0.05 10*3/MM3 (ref 0–0.4)
EOSINOPHIL NFR BLD AUTO: 0.4 % (ref 0.3–6.2)
ERYTHROCYTE [DISTWIDTH] IN BLOOD BY AUTOMATED COUNT: 15.2 % (ref 12.3–15.4)
GLOBULIN UR ELPH-MCNC: 3.6 GM/DL
GLUCOSE BLDC GLUCOMTR-MCNC: 122 MG/DL (ref 70–130)
GLUCOSE SERPL-MCNC: 156 MG/DL (ref 65–99)
HCT VFR BLD AUTO: 37.8 % (ref 34–46.6)
HGB BLD-MCNC: 12.3 G/DL (ref 12–15.9)
IMM GRANULOCYTES # BLD AUTO: 0.05 10*3/MM3 (ref 0–0.05)
IMM GRANULOCYTES NFR BLD AUTO: 0.4 % (ref 0–0.5)
LIPASE SERPL-CCNC: 26 U/L (ref 13–60)
LYMPHOCYTES # BLD AUTO: 0.77 10*3/MM3 (ref 0.7–3.1)
LYMPHOCYTES NFR BLD AUTO: 6.6 % (ref 19.6–45.3)
MCH RBC QN AUTO: 25.1 PG (ref 26.6–33)
MCHC RBC AUTO-ENTMCNC: 32.5 G/DL (ref 31.5–35.7)
MCV RBC AUTO: 77 FL (ref 79–97)
MONOCYTES # BLD AUTO: 0.36 10*3/MM3 (ref 0.1–0.9)
MONOCYTES NFR BLD AUTO: 3.1 % (ref 5–12)
NEUTROPHILS NFR BLD AUTO: 10.35 10*3/MM3 (ref 1.7–7)
NEUTROPHILS NFR BLD AUTO: 89.3 % (ref 42.7–76)
NRBC BLD AUTO-RTO: 0 /100 WBC (ref 0–0.2)
PLATELET # BLD AUTO: 236 10*3/MM3 (ref 140–450)
PMV BLD AUTO: 9.5 FL (ref 6–12)
POTASSIUM SERPL-SCNC: 4.1 MMOL/L (ref 3.5–5.2)
PROCALCITONIN SERPL-MCNC: 0.28 NG/ML (ref 0–0.25)
PROT SERPL-MCNC: 8.1 G/DL (ref 6–8.5)
RBC # BLD AUTO: 4.91 10*6/MM3 (ref 3.77–5.28)
SODIUM SERPL-SCNC: 134 MMOL/L (ref 136–145)
WBC NRBC COR # BLD AUTO: 11.6 10*3/MM3 (ref 3.4–10.8)

## 2024-08-17 PROCEDURE — 25010000002 CEFTRIAXONE PER 250 MG: Performed by: EMERGENCY MEDICINE

## 2024-08-17 PROCEDURE — 80053 COMPREHEN METABOLIC PANEL: CPT | Performed by: STUDENT IN AN ORGANIZED HEALTH CARE EDUCATION/TRAINING PROGRAM

## 2024-08-17 PROCEDURE — 25810000003 SODIUM CHLORIDE 0.9 % SOLUTION: Performed by: EMERGENCY MEDICINE

## 2024-08-17 PROCEDURE — 82948 REAGENT STRIP/BLOOD GLUCOSE: CPT

## 2024-08-17 PROCEDURE — 83605 ASSAY OF LACTIC ACID: CPT | Performed by: STUDENT IN AN ORGANIZED HEALTH CARE EDUCATION/TRAINING PROGRAM

## 2024-08-17 PROCEDURE — G0378 HOSPITAL OBSERVATION PER HR: HCPCS

## 2024-08-17 PROCEDURE — 84145 PROCALCITONIN (PCT): CPT | Performed by: STUDENT IN AN ORGANIZED HEALTH CARE EDUCATION/TRAINING PROGRAM

## 2024-08-17 PROCEDURE — 83690 ASSAY OF LIPASE: CPT | Performed by: STUDENT IN AN ORGANIZED HEALTH CARE EDUCATION/TRAINING PROGRAM

## 2024-08-17 PROCEDURE — 25010000002 ONDANSETRON PER 1 MG: Performed by: STUDENT IN AN ORGANIZED HEALTH CARE EDUCATION/TRAINING PROGRAM

## 2024-08-17 PROCEDURE — 99285 EMERGENCY DEPT VISIT HI MDM: CPT

## 2024-08-17 PROCEDURE — 85025 COMPLETE CBC W/AUTO DIFF WBC: CPT | Performed by: STUDENT IN AN ORGANIZED HEALTH CARE EDUCATION/TRAINING PROGRAM

## 2024-08-17 PROCEDURE — 36415 COLL VENOUS BLD VENIPUNCTURE: CPT | Performed by: STUDENT IN AN ORGANIZED HEALTH CARE EDUCATION/TRAINING PROGRAM

## 2024-08-17 PROCEDURE — 63710000001 ONDANSETRON ODT 4 MG TABLET DISPERSIBLE: Performed by: INTERNAL MEDICINE

## 2024-08-17 PROCEDURE — 87040 BLOOD CULTURE FOR BACTERIA: CPT | Performed by: STUDENT IN AN ORGANIZED HEALTH CARE EDUCATION/TRAINING PROGRAM

## 2024-08-17 RX ORDER — SODIUM CHLORIDE 9 MG/ML
40 INJECTION, SOLUTION INTRAVENOUS AS NEEDED
Status: DISCONTINUED | OUTPATIENT
Start: 2024-08-17 | End: 2024-08-20 | Stop reason: HOSPADM

## 2024-08-17 RX ORDER — ACETAMINOPHEN 325 MG/1
650 TABLET ORAL EVERY 4 HOURS PRN
Status: DISCONTINUED | OUTPATIENT
Start: 2024-08-17 | End: 2024-08-19

## 2024-08-17 RX ORDER — ONDANSETRON 4 MG/1
4 TABLET, ORALLY DISINTEGRATING ORAL EVERY 6 HOURS PRN
Status: DISCONTINUED | OUTPATIENT
Start: 2024-08-17 | End: 2024-08-20 | Stop reason: HOSPADM

## 2024-08-17 RX ORDER — METOPROLOL TARTRATE 25 MG/1
25 TABLET, FILM COATED ORAL 2 TIMES DAILY
Status: DISCONTINUED | OUTPATIENT
Start: 2024-08-18 | End: 2024-08-17

## 2024-08-17 RX ORDER — NITROGLYCERIN 0.4 MG/1
0.4 TABLET SUBLINGUAL
Status: DISCONTINUED | OUTPATIENT
Start: 2024-08-17 | End: 2024-08-20 | Stop reason: HOSPADM

## 2024-08-17 RX ORDER — DOXYCYCLINE 100 MG/1
100 CAPSULE ORAL ONCE
Status: COMPLETED | OUTPATIENT
Start: 2024-08-17 | End: 2024-08-17

## 2024-08-17 RX ORDER — ATORVASTATIN CALCIUM 20 MG/1
40 TABLET, FILM COATED ORAL DAILY
Status: DISCONTINUED | OUTPATIENT
Start: 2024-08-18 | End: 2024-08-20 | Stop reason: HOSPADM

## 2024-08-17 RX ORDER — IBUPROFEN 600 MG/1
1 TABLET ORAL
Status: DISCONTINUED | OUTPATIENT
Start: 2024-08-17 | End: 2024-08-20 | Stop reason: HOSPADM

## 2024-08-17 RX ORDER — ONDANSETRON 2 MG/ML
4 INJECTION INTRAMUSCULAR; INTRAVENOUS EVERY 6 HOURS PRN
Status: DISCONTINUED | OUTPATIENT
Start: 2024-08-17 | End: 2024-08-20 | Stop reason: HOSPADM

## 2024-08-17 RX ORDER — SPIRONOLACTONE 25 MG/1
25 TABLET ORAL DAILY
Status: DISCONTINUED | OUTPATIENT
Start: 2024-08-18 | End: 2024-08-20 | Stop reason: HOSPADM

## 2024-08-17 RX ORDER — SODIUM CHLORIDE 0.9 % (FLUSH) 0.9 %
10 SYRINGE (ML) INJECTION EVERY 12 HOURS SCHEDULED
Status: DISCONTINUED | OUTPATIENT
Start: 2024-08-17 | End: 2024-08-20 | Stop reason: HOSPADM

## 2024-08-17 RX ORDER — BISACODYL 10 MG
10 SUPPOSITORY, RECTAL RECTAL DAILY PRN
Status: DISCONTINUED | OUTPATIENT
Start: 2024-08-17 | End: 2024-08-20 | Stop reason: HOSPADM

## 2024-08-17 RX ORDER — ACETAMINOPHEN 160 MG/5ML
650 SOLUTION ORAL EVERY 4 HOURS PRN
Status: DISCONTINUED | OUTPATIENT
Start: 2024-08-17 | End: 2024-08-19

## 2024-08-17 RX ORDER — DEXTROSE MONOHYDRATE 25 G/50ML
25 INJECTION, SOLUTION INTRAVENOUS
Status: DISCONTINUED | OUTPATIENT
Start: 2024-08-17 | End: 2024-08-20 | Stop reason: HOSPADM

## 2024-08-17 RX ORDER — AMOXICILLIN 250 MG
2 CAPSULE ORAL 2 TIMES DAILY PRN
Status: DISCONTINUED | OUTPATIENT
Start: 2024-08-17 | End: 2024-08-20 | Stop reason: HOSPADM

## 2024-08-17 RX ORDER — PANTOPRAZOLE SODIUM 40 MG/1
40 TABLET, DELAYED RELEASE ORAL
Status: DISCONTINUED | OUTPATIENT
Start: 2024-08-18 | End: 2024-08-20 | Stop reason: HOSPADM

## 2024-08-17 RX ORDER — DOXYCYCLINE 100 MG/1
100 CAPSULE ORAL EVERY 12 HOURS SCHEDULED
Status: DISCONTINUED | OUTPATIENT
Start: 2024-08-18 | End: 2024-08-20 | Stop reason: HOSPADM

## 2024-08-17 RX ORDER — BISACODYL 5 MG/1
5 TABLET, DELAYED RELEASE ORAL DAILY PRN
Status: DISCONTINUED | OUTPATIENT
Start: 2024-08-17 | End: 2024-08-20 | Stop reason: HOSPADM

## 2024-08-17 RX ORDER — DILTIAZEM HCL 60 MG
120 TABLET ORAL EVERY 12 HOURS SCHEDULED
Status: DISCONTINUED | OUTPATIENT
Start: 2024-08-18 | End: 2024-08-20 | Stop reason: HOSPADM

## 2024-08-17 RX ORDER — NICOTINE POLACRILEX 4 MG
15 LOZENGE BUCCAL
Status: DISCONTINUED | OUTPATIENT
Start: 2024-08-17 | End: 2024-08-20 | Stop reason: HOSPADM

## 2024-08-17 RX ORDER — ONDANSETRON 2 MG/ML
4 INJECTION INTRAMUSCULAR; INTRAVENOUS ONCE
Status: COMPLETED | OUTPATIENT
Start: 2024-08-17 | End: 2024-08-17

## 2024-08-17 RX ORDER — METOPROLOL TARTRATE 25 MG/1
25 TABLET, FILM COATED ORAL 2 TIMES DAILY
Status: DISCONTINUED | OUTPATIENT
Start: 2024-08-18 | End: 2024-08-20 | Stop reason: HOSPADM

## 2024-08-17 RX ORDER — ASPIRIN 325 MG
325 TABLET ORAL DAILY
Status: DISCONTINUED | OUTPATIENT
Start: 2024-08-18 | End: 2024-08-20 | Stop reason: HOSPADM

## 2024-08-17 RX ORDER — HYDROCODONE BITARTRATE AND ACETAMINOPHEN 5; 325 MG/1; MG/1
1 TABLET ORAL EVERY 6 HOURS PRN
Status: DISCONTINUED | OUTPATIENT
Start: 2024-08-17 | End: 2024-08-19

## 2024-08-17 RX ORDER — SODIUM CHLORIDE 0.9 % (FLUSH) 0.9 %
10 SYRINGE (ML) INJECTION AS NEEDED
Status: DISCONTINUED | OUTPATIENT
Start: 2024-08-17 | End: 2024-08-20 | Stop reason: HOSPADM

## 2024-08-17 RX ORDER — ACETAMINOPHEN 650 MG/1
650 SUPPOSITORY RECTAL EVERY 4 HOURS PRN
Status: DISCONTINUED | OUTPATIENT
Start: 2024-08-17 | End: 2024-08-19

## 2024-08-17 RX ORDER — POLYETHYLENE GLYCOL 3350 17 G/17G
17 POWDER, FOR SOLUTION ORAL DAILY PRN
Status: DISCONTINUED | OUTPATIENT
Start: 2024-08-17 | End: 2024-08-20 | Stop reason: HOSPADM

## 2024-08-17 RX ORDER — INSULIN LISPRO 100 [IU]/ML
2-9 INJECTION, SOLUTION INTRAVENOUS; SUBCUTANEOUS
Status: DISCONTINUED | OUTPATIENT
Start: 2024-08-18 | End: 2024-08-20 | Stop reason: HOSPADM

## 2024-08-17 RX ADMIN — DOXYCYCLINE 100 MG: 100 CAPSULE ORAL at 20:09

## 2024-08-17 RX ADMIN — Medication 10 ML: at 23:07

## 2024-08-17 RX ADMIN — CEFTRIAXONE 2000 MG: 2 INJECTION, POWDER, FOR SOLUTION INTRAMUSCULAR; INTRAVENOUS at 20:04

## 2024-08-17 RX ADMIN — ONDANSETRON 4 MG: 4 TABLET, ORALLY DISINTEGRATING ORAL at 23:07

## 2024-08-17 RX ADMIN — HYDROCODONE BITARTRATE AND ACETAMINOPHEN 1 TABLET: 5; 325 TABLET ORAL at 22:34

## 2024-08-17 RX ADMIN — SODIUM CHLORIDE 1000 ML: 9 INJECTION, SOLUTION INTRAVENOUS at 19:17

## 2024-08-17 RX ADMIN — ONDANSETRON 4 MG: 2 INJECTION INTRAMUSCULAR; INTRAVENOUS at 18:47

## 2024-08-17 NOTE — ED PROVIDER NOTES
EMERGENCY DEPARTMENT ENCOUNTER    Room Number:  21/21  PCP: Edgar Mayers  Historian: Patient      HPI:  Chief Complaint: Nausea, vomiting  A complete HPI/ROS/PMH/PSH/SH/FH are unobtainable due to: None    Context: Sally Rivera is a 74 y.o. female who presents to the ED via private vehicle c/o acute nausea vomiting started today.  Was started on Bactrim yesterday for an abscess in her left armpit that she noted since Saturday.  Denies any measured fevers, no chest pain, no cough or shortness of breath, no dysuria or urinary urgency or frequency.  No abdominal pain started feeling really badly today.  Has had 2 doses of Bactrim.      MEDICAL RECORD REVIEW    External (non-ED) record review: Urgent care visit note reviewed from yesterday, patient diagnosed with an abscess that is self draining at that time, was placed on Bactrim and mupirocin ointment              PAST MEDICAL HISTORY  Active Ambulatory Problems     Diagnosis Date Noted    Chronic coronary artery disease 02/24/2016    Gastroesophageal reflux disease 02/24/2016    Hyperlipidemia 02/24/2016    Nonalcoholic fatty liver disease 02/24/2016    Atrial paroxysmal tachycardia 02/24/2016    Type 2 diabetes mellitus with hyperglycemia, without long-term current use of insulin 02/24/2016    History of rectal polypectomy 03/30/2017    Diverticulosis of large intestine without hemorrhage 09/24/2018    High risk medication use 09/24/2018    Obesity, morbid, BMI 40.0-49.9 06/02/2020    History of colon polyps 08/23/2021    Clinical diagnosis of COVID-19 09/17/2021    Pneumonia 07/29/2022    Lung nodule 07/29/2022    Pulmonary hypertension 11/11/2022    Hypoxia 11/11/2022    Abnormality of right ventricle of heart 11/29/2022    Risk factors for obstructive sleep apnea 01/16/2023    Chronic heart failure with preserved ejection fraction 01/16/2023    Advance care planning 03/19/2024     Resolved Ambulatory Problems     Diagnosis Date Noted    Hypertension  2016     Past Medical History:   Diagnosis Date    Acid reflux     Asthma 2022    CAD (coronary artery disease)     Diabetes mellitus type I     Diverticulitis     Elevated cholesterol     GERD (gastroesophageal reflux disease)     Heart attack     Liver disease          PAST SURGICAL HISTORY  Past Surgical History:   Procedure Laterality Date    CARDIAC CATHETERIZATION N/A 2022    Procedure: Right Heart Cath with vasodilator as indicated. pHTN workup;  Surgeon: Waqar Rush MD;  Location:  [facility];  Service: Cardiovascular;  Laterality: N/A;    CHOLECYSTECTOMY      COLON SURGERY      bowel resection for obstruction    COLONOSCOPY      Dr. Sky    COLONOSCOPY N/A 10/07/2021    Procedure: COLONOSCOPY TO CECUM WITH COLD POLYPECTOMY;  Surgeon: Facundo Klein Jr., MD;  Location:  [facility];  Service: General;  Laterality: N/A;  PRE- H/O COLON POLYPS  POST-COLON POLYP, DIVERTICULOSIS    HYSTERECTOMY      MOUTH BIOPSY      OOPHORECTOMY      TUBAL ABDOMINAL LIGATION      US GUIDED LYMPH NODE BIOPSY  2020         FAMILY HISTORY  Family History   Problem Relation Age of Onset    No Known Problems Mother     Diabetes Father     Heart disease Father          SOCIAL HISTORY  Social History     Socioeconomic History    Marital status:    Tobacco Use    Smoking status: Former     Current packs/day: 0.00     Average packs/day: 1 pack/day for 20.0 years (20.0 ttl pk-yrs)     Types: Cigarettes     Start date: 10/1/1970     Quit date: 1990     Years since quittin.5     Passive exposure: Past    Smokeless tobacco: Never   Vaping Use    Vaping status: Never Used   Substance and Sexual Activity    Alcohol use: No    Drug use: No    Sexual activity: Not Currently     Partners: Male         ALLERGIES  Patient has no known allergies.        REVIEW OF SYSTEMS  Review of Systems     All systems reviewed and negative except for those discussed in HPI.        PHYSICAL EXAM    I have reviewed the triage vital signs and nursing notes.    ED Triage Vitals   Temp Heart Rate Resp BP SpO2   08/17/24 1817 08/17/24 1817 08/17/24 1817 08/17/24 1822 08/17/24 1817   99.6 °F (37.6 °C) (!) 126 17 130/79 98 %      Temp src Heart Rate Source Patient Position BP Location FiO2 (%)   -- -- -- -- --              Physical Exam  General: No acute distress, nontoxic  HEENT: Mucous membranes moist, atraumatic, EOMI  Neck: Full ROM  Pulm: Symmetric chest rise, nonlabored, lungs CTAB  Cardiovascular: Regular rate and rhythm, intact distal pulses  GI: Soft, nontender, nondistended, no rebound, no guarding, bowel sounds present  MSK: Full ROM, no deformity  Skin: Warm, dry, small 1 cm x 1 cm abscess in the left axilla with active purulent drainage, no surrounding cellulitis, mildly tender to touch  Neuro: Awake, alert, oriented x 4, GCS 15, moving all extremities, no focal deficits  Psych: Calm, cooperative        LAB RESULTS  Recent Results (from the past 24 hour(s))   Comprehensive Metabolic Panel    Collection Time: 08/17/24  6:35 PM    Specimen: Blood   Result Value Ref Range    Glucose 156 (H) 65 - 99 mg/dL    BUN 12 8 - 23 mg/dL    Creatinine 1.19 (H) 0.57 - 1.00 mg/dL    Sodium 134 (L) 136 - 145 mmol/L    Potassium 4.1 3.5 - 5.2 mmol/L    Chloride 97 (L) 98 - 107 mmol/L    CO2 20.1 (L) 22.0 - 29.0 mmol/L    Calcium 9.7 8.6 - 10.5 mg/dL    Total Protein 8.1 6.0 - 8.5 g/dL    Albumin 4.5 3.5 - 5.2 g/dL    ALT (SGPT) 17 1 - 33 U/L    AST (SGOT) 21 1 - 32 U/L    Alkaline Phosphatase 82 39 - 117 U/L    Total Bilirubin 0.5 0.0 - 1.2 mg/dL    Globulin 3.6 gm/dL    A/G Ratio 1.3 g/dL    BUN/Creatinine Ratio 10.1 7.0 - 25.0    Anion Gap 16.9 (H) 5.0 - 15.0 mmol/L    eGFR 48.4 (L) >60.0 mL/min/1.73   Lipase    Collection Time: 08/17/24  6:35 PM    Specimen: Blood   Result Value Ref Range    Lipase 26 13 - 60 U/L   Lactic Acid, Plasma    Collection Time: 08/17/24  6:35 PM    Specimen: Blood    Result Value Ref Range    Lactate 3.0 (C) 0.5 - 2.0 mmol/L   Procalcitonin    Collection Time: 08/17/24  6:35 PM    Specimen: Blood   Result Value Ref Range    Procalcitonin 0.28 (H) 0.00 - 0.25 ng/mL   CBC Auto Differential    Collection Time: 08/17/24  6:35 PM    Specimen: Blood   Result Value Ref Range    WBC 11.60 (H) 3.40 - 10.80 10*3/mm3    RBC 4.91 3.77 - 5.28 10*6/mm3    Hemoglobin 12.3 12.0 - 15.9 g/dL    Hematocrit 37.8 34.0 - 46.6 %    MCV 77.0 (L) 79.0 - 97.0 fL    MCH 25.1 (L) 26.6 - 33.0 pg    MCHC 32.5 31.5 - 35.7 g/dL    RDW 15.2 12.3 - 15.4 %    RDW-SD 41.5 37.0 - 54.0 fl    MPV 9.5 6.0 - 12.0 fL    Platelets 236 140 - 450 10*3/mm3    Neutrophil % 89.3 (H) 42.7 - 76.0 %    Lymphocyte % 6.6 (L) 19.6 - 45.3 %    Monocyte % 3.1 (L) 5.0 - 12.0 %    Eosinophil % 0.4 0.3 - 6.2 %    Basophil % 0.2 0.0 - 1.5 %    Immature Grans % 0.4 0.0 - 0.5 %    Neutrophils, Absolute 10.35 (H) 1.70 - 7.00 10*3/mm3    Lymphocytes, Absolute 0.77 0.70 - 3.10 10*3/mm3    Monocytes, Absolute 0.36 0.10 - 0.90 10*3/mm3    Eosinophils, Absolute 0.05 0.00 - 0.40 10*3/mm3    Basophils, Absolute 0.02 0.00 - 0.20 10*3/mm3    Immature Grans, Absolute 0.05 0.00 - 0.05 10*3/mm3    nRBC 0.0 0.0 - 0.2 /100 WBC       Ordered the above labs and independently interpreted results. My findings will be discussed in the medical decision making section below        RADIOLOGY  No Radiology Exams Resulted Within Past 24 Hours    Ordered the above noted radiological studies.  Independently interpreted by me and my independent review of findings can be found in the ED Course.  See dictation for official radiology interpretation.      PROCEDURES    Procedures        MEDICATIONS GIVEN IN ER    Medications   cefTRIAXone (ROCEPHIN) 2,000 mg in sodium chloride 0.9 % 100 mL MBP (2,000 mg Intravenous New Bag 8/17/24 2004)   ondansetron (ZOFRAN) injection 4 mg (4 mg Intravenous Given 8/17/24 1847)   sodium chloride 0.9 % bolus 1,000 mL (1,000 mL  Intravenous New Bag 8/17/24 1917)   doxycycline (MONODOX) capsule 100 mg (100 mg Oral Given 8/17/24 2009)         PROGRESS, DATA ANALYSIS, CONSULTS, AND MEDICAL DECISION MAKING    Please note that this section constitutes my independent interpretation of clinical data including lab results, radiology, EKG's.  This constitutes my independent professional opinion regarding differential diagnosis and management of this patient.  It may include any factors such as history from outside sources, review of external records, social determinants of health, management of medications, response to those treatments, and discussions with other providers.    Differential Diagnosis and Plan: Initial concern for medication side effect, dehydration, renal failure, electrolyte abnormalities, sepsis, among others.  Plan for labs, supportive care, and reevaluation with results.    Additional sources:  - Discussed/ obtained information from independent historians:       - (Social Determinants of Health): None     - Shared decision making:  Patient and  at bedside fully updated on and in agreement with the course and plan moving forward    ED Course as of 08/17/24 2015   Sat Aug 17, 2024   1836 First look:  Patient presented emergency department with nausea and vomiting, discharged on Bactrim for left axillary abscesses.  Also having generalized fatigue.  Abscesses been present for the last week and a half.  No drainage performed in ICC.  Ordered labs for initial evaluation.  Patient noted to be tachycardic and borderline febrile, ordered blood cultures and lactic due to concern for sepsis. [FS]   1917 WBC(!): 11.60 [DC]   1917 Hemoglobin: 12.3 [DC]   1917 Platelets: 236 [DC]   1917 Lipase: 26 [DC]   1917 Procalcitonin(!): 0.28 [DC]   1917 Lactate(!!): 3.0 [DC]   1917 Glucose(!): 156 [DC]   1917 BUN: 12 [DC]   1917 Creatinine(!): 1.19  1.06 two months ago [DC]   1917 Sodium(!): 134 [DC]   1917 Potassium: 4.1 [DC]   1917 ALT (SGPT):  17 [DC]   1917 AST (SGOT): 21 [DC]   1917 Alkaline Phosphatase: 82 [DC]   1917 Total Bilirubin: 0.5 [DC]   2013 Discussed with SAMRIA Cornejo, discussed patient's clinical course and findings today, treatment modalities, and need for hospitalization.  Discussed observation and telemetry placement. [DC]      ED Course User Index  [DC] Serjio Ferguson MD  [FS] Christopher Gallardo MD     Patient has been fully updated on the findings today and need for hospitalization for IV antibiotics, IV fluids, further supportive care.    Hospitalization Considered?: yes    Orders Placed During This Visit:  Orders Placed This Encounter   Procedures    Blood Culture - Blood,    Blood Culture - Blood,    Comprehensive Metabolic Panel    Lipase    Urinalysis With Microscopic If Indicated (No Culture) - Urine, Clean Catch    Lactic Acid, Plasma    Procalcitonin    CBC Auto Differential    STAT Lactic Acid, Reflex    [facility] (on-call MD unless specified) Details    Initiate Observation Status    CBC & Differential       Additional orders considered but not placed:      Independent interpretation of labs, radiology studies, and discussions with consultants: See ED Course        AS OF 20:15 EDT VITALS:    BP - 133/78  HR - 113  TEMP - 99.6 °F (37.6 °C)  02 SATS - 93%          DIAGNOSIS  Final diagnoses:   Axillary abscess   Nausea and vomiting, unspecified vomiting type   Chronic renal impairment, unspecified CKD stage   Elevated lactic acid level   Leukocytosis, unspecified type   History of diabetes mellitus         DISPOSITION  ED Disposition       ED Disposition   Decision to Admit    Condition   --    Comment   Level of Care: Telemetry [5]   Diagnosis: Axillary abscess [[id number]]   Admitting Physician: MARISOL MADDEN [[id number]]   Attending Physician: MARISOL MADDEN [[id number]]   Bed Request Comments: Not [address]                  Please note that portions of this document were completed with a voice recognition program.    Note  Disclaimer: At [facility], we believe that sharing information builds trust and better relationships. You are receiving this note because you recently visited [facility]. It is possible you will see health information before a provider has talked with you about it. This kind of information can be easy to misunderstand. To help you fully understand what it means for your health, we urge you to discuss this note with your provider.                       Serjio Ferguson MD  08/18/24 0103

## 2024-08-17 NOTE — ED NOTES
PT to Er via PV from home for N/V and lightheadedness x today. Pt started taking a new medication for her armpit abscess     Bactrim and bactroban ointment

## 2024-08-18 ENCOUNTER — APPOINTMENT (OUTPATIENT)
Dept: GENERAL RADIOLOGY | Facility: HOSPITAL | Age: 74
DRG: 580 | End: 2024-08-18
Payer: MEDICARE

## 2024-08-18 LAB
ANION GAP SERPL CALCULATED.3IONS-SCNC: 10 MMOL/L (ref 5–15)
BASOPHILS # BLD AUTO: 0.02 10*3/MM3 (ref 0–0.2)
BASOPHILS NFR BLD AUTO: 0.3 % (ref 0–1.5)
BILIRUB UR QL STRIP: NEGATIVE
BUN SERPL-MCNC: 10 MG/DL (ref 8–23)
BUN/CREAT SERPL: 10.1 (ref 7–25)
CALCIUM SPEC-SCNC: 8.5 MG/DL (ref 8.6–10.5)
CHLORIDE SERPL-SCNC: 101 MMOL/L (ref 98–107)
CLARITY UR: CLEAR
CO2 SERPL-SCNC: 22 MMOL/L (ref 22–29)
COLOR UR: YELLOW
CREAT SERPL-MCNC: 0.99 MG/DL (ref 0.57–1)
DEPRECATED RDW RBC AUTO: 41.7 FL (ref 37–54)
EGFRCR SERPLBLD CKD-EPI 2021: 60.3 ML/MIN/1.73
EOSINOPHIL # BLD AUTO: 0.11 10*3/MM3 (ref 0–0.4)
EOSINOPHIL NFR BLD AUTO: 1.4 % (ref 0.3–6.2)
ERYTHROCYTE [DISTWIDTH] IN BLOOD BY AUTOMATED COUNT: 15.3 % (ref 12.3–15.4)
GLUCOSE BLDC GLUCOMTR-MCNC: 100 MG/DL (ref 70–130)
GLUCOSE BLDC GLUCOMTR-MCNC: 127 MG/DL (ref 70–130)
GLUCOSE BLDC GLUCOMTR-MCNC: 132 MG/DL (ref 70–130)
GLUCOSE BLDC GLUCOMTR-MCNC: 95 MG/DL (ref 70–130)
GLUCOSE SERPL-MCNC: 142 MG/DL (ref 65–99)
GLUCOSE UR STRIP-MCNC: NEGATIVE MG/DL
HCT VFR BLD AUTO: 34 % (ref 34–46.6)
HGB BLD-MCNC: 10.9 G/DL (ref 12–15.9)
HGB UR QL STRIP.AUTO: NEGATIVE
IMM GRANULOCYTES # BLD AUTO: 0.05 10*3/MM3 (ref 0–0.05)
IMM GRANULOCYTES NFR BLD AUTO: 0.6 % (ref 0–0.5)
KETONES UR QL STRIP: NEGATIVE
LEUKOCYTE ESTERASE UR QL STRIP.AUTO: NEGATIVE
LYMPHOCYTES # BLD AUTO: 0.52 10*3/MM3 (ref 0.7–3.1)
LYMPHOCYTES NFR BLD AUTO: 6.6 % (ref 19.6–45.3)
MCH RBC QN AUTO: 24.7 PG (ref 26.6–33)
MCHC RBC AUTO-ENTMCNC: 32.1 G/DL (ref 31.5–35.7)
MCV RBC AUTO: 77.1 FL (ref 79–97)
MONOCYTES # BLD AUTO: 0.34 10*3/MM3 (ref 0.1–0.9)
MONOCYTES NFR BLD AUTO: 4.3 % (ref 5–12)
NEUTROPHILS NFR BLD AUTO: 6.81 10*3/MM3 (ref 1.7–7)
NEUTROPHILS NFR BLD AUTO: 86.8 % (ref 42.7–76)
NITRITE UR QL STRIP: NEGATIVE
NRBC BLD AUTO-RTO: 0 /100 WBC (ref 0–0.2)
PH UR STRIP.AUTO: 6.5 [PH] (ref 5–8)
PLATELET # BLD AUTO: 168 10*3/MM3 (ref 140–450)
PMV BLD AUTO: 9.8 FL (ref 6–12)
POTASSIUM SERPL-SCNC: 4 MMOL/L (ref 3.5–5.2)
PROT UR QL STRIP: ABNORMAL
RBC # BLD AUTO: 4.41 10*6/MM3 (ref 3.77–5.28)
SODIUM SERPL-SCNC: 133 MMOL/L (ref 136–145)
SP GR UR STRIP: 1.02 (ref 1–1.03)
UROBILINOGEN UR QL STRIP: ABNORMAL
WBC NRBC COR # BLD AUTO: 7.85 10*3/MM3 (ref 3.4–10.8)

## 2024-08-18 PROCEDURE — 87075 CULTR BACTERIA EXCEPT BLOOD: CPT | Performed by: STUDENT IN AN ORGANIZED HEALTH CARE EDUCATION/TRAINING PROGRAM

## 2024-08-18 PROCEDURE — 0U9M0ZZ DRAINAGE OF VULVA, OPEN APPROACH: ICD-10-PCS | Performed by: INTERNAL MEDICINE

## 2024-08-18 PROCEDURE — 25010000002 ONDANSETRON PER 1 MG: Performed by: INTERNAL MEDICINE

## 2024-08-18 PROCEDURE — 99214 OFFICE O/P EST MOD 30 MIN: CPT | Performed by: STUDENT IN AN ORGANIZED HEALTH CARE EDUCATION/TRAINING PROGRAM

## 2024-08-18 PROCEDURE — 87076 CULTURE ANAEROBE IDENT EACH: CPT | Performed by: STUDENT IN AN ORGANIZED HEALTH CARE EDUCATION/TRAINING PROGRAM

## 2024-08-18 PROCEDURE — 87070 CULTURE OTHR SPECIMN AEROBIC: CPT | Performed by: STUDENT IN AN ORGANIZED HEALTH CARE EDUCATION/TRAINING PROGRAM

## 2024-08-18 PROCEDURE — 87077 CULTURE AEROBIC IDENTIFY: CPT | Performed by: STUDENT IN AN ORGANIZED HEALTH CARE EDUCATION/TRAINING PROGRAM

## 2024-08-18 PROCEDURE — 87186 SC STD MICRODIL/AGAR DIL: CPT | Performed by: STUDENT IN AN ORGANIZED HEALTH CARE EDUCATION/TRAINING PROGRAM

## 2024-08-18 PROCEDURE — 71045 X-RAY EXAM CHEST 1 VIEW: CPT

## 2024-08-18 PROCEDURE — 25010000002 CEFTRIAXONE PER 250 MG: Performed by: INTERNAL MEDICINE

## 2024-08-18 PROCEDURE — 0U9M0ZZ DRAINAGE OF VULVA, OPEN APPROACH: ICD-10-PCS | Performed by: STUDENT IN AN ORGANIZED HEALTH CARE EDUCATION/TRAINING PROGRAM

## 2024-08-18 PROCEDURE — 87205 SMEAR GRAM STAIN: CPT | Performed by: STUDENT IN AN ORGANIZED HEALTH CARE EDUCATION/TRAINING PROGRAM

## 2024-08-18 PROCEDURE — 85025 COMPLETE CBC W/AUTO DIFF WBC: CPT | Performed by: INTERNAL MEDICINE

## 2024-08-18 PROCEDURE — 0X950ZZ DRAINAGE OF LEFT AXILLA, OPEN APPROACH: ICD-10-PCS | Performed by: INTERNAL MEDICINE

## 2024-08-18 PROCEDURE — 80048 BASIC METABOLIC PNL TOTAL CA: CPT | Performed by: INTERNAL MEDICINE

## 2024-08-18 PROCEDURE — 0X950ZZ DRAINAGE OF LEFT AXILLA, OPEN APPROACH: ICD-10-PCS | Performed by: STUDENT IN AN ORGANIZED HEALTH CARE EDUCATION/TRAINING PROGRAM

## 2024-08-18 PROCEDURE — 82948 REAGENT STRIP/BLOOD GLUCOSE: CPT

## 2024-08-18 PROCEDURE — 87015 SPECIMEN INFECT AGNT CONCNTJ: CPT | Performed by: STUDENT IN AN ORGANIZED HEALTH CARE EDUCATION/TRAINING PROGRAM

## 2024-08-18 PROCEDURE — 81003 URINALYSIS AUTO W/O SCOPE: CPT | Performed by: STUDENT IN AN ORGANIZED HEALTH CARE EDUCATION/TRAINING PROGRAM

## 2024-08-18 RX ORDER — LIDOCAINE HYDROCHLORIDE 10 MG/ML
30 INJECTION, SOLUTION EPIDURAL; INFILTRATION; INTRACAUDAL; PERINEURAL ONCE
Status: COMPLETED | OUTPATIENT
Start: 2024-08-18 | End: 2024-08-18

## 2024-08-18 RX ADMIN — PANTOPRAZOLE SODIUM 40 MG: 40 TABLET, DELAYED RELEASE ORAL at 08:36

## 2024-08-18 RX ADMIN — ATORVASTATIN CALCIUM 40 MG: 20 TABLET, FILM COATED ORAL at 08:36

## 2024-08-18 RX ADMIN — METOPROLOL TARTRATE 25 MG: 25 TABLET, FILM COATED ORAL at 20:35

## 2024-08-18 RX ADMIN — DILTIAZEM HYDROCHLORIDE 120 MG: 60 TABLET, FILM COATED ORAL at 20:35

## 2024-08-18 RX ADMIN — DOXYCYCLINE 100 MG: 100 CAPSULE ORAL at 08:36

## 2024-08-18 RX ADMIN — LIDOCAINE HYDROCHLORIDE 30 ML: 10 INJECTION, SOLUTION EPIDURAL; INFILTRATION; INTRACAUDAL; PERINEURAL at 18:50

## 2024-08-18 RX ADMIN — ONDANSETRON 4 MG: 2 INJECTION INTRAMUSCULAR; INTRAVENOUS at 11:56

## 2024-08-18 RX ADMIN — METOPROLOL TARTRATE 25 MG: 25 TABLET, FILM COATED ORAL at 01:18

## 2024-08-18 RX ADMIN — HYDROCODONE BITARTRATE AND ACETAMINOPHEN 1 TABLET: 5; 325 TABLET ORAL at 08:36

## 2024-08-18 RX ADMIN — SPIRONOLACTONE 25 MG: 25 TABLET, FILM COATED ORAL at 11:51

## 2024-08-18 RX ADMIN — METOPROLOL TARTRATE 25 MG: 25 TABLET, FILM COATED ORAL at 11:51

## 2024-08-18 RX ADMIN — DILTIAZEM HYDROCHLORIDE 120 MG: 60 TABLET, FILM COATED ORAL at 08:36

## 2024-08-18 RX ADMIN — Medication 10 ML: at 20:35

## 2024-08-18 RX ADMIN — CEFTRIAXONE 2000 MG: 2 INJECTION, POWDER, FOR SOLUTION INTRAMUSCULAR; INTRAVENOUS at 20:34

## 2024-08-18 RX ADMIN — ONDANSETRON 4 MG: 2 INJECTION INTRAMUSCULAR; INTRAVENOUS at 06:22

## 2024-08-18 RX ADMIN — DOXYCYCLINE 100 MG: 100 CAPSULE ORAL at 20:35

## 2024-08-18 RX ADMIN — ASPIRIN 325 MG: 325 TABLET ORAL at 08:36

## 2024-08-18 RX ADMIN — HYDROCODONE BITARTRATE AND ACETAMINOPHEN 1 TABLET: 5; 325 TABLET ORAL at 20:39

## 2024-08-18 NOTE — NURSING NOTE
Increasing o2 needs.     Wears 2L at baseline.       Sats currently in the low 90's at 5L         Call out to A        Spoke with Jovanna,     Will order stat chest xray

## 2024-08-18 NOTE — ED NOTES
Nursing report ED to floor  Sally Rivera  74 y.o.  female    HPI :  HPI (Adult)  Stated Reason for Visit: N/V dizzy    Chief Complaint  Chief Complaint   Patient presents with    Vomiting    Dizziness    Nausea       Admitting doctor:   Dallin Nieto MD    Admitting diagnosis:   The primary encounter diagnosis was Axillary abscess. Diagnoses of Nausea and vomiting, unspecified vomiting type, Chronic renal impairment, unspecified CKD stage, Elevated lactic acid level, Leukocytosis, unspecified type, and History of diabetes mellitus were also pertinent to this visit.    Code status:   Current Code Status       Date Active Code Status Order ID Comments User Context       Not on file            Allergies:   Patient has no known allergies.    Isolation:   No active isolations    Intake and Output  No intake or output data in the 24 hours ending 08/17/24 2036    Weight:   There were no vitals filed for this visit.    Most recent vitals:   Vitals:    08/17/24 1817 08/17/24 1822 08/17/24 1826 08/17/24 1829   BP:  130/79 133/78    Pulse: (!) 126  109 113   Resp: 17      Temp: 99.6 °F (37.6 °C)      SpO2: 98%  92% 93%       Active LDAs/IV Access:   Lines, Drains & Airways       Active LDAs       Name Placement date Placement time Site Days    Peripheral IV 08/17/24 1832 Anterior;Left Forearm 08/17/24 1832  Forearm  less than 1                    Labs (abnormal labs have a star):   Labs Reviewed   COMPREHENSIVE METABOLIC PANEL - Abnormal; Notable for the following components:       Result Value    Glucose 156 (*)     Creatinine 1.19 (*)     Sodium 134 (*)     Chloride 97 (*)     CO2 20.1 (*)     Anion Gap 16.9 (*)     eGFR 48.4 (*)     All other components within normal limits    Narrative:     GFR Normal >60  Chronic Kidney Disease <60  Kidney Failure <15    The GFR formula is only valid for adults with stable renal function between ages 18 and 70.   LACTIC ACID, PLASMA - Abnormal; Notable for the following  "components:    Lactate 3.0 (*)     All other components within normal limits   PROCALCITONIN - Abnormal; Notable for the following components:    Procalcitonin 0.28 (*)     All other components within normal limits    Narrative:     As a Marker for Sepsis (Non-Neonates):    1. <0.5 ng/mL represents a low risk of severe sepsis and/or septic shock.  2. >2 ng/mL represents a high risk of severe sepsis and/or septic shock.    As a Marker for Lower Respiratory Tract Infections that require antibiotic therapy:    PCT on Admission    Antibiotic Therapy       6-12 Hrs later    >0.5                Strongly Recommended  >0.25 - <0.5        Recommended   0.1 - 0.25          Discouraged              Remeasure/reassess PCT  <0.1                Strongly Discouraged     Remeasure/reassess PCT    As 28 day mortality risk marker: \"Change in Procalcitonin Result\" (>80% or <=80%) if Day 0 (or Day 1) and Day 4 values are available. Refer to http://www.[employer]-pct-calculator.com    Change in PCT <=80%  A decrease of PCT levels below or equal to 80% defines a positive change in PCT test result representing a higher risk for 28-day all-cause mortality of patients diagnosed with severe sepsis for septic shock.    Change in PCT >80%  A decrease of PCT levels of more than 80% defines a negative change in PCT result representing a lower risk for 28-day all-cause mortality of patients diagnosed with severe sepsis or septic shock.      CBC WITH AUTO DIFFERENTIAL - Abnormal; Notable for the following components:    WBC 11.60 (*)     MCV 77.0 (*)     MCH 25.1 (*)     Neutrophil % 89.3 (*)     Lymphocyte % 6.6 (*)     Monocyte % 3.1 (*)     Neutrophils, Absolute 10.35 (*)     All other components within normal limits   LIPASE - Normal   BLOOD CULTURE   BLOOD CULTURE   URINALYSIS W/ MICROSCOPIC IF INDICATED (NO CULTURE)   LACTIC ACID, REFLEX   CBC AND DIFFERENTIAL    Narrative:     The following orders were created for panel order CBC & " Differential.  Procedure                               Abnormality         Status                     ---------                               -----------         ------                     CBC Auto Differential[[id number]]        Abnormal            Final result                 Please view results for these tests on the individual orders.       EKG:   No orders to display       Meds given in ED:   Medications   ondansetron (ZOFRAN) injection 4 mg (4 mg Intravenous Given 24)   sodium chloride 0.9 % bolus 1,000 mL (1,000 mL Intravenous New Bag 24)   cefTRIAXone (ROCEPHIN) 2,000 mg in sodium chloride 0.9 % 100 mL MBP (2,000 mg Intravenous New Bag 24)   doxycycline (MONODOX) capsule 100 mg (100 mg Oral Given 24)       Imaging results:  No radiology results for the last day    Ambulatory status:   - up with walker    Social issues:   Social History     Socioeconomic History    Marital status:    Tobacco Use    Smoking status: Former     Current packs/day: 0.00     Average packs/day: 1 pack/day for 20.0 years (20.0 ttl pk-yrs)     Types: Cigarettes     Start date: 10/1/1970     Quit date: 1990     Years since quittin.5     Passive exposure: Past    Smokeless tobacco: Never   Vaping Use    Vaping status: Never Used   Substance and Sexual Activity    Alcohol use: No    Drug use: No    Sexual activity: Not Currently     Partners: Male       Peripheral Neurovascular  Peripheral Neurovascular (Adult)  Peripheral Neurovascular WDL: WDL    Neuro Cognitive  Neuro Cognitive (Adult)  Cognitive/Neuro/Behavioral WDL: .WDL except  Additional Documentation: Dizziness Assessment (Group)  Dizziness Assessment  Dizziness Reported Symptoms: unsteady, weak, waves of dizziness  Dizziness Occurs With: activity    Learning  Learning Assessment (Adult)  Learning Readiness and Ability: no barriers identified    Respiratory  Respiratory WDL  Respiratory WDL: WDL    Abdominal Pain       Pain  Assessments  Pain (Adult)  (0-10) Pain Rating: Rest: 0    NIH Stroke Scale       Hammad Alegre RN  08/17/24 20:36 EDT

## 2024-08-18 NOTE — PROGRESS NOTES
"DAILY PROGRESS NOTE  [facility]    Patient Identification:  Name: Sally Rivera  Age: 74 y.o.  Sex: female  :  1950  MRN: [id number]         Primary Care Physician: Edgar Mayers    Subjective:  Interval History: She is very weak.    Objective:    Scheduled Meds:aspirin, 325 mg, Oral, Daily  atorvastatin, 40 mg, Oral, Daily  cefTRIAXone, 2,000 mg, Intravenous, Daily  dilTIAZem, 120 mg, Oral, Q12H  doxycycline, 100 mg, Oral, Q12H  insulin lispro, 2-9 Units, Subcutaneous, 4x Daily AC & at Bedtime  lidocaine PF 1%, 30 mL, Injection, Once  metoprolol tartrate, 25 mg, Oral, BID  pantoprazole, 40 mg, Oral, Q AM  sodium chloride, 10 mL, Intravenous, Q12H  spironolactone, 25 mg, Oral, Daily      Continuous Infusions:     Vital signs in last 24 hours:  Temp:  [97.9 °F (36.6 °C)-99.6 °F (37.6 °C)] 97.9 °F (36.6 °C)  Heart Rate:  [] 76  Resp:  [17-18] 18  BP: (102-163)/(63-79) 102/63    Intake/Output:    Intake/Output Summary (Last 24 hours) at 2024 1507  Last data filed at 2024 2234  Gross per 24 hour   Intake 120 ml   Output --   Net 120 ml       Exam:  /63 (BP Location: Right arm, Patient Position: Lying)   Pulse 76   Temp 97.9 °F (36.6 °C) (Oral)   Resp 18   Ht 162.6 cm (64.02\")   Wt 104 kg (228 lb 14.4 oz)   SpO2 92%   BMI 39.27 kg/m²     General Appearance:    Alert, cooperative, no distress   Head:    Normocephalic, without obvious abnormality, atraumatic   Eyes:       Throat:   Lips, tongue, gums normal   Neck:   Supple, symmetrical, trachea midline, no JVD   Lungs:     Clear to auscultation bilaterally, respirations unlabored   Chest Wall:    No tenderness or deformity    Heart:    Regular rate and rhythm, S1 and S2 normal, no murmur,no  rub or gallop   Abdomen:     Soft, nontender, bowel sounds active, no masses, no organomegaly    Extremities:   Extremities normal, atraumatic, no cyanosis or edema   Pulses:      Skin:   Skin is warm and dry,  no rashes or " palpable lesions   Neurologic:   no focal deficits noted      Lab Results (last 72 hours)       Procedure Component Value Units Date/Time    POC Glucose Once [[id number]]  (Normal) Collected: 08/18/24 1131    Specimen: Blood Updated: 08/18/24 1132     Glucose 127 mg/dL     Basic Metabolic Panel [[id number]]  (Abnormal) Collected: 08/18/24 0606    Specimen: Blood Updated: 08/18/24 0654     Glucose 142 mg/dL      BUN 10 mg/dL      Creatinine 0.99 mg/dL      Sodium 133 mmol/L      Potassium 4.0 mmol/L      Chloride 101 mmol/L      CO2 22.0 mmol/L      Calcium 8.5 mg/dL      BUN/Creatinine Ratio 10.1     Anion Gap 10.0 mmol/L      eGFR 60.3 mL/min/1.73     Narrative:      GFR Normal >60  Chronic Kidney Disease <60  Kidney Failure <15    The GFR formula is only valid for adults with stable renal function between ages 18 and 70.    CBC Auto Differential [[id number]]  (Abnormal) Collected: 08/18/24 0606    Specimen: Blood Updated: 08/18/24 0628     WBC 7.85 10*3/mm3      RBC 4.41 10*6/mm3      Hemoglobin 10.9 g/dL      Hematocrit 34.0 %      MCV 77.1 fL      MCH 24.7 pg      MCHC 32.1 g/dL      RDW 15.3 %      RDW-SD 41.7 fl      MPV 9.8 fL      Platelets 168 10*3/mm3      Neutrophil % 86.8 %      Lymphocyte % 6.6 %      Monocyte % 4.3 %      Eosinophil % 1.4 %      Basophil % 0.3 %      Immature Grans % 0.6 %      Neutrophils, Absolute 6.81 10*3/mm3      Lymphocytes, Absolute 0.52 10*3/mm3      Monocytes, Absolute 0.34 10*3/mm3      Eosinophils, Absolute 0.11 10*3/mm3      Basophils, Absolute 0.02 10*3/mm3      Immature Grans, Absolute 0.05 10*3/mm3      nRBC 0.0 /100 WBC     POC Glucose Once [[id number]]  (Abnormal) Collected: 08/18/24 0603    Specimen: Blood Updated: 08/18/24 0605     Glucose 132 mg/dL     Urinalysis With Microscopic If Indicated (No Culture) - Urine, Clean Catch [[id number]]  (Abnormal) Collected: 08/18/24 0427    Specimen: Urine, Clean Catch Updated: 08/18/24 0439     Color, UA Yellow     Appearance, UA  "Clear     pH, UA 6.5     Specific Gravity, UA 1.024     Glucose, UA Negative     Ketones, UA Negative     Bilirubin, UA Negative     Blood, UA Negative     Protein, UA Trace     Leuk Esterase, UA Negative     Nitrite, UA Negative     Urobilinogen, UA 1.0 E.U./dL    Narrative:      Urine microscopic not indicated.    STAT Lactic Acid, Reflex [[id number]]  (Normal) Collected: 08/17/24 2312    Specimen: Blood Updated: 08/17/24 2338     Lactate 1.6 mmol/L     POC Glucose Once [[id number]]  (Normal) Collected: 08/17/24 2228    Specimen: Blood Updated: 08/17/24 2229     Glucose 122 mg/dL     Blood Culture - Blood, Arm, Right [[id number]] Collected: 08/17/24 1934    Specimen: Blood from Arm, Right Updated: 08/17/24 1945    Blood Culture - Blood, Arm, Right [[id number]] Collected: 08/17/24 1940    Specimen: Blood from Arm, Right Updated: 08/17/24 1945    Procalcitonin [[id number]]  (Abnormal) Collected: 08/17/24 1835    Specimen: Blood Updated: 08/17/24 1913     Procalcitonin 0.28 ng/mL     Narrative:      As a Marker for Sepsis (Non-Neonates):    1. <0.5 ng/mL represents a low risk of severe sepsis and/or septic shock.  2. >2 ng/mL represents a high risk of severe sepsis and/or septic shock.    As a Marker for Lower Respiratory Tract Infections that require antibiotic therapy:    PCT on Admission    Antibiotic Therapy       6-12 Hrs later    >0.5                Strongly Recommended  >0.25 - <0.5        Recommended   0.1 - 0.25          Discouraged              Remeasure/reassess PCT  <0.1                Strongly Discouraged     Remeasure/reassess PCT    As 28 day mortality risk marker: \"Change in Procalcitonin Result\" (>80% or <=80%) if Day 0 (or Day 1) and Day 4 values are available. Refer to http://www.[facility]-pct-calculator.com    Change in PCT <=80%  A decrease of PCT levels below or equal to 80% defines a positive change in PCT test result representing a higher risk for 28-day all-cause mortality of patients diagnosed with " severe sepsis for septic shock.    Change in PCT >80%  A decrease of PCT levels of more than 80% defines a negative change in PCT result representing a lower risk for 28-day all-cause mortality of patients diagnosed with severe sepsis or septic shock.       Comprehensive Metabolic Panel [[id number]]  (Abnormal) Collected: 08/17/24 1835    Specimen: Blood Updated: 08/17/24 1906     Glucose 156 mg/dL      BUN 12 mg/dL      Creatinine 1.19 mg/dL      Sodium 134 mmol/L      Potassium 4.1 mmol/L      Chloride 97 mmol/L      CO2 20.1 mmol/L      Calcium 9.7 mg/dL      Total Protein 8.1 g/dL      Albumin 4.5 g/dL      ALT (SGPT) 17 U/L      AST (SGOT) 21 U/L      Alkaline Phosphatase 82 U/L      Total Bilirubin 0.5 mg/dL      Globulin 3.6 gm/dL      A/G Ratio 1.3 g/dL      BUN/Creatinine Ratio 10.1     Anion Gap 16.9 mmol/L      eGFR 48.4 mL/min/1.73     Narrative:      GFR Normal >60  Chronic Kidney Disease <60  Kidney Failure <15    The GFR formula is only valid for adults with stable renal function between ages 18 and 70.    Lipase [[id number]]  (Normal) Collected: 08/17/24 1835    Specimen: Blood Updated: 08/17/24 1906     Lipase 26 U/L     Lactic Acid, Plasma [[id number]]  (Abnormal) Collected: 08/17/24 1835    Specimen: Blood Updated: 08/17/24 1903     Lactate 3.0 mmol/L     CBC & Differential [[id number]]  (Abnormal) Collected: 08/17/24 1835    Specimen: Blood Updated: 08/17/24 1844    Narrative:      The following orders were created for panel order CBC & Differential.  Procedure                               Abnormality         Status                     ---------                               -----------         ------                     CBC Auto Differential[[id number]]        Abnormal            Final result                 Please view results for these tests on the individual orders.    CBC Auto Differential [[id number]]  (Abnormal) Collected: 08/17/24 1835    Specimen: Blood Updated: 08/17/24 1844     WBC 11.60  10*3/mm3      RBC 4.91 10*6/mm3      Hemoglobin 12.3 g/dL      Hematocrit 37.8 %      MCV 77.0 fL      MCH 25.1 pg      MCHC 32.5 g/dL      RDW 15.2 %      RDW-SD 41.5 fl      MPV 9.5 fL      Platelets 236 10*3/mm3      Neutrophil % 89.3 %      Lymphocyte % 6.6 %      Monocyte % 3.1 %      Eosinophil % 0.4 %      Basophil % 0.2 %      Immature Grans % 0.4 %      Neutrophils, Absolute 10.35 10*3/mm3      Lymphocytes, Absolute 0.77 10*3/mm3      Monocytes, Absolute 0.36 10*3/mm3      Eosinophils, Absolute 0.05 10*3/mm3      Basophils, Absolute 0.02 10*3/mm3      Immature Grans, Absolute 0.05 10*3/mm3      nRBC 0.0 /100 WBC           Data Review:  Results from last 7 days   Lab Units 08/18/24  0606 08/17/24  1835   SODIUM mmol/L 133* 134*   POTASSIUM mmol/L 4.0 4.1   CHLORIDE mmol/L 101 97*   CO2 mmol/L 22.0 20.1*   BUN mg/dL 10 12   CREATININE mg/dL 0.99 1.19*   GLUCOSE mg/dL 142* 156*   CALCIUM mg/dL 8.5* 9.7     Results from last 7 days   Lab Units 08/18/24  0606 08/17/24  1835   WBC 10*3/mm3 7.85 11.60*   HEMOGLOBIN g/dL 10.9* 12.3   HEMATOCRIT % 34.0 37.8   PLATELETS 10*3/mm3 168 236             Lab Results   Lab Value Date/Time    TROPONINT 15 (H) 03/23/2024 1241         Results from last 7 days   Lab Units 08/17/24  1835   ALK PHOS U/L 82   BILIRUBIN mg/dL 0.5   ALT (SGPT) U/L 17   AST (SGOT) U/L 21             Glucose   Date/Time Value Ref Range Status   08/18/2024 1131 127 70 - 130 mg/dL Final   08/18/2024 0603 132 (H) 70 - 130 mg/dL Final   08/17/2024 2228 122 70 - 130 mg/dL Final           Past Medical History:   Diagnosis Date    Acid reflux     Asthma June 2022    CAD (coronary artery disease)     per dr deepthi cesar office note 4/22-dd    Diabetes mellitus type I     Diverticulitis     Elevated cholesterol     GERD (gastroesophageal reflux disease)     Heart attack 1995    Hyperlipidemia     Hypertension     Hypertension     Liver disease        Assessment:  Active Hospital Problems    Diagnosis  POA     **Axillary abscess [L02.419]  Yes    Chronic heart failure with preserved ejection fraction [I50.32]  Yes    Pulmonary hypertension [I27.20]  Yes    Hypoxia [R09.02]  Yes    Chronic coronary artery disease [I25.10]  Yes    Gastroesophageal reflux disease [K21.9]  Yes    Nonalcoholic fatty liver disease [K76.0]  Yes    Type 2 diabetes mellitus with hyperglycemia, without long-term current use of insulin [E11.65]  Yes      Resolved Hospital Problems   No resolved problems to display.       Plan:  Surgical consult noted.  Plans for I&D of abscesses today.  Antibiotics and await cultures.  Follow-up on labs.    Jer Leslie MD  8/18/2024  15:07 EDT

## 2024-08-18 NOTE — H&P
Patient Name:  Sally Rivera  YOB: 1950  MRN:  [id number]  Admit Date:  8/17/2024  Patient Care Team:  Edgar Mayers as PCP - General (Family Medicine)  Parker Sky MD as Consulting Physician (Gastroenterology)  Rudy Berry MD as Consulting Physician (Pulmonary Disease)  Heron Martínez MD PhD as Consulting Physician (Cardiology)      Subjective   History Present Illness     Chief Complaint   Patient presents with    Vomiting    Dizziness    Nausea       Ms. Rivera is a 74 y.o. with a history of DM,CAD, GERD, BERNARD, pulmonary hypertension, D.CHF. She presents with N/V and lightheadedness. She had left armpit abscess that developed about 10 days ago. Started draining 3 days ago. Started on bactrim yesterday. Today she had chills, nausea, vomitting. Came to [facility] ER. Given IVFs and IV ABX. Feels a little better.  She is on 2L chronic oxygen.    History of Present Illness  Review of Systems   Constitutional:  Positive for chills. Negative for fever.   HENT: Negative.     Eyes: Negative.    Respiratory: Negative.  Positive for shortness of breath (some chronic). Negative for cough.    Gastrointestinal:  Positive for nausea and vomiting.   Endocrine: Negative.    Genitourinary: Negative.    Musculoskeletal: Negative.    Skin: Negative.    Allergic/Immunologic: Negative.    Neurological: Negative.    Hematological: Negative.    Psychiatric/Behavioral: Negative.          Personal History     Past Medical History:   Diagnosis Date    Acid reflux     Asthma June 2022    CAD (coronary artery disease)     per dr deepthi cesar office note 4/22-dd    Diabetes mellitus type I     Diverticulitis     Elevated cholesterol     GERD (gastroesophageal reflux disease)     Heart attack 1995    Hyperlipidemia     Hypertension     Hypertension     Liver disease      Past Surgical History:   Procedure Laterality Date    CARDIAC CATHETERIZATION N/A 11/21/2022    Procedure: Right Heart Cath with  vasodilator as indicated. pHTN workup;  Surgeon: Waqar Rush MD;  Location: [facility] CATH INVASIVE LOCATION;  Service: Cardiovascular;  Laterality: N/A;    CHOLECYSTECTOMY      COLON SURGERY      bowel resection for obstruction    COLONOSCOPY      Dr. Sky    COLONOSCOPY N/A 10/07/2021    Procedure: COLONOSCOPY TO CECUM WITH COLD POLYPECTOMY;  Surgeon: Facundo Klein Jr., MD;  Location: [facility] ENDOSCOPY;  Service: General;  Laterality: N/A;  PRE- H/O COLON POLYPS  POST-COLON POLYP, DIVERTICULOSIS    HYSTERECTOMY      MOUTH BIOPSY      OOPHORECTOMY      TUBAL ABDOMINAL LIGATION      US GUIDED LYMPH NODE BIOPSY  2020     Family History   Problem Relation Age of Onset    No Known Problems Mother     Diabetes Father     Heart disease Father      Social History     Tobacco Use    Smoking status: Former     Current packs/day: 0.00     Average packs/day: 1 pack/day for 20.0 years (20.0 ttl pk-yrs)     Types: Cigarettes     Start date: 10/1/1970     Quit date: 1990     Years since quittin.5     Passive exposure: Past    Smokeless tobacco: Never   Vaping Use    Vaping status: Never Used   Substance Use Topics    Alcohol use: No    Drug use: No     (Not in a hospital admission)    Allergies:  No Known Allergies    Objective    Objective     Vital Signs  Temp:  [99.6 °F (37.6 °C)] 99.6 °F (37.6 °C)  Heart Rate:  [109-126] 113  Resp:  [17] 17  BP: (130-133)/(78-79) 133/78  SpO2:  [92 %-98 %] 93 %  on  Flow (L/min):  [2] 2;   Device (Oxygen Therapy): nasal cannula  There is no height or weight on file to calculate BMI.    Physical Exam  Vitals and nursing note reviewed.   Constitutional:       General: She is in acute distress.      Appearance: She is well-developed. She is ill-appearing.   HENT:      Head: Normocephalic and atraumatic.   Eyes:      General: No scleral icterus.     Pupils: Pupils are equal, round, and reactive to light.   Cardiovascular:      Rate and Rhythm: Tachycardia  present.   Pulmonary:      Effort: Respiratory distress (slight) present.      Breath sounds: Rales (at bases) present.   Abdominal:      General: Bowel sounds are normal. There is no distension.      Palpations: Abdomen is soft.      Tenderness: There is no abdominal tenderness.   Musculoskeletal:      Cervical back: Normal range of motion and neck supple.   Skin:     General: Skin is warm and dry.      Findings: No rash.   Neurological:      Mental Status: She is alert and oriented to person, place, and time.      Cranial Nerves: No cranial nerve deficit.   Psychiatric:         Behavior: Behavior normal.         Thought Content: Thought content normal.     Small abscess in left axilla which is draining, not much cellulitis    Results Review:  I reviewed the patient's new clinical results.  Discussed with ED provider.    Lab Results (last 24 hours)       Procedure Component Value Units Date/Time    CBC & Differential [[id number]]  (Abnormal) Collected: 08/17/24 1835    Specimen: Blood Updated: 08/17/24 1844    Narrative:      The following orders were created for panel order CBC & Differential.  Procedure                               Abnormality         Status                     ---------                               -----------         ------                     CBC Auto Differential[[id number]]        Abnormal            Final result                 Please view results for these tests on the individual orders.    Comprehensive Metabolic Panel [[id number]]  (Abnormal) Collected: 08/17/24 1835    Specimen: Blood Updated: 08/17/24 1906     Glucose 156 mg/dL      BUN 12 mg/dL      Creatinine 1.19 mg/dL      Sodium 134 mmol/L      Potassium 4.1 mmol/L      Chloride 97 mmol/L      CO2 20.1 mmol/L      Calcium 9.7 mg/dL      Total Protein 8.1 g/dL      Albumin 4.5 g/dL      ALT (SGPT) 17 U/L      AST (SGOT) 21 U/L      Alkaline Phosphatase 82 U/L      Total Bilirubin 0.5 mg/dL      Globulin 3.6 gm/dL      A/G Ratio 1.3  "g/dL      BUN/Creatinine Ratio 10.1     Anion Gap 16.9 mmol/L      eGFR 48.4 mL/min/1.73     Narrative:      GFR Normal >60  Chronic Kidney Disease <60  Kidney Failure <15    The GFR formula is only valid for adults with stable renal function between ages 18 and 70.    Lipase [[id number]]  (Normal) Collected: 08/17/24 1835    Specimen: Blood Updated: 08/17/24 1906     Lipase 26 U/L     Lactic Acid, Plasma [[id number]]  (Abnormal) Collected: 08/17/24 1835    Specimen: Blood Updated: 08/17/24 1903     Lactate 3.0 mmol/L     Procalcitonin [[id number]]  (Abnormal) Collected: 08/17/24 1835    Specimen: Blood Updated: 08/17/24 1913     Procalcitonin 0.28 ng/mL     Narrative:      As a Marker for Sepsis (Non-Neonates):    1. <0.5 ng/mL represents a low risk of severe sepsis and/or septic shock.  2. >2 ng/mL represents a high risk of severe sepsis and/or septic shock.    As a Marker for Lower Respiratory Tract Infections that require antibiotic therapy:    PCT on Admission    Antibiotic Therapy       6-12 Hrs later    >0.5                Strongly Recommended  >0.25 - <0.5        Recommended   0.1 - 0.25          Discouraged              Remeasure/reassess PCT  <0.1                Strongly Discouraged     Remeasure/reassess PCT    As 28 day mortality risk marker: \"Change in Procalcitonin Result\" (>80% or <=80%) if Day 0 (or Day 1) and Day 4 values are available. Refer to http://www.[employer]-pct-calculator.com    Change in PCT <=80%  A decrease of PCT levels below or equal to 80% defines a positive change in PCT test result representing a higher risk for 28-day all-cause mortality of patients diagnosed with severe sepsis for septic shock.    Change in PCT >80%  A decrease of PCT levels of more than 80% defines a negative change in PCT result representing a lower risk for 28-day all-cause mortality of patients diagnosed with severe sepsis or septic shock.       CBC Auto Differential [[id number]]  (Abnormal) Collected: 08/17/24 1835 "    Specimen: Blood Updated: 08/17/24 1844     WBC 11.60 10*3/mm3      RBC 4.91 10*6/mm3      Hemoglobin 12.3 g/dL      Hematocrit 37.8 %      MCV 77.0 fL      MCH 25.1 pg      MCHC 32.5 g/dL      RDW 15.2 %      RDW-SD 41.5 fl      MPV 9.5 fL      Platelets 236 10*3/mm3      Neutrophil % 89.3 %      Lymphocyte % 6.6 %      Monocyte % 3.1 %      Eosinophil % 0.4 %      Basophil % 0.2 %      Immature Grans % 0.4 %      Neutrophils, Absolute 10.35 10*3/mm3      Lymphocytes, Absolute 0.77 10*3/mm3      Monocytes, Absolute 0.36 10*3/mm3      Eosinophils, Absolute 0.05 10*3/mm3      Basophils, Absolute 0.02 10*3/mm3      Immature Grans, Absolute 0.05 10*3/mm3      nRBC 0.0 /100 WBC     Blood Culture - Blood, Arm, Right [[id number]] Collected: 08/17/24 1934    Specimen: Blood from Arm, Right Updated: 08/17/24 1945    Blood Culture - Blood, Arm, Right [[id number]] Collected: 08/17/24 1940    Specimen: Blood from Arm, Right Updated: 08/17/24 1945            Imaging Results (Last 24 Hours)       ** No results found for the last 24 hours. **            Results for orders placed during the hospital encounter of 11/27/23    Adult Transthoracic Echo Limited W/ Cont if Necessary Per Protocol    Interpretation Summary    Left ventricular systolic function is normal. Left ventricular ejection fraction appears to be 51 - 55%.    Normal right ventricular wall thickness and septal motion noted. The right ventricular cavity is mild to moderately dilated. Borderline low right ventricular systolic function noted.    Estimated RV systolic pressure from tricuspid regurgitation is mildly elevated (35-45 mmHg). Mild pulmonary hypertension is present.      No orders to display        Assessment/Plan     Active Hospital Problems    Diagnosis  POA    **Axillary abscess [L02.419]  Yes    Chronic heart failure with preserved ejection fraction [I50.32]  Yes    Pulmonary hypertension [I27.20]  Yes     Added automatically from request for surgery  [id number]      Hypoxia [R09.02]  Yes     Added automatically from request for surgery [id number]      Chronic coronary artery disease [I25.10]  Yes    Gastroesophageal reflux disease [K21.9]  Yes    Nonalcoholic fatty liver disease [K76.0]  Yes    Type 2 diabetes mellitus with hyperglycemia, without long-term current use of insulin [E11.65]  Yes         IV ABX- ceftriaxone and doxy started on ER and can continue  It is draining some but will consult Surgery to see about additional drainage  H/O CHF. Has gotten IVFs for borderline sepsis/infection in the ER. Will monitor her closely as likely will then need diuretics as infection is improved  Resume most home medications  Insulin, monitor BG  I discussed the patients findings and my recommendations with patient, nursing staff, and consulting provider.    VTE Prophylaxis - SCDs.  Code Status - Full code.       Dallin Nieto MD  [facility]  08/17/24  21:06 EDT

## 2024-08-18 NOTE — CONSULTS
General Surgery Consultation    Consulting Physician: Barbara Boston MD  Referring: Dr. Nieto    Reason for consultation:   Left axilla and right mons abscess    CC:   Left axilla and right mons abscess    HPI:   The patient is a 74 y.o. female who presented to the hospital with a left axillary abscess.  She states she has had it for about a week.  She denies any fevers but has had associated nausea and vomiting.  She denies any sick contacts.  She noted a area of swelling under her left underarm that started to become tender and opened up and began draining pus 3 days ago.  She was started on Bactrim as an outpatient.  She felt worse and came to the emergency department.  She was admitted and started on ceftriaxone and doxycycline.  She was also found to have a swollen area at her right mons when she was being cleaned up by the nurses.  The patient was unaware of the site.  She does state that it is tender.  She thinks she may have had a similar lesion on her buttock in the past but otherwise has not had skin disease, denies hidradenitis, eczema, MRSA in the past.  She is on 325 mg aspirin for coronary artery disease status post MI many years ago.  Nonsmoker. Does have diabetes. A1c 7/2/24 6.7%.    Past Medical History:  Past Medical History:   Diagnosis Date    Acid reflux     Asthma June 2022    CAD (coronary artery disease)     per dr deepthi cesar office note 4/22-dd    Diabetes mellitus type I     Diverticulitis     Elevated cholesterol     GERD (gastroesophageal reflux disease)     Heart attack 1995    Hyperlipidemia     Hypertension     Hypertension     Liver disease        Past Surgical History:  Past Surgical History:   Procedure Laterality Date    CARDIAC CATHETERIZATION N/A 11/21/2022    Procedure: Right Heart Cath with vasodilator as indicated. pHTN workup;  Surgeon: Waqar Rush MD;  Location: [facility] CATH INVASIVE LOCATION;  Service: Cardiovascular;  Laterality: N/A;    CHOLECYSTECTOMY  2011     COLON SURGERY  2014    bowel resection for obstruction    COLONOSCOPY  2013    Dr. Sky    COLONOSCOPY N/A 10/07/2021    Procedure: COLONOSCOPY TO CECUM WITH COLD POLYPECTOMY;  Surgeon: Facundo Klein Jr., MD;  Location: [facility] ENDOSCOPY;  Service: General;  Laterality: N/A;  PRE- H/O COLON POLYPS  POST-COLON POLYP, DIVERTICULOSIS    HYSTERECTOMY  2011    MOUTH BIOPSY      OOPHORECTOMY      TUBAL ABDOMINAL LIGATION  1978    US GUIDED LYMPH NODE BIOPSY  11/30/2020   Left breast biopsy    Medications:    Medications Prior to Admission   Medication Sig Dispense Refill Last Dose    aspirin 325 MG tablet Take 1 tablet by mouth Daily.   8/17/2024    atorvastatin (LIPITOR) 40 MG tablet Take 1 tablet by mouth Daily. [id number] 90 tablet 3 8/17/2024    cholecalciferol (VITAMIN D3) 25 MCG (1000 UT) tablet Take 1 tablet by mouth Daily.   8/17/2024    dilTIAZem (CARDIZEM) 120 MG tablet Take 1 tablet by mouth 2 (Two) Times a Day.   8/17/2024    Dulaglutide (Trulicity) 0.75 MG/0.5ML solution pen-injector INJECT 0.75MG (1 PEN) UNDER THE SKIN EVERY WEEK 12 mL 3 Past Week    furosemide (LASIX) 20 MG tablet Take 1 tablet by mouth Daily. 90 tablet 1 8/17/2024    metFORMIN (GLUCOPHAGE) 500 MG tablet TAKE 2 TABLETS BY MOUTH 2 TIMES A DAY WITH MEALS (Patient taking differently: Take 1 tablet by mouth 2 (Two) Times a Day With Meals.) 180 tablet 0 8/17/2024    metoprolol tartrate (LOPRESSOR) 25 MG tablet Take 1 tablet by mouth 2 (Two) Times a Day.   8/17/2024    mupirocin (BACTROBAN) 2 % ointment Apply 1 Application topically to the appropriate area as directed 3 (Three) Times a Day. 15 g 0 8/17/2024    omeprazole (priLOSEC) 20 MG capsule TAKE 1 CAPSULE BY MOUTH EVERY DAY 90 capsule 2 8/17/2024    spironolactone (ALDACTONE) 25 MG tablet Take 1 tablet by mouth Daily. 90 tablet 1 8/17/2024    O2 (OXYGEN) Inhale 2 L/min. 24 -7          Current Facility-Administered Medications:     acetaminophen (TYLENOL) tablet 650 mg, 650 mg, Oral, Q4H PRN  **OR** acetaminophen (TYLENOL) 160 MG/5ML oral solution 650 mg, 650 mg, Oral, Q4H PRN **OR** acetaminophen (TYLENOL) suppository 650 mg, 650 mg, Rectal, Q4H PRN, Dallin Nieto MD    aspirin tablet 325 mg, 325 mg, Oral, Daily, Dallin Nieto MD, 325 mg at 08/18/24 0836    atorvastatin (LIPITOR) tablet 40 mg, 40 mg, Oral, Daily, Dallin Nieto MD, 40 mg at 08/18/24 0836    sennosides-docusate (PERICOLACE) 8.6-50 MG per tablet 2 tablet, 2 tablet, Oral, BID PRN **AND** polyethylene glycol (MIRALAX) packet 17 g, 17 g, Oral, Daily PRN **AND** bisacodyl (DULCOLAX) EC tablet 5 mg, 5 mg, Oral, Daily PRN **AND** bisacodyl (DULCOLAX) suppository 10 mg, 10 mg, Rectal, Daily PRN, Dallin Nieto MD    Calcium Replacement - Follow Nurse / BPA Driven Protocol, , Does not apply, PRN, Dallin Nieto MD    cefTRIAXone (ROCEPHIN) 2,000 mg in sodium chloride 0.9 % 100 mL MBP, 2,000 mg, Intravenous, Daily, Dallin Nieto MD    dextrose (D50W) (25 g/50 mL) IV injection 25 g, 25 g, Intravenous, Q15 Min PRN, Dallin Nieto MD    dextrose (GLUTOSE) oral gel 15 g, 15 g, Oral, Q15 Min PRN, Dallin Nieto MD    dilTIAZem (CARDIZEM) tablet 120 mg, 120 mg, Oral, Q12H, Dallin Nieto MD, 120 mg at 08/18/24 0836    doxycycline (MONODOX) capsule 100 mg, 100 mg, Oral, Q12H, Dallin Nieto MD, 100 mg at 08/18/24 0836    glucagon (GLUCAGEN) injection 1 mg, 1 mg, Intramuscular, Q15 Min PRN, Dallin Nieto MD    HYDROcodone-acetaminophen (NORCO) 5-325 MG per tablet 1 tablet, 1 tablet, Oral, Q6H PRN, Dallin Nieto MD, 1 tablet at 08/18/24 0836    insulin lispro (HUMALOG/ADMELOG) injection 2-9 Units, 2-9 Units, Subcutaneous, 4x Daily AC & at Bedtime, Dallin Nieto MD    lidocaine PF 1% (XYLOCAINE) injection 30 mL, 30 mL, Injection, Once, Barbara Boston MD    Magnesium Standard Dose Replacement - Follow Nurse / BPA Driven Protocol, , Does not apply, PRN, Dallin Nieto,  MD    metoprolol tartrate (LOPRESSOR) tablet 25 mg, 25 mg, Oral, BID, Dallin Nieto MD, 25 mg at 08/18/24 0118    nitroglycerin (NITROSTAT) SL tablet 0.4 mg, 0.4 mg, Sublingual, Q5 Min PRN, Dallin Nieto MD    ondansetron ODT (ZOFRAN-ODT) disintegrating tablet 4 mg, 4 mg, Oral, Q6H PRN, 4 mg at 08/17/24 2307 **OR** ondansetron (ZOFRAN) injection 4 mg, 4 mg, Intravenous, Q6H PRN, Dallin Nieto MD, 4 mg at 08/18/24 0622    pantoprazole (PROTONIX) EC tablet 40 mg, 40 mg, Oral, Q AM, Dallin Nieto MD, 40 mg at 08/18/24 0836    Phosphorus Replacement - Follow Nurse / BPA Driven Protocol, , Does not apply, PRGerson CALLEJAS Alan David, MD    Potassium Replacement - Follow Nurse / BPA Driven Protocol, , Does not apply, Gerson MERCHANT Alan David, MD    sodium chloride 0.9 % flush 10 mL, 10 mL, Intravenous, Q12H, Dallin Nieto MD, 10 mL at 08/17/24 2307    sodium chloride 0.9 % flush 10 mL, 10 mL, Intravenous, PRN, Dallin Nieto MD    sodium chloride 0.9 % infusion 40 mL, 40 mL, Intravenous, PRN, Dallin Nieto MD    spironolactone (ALDACTONE) tablet 25 mg, 25 mg, Oral, Daily, Dallin Nieto MD    Allergies:   No Known Allergies    Social History:   Former smoker, denies recreational drug use or alcohol use  Lives at home with     Family History:   Family History   Problem Relation Age of Onset    No Known Problems Mother     Diabetes Father     Heart disease Father        Review of Systems:  Constitutional: denies fever or chills  Cardiovascular: denies chest pain or palpitations   Respiratory: denies cough or shortness of breath  Gastrointestinal:  denies abdominal pain, +nausea, vomiting  Genitourinary: denies dysuria or hematuria  Skin: denies rash or jaundice; positive abscesses     Physical Exam:   Vitals:    08/18/24 0730   BP: 126/74   Pulse: 104   Resp: 18   Temp: 99 °F (37.2 °C)   SpO2: 98%     GENERAL: alert, interactive, cooperative  HEENT: no scleral  icterus; moist mucous membranes  NECK: Supple  RESPIRATORY: normal work of breathing on room air  CARDIOVASCULAR: Heart rate 100s, palpable distal pulses  MUSCULOSKELETAL: Moves all extremities, no cyanosis  NEUROLOGIC: alert and oriented, normal speech, no gross focal deficits   SKIN: Left axilla has a 3 cm x 2 cm area of swelling and fluctuance with 2 pinpoint holes draining deepthi pus; right mons has an area of swelling and erythema with central fluctuance consistent with nondraining abscess      Diagnostic workup:     Pertinent labs:     Results from last 7 days   Lab Units 08/18/24  0606 08/17/24  1835   WBC 10*3/mm3 7.85 11.60*   HEMOGLOBIN g/dL 10.9* 12.3   HEMATOCRIT % 34.0 37.8   PLATELETS 10*3/mm3 168 236     Results from last 7 days   Lab Units 08/18/24  0606 08/17/24  1835   SODIUM mmol/L 133* 134*   POTASSIUM mmol/L 4.0 4.1   CHLORIDE mmol/L 101 97*   CO2 mmol/L 22.0 20.1*   BUN mg/dL 10 12   CREATININE mg/dL 0.99 1.19*   CALCIUM mg/dL 8.5* 9.7   BILIRUBIN mg/dL  --  0.5   ALK PHOS U/L  --  82   ALT (SGPT) U/L  --  17   AST (SGOT) U/L  --  21   GLUCOSE mg/dL 142* 156*     Procalcitonin 0.28  Lactic acid 1.6  A1c 7/2/24 6.7%.    Imaging:  Chest x-ray from today imaging report reviewed, chronic interstitial changes, no acute infiltrates    Assessment and plan:     Problem List:  Left axillary abscess  Right mons abscess  Chronic heart failure with preserved ejection fraction  Pulmonary hypertension  Hypoxia  CAD  Gastroesophageal reflux disease  Nonalcoholic fatty liver disease  Type 2 diabetes mellitus with hyperglycemia      The patient is a 74 y.o. female with left axillary and right mons abscesses.  Recommend incision and drainage.  Discussed procedure with patient including risk, benefits, alternatives, postprocedure expectations.  She wishes to proceed with drainage.       Barbara Boston M.D.  General, Robotic, and Endoscopic Surgery  [facility] [address]  [place], [place], [address]  P: [phone]  F: [phone]

## 2024-08-18 NOTE — PLAN OF CARE
Goal Outcome Evaluation:  Plan of Care Reviewed With: patient        Progress: no change  Outcome Evaluation:     New pt to [facility].       A&O x4. VSS. SR/ST on telemetry.       Arrived to unit on 2L via n/c, which is her baseline home amount.     She is now requiring 5L via n/c to maintain sats in the low 90's, at least while sleeping.     [facility] made aware.   Stat chest XRAY obtained.       C/O intermittent nausea. Dry heaving but no emesis.    PRN Zofran given.       Stand by assist to restroom.   Urine sample collected and sent to lab      Accuchecks.         Will continue to monitor.

## 2024-08-18 NOTE — PROCEDURES
Operative Note :  Barbara Boston MD    Patient Name and :  Sally Rivera  1950    Procedure Date:   24    Pre-op Diagnosis:  Left axilla abscess  Right mons abscess    Post-Operative Diagnosis:  Post-Op Diagnosis Codes:  Left axilla abscess secondary to infected epidermoid cyst  Right mons abscess    Procedure:   Incision and drainage of left axilla abscess and right mons abscess    Surgeon:   Barbara Boston MD    Assistant:   None    Anesthesia:    Local (local anesthesia only)    Estimated Blood Loss:   5cc    Specimens:   Anaerobic and aerobic wound cultures from both abscesses    Complications:   None    Findings:   Approximately 3 cm x 2 cm left axilla abscess with keratinized cystic material noted on drainage consistent with infected epidermoid cyst  Approximately 2 cm x 2 cm right mons abscess containing large amount of pus    Description of procedure:  After informed consent was obtained, the patient was laid supine.  A timeout was performed with the patient, the nurse, and myself.    The left axilla was prepped with ChloraPrep and draped with blue towels in the usual sterile fashion.  10 cc of lidocaine 1% was used to perform a local block around the abscess.  The 11 blade was used to make a 2 cm transverse incision through the area of most fluctuance.  Pus and keratinized cystic material was removed and cultures were obtained.  Hemostats were used to break up loculations.  The wound was irrigated with sterile saline.  The wound was packed with quarter inch iodoform gauze.  A pressure dressing of gauze and ABD pad was placed.  The wound was hemostatic.      Then finally a sterile kit was used.  The right mons was prepped with ChloraPrep and draped with blue towels in usual sterile fashion.  10 cc of lidocaine 1% was used to perform a local block around this abscess.  The 11 blade was used to make a 2 cm transverse incision through the area of most fluctuance.  Copious black  pus was expressed.  Wound cultures were obtained.  Hemostats were used to break up loculations.  The wound was irrigated with sterile saline.  The wound was packed with quarter inch iodoform gauze.  A pressure dressing of gauze and ABD pad was placed.  The wound was hemostatic.    The patient tolerated procedure without complications.      Barbara Boston MD  General Surgery  [facility] [place], operated by [facility]    [address], [address]  [place] [address]  P: [phone]  F: [phone]

## 2024-08-19 PROBLEM — E66.812 CLASS 2 SEVERE OBESITY WITH SERIOUS COMORBIDITY IN ADULT: Status: ACTIVE | Noted: 2020-06-02

## 2024-08-19 PROBLEM — A49.9 BACTERIAL INFECTION: Status: ACTIVE | Noted: 2024-08-19

## 2024-08-19 LAB
ANION GAP SERPL CALCULATED.3IONS-SCNC: 9.6 MMOL/L (ref 5–15)
BASOPHILS # BLD AUTO: 0.02 10*3/MM3 (ref 0–0.2)
BASOPHILS NFR BLD AUTO: 0.3 % (ref 0–1.5)
BUN SERPL-MCNC: 10 MG/DL (ref 8–23)
BUN/CREAT SERPL: 11.5 (ref 7–25)
CALCIUM SPEC-SCNC: 8.9 MG/DL (ref 8.6–10.5)
CHLORIDE SERPL-SCNC: 99 MMOL/L (ref 98–107)
CO2 SERPL-SCNC: 21.4 MMOL/L (ref 22–29)
CREAT SERPL-MCNC: 0.87 MG/DL (ref 0.57–1)
DEPRECATED RDW RBC AUTO: 42.6 FL (ref 37–54)
EGFRCR SERPLBLD CKD-EPI 2021: 70.5 ML/MIN/1.73
EOSINOPHIL # BLD AUTO: 0.27 10*3/MM3 (ref 0–0.4)
EOSINOPHIL NFR BLD AUTO: 4.3 % (ref 0.3–6.2)
ERYTHROCYTE [DISTWIDTH] IN BLOOD BY AUTOMATED COUNT: 15.2 % (ref 12.3–15.4)
GLUCOSE BLDC GLUCOMTR-MCNC: 101 MG/DL (ref 70–130)
GLUCOSE BLDC GLUCOMTR-MCNC: 104 MG/DL (ref 70–130)
GLUCOSE BLDC GLUCOMTR-MCNC: 189 MG/DL (ref 70–130)
GLUCOSE BLDC GLUCOMTR-MCNC: 226 MG/DL (ref 70–130)
GLUCOSE SERPL-MCNC: 101 MG/DL (ref 65–99)
HCT VFR BLD AUTO: 33.8 % (ref 34–46.6)
HGB BLD-MCNC: 10.9 G/DL (ref 12–15.9)
IMM GRANULOCYTES # BLD AUTO: 0.02 10*3/MM3 (ref 0–0.05)
IMM GRANULOCYTES NFR BLD AUTO: 0.3 % (ref 0–0.5)
LYMPHOCYTES # BLD AUTO: 1.03 10*3/MM3 (ref 0.7–3.1)
LYMPHOCYTES NFR BLD AUTO: 16.6 % (ref 19.6–45.3)
MCH RBC QN AUTO: 25.1 PG (ref 26.6–33)
MCHC RBC AUTO-ENTMCNC: 32.2 G/DL (ref 31.5–35.7)
MCV RBC AUTO: 77.7 FL (ref 79–97)
MONOCYTES # BLD AUTO: 0.53 10*3/MM3 (ref 0.1–0.9)
MONOCYTES NFR BLD AUTO: 8.5 % (ref 5–12)
NEUTROPHILS NFR BLD AUTO: 4.34 10*3/MM3 (ref 1.7–7)
NEUTROPHILS NFR BLD AUTO: 70 % (ref 42.7–76)
NRBC BLD AUTO-RTO: 0 /100 WBC (ref 0–0.2)
PLATELET # BLD AUTO: 174 10*3/MM3 (ref 140–450)
PMV BLD AUTO: 9.3 FL (ref 6–12)
POTASSIUM SERPL-SCNC: 3.6 MMOL/L (ref 3.5–5.2)
RBC # BLD AUTO: 4.35 10*6/MM3 (ref 3.77–5.28)
SODIUM SERPL-SCNC: 130 MMOL/L (ref 136–145)
WBC NRBC COR # BLD AUTO: 6.21 10*3/MM3 (ref 3.4–10.8)

## 2024-08-19 PROCEDURE — 80048 BASIC METABOLIC PNL TOTAL CA: CPT | Performed by: HOSPITALIST

## 2024-08-19 PROCEDURE — 82948 REAGENT STRIP/BLOOD GLUCOSE: CPT

## 2024-08-19 PROCEDURE — 25010000002 CEFTRIAXONE PER 250 MG: Performed by: INTERNAL MEDICINE

## 2024-08-19 PROCEDURE — 63710000001 INSULIN LISPRO (HUMAN) PER 5 UNITS: Performed by: INTERNAL MEDICINE

## 2024-08-19 PROCEDURE — 85025 COMPLETE CBC W/AUTO DIFF WBC: CPT | Performed by: HOSPITALIST

## 2024-08-19 PROCEDURE — 99231 SBSQ HOSP IP/OBS SF/LOW 25: CPT | Performed by: STUDENT IN AN ORGANIZED HEALTH CARE EDUCATION/TRAINING PROGRAM

## 2024-08-19 RX ORDER — FLUTICASONE PROPIONATE 50 MCG
2 SPRAY, SUSPENSION (ML) NASAL DAILY
Status: DISCONTINUED | OUTPATIENT
Start: 2024-08-19 | End: 2024-08-20 | Stop reason: HOSPADM

## 2024-08-19 RX ORDER — TRAMADOL HYDROCHLORIDE 50 MG/1
50 TABLET ORAL EVERY 6 HOURS PRN
Status: DISCONTINUED | OUTPATIENT
Start: 2024-08-19 | End: 2024-08-20 | Stop reason: HOSPADM

## 2024-08-19 RX ADMIN — DILTIAZEM HYDROCHLORIDE 120 MG: 60 TABLET, FILM COATED ORAL at 09:53

## 2024-08-19 RX ADMIN — ACETAMINOPHEN 650 MG: 325 TABLET ORAL at 06:28

## 2024-08-19 RX ADMIN — ATORVASTATIN CALCIUM 40 MG: 20 TABLET, FILM COATED ORAL at 09:53

## 2024-08-19 RX ADMIN — INSULIN LISPRO 2 UNITS: 100 INJECTION, SOLUTION INTRAVENOUS; SUBCUTANEOUS at 17:58

## 2024-08-19 RX ADMIN — Medication 10 ML: at 20:38

## 2024-08-19 RX ADMIN — PANTOPRAZOLE SODIUM 40 MG: 40 TABLET, DELAYED RELEASE ORAL at 06:28

## 2024-08-19 RX ADMIN — DOXYCYCLINE 100 MG: 100 CAPSULE ORAL at 09:53

## 2024-08-19 RX ADMIN — SPIRONOLACTONE 25 MG: 25 TABLET, FILM COATED ORAL at 09:53

## 2024-08-19 RX ADMIN — METOPROLOL TARTRATE 25 MG: 25 TABLET, FILM COATED ORAL at 09:53

## 2024-08-19 RX ADMIN — INSULIN LISPRO 4 UNITS: 100 INJECTION, SOLUTION INTRAVENOUS; SUBCUTANEOUS at 20:40

## 2024-08-19 RX ADMIN — METOPROLOL TARTRATE 25 MG: 25 TABLET, FILM COATED ORAL at 20:38

## 2024-08-19 RX ADMIN — Medication 10 ML: at 10:01

## 2024-08-19 RX ADMIN — DILTIAZEM HYDROCHLORIDE 120 MG: 60 TABLET, FILM COATED ORAL at 20:38

## 2024-08-19 RX ADMIN — CEFTRIAXONE 2000 MG: 2 INJECTION, POWDER, FOR SOLUTION INTRAMUSCULAR; INTRAVENOUS at 09:53

## 2024-08-19 RX ADMIN — DOXYCYCLINE 100 MG: 100 CAPSULE ORAL at 20:38

## 2024-08-19 RX ADMIN — FLUTICASONE PROPIONATE 2 SPRAY: 50 SPRAY, METERED NASAL at 14:45

## 2024-08-19 NOTE — PROGRESS NOTES
"General Surgery Progress Note    CC: Left axilla and right mons abscesses    S: Patient states she is feeling much better. Sore at her axilla wound but tolerable. Denies other symptoms.    O:/59   Pulse 72   Temp 98.5 °F (36.9 °C) (Oral)   Resp 18   Ht 162.6 cm (64.02\")   Wt 104 kg (228 lb 14.4 oz)   SpO2 98%   BMI 39.27 kg/m²     Intake & Output (last day)         08/18 0701 08/19 0700 08/19 0701 08/20 0700    P.O.      Total Intake(mL/kg)      Net            Urine Unmeasured Occurrence 1 x             GENERAL: awake, alert, interactive, cooperative  HEENT: EOMI, clear sclera, moist mucus membranes   CHEST: normal work of breathing on room air  CARDIAC: well perfused  EXTREMITIES: LOBATO  SKIN:   -L axilla wound packing removed, wound hemostatic with minimal surrounding erythema, serosanguineous drainage on packing; single iodoform gauze packing with long tail replaced  -Right mons wound packing removed, wound hemostatic with much improved surrounding cellulitis, serosanguineous drainage on packing, single iodoform gauze packing with long tail replaced    LABS  Results from last 7 days   Lab Units 08/19/24  0616 08/18/24  0606 08/17/24  1835   WBC 10*3/mm3 6.21 7.85 11.60*   HEMOGLOBIN g/dL 10.9* 10.9* 12.3   HEMATOCRIT % 33.8* 34.0 37.8   PLATELETS 10*3/mm3 174 168 236     Results from last 7 days   Lab Units 08/19/24  0616 08/18/24  0606 08/17/24  1835   SODIUM mmol/L 130* 133* 134*   POTASSIUM mmol/L 3.6 4.0 4.1   CHLORIDE mmol/L 99 101 97*   CO2 mmol/L 21.4* 22.0 20.1*   BUN mg/dL 10 10 12   CREATININE mg/dL 0.87 0.99 1.19*   CALCIUM mg/dL 8.9 8.5* 9.7   BILIRUBIN mg/dL  --   --  0.5   ALK PHOS U/L  --   --  82   ALT (SGPT) U/L  --   --  17   AST (SGOT) U/L  --   --  21   GLUCOSE mg/dL 101* 142* 156*         Left axilla wound culture with no growth, Gram stain no WBCs or organisms seen  Right mons wound culture with growth present, too young to evaluate, rare 1+ gram-negative bacilli, few 2+ " gram-positive cocci in clusters, rare 1+ WBCs seen on Gram stain  anaerobic cultures pending  Blood cultures no growth at 24 hours    A/P: 74 y.o. female with left axilla abscess consistent with infected epidermoid cyst and right mons abscess status post incision and drainage 8/18/2024.     Continue daily packing changes with iodoform gauze.  Okay to shower over the sites with warm soapy water, rinse, pat dry, and replace packing.  Continue antibiotics given the cellulitis around her mons wound.  Follow-up culture data.  Stable for discharge from my standpoint once medically appropriate. Can follow up in the office in 2 weeks for wound check.    Barbara Boston MD  General, Robotic and Endoscopic Surgery  [facility]    [address], [address]  [place], [place], [address]  P: [phone]  F: [phone]

## 2024-08-19 NOTE — DISCHARGE INSTRUCTIONS
Continue daily packing changes to left axilla and right mons wounds.    At least once daily, remove your dressings and packing.  Wash wounds with warm soapy water, rinse, pat dry.  Replace quarter inch iodoform gauze and cover with clean bandages.  Your wounds will eventually fill in and be too small to pack.  At this point you may just keep them clean as above and keep a clean bandage on them.  Call the general surgery office if you develop worsening redness, pain, fever, chills, swelling, bleeding, or drainage at your wound sites. Go to the office for a follow-up appointment with Dr. Boston on Thursday, August 22 at 4:15pm.  Call the office at [phone] if you have any questions.

## 2024-08-19 NOTE — PROGRESS NOTES
[facility] Medicine Services  PROGRESS NOTE    Patient Name: Sally Rivera  : 1950  MRN: [id number]    Date of Admission: 2024  Primary Care Physician: Edgar Mayers    Subjective   Subjective     CC:  Left axilla and right mons abscess since    Subjective:  Patient feels better.  She does not feel ready to go home.  We discussed surgery recommendations.  We discussed antibiotic treatment plan.  She was pleased with her care and with the plans.    Review of Systems  No current fevers or chills  No current shortness of breath or cough  No current nausea, vomiting, or diarrhea  No current chest pain or palpitations       Objective   Objective     Vital Signs:   Temp:  [97.9 °F (36.6 °C)-98.8 °F (37.1 °C)] 98.5 °F (36.9 °C)  Heart Rate:  [65-83] 72  Resp:  [18] 18  BP: ()/(59-63) 112/59        Physical Exam:  Constitutional:Awake, alert  HENT: NCAT, mucous membranes moist, neck supple  Respiratory: No cough or wheezes, normal respirations, nonlabored breathing   Cardiovascular: Pulse rate is normal, palpable radial pulses  Gastrointestinal:  soft, nontender, nondistended  Musculoskeletal: Severely obese, no lower extremity edema, BMI 39  Psychiatric: Appropriate affect, cooperative, conversational  Neurologic: No slurred speech or facial droop, follows commands  Skin: Left axilla and right mons abscess status post drainage no rashes or jaundice, warm      Results Reviewed:  Results from last 7 days   Lab Units 24  0616 24  0606 24  1835   WBC 10*3/mm3 6.21 7.85 11.60*   HEMOGLOBIN g/dL 10.9* 10.9* 12.3   HEMATOCRIT % 33.8* 34.0 37.8   PLATELETS 10*3/mm3 174 168 236   PROCALCITONIN ng/mL  --   --  0.28*     Results from last 7 days   Lab Units 24  0616 24  0606 24  1835   SODIUM mmol/L 130* 133* 134*   POTASSIUM mmol/L 3.6 4.0 4.1   CHLORIDE mmol/L 99 101 97*   CO2 mmol/L 21.4* 22.0 20.1*   BUN mg/dL 10 10 12   CREATININE mg/dL 0.87 0.99 1.19*   GLUCOSE  mg/dL 101* 142* 156*   CALCIUM mg/dL 8.9 8.5* 9.7   ALK PHOS U/L  --   --  82   ALT (SGPT) U/L  --   --  17   AST (SGOT) U/L  --   --  21     Estimated Creatinine Clearance: 67.6 mL/min (by C-G formula based on SCr of 0.87 mg/dL).    Microbiology Results Abnormal       Procedure Component Value - Date/Time    Wound Culture - Wound, Axilla, Left [[id number]] Collected: 08/18/24 1910    Lab Status: Preliminary result Specimen: Wound from Axilla, Left Updated: 08/19/24 0726     Wound Culture No growth     Gram Stain No WBCs or organisms seen    Wound Culture - Wound, Pubis [[id number]] Collected: 08/18/24 1910    Lab Status: Preliminary result Specimen: Wound from Pubis Updated: 08/19/24 0725     Wound Culture Growth present, too young to evaluate     Gram Stain Rare (1+) Gram negative bacilli      Few (2+) Gram positive cocci in clusters      Rare (1+) WBCs seen    Blood Culture - Blood, Arm, Right [[id number]]  (Normal) Collected: 08/17/24 1934    Lab Status: Preliminary result Specimen: Blood from Arm, Right Updated: 08/18/24 2000     Blood Culture No growth at 24 hours    Blood Culture - Blood, Arm, Right [[id number]]  (Normal) Collected: 08/17/24 1940    Lab Status: Preliminary result Specimen: Blood from Arm, Right Updated: 08/18/24 1945     Blood Culture No growth at 24 hours            Imaging Results (Last 24 Hours)       ** No results found for the last 24 hours. **            Results for orders placed during the hospital encounter of 11/27/23    Adult Transthoracic Echo Limited W/ Cont if Necessary Per Protocol    Interpretation Summary    Left ventricular systolic function is normal. Left ventricular ejection fraction appears to be 51 - 55%.    Normal right ventricular wall thickness and septal motion noted. The right ventricular cavity is mild to moderately dilated. Borderline low right ventricular systolic function noted.    Estimated RV systolic pressure from tricuspid regurgitation is mildly elevated (35-45  mmHg). Mild pulmonary hypertension is present.      I have reviewed the medications:  Scheduled Meds:aspirin, 325 mg, Oral, Daily  atorvastatin, 40 mg, Oral, Daily  cefTRIAXone, 2,000 mg, Intravenous, Daily  dilTIAZem, 120 mg, Oral, Q12H  doxycycline, 100 mg, Oral, Q12H  fluticasone, 2 spray, Each Nare, Daily  insulin lispro, 2-9 Units, Subcutaneous, 4x Daily AC & at Bedtime  metoprolol tartrate, 25 mg, Oral, BID  pantoprazole, 40 mg, Oral, Q AM  sodium chloride, 10 mL, Intravenous, Q12H  spironolactone, 25 mg, Oral, Daily      Continuous Infusions:   PRN Meds:.  acetaminophen **OR** acetaminophen **OR** acetaminophen    senna-docusate sodium **AND** polyethylene glycol **AND** bisacodyl **AND** bisacodyl    Calcium Replacement - Follow Nurse / BPA Driven Protocol    dextrose    dextrose    glucagon (human recombinant)    HYDROcodone-acetaminophen    Magnesium Standard Dose Replacement - Follow Nurse / BPA Driven Protocol    nitroglycerin    ondansetron ODT **OR** ondansetron    Phosphorus Replacement - Follow Nurse / BPA Driven Protocol    Potassium Replacement - Follow Nurse / BPA Driven Protocol    sodium chloride    sodium chloride    Assessment & Plan   Assessment & Plan     Active Hospital Problems    Diagnosis  POA    **Axillary abscess [L02.419]  Yes    Bacterial infection [A49.9]  Yes    Chronic heart failure with preserved ejection fraction [I50.32]  Yes    Pulmonary hypertension [I27.20]  Yes    Hypoxia [R09.02]  Yes    Class 2 severe obesity with serious comorbidity in adult [E66.01]  Yes    Chronic coronary artery disease [I25.10]  Yes    Gastroesophageal reflux disease [K21.9]  Yes    Nonalcoholic fatty liver disease [K76.0]  Yes    Type 2 diabetes mellitus with hyperglycemia, without long-term current use of insulin [E11.65]  Yes      Resolved Hospital Problems   No resolved problems to display.        Brief Hospital Course to date:  Sally Beaulieuolas is a 74 y.o. female     Discussion/plan for  today: All medical problems are new under my management today.  Labs, vitals, and imaging reviewed.  Case discussed with surgery team.  Wound care plans are noted.  Wound culture reviewed and Gram stains thus far seem to be showing bacterial growth which is awaiting speciation's and susceptibility.  Currently on doxycycline and ceftriaxone for broad-spectrum coverage.  Glucose reviewed.  Continue current insulin regimen for now.  Continue local wound care.  Continue chronic treatment for chronic medical problems.  Diltiazem with history of paroxysmal atrial tachycardia noted.  Continue beta-blocker.  Add Flonase for nasal congestion.  Wean supplemental oxygen today 5 L to 2 L.    DVT Prophylaxis: Mechanical      Disposition: I expect the patient to be discharged home in 1 to 2 days.    CODE STATUS:   Code Status and Medical Interventions: CPR (Attempt to Resuscitate); Full Support   Ordered at: 08/18/24 0056     Code Status (Patient has no pulse and is not breathing):    CPR (Attempt to Resuscitate)     Medical Interventions (Patient has pulse or is breathing):    Full Support       Darron Bhat MD  08/19/24

## 2024-08-19 NOTE — CONSULTS
met with pt and spouse at bedside to discuss Advance Directive. Pt concerned that updated version is not on file.  provided education on differences between AD and DNR. Pt stated a copy of AD is available at home; spouse will attempt to locate completed copy and bring back to hospital. Pt and spouse expressed understanding how to reach out to chaplains when needed. Pastoral Care Office remains available.

## 2024-08-19 NOTE — PLAN OF CARE
Goal Outcome Evaluation:  Plan of Care Reviewed With: patient        Progress: improving  Outcome Evaluation: Had I/D of 2 lesions yesterday at bedside.  Dressing CDI.  No further orders for dressing changes.  Surgeon to see again in AM.  O2 5L NC.

## 2024-08-19 NOTE — CASE MANAGEMENT/SOCIAL WORK
Discharge Planning Assessment  [facility]     Patient Name: Sally Rivera  MRN: [id number]  Today's Date: 8/19/2024    Admit Date: 8/17/2024    Plan: Home with , may need wound center pending dressing recs   Discharge Needs Assessment       Row Name 08/19/24 165       Living Environment    People in Home spouse    Name(s) of People in Home Ernie, 8 and 6 y/o grandchildren    Current Living Arrangements home    Potentially Unsafe Housing Conditions none    Primary Care Provided by self    Family Caregiver if Needed child(renzo), adult;spouse    Quality of Family Relationships involved;helpful    Able to Return to Prior Arrangements yes       Resource/Environmental Concerns    Resource/Environmental Concerns none       Transition Planning    Patient/Family Anticipates Transition to home with family;home with help/services    Patient/Family Anticipated Services at Transition home health care    Transportation Anticipated family or friend will provide       Discharge Needs Assessment    Readmission Within the Last 30 Days no previous admission in last 30 days    Equipment Currently Used at Home rollator;cane, straight;oxygen    Concerns to be Addressed adjustment to diagnosis/illness    Anticipated Changes Related to Illness none    Equipment Needed After Discharge none                   Discharge Plan       Row Name 08/19/24 4524       Plan    Plan Home with , may need wound center pending dressing recs    Patient/Family in Agreement with Plan yes    Plan Comments Spoke with pt and  at bedside, introduced self and explained CCP role, and confirmed pharmacy and face sheet information verified. Pt lives with her  Ernie, their 8 y/0 grandchild and care for a 6 y/o grandchild as well. She is IADL's, uses a cane or rollator outside the home only. She has continuous oxygen through [employer] with portable tank. She has no HH or SNF history, she now has dressing changes that require packing  per pt and may need HH/wound care clinic etc. Ernie does not think he can do the dressings and she has no one else who would be able to help. She plans home with Ernie to transport. CCP will follow  - Carmen VALDEZ                  Continued Care and Services - Admitted Since 8/17/2024    No active coordination exists for this encounter.       Expected Discharge Date and Time       Expected Discharge Date Expected Discharge Time    Aug 20, 2024            Demographic Summary       Row Name 08/19/24 3079       General Information    Admission Type inpatient                   Functional Status       Row Name 08/19/24 5897       Functional Status    Usual Activity Tolerance excellent    Current Activity Tolerance good                   Psychosocial    No documentation.                  Abuse/Neglect    No documentation.                  Legal       Row Name 08/19/24 1768       Financial/Legal    Who Manages Finances if Patient Unable  Ernie                   Substance Abuse    No documentation.                  Patient Forms    No documentation.                     Carmen Quezada RN

## 2024-08-19 NOTE — NURSING NOTE
CWON note: duplicate consult for groin/mons pubis abscess. Dr Boston has already seen the pt and performed I&D. Will defer care and management of site to general surgery.

## 2024-08-20 ENCOUNTER — READMISSION MANAGEMENT (OUTPATIENT)
Dept: CALL CENTER | Facility: HOSPITAL | Age: 74
End: 2024-08-20
Payer: MEDICARE

## 2024-08-20 VITALS
HEART RATE: 71 BPM | DIASTOLIC BLOOD PRESSURE: 71 MMHG | TEMPERATURE: 97.9 F | WEIGHT: 228.9 LBS | RESPIRATION RATE: 18 BRPM | BODY MASS INDEX: 39.08 KG/M2 | HEIGHT: 64 IN | SYSTOLIC BLOOD PRESSURE: 126 MMHG | OXYGEN SATURATION: 92 %

## 2024-08-20 PROBLEM — I50.32 CHRONIC HEART FAILURE WITH PRESERVED EJECTION FRACTION (HFPEF): Status: ACTIVE | Noted: 2024-08-20

## 2024-08-20 PROBLEM — J96.11 CHRONIC RESPIRATORY FAILURE WITH HYPOXIA: Status: ACTIVE | Noted: 2024-08-20

## 2024-08-20 LAB
GLUCOSE BLDC GLUCOMTR-MCNC: 111 MG/DL (ref 70–130)
GLUCOSE BLDC GLUCOMTR-MCNC: 165 MG/DL (ref 70–130)

## 2024-08-20 PROCEDURE — 99231 SBSQ HOSP IP/OBS SF/LOW 25: CPT | Performed by: STUDENT IN AN ORGANIZED HEALTH CARE EDUCATION/TRAINING PROGRAM

## 2024-08-20 PROCEDURE — 25010000002 CEFTRIAXONE PER 250 MG: Performed by: INTERNAL MEDICINE

## 2024-08-20 PROCEDURE — 82948 REAGENT STRIP/BLOOD GLUCOSE: CPT

## 2024-08-20 PROCEDURE — 63710000001 INSULIN LISPRO (HUMAN) PER 5 UNITS: Performed by: INTERNAL MEDICINE

## 2024-08-20 RX ORDER — FAMOTIDINE 10 MG
10 TABLET ORAL 2 TIMES DAILY PRN
Start: 2024-08-20

## 2024-08-20 RX ORDER — DOXYCYCLINE 100 MG/1
100 CAPSULE ORAL
Qty: 6 CAPSULE | Refills: 0 | Status: SHIPPED | OUTPATIENT
Start: 2024-08-20 | End: 2024-08-23

## 2024-08-20 RX ORDER — CEFPODOXIME PROXETIL 200 MG/1
200 TABLET, FILM COATED ORAL EVERY 12 HOURS
Qty: 6 TABLET | Refills: 0 | Status: SHIPPED | OUTPATIENT
Start: 2024-08-20 | End: 2024-08-23

## 2024-08-20 RX ADMIN — ASPIRIN 325 MG: 325 TABLET ORAL at 09:25

## 2024-08-20 RX ADMIN — ATORVASTATIN CALCIUM 40 MG: 20 TABLET, FILM COATED ORAL at 09:25

## 2024-08-20 RX ADMIN — CEFTRIAXONE 2000 MG: 2 INJECTION, POWDER, FOR SOLUTION INTRAMUSCULAR; INTRAVENOUS at 09:26

## 2024-08-20 RX ADMIN — Medication 10 ML: at 09:26

## 2024-08-20 RX ADMIN — METOPROLOL TARTRATE 25 MG: 25 TABLET, FILM COATED ORAL at 09:25

## 2024-08-20 RX ADMIN — INSULIN LISPRO 2 UNITS: 100 INJECTION, SOLUTION INTRAVENOUS; SUBCUTANEOUS at 12:32

## 2024-08-20 RX ADMIN — PANTOPRAZOLE SODIUM 40 MG: 40 TABLET, DELAYED RELEASE ORAL at 06:31

## 2024-08-20 RX ADMIN — DILTIAZEM HYDROCHLORIDE 120 MG: 60 TABLET, FILM COATED ORAL at 09:25

## 2024-08-20 RX ADMIN — DOXYCYCLINE 100 MG: 100 CAPSULE ORAL at 09:25

## 2024-08-20 RX ADMIN — FLUTICASONE PROPIONATE 2 SPRAY: 50 SPRAY, METERED NASAL at 09:26

## 2024-08-20 RX ADMIN — SPIRONOLACTONE 25 MG: 25 TABLET, FILM COATED ORAL at 09:26

## 2024-08-20 NOTE — PLAN OF CARE
Goal Outcome Evaluation:  Plan of Care Reviewed With: patient        Progress: improving  Outcome Evaluation: Possible DC today.  Up with standby assist to BR.  Dressings intact to left axilla and right dannie area.  O2 3L NC.

## 2024-08-20 NOTE — DISCHARGE SUMMARY
[facility] Medicine Services  DISCHARGE SUMMARY    Patient Name: Sally Rivera  : 1950  MRN: [id number]    Date of Admission: 2024  6:17 PM  Date of Discharge:   2024  Primary Care Physician: Edgar Mayers    Consults       Date and Time Order Name Status Description    2024 11:57 PM Inpatient General Surgery Consult Completed     2024  7:56 PM [facility] (on-call MD unless specified) Details              Hospital Course       Active Hospital Problems    Diagnosis  POA    **Axillary abscess [L02.419]  Yes    Chronic respiratory failure with hypoxia [J96.11]  Yes    Chronic heart failure with preserved ejection fraction (HFpEF) [I50.32]  Yes    Bacterial infection [A49.9]  Yes    Chronic heart failure with preserved ejection fraction [I50.32]  Yes    Pulmonary hypertension [I27.20]  Yes    Hypoxia [R09.02]  Yes    Class 2 severe obesity with serious comorbidity in adult [E66.01]  Yes    Chronic coronary artery disease [I25.10]  Yes    Gastroesophageal reflux disease [K21.9]  Yes    Nonalcoholic fatty liver disease [K76.0]  Yes    Type 2 diabetes mellitus with hyperglycemia, without long-term current use of insulin [E11.65]  Yes      Resolved Hospital Problems   No resolved problems to display.          Hospital Course:  Sally Rivera is a 74 y.o. female presents the hospital with abscess in the axilla region as well as mons pubis region abscess.  Patient underwent incision and drainage by surgery team.  Culture is showed mixed gram-positive and gram-negative on Gram stain however culture thus far is only growing the gram-negative bacteria.  Patient is responding well to therapy on a cephalosporin and doxycycline therapy.  Patient will continue a short course of a couple more days.  Patient will return to surgery for wound check on Thursday and wound will be repacked today.  I have discussed with surgery team who is cleared her to discharge.    Continue chronic treatment for  chronic medical problems.     Diltiazem with history of paroxysmal atrial tachycardia noted. Continue beta-blocker.     Wean supplemental oxygen today 5 L to 3 L for chronic hypoxia due to CHF and pulmonary hypertension.    Hypoglycemia likely related to infection and is improved.  Patient will return to home regimen at discharge.  A1c is notably 6.6.    The remainder of patient's medical problems are reasonably stable and patient is eager and agreeable to discharge today.  She agrees to follow-up with primary care provider and with surgery team.    At the time of discharge patient was told to take all medications as prescribed, keep all follow-up appointments, and call their doctor or return to the hospital with any worsening or concerning symptoms.    Please note that this note was made using Dragon voice recognition software            Day of Discharge     Subjective patient says her back feels much better today.  She says she feels great.  She wants to discharge.  She agrees to follow-up closely.  She says she has good family support.  No other new complaints    Vital Signs:   Temp:  [97.9 °F (36.6 °C)-98.4 °F (36.9 °C)] 97.9 °F (36.6 °C)  Heart Rate:  [] 71  Resp:  [18] 18  BP: (102-126)/(58-77) 126/71     Physical Exam:  Constitutional:Awake, alert  HENT: NCAT, mucous membranes moist, neck supple  Respiratory: No cough or wheezes, normal respirations, nonlabored breathing   Cardiovascular: Pulse rate is normal, palpable radial pulses  Gastrointestinal:  soft, nontender, nondistended  Musculoskeletal: Severely obese, no lower extremity edema, BMI 39  Psychiatric: Appropriate affect, cooperative, conversational  Neurologic: No slurred speech or facial droop, follows commands  Skin: Left axilla and right mons abscess status post drainage no rashes or jaundice, warm    Pertinent  and/or Most Recent Results     Results from last 7 days   Lab Units 08/19/24  0616 08/18/24  0606 08/17/24  1835   WBC 10*3/mm3  "6.21 7.85 11.60*   HEMOGLOBIN g/dL 10.9* 10.9* 12.3   HEMATOCRIT % 33.8* 34.0 37.8   PLATELETS 10*3/mm3 174 168 236   SODIUM mmol/L 130* 133* 134*   POTASSIUM mmol/L 3.6 4.0 4.1   CHLORIDE mmol/L 99 101 97*   CO2 mmol/L 21.4* 22.0 20.1*   BUN mg/dL 10 10 12   CREATININE mg/dL 0.87 0.99 1.19*   GLUCOSE mg/dL 101* 142* 156*   CALCIUM mg/dL 8.9 8.5* 9.7     Results from last 7 days   Lab Units 08/17/24  1835   BILIRUBIN mg/dL 0.5   ALK PHOS U/L 82   ALT (SGPT) U/L 17   AST (SGOT) U/L 21           Invalid input(s): \"TG\", \"LDLCALC\", \"LDLREALC\"  Results from last 7 days   Lab Units 08/17/24  2312 08/17/24  1835   PROCALCITONIN ng/mL  --  0.28*   LACTATE mmol/L 1.6 3.0*       Brief Urine Lab Results  (Last result in the past 365 days)        Color   Clarity   Blood   Leuk Est   Nitrite   Protein   CREAT   Urine HCG        08/18/24 0427 Yellow   Clear   Negative   Negative   Negative   Trace                   Microbiology Results Abnormal       Procedure Component Value - Date/Time    Wound Culture - Wound, Axilla, Left [[id number]] Collected: 08/18/24 1910    Lab Status: Preliminary result Specimen: Wound from Axilla, Left Updated: 08/20/24 0654     Wound Culture Culture in progress     Gram Stain No WBCs or organisms seen    Blood Culture - Blood, Arm, Right [[id number]]  (Normal) Collected: 08/17/24 1934    Lab Status: Preliminary result Specimen: Blood from Arm, Right Updated: 08/19/24 2000     Blood Culture No growth at 2 days    Blood Culture - Blood, Arm, Right [[id number]]  (Normal) Collected: 08/17/24 1940    Lab Status: Preliminary result Specimen: Blood from Arm, Right Updated: 08/19/24 1945     Blood Culture No growth at 2 days            Imaging Results (All)       Procedure Component Value Units Date/Time    XR Chest 1 View [[id number]] Collected: 08/18/24 0038     Updated: 08/18/24 0043    Narrative:      SINGLE VIEW OF THE CHEST     HISTORY: Increasing oxygen requirement     COMPARISON: March 23, 2024   "   FINDINGS:  There is cardiomegaly. There is calcification of the aorta. No  pneumothorax or pleural effusion is seen. There is some interstitial  prominence, particularly in a basilar predominant distribution. Similar  findings were present on the prior study. No definite acute infiltrates  are seen.       Impression:      Background chronic interstitial changes. No definite acute infiltrates  are seen.     This report was finalized on 8/18/2024 12:40 AM by Dr. Arline Montes M.D on Workstation: [id number]                       Results for orders placed during the hospital encounter of 11/27/23    Adult Transthoracic Echo Limited W/ Cont if Necessary Per Protocol    Interpretation Summary    Left ventricular systolic function is normal. Left ventricular ejection fraction appears to be 51 - 55%.    Normal right ventricular wall thickness and septal motion noted. The right ventricular cavity is mild to moderately dilated. Borderline low right ventricular systolic function noted.    Estimated RV systolic pressure from tricuspid regurgitation is mildly elevated (35-45 mmHg). Mild pulmonary hypertension is present.    Wound culture from pubis abscess  Rare (1+) Gram negative bacilli P Light growth (2+) Gram Negative Bacilli     Few (2+) Gram positive cocci in clusters P    Rare (1+) WBCs seen       Plan for Follow-up of Pending Labs/Results: Surgery clinic  Pending Labs       Order Current Status    Anaerobic Culture - Swab, Axilla, Left In process    Anaerobic Culture - Swab, Pubis In process    Blood Culture - Blood, Arm, Right Preliminary result    Blood Culture - Blood, Arm, Right Preliminary result    Wound Culture - Wound, Axilla, Left Preliminary result    Wound Culture - Wound, Pubis Preliminary result          Discharge Details        Discharge Medications        New Medications        Instructions Start Date   cefpodoxime 200 MG tablet  Commonly known as: VANTIN   200 mg, Oral, Every 12 Hours       doxycycline 100 MG capsule  Commonly known as: VIBRAMYCIN   100 mg, Oral, Daily With Breakfast & Dinner      famotidine 10 MG tablet  Commonly known as: Pepcid AC   10 mg, Oral, 2 Times Daily PRN             Changes to Medications        Instructions Start Date   metFORMIN 500 MG tablet  Commonly known as: GLUCOPHAGE  What changed: how much to take   TAKE 2 TABLETS BY MOUTH 2 TIMES A DAY WITH MEALS             Continue These Medications        Instructions Start Date   aspirin 325 MG tablet   325 mg, Oral, Daily      atorvastatin 40 MG tablet  Commonly known as: LIPITOR   40 mg, Oral, Daily, 200001      cholecalciferol 25 MCG (1000 UT) tablet  Commonly known as: VITAMIN D3   1,000 Units, Oral, Daily      dilTIAZem 120 MG tablet  Commonly known as: CARDIZEM   120 mg, Oral, 2 Times Daily      furosemide 20 MG tablet  Commonly known as: LASIX   20 mg, Oral, Daily      metoprolol tartrate 25 MG tablet  Commonly known as: LOPRESSOR   25 mg, Oral, 2 Times Daily      mupirocin 2 % ointment  Commonly known as: BACTROBAN   1 Application, Topical, 3 Times Daily      O2  Commonly known as: OXYGEN   2 L/min, Inhalation, 24 -7      omeprazole 20 MG capsule  Commonly known as: priLOSEC   20 mg, Oral, Daily      spironolactone 25 MG tablet  Commonly known as: ALDACTONE   25 mg, Oral, Daily      Trulicity 0.75 MG/0.5ML solution pen-injector  Generic drug: Dulaglutide   INJECT 0.75MG (1 PEN) UNDER THE SKIN EVERY WEEK               No Known Allergies      Discharge Disposition:  Home-Health Care [facility]    Diet:  Hospital:  Diet Order   Procedures    Diet: Cardiac, Diabetic; Healthy Heart (2-3 Na+); Consistent Carbohydrate; Fluid Consistency: Thin (IDDSI 0)       Activity:  Activity Instructions       Activity as Tolerated                   CODE STATUS:    Code Status and Medical Interventions: CPR (Attempt to Resuscitate); Full Support   Ordered at: 08/18/24 0056     Code Status (Patient has no pulse and is not breathing):    CPR  (Attempt to Resuscitate)     Medical Interventions (Patient has pulse or is breathing):    Full Support       Future Appointments   Date Time Provider Department Center   8/22/2024  4:15 PM [facility] MD [facility]   9/17/2024 10:30 AM [facility]           Additional Instructions for the Follow-ups that You Need to Schedule       Discharge Follow-up with PCP   As directed       Currently Documented PCP:    Edgar Mayers    PCP Phone Number:    [phone]     Follow Up Details: 1 week, call today               Follow-up Information       Barbara Boston MD. Go to.    Specialty: General Surgery  Why: follow up appointment on thursday, august 22 at 4:15pm For wound re-check  Contact information:  [address] [address]  [phone]               Edgar Mayers .    Specialty: Family Medicine  Why: 1 week, call today  Contact information:  [address]  [place] [id number]  [phone]                                 Darron Bhat MD  08/20/24      Time Spent on Discharge:  I spent greater than 35 minutes on this discharge activity which included: face-to-face encounter with the patient, reviewing the data in the system, coordination of the care with the nursing staff as well as consultants, documentation, and entering orders.

## 2024-08-20 NOTE — CASE MANAGEMENT/SOCIAL WORK
Case Management Discharge Note      Final Note: home no needs         Selected Continued Care - Discharged on 8/20/2024 Admission date: 8/17/2024 - Discharge disposition: Home-Health Care Svc      Destination    No services have been selected for the patient.                Durable Medical Equipment    No services have been selected for the patient.                Dialysis/Infusion    No services have been selected for the patient.                Home Medical Care    No services have been selected for the patient.                Therapy    No services have been selected for the patient.                Community Resources    No services have been selected for the patient.                Community & DME    No services have been selected for the patient.                    Transportation Services  Private: Car    Final Discharge Disposition Code: 01 - home or self-care

## 2024-08-20 NOTE — PROGRESS NOTES
"General Surgery Progress Note    CC: Left axilla and right mons abscesses    S: No new complaints.  Patient states her pain is tolerable.    O:/71 (BP Location: Right arm, Patient Position: Lying)   Pulse 71   Temp 97.9 °F (36.6 °C) (Oral)   Resp 18   Ht 162.6 cm (64.02\")   Wt 104 kg (228 lb 14.4 oz)   SpO2 92%   BMI 39.27 kg/m²     Intake & Output (last day)         08/19 0701 08/20 0700 08/20 0701 08/21 0700    P.O. 360     Total Intake(mL/kg) 360 (3.5)     Net +360           Urine Unmeasured Occurrence 3 x 0 x            GENERAL: awake, alert, interactive, cooperative  HEENT: EOMI, clear sclera, moist mucus membranes   CHEST: normal work of breathing on room air  CARDIAC: well perfused  EXTREMITIES: LOBATO  SKIN: Wounds dressed    LABS  Results from last 7 days   Lab Units 08/19/24  0616 08/18/24  0606 08/17/24  1835   WBC 10*3/mm3 6.21 7.85 11.60*   HEMOGLOBIN g/dL 10.9* 10.9* 12.3   HEMATOCRIT % 33.8* 34.0 37.8   PLATELETS 10*3/mm3 174 168 236     Results from last 7 days   Lab Units 08/19/24  0616 08/18/24  0606 08/17/24  1835   SODIUM mmol/L 130* 133* 134*   POTASSIUM mmol/L 3.6 4.0 4.1   CHLORIDE mmol/L 99 101 97*   CO2 mmol/L 21.4* 22.0 20.1*   BUN mg/dL 10 10 12   CREATININE mg/dL 0.87 0.99 1.19*   CALCIUM mg/dL 8.9 8.5* 9.7   BILIRUBIN mg/dL  --   --  0.5   ALK PHOS U/L  --   --  82   ALT (SGPT) U/L  --   --  17   AST (SGOT) U/L  --   --  21   GLUCOSE mg/dL 101* 142* 156*         Left axilla wound culture with no growth, Gram stain no WBCs or organisms seen  Right mons wound culture with 2+ gram-negative bacilli; rare 1+ gram-negative bacilli, few 2+ gram-positive cocci in clusters, rare 1+ WBCs seen on Gram stain  anaerobic cultures pending  Blood cultures no growth at 24 hours    A/P: 74 y.o. female with left axilla abscess consistent with infected epidermoid cyst and right mons abscess status post incision and drainage 8/18/2024.     Patient doing well, stable for discharge from my " standpoint.  The patient is concerned about being able to do her packing changes at home and does not qualify for home health.  I will ask the nurse to repack her wounds prior to discharge today and she can keep them packed until she follows up with me on Thursday (in 2 days) in the office for a wound check.  I discussed with Dr. Bhat and I am comfortable with antibiotic plan to send her home on broad-spectrums based on her gram-negative and gram-positive results from her right mons wound.  Okay to shower over her wounds with warm soapy water, rinse, pat dry, and replace packing.    Barbara Boston MD  General, Robotic and Endoscopic Surgery  [facility]    [address], [address]  [place], [place], [address]  P: [phone]  F: [phone]

## 2024-08-20 NOTE — CASE MANAGEMENT/SOCIAL WORK
Continued Stay Note  [facility]     Patient Name: Sally Rivera  MRN: [id number]  Today's Date: 8/20/2024    Admit Date: 8/17/2024    Plan: Home with  and surgical f/u   Discharge Plan       Row Name 08/20/24 1306       Plan    Plan Home with  and surgical f/u    Patient/Family in Agreement with Plan yes    Plan Comments Spoke to primary RN, pt plans appt f/u with sx on Thursday, they will assess need at that time for dsg changes outpatient. Pt denies further dc needs. - Carmen VALDEZ                   Discharge Codes    No documentation.                 Expected Discharge Date and Time       Expected Discharge Date Expected Discharge Time    Aug 20, 2024               Carmen Quezada RN

## 2024-08-21 LAB
BACTERIA SPEC AEROBE CULT: ABNORMAL
GRAM STN SPEC: ABNORMAL

## 2024-08-21 NOTE — OUTREACH NOTE
Prep Survey      Flowsheet Row Responses   [facility] discharged from? [facility]   Is LACE score < 7 ? No   Eligibility Readm Mgmt   Discharge diagnosis Axillary abscess   Does the patient have one of the following disease processes/diagnoses(primary or secondary)? Other   Does the patient have Home health ordered? No   Is there a DME ordered? No   Prep survey completed? Yes            LILI CUELLAR - Registered Nurse

## 2024-08-22 ENCOUNTER — OFFICE VISIT (OUTPATIENT)
Dept: SURGERY | Facility: CLINIC | Age: 74
End: 2024-08-22
Payer: MEDICARE

## 2024-08-22 VITALS — WEIGHT: 228 LBS | HEIGHT: 64 IN | BODY MASS INDEX: 38.93 KG/M2

## 2024-08-22 DIAGNOSIS — L08.9 INFECTED EPIDERMOID CYST: Primary | ICD-10-CM

## 2024-08-22 DIAGNOSIS — L72.0 INFECTED EPIDERMOID CYST: Primary | ICD-10-CM

## 2024-08-22 DIAGNOSIS — L02.91 ABSCESS: ICD-10-CM

## 2024-08-22 LAB
BACTERIA SPEC AEROBE CULT: ABNORMAL
BACTERIA SPEC AEROBE CULT: ABNORMAL
BACTERIA SPEC AEROBE CULT: NORMAL
BACTERIA SPEC AEROBE CULT: NORMAL
GRAM STN SPEC: ABNORMAL

## 2024-08-22 RX ORDER — OXYCODONE HYDROCHLORIDE 5 MG/1
1 TABLET ORAL
COMMUNITY
Start: 2024-08-15

## 2024-08-23 ENCOUNTER — READMISSION MANAGEMENT (OUTPATIENT)
Dept: CALL CENTER | Facility: HOSPITAL | Age: 74
End: 2024-08-23
Payer: MEDICARE

## 2024-08-23 LAB
BACTERIA SPEC ANAEROBE CULT: ABNORMAL
BACTERIA SPEC ANAEROBE CULT: ABNORMAL

## 2024-08-23 NOTE — PROGRESS NOTES
General Surgery Office Follow Up Note     History of Present Illness:    Sally Rivera is a 74 y.o. female who presents for follow up from incision and drainage of left axilla abscess and right mons abscess performed on 8/18/2024.  Patient was recently admitted to the hospital with these lesions and discharged on 8/20/2024.  Left axilla culture showed 1+ corynebacterium growth with Veillonella species.  Right mons culture showed 2+ Morganella morganii and 1+ E. coli growth susceptible to cephalosporins and tetracyclines with anaerobic peptoniphilus asaccharolyticus present.  The patient was discharged from the hospital on cefpodoxime and doxycycline for 3 days.  She has been taking her medication as prescribed.  She states she is feeling much better.  She presents today for packing change and wound check.    Allergies:  No Known Allergies    Meds:    Current Outpatient Medications:     aspirin 325 MG tablet, Take 1 tablet by mouth Daily., Disp: , Rfl:     atorvastatin (LIPITOR) 40 MG tablet, Take 1 tablet by mouth Daily. 200001, Disp: 90 tablet, Rfl: 3    cefpodoxime (VANTIN) 200 MG tablet, Take 1 tablet by mouth Every 12 (Twelve) Hours for 3 days., Disp: 6 tablet, Rfl: 0    cholecalciferol (VITAMIN D3) 25 MCG (1000 UT) tablet, Take 1 tablet by mouth Daily., Disp: , Rfl:     dilTIAZem (CARDIZEM) 120 MG tablet, Take 1 tablet by mouth 2 (Two) Times a Day., Disp: , Rfl:     doxycycline (VIBRAMYCIN) 100 MG capsule, Take 1 capsule by mouth Daily With Breakfast & Dinner for 3 days., Disp: 6 capsule, Rfl: 0    Dulaglutide (Trulicity) 0.75 MG/0.5ML solution pen-injector, INJECT 0.75MG (1 PEN) UNDER THE SKIN EVERY WEEK, Disp: 12 mL, Rfl: 3    furosemide (LASIX) 20 MG tablet, Take 1 tablet by mouth Daily., Disp: 90 tablet, Rfl: 1    metFORMIN (GLUCOPHAGE) 500 MG tablet, TAKE 2 TABLETS BY MOUTH 2 TIMES A DAY WITH MEALS (Patient taking differently: Take 1 tablet by mouth 2 (Two) Times a Day With Meals.), Disp: 180  "tablet, Rfl: 0    metoprolol tartrate (LOPRESSOR) 25 MG tablet, Take 1 tablet by mouth 2 (Two) Times a Day., Disp: , Rfl:     O2 (OXYGEN), Inhale 2 L/min. 24 -7, Disp: , Rfl:     omeprazole (priLOSEC) 20 MG capsule, TAKE 1 CAPSULE BY MOUTH EVERY DAY, Disp: 90 capsule, Rfl: 2    oxyCODONE (ROXICODONE) 5 MG immediate release tablet, Take 1 tablet by mouth Every 4 (Four) to 6 (Six) Hours As Needed for Pain., Disp: , Rfl:     spironolactone (ALDACTONE) 25 MG tablet, Take 1 tablet by mouth Daily., Disp: 90 tablet, Rfl: 1    tacrolimus (PROGRAF) 0.5 mg/ml oral suspension, Take 2 mL by mouth Daily., Disp: , Rfl:     famotidine (Pepcid AC) 10 MG tablet, Take 1 tablet by mouth 2 (Two) Times a Day As Needed for Heartburn or Indigestion (OTC). (Patient not taking: Reported on 8/22/2024), Disp: , Rfl:     mupirocin (BACTROBAN) 2 % ointment, Apply 1 Application topically to the appropriate area as directed 3 (Three) Times a Day. (Patient not taking: Reported on 8/22/2024), Disp: 15 g, Rfl: 0    Physical Exam:   Ht 162.6 cm (64.02\")   Wt 103 kg (228 lb)   BMI 39.11 kg/m²   Constitutional: Well-developed well-nourished   Eyes: Conjunctiva normal, sclera nonicteric  HENT: Hearing grossly normal, oral mucosa moist  Respiratory: Normal inspiratory effort on home oxygen  Musculoskeletal: Symmetric strength, normal gait  Psychiatric: Normal mood and affect  Skin: Left axilla wound 1 cm by half centimeter by 1 cm deep without signs of cellulitis, serosanguineous drainage on packing; right mons wound 1 cm by half centimeter, shallow, with scant serous drainage, without signs of cellulitis     Assessment/Plan:  74-year-old lady with obesity BMI 39 and T2DM with hyperglycemia presenting status post incision and drainage of left axilla abscess suspected secondary to infected epidermoid cyst and right mons abscess    Micro results reviewed as above, patient should continue her antibiotics as prescribed.  She has no cellulitis on exam today " warranting further antibiotic treatment beyond current prescription.    Left axilla abscess cavity is deep enough to continue packing.  Instructed patient to continue daily packing changes with quarter inch iodoform gauze until the wound heals and it is unable to be packed.  I instructed her on specific packing change technique and bathing instructions.  She states that she and her  will be able to manage this at home.  I recommend she return to the office in 4 weeks after this area is healed for evaluation for possible cyst excision.  Her right mons abscess wound no longer requires packing and should just be kept clean and covered.    I counseled the patient on signs and symptoms concerning for worsening infection and need to call the office or return to the ER.  She voiced understanding is agreeable.      Barbara Boston M.D.  General, Robotic and Endoscopic Surgery  [facility]    [address], [address]  [place], [place], [address]  P: [phone]  F: [phone]

## 2024-08-23 NOTE — OUTREACH NOTE
Medical Week 1 Survey      Flowsheet Row Responses   [facility], [place] [facility] by [facility] patient discharged from? [place]   Does the patient have one of the following disease processes/diagnoses(primary or secondary)? Other   Week 1 attempt successful? Yes   Call start time 0927   Call end time 0933   Meds reviewed with patient/caregiver? Yes   Is the patient having any side effects they believe may be caused by any medication additions or changes? No   Does the patient have all medications ordered at discharge? Yes   Is the patient taking all medications as directed (includes completed medication regime)? Yes   Does the patient have a primary care provider?  Yes   Does the patient have an appointment with their PCP within 7 days of discharge? Yes   Has the patient kept scheduled appointments due by today? Yes   Has home health visited the patient within 72 hours of discharge? N/A   Home health comments States her  planned to help her with dressing changes.   Psychosocial issues? Yes   Psychosocial comments Patient states her  has just been admitted to hospital with cardiac issues.   Did the patient receive a copy of their discharge instructions? Yes   Nursing interventions Reviewed instructions with patient   What is the patient's perception of their health status since discharge? Improving   Is the patient/caregiver able to teach back signs and symptoms related to disease process for when to call PCP? Yes   Is the patient/caregiver able to teach back signs and symptoms related to disease process for when to call 911? Yes   Is the patient/caregiver able to teach back the hierarchy of who to call/visit for symptoms/problems? PCP, Specialist, Home health nurse, Urgent Care, ED, 911 Yes   Week 1 call completed? Yes   Would this patient benefit from a Referral to Amb Social Work? No   Is the patient interested in additional calls from an ambulatory ? No   Wrap up additional comments Brief call with patient  who states is improving, and kept appt with surgeon yesterday. States her  was going to assist her with packing axilla, but has now been admitted to hospital with cardiac issues. [facility] will contact her surgeon for assistance with packing wound today. Denies any other needs at this time.   Call end time [id number]            Karine HANNON - Registered Nurse

## 2024-09-17 ENCOUNTER — HOSPITAL ENCOUNTER (OUTPATIENT)
Dept: CARDIOLOGY | Facility: HOSPITAL | Age: 74
Discharge: HOME | End: 2024-09-17
Admitting: NURSE PRACTITIONER
Payer: MEDICARE

## 2024-09-17 VITALS
WEIGHT: 225.8 LBS | HEIGHT: 64 IN | SYSTOLIC BLOOD PRESSURE: 126 MMHG | HEART RATE: 77 BPM | OXYGEN SATURATION: 96 % | BODY MASS INDEX: 38.55 KG/M2 | DIASTOLIC BLOOD PRESSURE: 67 MMHG

## 2024-09-17 DIAGNOSIS — Q20.8 ABNORMALITY OF RIGHT VENTRICLE OF HEART: ICD-10-CM

## 2024-09-17 DIAGNOSIS — I50.32 CHRONIC HEART FAILURE WITH PRESERVED EJECTION FRACTION (HFPEF): ICD-10-CM

## 2024-09-17 DIAGNOSIS — I27.20 PULMONARY HYPERTENSION: Primary | ICD-10-CM

## 2024-09-17 PROCEDURE — 94726 PLETHYSMOGRAPHY LUNG VOLUMES: CPT | Performed by: NURSE PRACTITIONER

## 2024-09-17 PROCEDURE — G0463 HOSPITAL OUTPT CLINIC VISIT: HCPCS

## 2024-09-19 ENCOUNTER — OFFICE VISIT (OUTPATIENT)
Dept: SURGERY | Facility: CLINIC | Age: 74
End: 2024-09-19
Payer: MEDICARE

## 2024-09-19 VITALS
HEIGHT: 64 IN | TEMPERATURE: 97.9 F | WEIGHT: 225 LBS | SYSTOLIC BLOOD PRESSURE: 114 MMHG | HEART RATE: 76 BPM | DIASTOLIC BLOOD PRESSURE: 68 MMHG | OXYGEN SATURATION: 96 % | BODY MASS INDEX: 38.41 KG/M2

## 2024-09-19 DIAGNOSIS — Z51.89 VISIT FOR WOUND CHECK: ICD-10-CM

## 2024-09-19 DIAGNOSIS — L08.9 INFECTED EPIDERMOID CYST: Primary | ICD-10-CM

## 2024-09-19 DIAGNOSIS — L72.0 INFECTED EPIDERMOID CYST: Primary | ICD-10-CM

## 2024-09-30 RX ORDER — SPIRONOLACTONE 25 MG/1
25 TABLET ORAL DAILY
Qty: 90 TABLET | Refills: 1 | Status: SHIPPED | OUTPATIENT
Start: 2024-09-30

## 2024-12-10 ENCOUNTER — HOSPITAL ENCOUNTER (OUTPATIENT)
Dept: CARDIOLOGY | Facility: HOSPITAL | Age: 74
Discharge: HOME | End: 2024-12-10
Admitting: NURSE PRACTITIONER
Payer: MEDICARE

## 2024-12-10 VITALS
WEIGHT: 224.87 LBS | DIASTOLIC BLOOD PRESSURE: 89 MMHG | SYSTOLIC BLOOD PRESSURE: 151 MMHG | HEIGHT: 63 IN | BODY MASS INDEX: 39.84 KG/M2 | HEART RATE: 66 BPM

## 2024-12-10 DIAGNOSIS — I27.20 PULMONARY HYPERTENSION: ICD-10-CM

## 2024-12-10 DIAGNOSIS — I50.32 CHRONIC HEART FAILURE WITH PRESERVED EJECTION FRACTION (HFPEF): ICD-10-CM

## 2024-12-10 LAB
AORTIC DIMENSIONLESS INDEX: 0.76 (DI)
ASCENDING AORTA: 2.5 CM
BH CV ECHO LEFT VENTRICLE GLOBAL LONGITUDINAL STRAIN: -18.4 %
BH CV ECHO MEAS - ACS: 2 CM
BH CV ECHO MEAS - AO MAX PG: 7.1 MMHG
BH CV ECHO MEAS - AO MEAN PG: 3.8 MMHG
BH CV ECHO MEAS - AO ROOT DIAM: 3 CM
BH CV ECHO MEAS - AO V2 MAX: 133 CM/SEC
BH CV ECHO MEAS - AO V2 VTI: 27.9 CM
BH CV ECHO MEAS - AVA(I,D): 2.23 CM2
BH CV ECHO MEAS - EDV(CUBED): 126.6 ML
BH CV ECHO MEAS - EDV(MOD-SP2): 79 ML
BH CV ECHO MEAS - EDV(MOD-SP4): 109 ML
BH CV ECHO MEAS - EF(MOD-BP): 50.5 %
BH CV ECHO MEAS - EF(MOD-SP2): 51.9 %
BH CV ECHO MEAS - EF(MOD-SP4): 50.5 %
BH CV ECHO MEAS - ESV(CUBED): 43.2 ML
BH CV ECHO MEAS - ESV(MOD-SP2): 38 ML
BH CV ECHO MEAS - ESV(MOD-SP4): 54 ML
BH CV ECHO MEAS - FS: 30.1 %
BH CV ECHO MEAS - IVS/LVPW: 0.96 CM
BH CV ECHO MEAS - IVSD: 0.93 CM
BH CV ECHO MEAS - LAT PEAK E' VEL: 7.9 CM/SEC
BH CV ECHO MEAS - LV MASS(C)D: 171.7 GRAMS
BH CV ECHO MEAS - LV MAX PG: 3.4 MMHG
BH CV ECHO MEAS - LV MEAN PG: 2.01 MMHG
BH CV ECHO MEAS - LV V1 MAX: 92.5 CM/SEC
BH CV ECHO MEAS - LV V1 VTI: 21.7 CM
BH CV ECHO MEAS - LVIDD: 5 CM
BH CV ECHO MEAS - LVIDS: 3.5 CM
BH CV ECHO MEAS - LVOT AREA: 2.9 CM2
BH CV ECHO MEAS - LVOT DIAM: 1.91 CM
BH CV ECHO MEAS - LVPWD: 0.97 CM
BH CV ECHO MEAS - MED PEAK E' VEL: 5.3 CM/SEC
BH CV ECHO MEAS - MR MAX PG: 65 MMHG
BH CV ECHO MEAS - MR MAX VEL: 403.1 CM/SEC
BH CV ECHO MEAS - MV A DUR: 0.15 SEC
BH CV ECHO MEAS - MV A MAX VEL: 75.8 CM/SEC
BH CV ECHO MEAS - MV DEC SLOPE: 297.3 CM/SEC2
BH CV ECHO MEAS - MV DEC TIME: 0.34 SEC
BH CV ECHO MEAS - MV E MAX VEL: 54.8 CM/SEC
BH CV ECHO MEAS - MV E/A: 0.72
BH CV ECHO MEAS - MV MAX PG: 4.6 MMHG
BH CV ECHO MEAS - MV MEAN PG: 1.62 MMHG
BH CV ECHO MEAS - MV P1/2T: 85.6 MSEC
BH CV ECHO MEAS - MV V2 VTI: 29.8 CM
BH CV ECHO MEAS - MVA(P1/2T): 2.6 CM2
BH CV ECHO MEAS - MVA(VTI): 2.08 CM2
BH CV ECHO MEAS - PA ACC TIME: 0.06 SEC
BH CV ECHO MEAS - PA V2 MAX: 100.4 CM/SEC
BH CV ECHO MEAS - PULM A REVS DUR: 0.16 SEC
BH CV ECHO MEAS - PULM A REVS VEL: 36 CM/SEC
BH CV ECHO MEAS - PULM DIAS VEL: 30.4 CM/SEC
BH CV ECHO MEAS - PULM S/D: 0.82
BH CV ECHO MEAS - PULM SYS VEL: 24.9 CM/SEC
BH CV ECHO MEAS - QP/QS: 0.96
BH CV ECHO MEAS - RAP SYSTOLE: 8 MMHG
BH CV ECHO MEAS - RV MAX PG: 3 MMHG
BH CV ECHO MEAS - RV V1 MAX: 86.1 CM/SEC
BH CV ECHO MEAS - RV V1 VTI: 18.7 CM
BH CV ECHO MEAS - RVOT DIAM: 2.02 CM
BH CV ECHO MEAS - RVSP: 39 MMHG
BH CV ECHO MEAS - SV(LVOT): 62.2 ML
BH CV ECHO MEAS - SV(MOD-SP2): 41 ML
BH CV ECHO MEAS - SV(MOD-SP4): 55 ML
BH CV ECHO MEAS - SV(RVOT): 59.8 ML
BH CV ECHO MEAS - TR MAX PG: 30.7 MMHG
BH CV ECHO MEAS - TR MAX VEL: 276.9 CM/SEC
BH CV ECHO MEASUREMENTS AVERAGE E/E' RATIO: 8.3
BH CV XLRA - RV BASE: 3.6 CM
BH CV XLRA - RV LENGTH: 8.2 CM
BH CV XLRA - RV MID: 2.9 CM
BH CV XLRA - TDI S': 10.8 CM/SEC
LEFT ATRIUM VOLUME INDEX: 27.4 ML/M2
SINUS: 2.8 CM
STJ: 2.16 CM

## 2024-12-10 PROCEDURE — 93306 TTE W/DOPPLER COMPLETE: CPT

## 2024-12-10 PROCEDURE — 93356 MYOCRD STRAIN IMG SPCKL TRCK: CPT

## 2024-12-17 ENCOUNTER — TELEPHONE (OUTPATIENT)
Dept: CARDIOLOGY | Facility: HOSPITAL | Age: 74
End: 2024-12-17
Payer: MEDICARE

## 2024-12-17 ENCOUNTER — HOSPITAL ENCOUNTER (OUTPATIENT)
Dept: CARDIOLOGY | Facility: HOSPITAL | Age: 74
Discharge: HOME | End: 2024-12-17
Payer: MEDICARE

## 2024-12-17 ENCOUNTER — LAB (OUTPATIENT)
Dept: LAB | Facility: HOSPITAL | Age: 74
End: 2024-12-17
Payer: MEDICARE

## 2024-12-17 VITALS
SYSTOLIC BLOOD PRESSURE: 109 MMHG | OXYGEN SATURATION: 93 % | HEART RATE: 77 BPM | DIASTOLIC BLOOD PRESSURE: 74 MMHG | WEIGHT: 227.4 LBS | HEIGHT: 63 IN | BODY MASS INDEX: 40.29 KG/M2

## 2024-12-17 DIAGNOSIS — I27.20 PULMONARY HYPERTENSION: Primary | ICD-10-CM

## 2024-12-17 DIAGNOSIS — I50.22 HEART FAILURE WITH MILDLY REDUCED EJECTION FRACTION (HFMREF): ICD-10-CM

## 2024-12-17 LAB
ANION GAP SERPL CALCULATED.3IONS-SCNC: 15.8 MMOL/L (ref 5–15)
BUN SERPL-MCNC: 16 MG/DL (ref 8–23)
BUN/CREAT SERPL: 15.5 (ref 7–25)
CALCIUM SPEC-SCNC: 9.6 MG/DL (ref 8.6–10.5)
CHLORIDE SERPL-SCNC: 100 MMOL/L (ref 98–107)
CO2 SERPL-SCNC: 25.2 MMOL/L (ref 22–29)
CREAT SERPL-MCNC: 1.03 MG/DL (ref 0.57–1)
EGFRCR SERPLBLD CKD-EPI 2021: 57.5 ML/MIN/1.73
GLUCOSE SERPL-MCNC: 151 MG/DL (ref 65–99)
POTASSIUM SERPL-SCNC: 4.5 MMOL/L (ref 3.5–5.2)
SODIUM SERPL-SCNC: 141 MMOL/L (ref 136–145)

## 2024-12-17 PROCEDURE — 94726 PLETHYSMOGRAPHY LUNG VOLUMES: CPT | Performed by: NURSE PRACTITIONER

## 2024-12-17 PROCEDURE — G0463 HOSPITAL OUTPT CLINIC VISIT: HCPCS

## 2024-12-17 PROCEDURE — 80048 BASIC METABOLIC PNL TOTAL CA: CPT

## 2024-12-17 PROCEDURE — 36415 COLL VENOUS BLD VENIPUNCTURE: CPT

## 2024-12-17 RX ORDER — SILDENAFIL CITRATE 20 MG/1
10 TABLET ORAL 3 TIMES DAILY
Qty: 45 TABLET | Refills: 3 | Status: SHIPPED | OUTPATIENT
Start: 2024-12-17

## 2024-12-17 NOTE — PATIENT INSTRUCTIONS
Start revatio 10mg (which is a 1/2 tablet) once a day for 3 days, if you are feeling well , no dizziness or lightheadedness then increase to 2 times a day for 3 days, if continue to feel well then increase to 3 times a day until you are seen at follow-up.     Directions for when to call the clinic reviewed with the patient to include weight gain of 2 to 3 pounds in 24 hours, weight gain of 5 to 10 pounds within 7 days; worsening shortness of breath; worsening lower extremity edema or abdominal distention.

## 2024-12-17 NOTE — LETTER
December 23, 2024     Edgar Mayers MD  [address]  [address]  [facility] [address]    Patient: Sally Rivera   YOB: 1950   Date of Visit: 12/17/2024     Dear Edgar Mayers:       Thank you for referring Sally Rivera to me for evaluation. Below are the relevant portions of my assessment and plan of care.    If you have questions, please do not hesitate to call me. I look forward to following Sally along with you.         Sincerely,        KHALIF Arredondo        CC: No Recipients    Jovanna Proctor APRN  12/23/24 0820  Signed    [facility]    Provider, [place] [address]    Thank you for asking me to see Sally Rivera.     Congestive Heart Failure  Associated symptoms include shortness of breath (chronic and unchanged). Pertinent negatives include no chest pain.       1. 1. PAH       RHC 2022:  Hemodynamics:  Right Atrium: Mean of 12 mmHg  Right Ventricle: 43/11 mmHg  Pulmonary Artery: 56/21/31 mmHg  Pulmonary Wedge: Mean of 16 mmHg  Cardiac Output/Index: 3.91 L/min 1.84 L/min/m2  PA Sat: 65%  AO Sat: 92%  PVR: 3.9 Wood units     2. RV abnormality    3. HFmrEF    Subjective     Sally Antunez a 74 y.o. female who presents today for follow-up of pulmonary hypertension, RV abnormality, and HFpEF.     Clinical HX:  Last spirometry showed FEV1/FVC of 89% of predicted.  FVC was 54% of predicted.  DLCO was 53% of predicted.  TLC was 68% of predicted and consistent with restrictive physiology.  Patient also has a history of abnormal CT scans.     In April 2022 she was found to have multiple nodular opacities with the largest of 2.1 cm.  There was bilateral increased septal thickening and some areas of GGOs.   Pulmonary artery was dilated.  No history of VTE/PE.  No VQ scan.  No history of autoimmune or rheumatic diseases.  No family history of cardiomyopathy or pulmonary  hypertension. 2D TTE was obtained and showed normal left ventricular function with indeterminate diastolic function.  Right ventricle was interpreted as normal.  mild to moderate mitral regurgitation and mild to moderate tricuspid regurgitation with a PA systolic pressure of 49 mmHg.   RHC which has been completed.  Primary complaint at that time was worsening exertional dyspnea and exercise intolerance.     RHC was performed and showed RA pressure of 12.  RV was 43/11.  PA pressure was 56/21 with a mean of 31.  Wedge was elevated to 15 mmHg.  PVR was abnormal at 3.9 Wood units. VD reactivity testing was not performed.  Fluid loading was not performed.     The patient returns to the clinic today for follow-up.  stable exercise intolerance.  Doing well on spironolactone at 25 mg daily.  Denies adverse or side effects.  No orthopnea, lower extremity edema, or abdominal distention.  She continues on LTOT-continuous 2 L/min O2 average SPO2 in the low 90s  She states she was told that she could take an extra lasix if she ever felt by her PCP, although states she has not needed to do this.  She is still short of breath and SERRANO. Even mild exertion causes dyspnea.     2D TTE from 12/10/2024--Left ventricular systolic function is low normal. Calculated left ventricular EF = 50.5%. Left ventricular ejection fraction appears to be 46 - 50%.Left ventricular diastolic function is consistent with (grade I) impaired relaxation. Estimated right ventricular systolic pressure from tricuspid regurgitation is mildly elevated (35-45 mmHg).      Review of Systems - Review of Systems   Constitutional: Negative for weight gain and weight loss.   Cardiovascular:  Negative for chest pain, dyspnea on exertion, leg swelling, orthopnea, paroxysmal nocturnal dyspnea and syncope.   Respiratory:  Positive for shortness of breath (chronic and unchanged). Negative for cough, sleep disturbances due to breathing and snoring.    Gastrointestinal:   Negative for bloating.   Neurological:  Negative for dizziness, light-headedness and weakness.          Patient Active Problem List   Diagnosis   • Chronic coronary artery disease   • Gastroesophageal reflux disease   • Hyperlipidemia   • Nonalcoholic fatty liver disease   • Atrial paroxysmal tachycardia   • Type 2 diabetes mellitus with hyperglycemia, without long-term current use of insulin   • History of rectal polypectomy   • Diverticulosis of large intestine without hemorrhage   • High risk medication use   • Class 2 severe obesity with serious comorbidity in adult   • History of colon polyps   • Clinical diagnosis of COVID-19   • Pneumonia   • Lung nodule   • Pulmonary hypertension   • Hypoxia   • Abnormality of right ventricle of heart   • Risk factors for obstructive sleep apnea   • Chronic heart failure with preserved ejection fraction   • Advance care planning   • Axillary abscess   • Bacterial infection   • Chronic respiratory failure with hypoxia   • Chronic heart failure with preserved ejection fraction (HFpEF)   • Heart failure with mildly reduced ejection fraction (HFmrEF)     family history includes Diabetes in her father; Heart disease in her father; No Known Problems in her mother.   reports that she quit smoking about 34 years ago. Her smoking use included cigarettes. She started smoking about 54 years ago. She has a 20 pack-year smoking history. She has been exposed to tobacco smoke. She has never used smokeless tobacco. She reports that she does not drink alcohol and does not use drugs.  No Known Allergies  Physical Activity: Not on file          Current Outpatient Medications:   •  aspirin 325 MG tablet, Take 1 tablet by mouth Daily., Disp: , Rfl:   •  atorvastatin (LIPITOR) 40 MG tablet, Take 1 tablet by mouth Daily. 200001, Disp: 90 tablet, Rfl: 3  •  cholecalciferol (VITAMIN D3) 25 MCG (1000 UT) tablet, Take 1 tablet by mouth Daily., Disp: , Rfl:   •  dilTIAZem (CARDIZEM) 120 MG tablet, Take  1 tablet by mouth 2 (Two) Times a Day., Disp: , Rfl:   •  Dulaglutide (Trulicity) 0.75 MG/0.5ML solution pen-injector, INJECT 0.75MG (1 PEN) UNDER THE SKIN EVERY WEEK, Disp: 12 mL, Rfl: 3  •  furosemide (LASIX) 20 MG tablet, Take 1 tablet by mouth Daily., Disp: 90 tablet, Rfl: 1  •  metFORMIN (GLUCOPHAGE) 500 MG tablet, Take 1 tablet by mouth 2 (Two) Times a Day With Meals., Disp: , Rfl:   •  metoprolol tartrate (LOPRESSOR) 25 MG tablet, Take 1 tablet by mouth 2 (Two) Times a Day., Disp: , Rfl:   •  O2 (OXYGEN), Inhale 2 L/min. 24 -7, Disp: , Rfl:   •  omeprazole (priLOSEC) 20 MG capsule, TAKE 1 CAPSULE BY MOUTH EVERY DAY, Disp: 90 capsule, Rfl: 2  •  spironolactone (ALDACTONE) 25 MG tablet, TAKE 1 TABLET BY MOUTH EVERY DAY, Disp: 90 tablet, Rfl: 1  •  tacrolimus (PROGRAF) 0.5 mg/ml oral suspension, Take 2 mL by mouth Daily., Disp: , Rfl:     Objective  Vital Sign Review:   Vitals:    12/17/24 1039   BP: 109/74   Pulse: 77   SpO2: 93%       Body mass index is 40.29 kg/m².      12/17/24  1039   Weight: 103 kg (227 lb 6.4 oz)       Physical Exam:  Constitutional:       Appearance: Normal and healthy appearance.   Neck:      Vascular: No carotid bruit or JVD. JVD normal.   Pulmonary:      Effort: Pulmonary effort is normal.      Breath sounds: Normal breath sounds.   Cardiovascular:      PMI at left midclavicular line. Normal rate. Regular rhythm.   Pulses:     Intact distal pulses.   Edema:     Peripheral edema absent.   Abdominal:      Palpations: Abdomen is soft.      Tenderness: There is no abdominal tenderness.   Skin:     General: Skin is warm and dry.   Neurological:      General: No focal deficit present.      Mental Status: Alert and oriented to person, place and time.   Psychiatric:         Behavior: Behavior is cooperative.          DATA REVIEWED:   EKG:      ---------------------------------------------------  ECHO:    Results for orders placed during the hospital encounter of 12/10/24    Adult  Transthoracic Echo Complete W/ Cont if Necessary Per Protocol    Interpretation Summary  •  Left ventricular systolic function is low normal. Calculated left ventricular EF = 50.5%. Left ventricular ejection fraction appears to be 46 - 50%.  •  Left ventricular diastolic function is consistent with (grade I) impaired relaxation.  •  Estimated right ventricular systolic pressure from tricuspid regurgitation is mildly elevated (35-45 mmHg).  •  Global longitudinal LV strain (GLS) = -18.4%.          -----------------------------------------------------  RHC/LHC    Results for orders placed during the hospital encounter of 22    Cardiac Catheterization/Vascular Study    Conclusion  [facility]  CARDIAC CATHETERIZATION PROCEDURE REPORT    Patient: Sally Rivera  : 1950  MRN: [id number]    Procedure Date:  22    Referring Physician:  Cory Christensen MD    Interventional Cardiologist:  Waqar Montoya MD    Indication:  Pulmonary hypertension      Clinical Presentation:  72-year-old lady past medical history hypertension, hyperlipidemia, recent diagnosis of pulmonary interstitial disease, found to have elevated RVSP on echocardiogram, referred for right heart catheterization for further evaluation pulmonary hypertension.    Procedure performed:  Diagnostic Right Heart Catheterization    Access Sites:  right basilic vein    Findings:  1. Hemodynamics:  Right Atrium: Mean of 12 mmHg  Right Ventricle: 43/11 mmHg  Pulmonary Artery: 56/21/31 mmHg  Pulmonary Wedge: Mean of 16 mmHg  Cardiac Output/Index: 3.91 L/min 1.84 L/min/m2  PA Sat: 65%  AO Sat: 92%  PVR: 3.9 Wood units      Conclusions:  Mild precapillary hypertension (pulmonary wedge right at the cutoff of 15)      Recommendations:  Continued follow-up with pulmonology and cardiology for further evaluation and care.      Procedure Status:  Elective    Procedure Details:  Informed consent was obtained with an explanation of the risk and  benefits of the procedure. The patient was brought to the Cardiac Catheterization Laboratory and was prepped and draped in a standard sterile fashion. Lidocaine 2% was used to anesthetize the right antecubital fossa. An existing IV placed by the circulating nurse was exchange over a wire for a 5 Norwegian sheath.    A 5 Norwegian PA catheter was then advanced into the heart and out into the pulmonary artery. Hemodynamics were obtained in the pulmonary wedge, pulmonary artery, right ventricle, and right atrium positions.  Saturations were obtained from the pulmonary artery. The venous sheath was removed and hemostasis achieved with manual pressure.    Patient tolerated the procedure well without any complications, and transferred to the post procedure area for recovery in a stable condition.      Complications:  None.    Estimated Blood Loss:  Minimal.    Waqar Montoya MD  [facility]  11/21/22  09:43 EST      ---------------------------------------------------------------------------  CXR/Imaging:     Imaging Results (Most Recent)       None                -----------------------------------------------------------------------------  CT:   No radiology results for the last 30 days.        --------------------------------------------------------------------------------------------------------------    Laboratory evaluations:  Renal Function: CrCl cannot be calculated (Patient's most recent lab result is older than the maximum 30 days allowed.).    Lab Results   Component Value Date    GLUCOSE 101 (H) 08/19/2024    BUN 10 08/19/2024    CREATININE 0.87 08/19/2024    EGFRIFNONA 58 (L) 11/09/2021    EGFRIFAFRI 67 11/09/2021    BCR 11.5 08/19/2024    K 3.6 08/19/2024    CO2 21.4 (L) 08/19/2024    CALCIUM 8.9 08/19/2024    PROTENTOTREF 8.3 04/03/2023    ALBUMIN 4.5 08/17/2024    LABIL2 1.4 04/03/2023    AST 21 08/17/2024    ALT 17 08/17/2024     Lab Results   Component Value Date    WBC 6.21 08/19/2024    HGB 10.9  (L) 08/19/2024    HCT 33.8 (L) 08/19/2024    MCV 77.7 (L) 08/19/2024     08/19/2024     Lab Results   Component Value Date    HGBA1C 6.6 (H) 07/24/2023     Lab Results   Component Value Date    HGBA1C 6.6 (H) 07/24/2023    HGBA1C 6.70 (H) 04/03/2023    HGBA1C 7.70 (H) 11/23/2022     Lab Results   Component Value Date    MICROALBUR <3.0 04/03/2023    CREATININE 0.87 08/19/2024     Lab Results   Component Value Date    TIBC 583 06/02/2020    FERRITIN 68 08/03/2020       Result Review:  I have personally reviewed the results from the time of this admission to 12/17/2024 11:07 EST and agree with these findings:  [x]  Laboratory list / accordion  []  Microbiology  [x]  Radiology  [x]  EKG/Telemetry   [x]  Cardiology/Vascular   []  Pathology  [x]  Old records  []  Other:             ReDS VOLUMETRIC ASSESSMENT:  ReDs Vest    Performed by: Jovanna Proctor APRN  Authorized by: Jovanna Proctor APRN    ReDS value:  36  ReDS Value Description:  36-41 (orange) = Possible Hypervolemic Status        Assessment & Plan     1. Pulmonary hypertension    2. Heart failure with mildly reduced ejection fraction (HFmrEF)        Pulmonary Hypertension- plan per Dr Martínez-WHO Group  1, 2, 3: primarily more 1 and 3 ; PFTs:  Restrictive with decreased DLCO.  . MPAP: 31. Maryanne: 2.9. Exercise pulse oximetry showed a minimum SpO2 = 86% on room air--she is on LTOT. VQ scan showed no evidence of CTEPH.  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 She does have HFpEF.  no significant sleep apnea.  CT scan did have some GGOs, which have improved.  It was thought she may have a group 1 component.  She also follows up with Dr. Guardado with pulmonology.We will continue management of HFmrEF.   Because she is still having significant shortness of breath and exertional dyspnea, we will trial starting revatio 10mg TID--instructions given to start once daily for 3 days, then increase 2x a day for 3 days, then increase 3x a day until f/u in 2  week with Dr Martínez  Reviewed with the patient to montior BP and watch for dizziness/lightheadedness    2.  HFmrEF--patient is euvolemic today.  GDMT as listed below. EF 50% from 2d TTE on 12/10/2024.   She is not on HF specific BB, echo is unchanged, could possibly look to change in the future.     3. RV remodeling.RV dilated.  RVEF is abnormal.  RV CI is abnormal.TAPSE/PASP ratio is markedly abnormal and consistent with RV-PA uncoupling. The patient's abnormal right ventricle is primarily from pulmonary hypertension.  We will continue spironolactone as listed below with GDMT--with the addition of revatio I do not think BP would support furhter increase in spironolactone.  Recommend biannual GFR/electrolyte assessment             chronic  HFpEF.   Etiology: RV abnormality.    NYHA stage C FC-III     Clinical status was assessed and has remained stable.  Treatment intent: curative   ReDS reading was obtained today.  ReDS result: 34       Today, Patient appears euvolemic. and with perfusion. The patient's hemodynamics are currently acceptable. HR is: normal and is at goal. BP/MAP was reviewed and there is not room for medication up-titration.  LVEF: 51-55%.     GDMT Assessment:   GDMT changes recommended today: No changes to medication therapy.      BB:   continue Metoprolol tartrate  25mg bid  Monitor heart rate.  Please call the [facility] for HR<50, dizziness, lightheadedness, syncope    A/A/A:   Previous issues with marginal hemodynamics, blood pressure stable at this time and runs on the lower end of normal, possible future addition if needed    [id number]:  Unable to take due to vaginal candida infections    MRA:  The patient is FC-NYHA Class III and MRA is indicated.   continue Spironolactone  25mg daily  Recommended biannual GFR/electrolyte assessments      Diuretic Regimen:  -DIURETIC:   furosemide (LASIX) 20 mg every day  -Fluid restriction:   -requested  -2000 ml  -Patient has been asked to weigh daily and was  provided with a printed diuretic strategy.  -Sodium restriction:   -1,500 mg Na restriction was discussed.      Labs/Diagnostics/Referrals:    Labs -Chem-7       Lifestyle Advice:   - call office if I gain more than 2 pounds in one day or 5 pounds in one week   - keep legs up while sitting   - use salt in moderation   - watch for swelling in feet, ankles and legs every day   - weigh myself daily   -call for concerning s/sx   -- activity or exercise based on tolerance encouraged     CODE STATUS: FULL              Return in about 2 weeks (around 12/31/2024).                Thank you for allowing me to participate in the care of your patient,         Jovanna Proctor, KHALIF  [facility]  12/17/24  08:23 EDT      **Jyoti Disclaimer:**  Much of this encounter note is an electronic transcription/translation of spoken language to printed text. The electronic translation of spoken language may permit erroneous, or at times, nonsensical words or phrases to be inadvertently transcribed. Although I have reviewed the note for such errors, some may still exist.    The information in this note was reviewed and updated during the visit to be as accurate as possible. As many patients have chronic medical problems, many of their individual problems and ongoing plans do not change significantly from visit to visit.  This information is correct and is consistent with my treatment plan as of today's visit.

## 2024-12-17 NOTE — PROGRESS NOTES
[facility]    Provider, [place] [address]    Thank you for asking me to see Sally Rivera.     Congestive Heart Failure  Associated symptoms include shortness of breath (chronic and unchanged). Pertinent negatives include no chest pain.       1. 1. PAH       RHC 2022:  Hemodynamics:  Right Atrium: Mean of 12 mmHg  Right Ventricle: 43/11 mmHg  Pulmonary Artery: 56/21/31 mmHg  Pulmonary Wedge: Mean of 16 mmHg  Cardiac Output/Index: 3.91 L/min 1.84 L/min/m2  PA Sat: 65%  AO Sat: 92%  PVR: 3.9 Wood units     2. RV abnormality    3. HFmrEF    Subjective      Sally Jaysis a 74 y.o. female who presents today for follow-up of pulmonary hypertension, RV abnormality, and HFpEF.     Clinical HX:  Last spirometry showed FEV1/FVC of 89% of predicted.  FVC was 54% of predicted.  DLCO was 53% of predicted.  TLC was 68% of predicted and consistent with restrictive physiology.  Patient also has a history of abnormal CT scans.     In April 2022 she was found to have multiple nodular opacities with the largest of 2.1 cm.  There was bilateral increased septal thickening and some areas of GGOs.   Pulmonary artery was dilated.  No history of VTE/PE.  No VQ scan.  No history of autoimmune or rheumatic diseases.  No family history of cardiomyopathy or pulmonary hypertension. 2D TTE was obtained and showed normal left ventricular function with indeterminate diastolic function.  Right ventricle was interpreted as normal.  mild to moderate mitral regurgitation and mild to moderate tricuspid regurgitation with a PA systolic pressure of 49 mmHg.   RHC which has been completed.  Primary complaint at that time was worsening exertional dyspnea and exercise intolerance.     RHC was performed and showed RA pressure of 12.  RV was 43/11.  PA pressure was 56/21 with a mean of 31.  Wedge was elevated to 15 mmHg.  PVR was abnormal at 3.9 Wood units. VD  reactivity testing was not performed.  Fluid loading was not performed.     The patient returns to the clinic today for follow-up.  stable exercise intolerance.  Doing well on spironolactone at 25 mg daily.  Denies adverse or side effects.  No orthopnea, lower extremity edema, or abdominal distention.  She continues on LTOT-continuous 2 L/min O2 average SPO2 in the low 90s  She states she was told that she could take an extra lasix if she ever felt by her PCP, although states she has not needed to do this.  She is still short of breath and SERRANO. Even mild exertion causes dyspnea.     2D TTE from 12/10/2024--Left ventricular systolic function is low normal. Calculated left ventricular EF = 50.5%. Left ventricular ejection fraction appears to be 46 - 50%.Left ventricular diastolic function is consistent with (grade I) impaired relaxation. Estimated right ventricular systolic pressure from tricuspid regurgitation is mildly elevated (35-45 mmHg).      Review of Systems - Review of Systems   Constitutional: Negative for weight gain and weight loss.   Cardiovascular:  Negative for chest pain, dyspnea on exertion, leg swelling, orthopnea, paroxysmal nocturnal dyspnea and syncope.   Respiratory:  Positive for shortness of breath (chronic and unchanged). Negative for cough, sleep disturbances due to breathing and snoring.    Gastrointestinal:  Negative for bloating.   Neurological:  Negative for dizziness, light-headedness and weakness.          Patient Active Problem List   Diagnosis    Chronic coronary artery disease    Gastroesophageal reflux disease    Hyperlipidemia    Nonalcoholic fatty liver disease    Atrial paroxysmal tachycardia    Type 2 diabetes mellitus with hyperglycemia, without long-term current use of insulin    History of rectal polypectomy    Diverticulosis of large intestine without hemorrhage    High risk medication use    Class 2 severe obesity with serious comorbidity in adult    History of colon polyps     Clinical diagnosis of COVID-19    Pneumonia    Lung nodule    Pulmonary hypertension    Hypoxia    Abnormality of right ventricle of heart    Risk factors for obstructive sleep apnea    Chronic heart failure with preserved ejection fraction    Advance care planning    Axillary abscess    Bacterial infection    Chronic respiratory failure with hypoxia    Chronic heart failure with preserved ejection fraction (HFpEF)    Heart failure with mildly reduced ejection fraction (HFmrEF)     family history includes Diabetes in her father; Heart disease in her father; No Known Problems in her mother.   reports that she quit smoking about 34 years ago. Her smoking use included cigarettes. She started smoking about 54 years ago. She has a 20 pack-year smoking history. She has been exposed to tobacco smoke. She has never used smokeless tobacco. She reports that she does not drink alcohol and does not use drugs.  No Known Allergies  Physical Activity: Not on file          Current Outpatient Medications:     aspirin 325 MG tablet, Take 1 tablet by mouth Daily., Disp: , Rfl:     atorvastatin (LIPITOR) 40 MG tablet, Take 1 tablet by mouth Daily. 200001, Disp: 90 tablet, Rfl: 3    cholecalciferol (VITAMIN D3) 25 MCG (1000 UT) tablet, Take 1 tablet by mouth Daily., Disp: , Rfl:     dilTIAZem (CARDIZEM) 120 MG tablet, Take 1 tablet by mouth 2 (Two) Times a Day., Disp: , Rfl:     Dulaglutide (Trulicity) 0.75 MG/0.5ML solution pen-injector, INJECT 0.75MG (1 PEN) UNDER THE SKIN EVERY WEEK, Disp: 12 mL, Rfl: 3    furosemide (LASIX) 20 MG tablet, Take 1 tablet by mouth Daily., Disp: 90 tablet, Rfl: 1    metFORMIN (GLUCOPHAGE) 500 MG tablet, Take 1 tablet by mouth 2 (Two) Times a Day With Meals., Disp: , Rfl:     metoprolol tartrate (LOPRESSOR) 25 MG tablet, Take 1 tablet by mouth 2 (Two) Times a Day., Disp: , Rfl:     O2 (OXYGEN), Inhale 2 L/min. 24 -7, Disp: , Rfl:     omeprazole (priLOSEC) 20 MG capsule, TAKE 1 CAPSULE BY MOUTH EVERY  DAY, Disp: 90 capsule, Rfl: 2    spironolactone (ALDACTONE) 25 MG tablet, TAKE 1 TABLET BY MOUTH EVERY DAY, Disp: 90 tablet, Rfl: 1    tacrolimus (PROGRAF) 0.5 mg/ml oral suspension, Take 2 mL by mouth Daily., Disp: , Rfl:     Objective   Vital Sign Review:   Vitals:    12/17/24 1039   BP: 109/74   Pulse: 77   SpO2: 93%       Body mass index is 40.29 kg/m².      12/17/24  1039   Weight: 103 kg (227 lb 6.4 oz)       Physical Exam:  Constitutional:       Appearance: Normal and healthy appearance.   Neck:      Vascular: No carotid bruit or JVD. JVD normal.   Pulmonary:      Effort: Pulmonary effort is normal.      Breath sounds: Normal breath sounds.   Cardiovascular:      PMI at left midclavicular line. Normal rate. Regular rhythm.   Pulses:     Intact distal pulses.   Edema:     Peripheral edema absent.   Abdominal:      Palpations: Abdomen is soft.      Tenderness: There is no abdominal tenderness.   Skin:     General: Skin is warm and dry.   Neurological:      General: No focal deficit present.      Mental Status: Alert and oriented to person, place and time.   Psychiatric:         Behavior: Behavior is cooperative.          DATA REVIEWED:   EKG:      ---------------------------------------------------  ECHO:    Results for orders placed during the hospital encounter of 12/10/24    Adult Transthoracic Echo Complete W/ Cont if Necessary Per Protocol    Interpretation Summary    Left ventricular systolic function is low normal. Calculated left ventricular EF = 50.5%. Left ventricular ejection fraction appears to be 46 - 50%.    Left ventricular diastolic function is consistent with (grade I) impaired relaxation.    Estimated right ventricular systolic pressure from tricuspid regurgitation is mildly elevated (35-45 mmHg).    Global longitudinal LV strain (GLS) = -18.4%.          -----------------------------------------------------  RHC/LHC    Results for orders placed during the hospital encounter of  22    Cardiac Catheterization/Vascular Study    Conclusion  [facility]  CARDIAC CATHETERIZATION PROCEDURE REPORT    Patient: Sally Rivera  : 1950  MRN: [id number]    Procedure Date:  22    Referring Physician:  oCry Christensen MD    Interventional Cardiologist:  Waqar Montoya MD    Indication:  Pulmonary hypertension      Clinical Presentation:  72-year-old lady past medical history hypertension, hyperlipidemia, recent diagnosis of pulmonary interstitial disease, found to have elevated RVSP on echocardiogram, referred for right heart catheterization for further evaluation pulmonary hypertension.    Procedure performed:  Diagnostic Right Heart Catheterization    Access Sites:  right basilic vein    Findings:  1. Hemodynamics:  Right Atrium: Mean of 12 mmHg  Right Ventricle: 43/11 mmHg  Pulmonary Artery: 56/21/31 mmHg  Pulmonary Wedge: Mean of 16 mmHg  Cardiac Output/Index: 3.91 L/min 1.84 L/min/m2  PA Sat: 65%  AO Sat: 92%  PVR: 3.9 Wood units      Conclusions:  Mild precapillary hypertension (pulmonary wedge right at the cutoff of 15)      Recommendations:  Continued follow-up with pulmonology and cardiology for further evaluation and care.      Procedure Status:  Elective    Procedure Details:  Informed consent was obtained with an explanation of the risk and benefits of the procedure. The patient was brought to the Cardiac Catheterization Laboratory and was prepped and draped in a standard sterile fashion. Lidocaine 2% was used to anesthetize the right antecubital fossa. An existing IV placed by the circulating nurse was exchange over a wire for a 5 Occitan sheath.    A 5 Occitan PA catheter was then advanced into the heart and out into the pulmonary artery. Hemodynamics were obtained in the pulmonary wedge, pulmonary artery, right ventricle, and right atrium positions.  Saturations were obtained from the pulmonary artery. The venous sheath was removed and hemostasis achieved  with manual pressure.    Patient tolerated the procedure well without any complications, and transferred to the post procedure area for recovery in a stable condition.      Complications:  None.    Estimated Blood Loss:  Minimal.    Waqar Montoya MD  [facility]  11/21/22  09:43 EST      ---------------------------------------------------------------------------  CXR/Imaging:     Imaging Results (Most Recent)       None                -----------------------------------------------------------------------------  CT:   No radiology results for the last 30 days.        --------------------------------------------------------------------------------------------------------------    Laboratory evaluations:  Renal Function: CrCl cannot be calculated (Patient's most recent lab result is older than the maximum 30 days allowed.).    Lab Results   Component Value Date    GLUCOSE 101 (H) 08/19/2024    BUN 10 08/19/2024    CREATININE 0.87 08/19/2024    EGFRIFNONA 58 (L) 11/09/2021    EGFRIFAFRI 67 11/09/2021    BCR 11.5 08/19/2024    K 3.6 08/19/2024    CO2 21.4 (L) 08/19/2024    CALCIUM 8.9 08/19/2024    PROTENTOTREF 8.3 04/03/2023    ALBUMIN 4.5 08/17/2024    LABIL2 1.4 04/03/2023    AST 21 08/17/2024    ALT 17 08/17/2024     Lab Results   Component Value Date    WBC 6.21 08/19/2024    HGB 10.9 (L) 08/19/2024    HCT 33.8 (L) 08/19/2024    MCV 77.7 (L) 08/19/2024     08/19/2024     Lab Results   Component Value Date    HGBA1C 6.6 (H) 07/24/2023     Lab Results   Component Value Date    HGBA1C 6.6 (H) 07/24/2023    HGBA1C 6.70 (H) 04/03/2023    HGBA1C 7.70 (H) 11/23/2022     Lab Results   Component Value Date    MICROALBUR <3.0 04/03/2023    CREATININE 0.87 08/19/2024     Lab Results   Component Value Date    TIBC 583 06/02/2020    FERRITIN 68 08/03/2020       Result Review:  I have personally reviewed the results from the time of this admission to 12/17/2024 11:07 EST and agree with these findings:  [x]   Laboratory list / accordion  []  Microbiology  [x]  Radiology  [x]  EKG/Telemetry   [x]  Cardiology/Vascular   []  Pathology  [x]  Old records  []  Other:             ReDS VOLUMETRIC ASSESSMENT:  ReDs Vest    Performed by: Jovanna Proctor APRN  Authorized by: Jovanna Proctor APRN    ReDS value:  36  ReDS Value Description:  36-41 (orange) = Possible Hypervolemic Status        Assessment & Plan      1. Pulmonary hypertension    2. Heart failure with mildly reduced ejection fraction (HFmrEF)        Pulmonary Hypertension- plan per Dr Martínez-[facility]  1, 2, 3: primarily more 1 and 3 ; PFTs:  Restrictive with decreased DLCO.  . MPAP: 31. Maryanne: 2.9. Exercise pulse oximetry showed a minimum SpO2 = 86% on room air--she is on LTOT. VQ scan showed no evidence of CTEPH.  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 She does have HFpEF.  no significant sleep apnea.  CT scan did have some GGOs, which have improved.  It was thought she may have a group 1 component.  She also follows up with Dr. Guardado with pulmonology.We will continue management of HFmrEF.   Because she is still having significant shortness of breath and exertional dyspnea, we will trial starting revatio 10mg TID--instructions given to start once daily for 3 days, then increase 2x a day for 3 days, then increase 3x a day until f/u in 2 week with Dr Martínez  Reviewed with the patient to montior BP and watch for dizziness/lightheadedness    2.  HFmrEF--patient is euvolemic today.  GDMT as listed below. EF 50% from 2d TTE on 12/10/2024.   She is not on HF specific BB, echo is unchanged, could possibly look to change in the future.     3. RV remodeling.RV dilated.  RVEF is abnormal.  RV CI is abnormal.TAPSE/PASP ratio is markedly abnormal and consistent with RV-PA uncoupling. The patient's abnormal right ventricle is primarily from pulmonary hypertension.  We will continue spironolactone as listed below with GDMT--with the addition of revatio I do not  think BP would support furhter increase in spironolactone.  Recommend biannual GFR/electrolyte assessment             chronic  HFpEF.   Etiology: RV abnormality.    NYHA stage C FC-III     Clinical status was assessed and has remained stable.  Treatment intent: curative   ReDS reading was obtained today.  ReDS result: 34       Today, Patient appears euvolemic. and with perfusion. The patient's hemodynamics are currently acceptable. HR is: normal and is at goal. BP/MAP was reviewed and there is not room for medication up-titration.  LVEF: 51-55%.     GDMT Assessment:   GDMT changes recommended today: No changes to medication therapy.      BB:   continue Metoprolol tartrate  25mg bid  Monitor heart rate.  Please call the HF for HR<50, dizziness, lightheadedness, syncope    A/A/A:   Previous issues with marginal hemodynamics, blood pressure stable at this time and runs on the lower end of normal, possible future addition if needed    [id number]:  Unable to take due to vaginal candida infections    MRA:  The patient is FC-NYHA Class III and MRA is indicated.   continue Spironolactone  25mg daily  Recommended biannual GFR/electrolyte assessments      Diuretic Regimen:  -DIURETIC:   furosemide (LASIX) 20 mg every day  -Fluid restriction:   -requested  -2000 ml  -Patient has been asked to weigh daily and was provided with a printed diuretic strategy.  -Sodium restriction:   -1,500 mg Na restriction was discussed.      Labs/Diagnostics/Referrals:    Labs -Chem-7       Lifestyle Advice:   - call office if I gain more than 2 pounds in one day or 5 pounds in one week   - keep legs up while sitting   - use salt in moderation   - watch for swelling in feet, ankles and legs every day   - weigh myself daily   -call for concerning s/sx   -- activity or exercise based on tolerance encouraged     CODE STATUS: FULL              Return in about 2 weeks (around 12/31/2024).                Thank you for allowing me to participate in the  care of your patient,         KHALIF Arredondo  [facility]  12/17/24  08:23 EDT      **Jyoti Disclaimer:**  Much of this encounter note is an electronic transcription/translation of spoken language to printed text. The electronic translation of spoken language may permit erroneous, or at times, nonsensical words or phrases to be inadvertently transcribed. Although I have reviewed the note for such errors, some may still exist.    The information in this note was reviewed and updated during the visit to be as accurate as possible. As many patients have chronic medical problems, many of their individual problems and ongoing plans do not change significantly from visit to visit.  This information is correct and is consistent with my treatment plan as of today's visit.

## 2024-12-17 NOTE — TELEPHONE ENCOUNTER
--Results seen by patient in chart      --- Message from Jovanna Proctor sent at 12/17/2024  1:02 PM EST -----  Please notify patient of results.   Kidney function and electrolytes are stable

## 2024-12-31 ENCOUNTER — HOSPITAL ENCOUNTER (OUTPATIENT)
Dept: CARDIOLOGY | Facility: HOSPITAL | Age: 74
Discharge: HOME | End: 2024-12-31
Admitting: INTERNAL MEDICINE
Payer: MEDICARE

## 2024-12-31 VITALS
OXYGEN SATURATION: 93 % | SYSTOLIC BLOOD PRESSURE: 94 MMHG | HEIGHT: 63 IN | WEIGHT: 230.8 LBS | BODY MASS INDEX: 40.89 KG/M2 | DIASTOLIC BLOOD PRESSURE: 64 MMHG | HEART RATE: 73 BPM

## 2024-12-31 DIAGNOSIS — Q20.8 ABNORMALITY OF RIGHT VENTRICLE OF HEART: ICD-10-CM

## 2024-12-31 DIAGNOSIS — I27.20 PULMONARY HYPERTENSION: Primary | ICD-10-CM

## 2024-12-31 DIAGNOSIS — I50.32 CHRONIC HEART FAILURE WITH PRESERVED EJECTION FRACTION (HFPEF): ICD-10-CM

## 2024-12-31 PROCEDURE — 94726 PLETHYSMOGRAPHY LUNG VOLUMES: CPT | Performed by: INTERNAL MEDICINE

## 2024-12-31 NOTE — LETTER
December 31, 2024     Edgar Mayers MD  [address]  [address]  [facility] [address]    Patient: Sally Rivera   YOB: 1950   Date of Visit: 12/31/2024     Dear Dr. Mayers:    Thank you for referring Sally Rivera to me for evaluation. Below are the relevant portions of my assessment and plan of care.    If you have questions, please do not hesitate to call me. I look forward to following Sally along with you.         Sincerely,        Heron Martínez MD PhD        CC: Rudy Berry MD      Progress Notes:  Heart Failure & Pulmonary Arterial Hypertension Clinic  [facility]  Heron Martínez M.D., Ph.D., [facility]         History of Present Illness  This is a 74 y.o. female with:    1. PAH  A. RHC 2022:  Hemodynamics:  Right Atrium: Mean of 12 mmHg  Right Ventricle: 43/11 mmHg  Pulmonary Artery: 56/21/31 mmHg  Pulmonary Wedge: Mean of 16 mmHg  Cardiac Output/Index: 3.91 L/min 1.84 L/min/m2  PA Sat: 65%  AO Sat: 92%  PVR: 3.9 Wood units    2. RV abnormality    Sally Rivera is a 74 y.o. female who presents for today for PH.  The patient is typically seen by Edgar Mayers.  Patient's primary cardiologist is Dr. Christensen.      The patient has seen pulmonary medicine in the past.  Last spirometry showed FEV1/FVC of 89% of predicted.  FVC was 54% of predicted.  DLCO was 53% of predicted.  TLC was 68% of predicted and consistent with restrictive physiology.  Patient also has a history of abnormal CT scans.    In April 2022 she was found to have multiple nodular opacities with the largest of 2.1 cm.  There was bilateral increased septal thickening and some areas of GGOs.  Though, this improved on her most recent CT scan.  She had a CTA recently which showed no evidence of pulmonary embolism.  Pulmonary artery was dilated.  She remained with some areas of GGOs, but had decreased LAD from prior.  No history of VTE/PE.  No VQ scan.  No history of autoimmune or  rheumatic diseases.  No family history of cardiomyopathy or pulmonary hypertension. 2D TTE was obtained and showed normal left ventricular function with indeterminate diastolic function.  Right ventricle was interpreted as normal.  It was interpreted as mild to moderate mitral regurgitation and mild to moderate tricuspid regurgitation with a PA systolic pressure of 49 mmHg.  She  was seen by cardiology, Dr. Christensen.  She was sent for the CTA of the chest with contrast, as aforementioned.  She was also sent for a RHC which has been completed.  Primary complaint at that time was worsening exertional dyspnea and exercise intolerance. RHC was performed and showed RA pressure of 12.  RV was 43/11.  PA pressure was 56/21 with a mean of 31.  Wedge was elevated to 15 mmHg.  PVR was abnormal at 3.9 Wood units. VD reactivity testing was not performed.  Fluid loading was not performed.    Patient returns to the clinic today.  She continues to use LTOT.  She does have follow-up with sleep and pulmonary medicine.  She reports stable exercise intolerance.  She remains on spironolactone.  She is able to obtain and afford her medications.  Denies adverse effects.  No orthopnea, PND, lower extremity edema, ascites, early abdominal satiety. She has had two episodes of dizziness and lightheadedness when she went up to TID on Revatio, but this has not occurred again. She took her BP and HR and it was 129/90 and HR was 101.     Review of Systems - Review of Systems   Cardiovascular:  Positive for dyspnea on exertion.   Respiratory:  Positive for shortness of breath.    All other systems reviewed and are negative.       All other systems were reviewed and were negative.    Patient Active Problem List   Diagnosis   • Chronic coronary artery disease   • Gastroesophageal reflux disease   • Hyperlipidemia   • Nonalcoholic fatty liver disease   • Atrial paroxysmal tachycardia   • Type 2 diabetes mellitus with hyperglycemia, without long-term  current use of insulin   • History of rectal polypectomy   • Diverticulosis of large intestine without hemorrhage   • High risk medication use   • Class 2 severe obesity with serious comorbidity in adult   • History of colon polyps   • Clinical diagnosis of COVID-19   • Pneumonia   • Lung nodule   • Pulmonary hypertension   • Hypoxia   • Abnormality of right ventricle of heart   • Risk factors for obstructive sleep apnea   • Advance care planning   • Axillary abscess   • Bacterial infection   • Chronic respiratory failure with hypoxia   • Chronic heart failure with preserved ejection fraction (HFpEF)   • Heart failure with mildly reduced ejection fraction (HFmrEF)       family history includes Diabetes in her father; Heart disease in her father; No Known Problems in her mother.     reports that she quit smoking about 34 years ago. Her smoking use included cigarettes. She started smoking about 54 years ago. She has a 20 pack-year smoking history. She has been exposed to tobacco smoke. She has never used smokeless tobacco. She reports that she does not drink alcohol and does not use drugs.    No Known Allergies      Current Outpatient Medications:   •  aspirin 325 MG tablet, Take 1 tablet by mouth Daily., Disp: , Rfl:   •  atorvastatin (LIPITOR) 40 MG tablet, Take 1 tablet by mouth Daily. 200001, Disp: 90 tablet, Rfl: 3  •  cholecalciferol (VITAMIN D3) 25 MCG (1000 UT) tablet, Take 1 tablet by mouth Daily., Disp: , Rfl:   •  dilTIAZem (CARDIZEM) 120 MG tablet, Take 1 tablet by mouth 2 (Two) Times a Day., Disp: , Rfl:   •  Dulaglutide (Trulicity) 0.75 MG/0.5ML solution pen-injector, INJECT 0.75MG (1 PEN) UNDER THE SKIN EVERY WEEK, Disp: 12 mL, Rfl: 3  •  furosemide (LASIX) 20 MG tablet, Take 1 tablet by mouth Daily., Disp: 90 tablet, Rfl: 1  •  metFORMIN (GLUCOPHAGE) 500 MG tablet, Take 1 tablet by mouth 2 (Two) Times a Day With Meals., Disp: , Rfl:   •  metoprolol tartrate (LOPRESSOR) 25 MG tablet, Take 1 tablet by  mouth 2 (Two) Times a Day., Disp: , Rfl:   •  O2 (OXYGEN), Inhale 2 L/min. 24 -7, Disp: , Rfl:   •  omeprazole (priLOSEC) 20 MG capsule, TAKE 1 CAPSULE BY MOUTH EVERY DAY, Disp: 90 capsule, Rfl: 2  •  sildenafil (Revatio) 20 MG tablet, Take 0.5 tablets by mouth 3 (Three) Times a Day., Disp: 45 tablet, Rfl: 3  •  spironolactone (ALDACTONE) 25 MG tablet, TAKE 1 TABLET BY MOUTH EVERY DAY, Disp: 90 tablet, Rfl: 1  •  tacrolimus (PROGRAF) 0.5 mg/ml oral suspension, Take 2 mL by mouth Daily., Disp: , Rfl:       Physical Exam:  I have reviewed the patient's current vital signs as documented in the patient's EMR.   Vitals:    12/31/24 0937   BP: 94/64   Pulse: 73   SpO2: 93%           Body mass index is 40.89 kg/m².       12/31/24 0937   Weight: 105 kg (230 lb 12.8 oz)         Vitals reviewed.   Constitutional:       General: Not in acute distress.     Appearance: Normal and healthy appearance. Not in distress. Not ill-appearing, toxic-appearing or diaphoretic.      Interventions: Not intubated.  Eyes:      Conjunctiva/sclera: Conjunctivae normal.      Pupils: Pupils are equal, round, and reactive to light.   Neck:      Vascular: No JVR. JVD normal with 6 cm of water.   Pulmonary:      Effort: Pulmonary effort is normal. No tachypnea, bradypnea, accessory muscle usage, prolonged expiration, respiratory distress or retractions. Not intubated.      Breath sounds: Normal breath sounds and air entry. No stridor, decreased air movement or transmitted upper airway sounds. No decreased breath sounds. No wheezing. No rhonchi. No rales.   Cardiovascular:      PMI at left midclavicular line. Normal rate. Regular rhythm. S1 with normal intensity. loud S2.       Murmurs: There is no murmur.      No gallop.  No click. No rub.   Neurological:      General: No focal deficit present.      Mental Status: Alert and oriented to person, place and time.   Psychiatric:         Behavior: Behavior is cooperative.         DATA REVIEWED:      ---------------------------------------------------  TTE/LENNY:  Results for orders placed during the hospital encounter of 12/10/24    Adult Transthoracic Echo Complete W/ Cont if Necessary Per Protocol    Interpretation Summary  •  Left ventricular systolic function is low normal. Calculated left ventricular EF = 50.5%. Left ventricular ejection fraction appears to be 46 - 50%.  •  Left ventricular diastolic function is consistent with (grade I) impaired relaxation.  •  Estimated right ventricular systolic pressure from tricuspid regurgitation is mildly elevated (35-45 mmHg).  •  Global longitudinal LV strain (GLS) = -18.4%.        My interpretation of the TTE is below.   LVEF is 50%.          -----------------------------------------------------  RHC:                          Interpretation today: Right atrial pressure is elevated with normal waveforms and normal inspiratory decrease.  Abnormal right ventricle.  Pulmonary hypertension.          ----------------------------------------------------    PFTs:        I interpreted the PFTs.  Restrictive physiology  --------------------------------------------------------------------------------------------------    Laboratory evaluations:  Lab Results   Component Value Date    PROBNP 265.0 03/23/2024     Lab Results   Component Value Date    GLUCOSE 151 (H) 12/17/2024    BUN 16 12/17/2024    CREATININE 1.03 (H) 12/17/2024    EGFRIFNONA 58 (L) 11/09/2021    EGFRIFAFRI 67 11/09/2021    BCR 15.5 12/17/2024    K 4.5 12/17/2024    CO2 25.2 12/17/2024    CALCIUM 9.6 12/17/2024    PROTENTOTREF 8.3 04/03/2023    ALBUMIN 4.5 08/17/2024    LABIL2 1.4 04/03/2023    AST 21 08/17/2024    ALT 17 08/17/2024     Lab Results   Component Value Date    HGBA1C 6.6 (H) 07/24/2023     Lab Results   Component Value Date    HGBA1C 6.6 (H) 07/24/2023    HGBA1C 6.70 (H) 04/03/2023    HGBA1C 7.70 (H) 11/23/2022     Lab Results   Component Value Date    MICROALBUR <3.0 04/03/2023    CREATININE  "1.03 (H) 12/17/2024     Lab Results   Component Value Date    TIBC 583 06/02/2020    FERRITIN 68 08/03/2020     Lab Results   Component Value Date    WBC 6.21 08/19/2024    RBC 4.35 08/19/2024    HGB 10.9 (L) 08/19/2024    HCT 33.8 (L) 08/19/2024    MCV 77.7 (L) 08/19/2024    MCH 25.1 (L) 08/19/2024    MCHC 32.2 08/19/2024    RDW 15.2 08/19/2024    RDWSD 42.6 08/19/2024    MPV 9.3 08/19/2024     08/19/2024    NEUTRORELPCT 70.0 08/19/2024    LYMPHORELPCT 16.6 (L) 08/19/2024    MONORELPCT 8.5 08/19/2024    EOSRELPCT 4.3 08/19/2024    BASORELPCT 0.3 08/19/2024    NEUTROABS 4.34 08/19/2024    LYMPHSABS 1.03 08/19/2024    MONOSABS 0.53 08/19/2024    EOSABS 0.27 08/19/2024    BASOSABS 0.02 08/19/2024    IMMATUREGR 0.2 04/17/2023    IMMGRANABS 0.02 04/17/2023    NRBC 0.0 08/19/2024      No results found for: \"MYRANDA\", \"PROTURINE\", \"[id number]\", \"ALBUMINU\", \"LABALPH1\", \"LABALPH2\", \"LABBETA\", \"GAMMAUR\", \"MSPIKEURINE\", \"IFRESULTU\", \"NOTE\"  No results found for: \"LABFREE\", \"FREEKAPPAL\", \"FREELAMBDA\", \"LABKAPP\"   Lab Results   Component Value Date    PROTENTOTREF 8.3 04/03/2023    ALBUMIN 4.5 08/17/2024    LABGLOBREF 3.4 04/03/2023    LABIL2 1.4 04/03/2023      No results found for: \"LABKAPP\"       PAH RISK ASSESSMENT:      ReDs Vest    Performed by: Heron Martínez MD PhD  Authorized by: Heron Martínez MD PhD    ReDS value:  36  ReDS Value Description:  36-41 (orange) = Possible Hypervolemic Status        1. Pulmonary hypertension (HCC). PAH/PVH. WHO Group  1, 2, 3: primarily more 1 and 3 ; FC: III.  RV status: Abnormal:.  PFTs:  Restrictive with decreased DLCO.  . MPAP: 31. Maryanne: 2.9. Exercise pulse oximetry showed a minimum SpO2 = 86% on room air.  The Aguilar Index was updated today. Most recent score: 6.     VQ scan showed no evidence of CTEPH.  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 She does have HFpEF.  With that said, her PSG had no significant sleep apnea.  CT scan did have some GGOs, which have " improved.  She may very well have a group 1 component.       She does continue to have marginal hemodynamics so will continue low-dose Revatio.     I have asked the patient that if they were to move to be certain they see someone with expertise in PAH. These providers can be located at [email or web address].        2. HFpEF.  NYHA stage C; functional class III.  Today, euvolemic and perfused.  Clinical trajectory was reviewed today and is stable.      -Spironolactone 25 mg with bi-annual surveillance  - SGLT2 inhibitors are not prescribe due to recurrent vaginal candidiasis    Lifestyle Advice:   - call office if I gain more than 2 pounds in one day or 5 pounds in one week  - keep legs up while sitting  - use salt in moderation  - watch for swelling in feet, ankles and legs every day  - weigh myself daily  -call for concerning s/sx         3. Abnormality of right ventricle of heart.  NYHA stage  C ; FC-III.  RV is dilated.  RV status: RV ADAPTED-REMODELED with an ESVi of <; Maryanne is  2.9 .  TAPSE/PASP ratio is markedly abnormal and consistent with RV-PA uncoupling.  Patient's abnormal right ventricle is primarily from pulmonary hypertension.  She continues to tolerate MRA therapy.    She has had interval positive remodeling on MRA and PDE-5, so will continue.     - Continue spironolactone, as above         4.   Advance Care Planning:   The following is retained as a matter of history:    -The patient desires their CODE STATUS to be full code. She desires FULL CODE for issues deemed to be reversible, but does desire to be DNR in issues and situations that are not reversible. No LONG-TERM ventilation.   -does not want to complete a MOST form today.  -does not want to complete a [facility] DNR form today.  -does desire full scope of treatment including intubation, mechanical ventilation, defibrillation, cardioversion, IV fluids and medical treatment. Again, for situations that are reversible.     -does not  desire comfort measures only. HOWEVER, in an irreversible situation she would desire comfort care.   -does not desire long-term IV fluids  -does not desire long-term feeding tube placement  -does not desire PEG placement  -does desire dialysis short-term     These decisions made today were made by an adult patient with decisional capacity.                 This document has been electronically signed by Heron Martínez MD PhD on December 31, 2024 09:49 EST        Return in about 3 months (around 3/31/2025).      Heron Martínez M.D., Ph.D., [facility]  System Medical Director for HF and PAH

## 2024-12-31 NOTE — PROGRESS NOTES
Heart Failure & Pulmonary Arterial Hypertension Clinic  [facility]  Heron Martínez M.D., Ph.D., [facility]         History of Present Illness  This is a 74 y.o. female with:    1. PAH  A. RHC 2022:  Hemodynamics:  Right Atrium: Mean of 12 mmHg  Right Ventricle: 43/11 mmHg  Pulmonary Artery: 56/21/31 mmHg  Pulmonary Wedge: Mean of 16 mmHg  Cardiac Output/Index: 3.91 L/min 1.84 L/min/m2  PA Sat: 65%  AO Sat: 92%  PVR: 3.9 Wood units    2. RV abnormality    Sally Rivera is a 74 y.o. female who presents for today for PH.  The patient is typically seen by Edgar Mayers.  Patient's primary cardiologist is Dr. Christensen.      The patient has seen pulmonary medicine in the past.  Last spirometry showed FEV1/FVC of 89% of predicted.  FVC was 54% of predicted.  DLCO was 53% of predicted.  TLC was 68% of predicted and consistent with restrictive physiology.  Patient also has a history of abnormal CT scans.    In April 2022 she was found to have multiple nodular opacities with the largest of 2.1 cm.  There was bilateral increased septal thickening and some areas of GGOs.  Though, this improved on her most recent CT scan.  She had a CTA recently which showed no evidence of pulmonary embolism.  Pulmonary artery was dilated.  She remained with some areas of GGOs, but had decreased LAD from prior.  No history of VTE/PE.  No VQ scan.  No history of autoimmune or rheumatic diseases.  No family history of cardiomyopathy or pulmonary hypertension. 2D TTE was obtained and showed normal left ventricular function with indeterminate diastolic function.  Right ventricle was interpreted as normal.  It was interpreted as mild to moderate mitral regurgitation and mild to moderate tricuspid regurgitation with a PA systolic pressure of 49 mmHg.  She  was seen by cardiology, Dr. Christensen.  She was sent for the CTA of the chest with contrast, as aforementioned.  She was also sent for a RHC which has been completed.  Primary  complaint at that time was worsening exertional dyspnea and exercise intolerance. RHC was performed and showed RA pressure of 12.  RV was 43/11.  PA pressure was 56/21 with a mean of 31.  Wedge was elevated to 15 mmHg.  PVR was abnormal at 3.9 Wood units. VD reactivity testing was not performed.  Fluid loading was not performed.    Patient returns to the clinic today.  She continues to use LTOT.  She does have follow-up with sleep and pulmonary medicine.  She reports stable exercise intolerance.  She remains on spironolactone.  She is able to obtain and afford her medications.  Denies adverse effects.  No orthopnea, PND, lower extremity edema, ascites, early abdominal satiety. She has had two episodes of dizziness and lightheadedness when she went up to TID on Revatio, but this has not occurred again. She took her BP and HR and it was 129/90 and HR was 101.     Review of Systems - Review of Systems   Cardiovascular:  Positive for dyspnea on exertion.   Respiratory:  Positive for shortness of breath.    All other systems reviewed and are negative.       All other systems were reviewed and were negative.    Patient Active Problem List   Diagnosis    Chronic coronary artery disease    Gastroesophageal reflux disease    Hyperlipidemia    Nonalcoholic fatty liver disease    Atrial paroxysmal tachycardia    Type 2 diabetes mellitus with hyperglycemia, without long-term current use of insulin    History of rectal polypectomy    Diverticulosis of large intestine without hemorrhage    High risk medication use    Class 2 severe obesity with serious comorbidity in adult    History of colon polyps    Clinical diagnosis of COVID-19    Pneumonia    Lung nodule    Pulmonary hypertension    Hypoxia    Abnormality of right ventricle of heart    Risk factors for obstructive sleep apnea    Advance care planning    Axillary abscess    Bacterial infection    Chronic respiratory failure with hypoxia    Chronic heart failure with  preserved ejection fraction (HFpEF)    Heart failure with mildly reduced ejection fraction (HFmrEF)       family history includes Diabetes in her father; Heart disease in her father; No Known Problems in her mother.     reports that she quit smoking about 34 years ago. Her smoking use included cigarettes. She started smoking about 54 years ago. She has a 20 pack-year smoking history. She has been exposed to tobacco smoke. She has never used smokeless tobacco. She reports that she does not drink alcohol and does not use drugs.    No Known Allergies      Current Outpatient Medications:     aspirin 325 MG tablet, Take 1 tablet by mouth Daily., Disp: , Rfl:     atorvastatin (LIPITOR) 40 MG tablet, Take 1 tablet by mouth Daily. 200001, Disp: 90 tablet, Rfl: 3    cholecalciferol (VITAMIN D3) 25 MCG (1000 UT) tablet, Take 1 tablet by mouth Daily., Disp: , Rfl:     dilTIAZem (CARDIZEM) 120 MG tablet, Take 1 tablet by mouth 2 (Two) Times a Day., Disp: , Rfl:     Dulaglutide (Trulicity) 0.75 MG/0.5ML solution pen-injector, INJECT 0.75MG (1 PEN) UNDER THE SKIN EVERY WEEK, Disp: 12 mL, Rfl: 3    furosemide (LASIX) 20 MG tablet, Take 1 tablet by mouth Daily., Disp: 90 tablet, Rfl: 1    metFORMIN (GLUCOPHAGE) 500 MG tablet, Take 1 tablet by mouth 2 (Two) Times a Day With Meals., Disp: , Rfl:     metoprolol tartrate (LOPRESSOR) 25 MG tablet, Take 1 tablet by mouth 2 (Two) Times a Day., Disp: , Rfl:     O2 (OXYGEN), Inhale 2 L/min. 24 -7, Disp: , Rfl:     omeprazole (priLOSEC) 20 MG capsule, TAKE 1 CAPSULE BY MOUTH EVERY DAY, Disp: 90 capsule, Rfl: 2    sildenafil (Revatio) 20 MG tablet, Take 0.5 tablets by mouth 3 (Three) Times a Day., Disp: 45 tablet, Rfl: 3    spironolactone (ALDACTONE) 25 MG tablet, TAKE 1 TABLET BY MOUTH EVERY DAY, Disp: 90 tablet, Rfl: 1    tacrolimus (PROGRAF) 0.5 mg/ml oral suspension, Take 2 mL by mouth Daily., Disp: , Rfl:       Physical Exam:  I have reviewed the patient's current vital signs as  documented in the patient's EMR.   Vitals:    12/31/24 0937   BP: 94/64   Pulse: 73   SpO2: 93%           Body mass index is 40.89 kg/m².       12/31/24  0937   Weight: 105 kg (230 lb 12.8 oz)         Vitals reviewed.   Constitutional:       General: Not in acute distress.     Appearance: Normal and healthy appearance. Not in distress. Not ill-appearing, toxic-appearing or diaphoretic.      Interventions: Not intubated.  Eyes:      Conjunctiva/sclera: Conjunctivae normal.      Pupils: Pupils are equal, round, and reactive to light.   Neck:      Vascular: No JVR. JVD normal with 6 cm of water.   Pulmonary:      Effort: Pulmonary effort is normal. No tachypnea, bradypnea, accessory muscle usage, prolonged expiration, respiratory distress or retractions. Not intubated.      Breath sounds: Normal breath sounds and air entry. No stridor, decreased air movement or transmitted upper airway sounds. No decreased breath sounds. No wheezing. No rhonchi. No rales.   Cardiovascular:      PMI at left midclavicular line. Normal rate. Regular rhythm. S1 with normal intensity. loud S2.       Murmurs: There is no murmur.      No gallop.  No click. No rub.   Neurological:      General: No focal deficit present.      Mental Status: Alert and oriented to person, place and time.   Psychiatric:         Behavior: Behavior is cooperative.         DATA REVIEWED:     ---------------------------------------------------  TTE/LENNY:  Results for orders placed during the hospital encounter of 12/10/24    Adult Transthoracic Echo Complete W/ Cont if Necessary Per Protocol    Interpretation Summary    Left ventricular systolic function is low normal. Calculated left ventricular EF = 50.5%. Left ventricular ejection fraction appears to be 46 - 50%.    Left ventricular diastolic function is consistent with (grade I) impaired relaxation.    Estimated right ventricular systolic pressure from tricuspid regurgitation is mildly elevated (35-45 mmHg).     Global longitudinal LV strain (GLS) = -18.4%.        My interpretation of the TTE is below.   LVEF is 50%.          -----------------------------------------------------  RHC:                          Interpretation today: Right atrial pressure is elevated with normal waveforms and normal inspiratory decrease.  Abnormal right ventricle.  Pulmonary hypertension.          ----------------------------------------------------    PFTs:        I interpreted the PFTs.  Restrictive physiology  --------------------------------------------------------------------------------------------------    Laboratory evaluations:  Lab Results   Component Value Date    PROBNP 265.0 03/23/2024     Lab Results   Component Value Date    GLUCOSE 151 (H) 12/17/2024    BUN 16 12/17/2024    CREATININE 1.03 (H) 12/17/2024    EGFRIFNONA 58 (L) 11/09/2021    EGFRIFAFRI 67 11/09/2021    BCR 15.5 12/17/2024    K 4.5 12/17/2024    CO2 25.2 12/17/2024    CALCIUM 9.6 12/17/2024    PROTENTOTREF 8.3 04/03/2023    ALBUMIN 4.5 08/17/2024    LABIL2 1.4 04/03/2023    AST 21 08/17/2024    ALT 17 08/17/2024     Lab Results   Component Value Date    HGBA1C 6.6 (H) 07/24/2023     Lab Results   Component Value Date    HGBA1C 6.6 (H) 07/24/2023    HGBA1C 6.70 (H) 04/03/2023    HGBA1C 7.70 (H) 11/23/2022     Lab Results   Component Value Date    MICROALBUR <3.0 04/03/2023    CREATININE 1.03 (H) 12/17/2024     Lab Results   Component Value Date    TIBC 583 06/02/2020    FERRITIN 68 08/03/2020     Lab Results   Component Value Date    WBC 6.21 08/19/2024    RBC 4.35 08/19/2024    HGB 10.9 (L) 08/19/2024    HCT 33.8 (L) 08/19/2024    MCV 77.7 (L) 08/19/2024    MCH 25.1 (L) 08/19/2024    MCHC 32.2 08/19/2024    RDW 15.2 08/19/2024    RDWSD 42.6 08/19/2024    MPV 9.3 08/19/2024     08/19/2024    NEUTRORELPCT 70.0 08/19/2024    LYMPHORELPCT 16.6 (L) 08/19/2024    MONORELPCT 8.5 08/19/2024    EOSRELPCT 4.3 08/19/2024    BASORELPCT 0.3 08/19/2024    NEUTROABS 4.34  "08/19/2024    LYMPHSABS 1.03 08/19/2024    MONOSABS 0.53 08/19/2024    EOSABS 0.27 08/19/2024    BASOSABS 0.02 08/19/2024    IMMATUREGR 0.2 04/17/2023    IMMGRANABS 0.02 04/17/2023    NRBC 0.0 08/19/2024      No results found for: \"MYRANAD\", \"PROTURINE\", \"VJMRSWM39RD\", \"ALBUMINU\", \"LABALPH1\", \"LABALPH2\", \"LABBETA\", \"GAMMAUR\", \"MSPIKEURINE\", \"IFRESULTU\", \"NOTE\"  No results found for: \"LABFREE\", \"FREEKAPPAL\", \"FREELAMBDA\", \"LABKAPP\"   Lab Results   Component Value Date    PROTENTOTREF 8.3 04/03/2023    ALBUMIN 4.5 08/17/2024    LABGLOBREF 3.4 04/03/2023    LABIL2 1.4 04/03/2023      No results found for: \"LABKAPP\"       PAH RISK ASSESSMENT:      ReDs Vest    Performed by: Heron Martínez MD PhD  Authorized by: Heron Martínez MD PhD    ReDS value:  36  ReDS Value Description:  36-41 (orange) = Possible Hypervolemic Status        1. Pulmonary hypertension (HCC). PAH/PVH. WHO Group  1, 2, 3: primarily more 1 and 3 ; FC: III.  RV status: Abnormal:.  PFTs:  Restrictive with decreased DLCO.  . MPAP: 31. Maryanne: 2.9. Exercise pulse oximetry showed a minimum SpO2 = 86% on room air.  The Aguilar Index was updated today. Most recent score: 6.     VQ scan showed no evidence of CTEPH.  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 She does have HFpEF.  With that said, her PSG had no significant sleep apnea.  CT scan did have some GGOs, which have improved.  She may very well have a group 1 component.       She does continue to have marginal hemodynamics so will continue low-dose Revatio.     I have asked the patient that if they were to move to be certain they see someone with expertise in PAH. These providers can be located at [email or web address].        2. HFpEF.  NYHA stage C; functional class III.  Today, euvolemic and perfused.  Clinical trajectory was reviewed today and is stable.      -Spironolactone 25 mg with bi-annual surveillance  - SGLT2 inhibitors are not prescribe due to recurrent vaginal candidiasis    Lifestyle " Advice:   - call office if I gain more than 2 pounds in one day or 5 pounds in one week  - keep legs up while sitting  - use salt in moderation  - watch for swelling in feet, ankles and legs every day  - weigh myself daily  -call for concerning s/sx         3. Abnormality of right ventricle of heart.  NYHA stage  C ; FC-III.  RV is dilated.  RV status: RV ADAPTED-REMODELED with an ESVi of <; Maryanne is  2.9 .  TAPSE/PASP ratio is markedly abnormal and consistent with RV-PA uncoupling.  Patient's abnormal right ventricle is primarily from pulmonary hypertension.  She continues to tolerate MRA therapy.    She has had interval positive remodeling on MRA and PDE-5, so will continue.     - Continue spironolactone, as above         4.   Advance Care Planning:   The following is retained as a matter of history:    -The patient desires their CODE STATUS to be full code. She desires FULL CODE for issues deemed to be reversible, but does desire to be DNR in issues and situations that are not reversible. No LONG-TERM ventilation.   -does not want to complete a MOST form today.  -does not want to complete a [facility] DNR form today.  -does desire full scope of treatment including intubation, mechanical ventilation, defibrillation, cardioversion, IV fluids and medical treatment. Again, for situations that are reversible.     -does not desire comfort measures only. HOWEVER, in an irreversible situation she would desire comfort care.   -does not desire long-term IV fluids  -does not desire long-term feeding tube placement  -does not desire PEG placement  -does desire dialysis short-term     These decisions made today were made by an adult patient with decisional capacity.                 This document has been electronically signed by Heron Martínez MD PhD on December 31, 2024 09:49 EST        Return in about 3 months (around 3/31/2025).      Heron Martínez M.D., Ph.D., [facility] Failure and  PAH [facility]  System Medical Director for HF and PAH

## 2025-02-06 ENCOUNTER — OFFICE VISIT (OUTPATIENT)
Dept: SURGERY | Facility: CLINIC | Age: 75
End: 2025-02-06
Payer: MEDICARE

## 2025-02-06 VITALS
HEART RATE: 82 BPM | HEIGHT: 63 IN | SYSTOLIC BLOOD PRESSURE: 130 MMHG | BODY MASS INDEX: 41.11 KG/M2 | DIASTOLIC BLOOD PRESSURE: 90 MMHG | WEIGHT: 232 LBS | OXYGEN SATURATION: 92 %

## 2025-02-06 DIAGNOSIS — L72.0 EPIDERMOID CYST: Primary | ICD-10-CM

## 2025-02-06 NOTE — PROGRESS NOTES
"General Surgery Office Visit    Chief Complaint:    Left axilla cyst left axilla cystLeft axilla cyst    History of Present Illness:    Sally Rivera is a 74 y.o. female who presents for evaluation of a left axilla cyst. Previously had a left axilla abscess drained on 8/18/24 for infected epidermoid cyst.  She states since then she has had no infections, denies fever, swelling, or drainage, but every now and then she has \"phantom packing pains\" from when she had to pack her wound, and she would like the cyst removed. She denies other changes to her health history.     Past Medical History:   GERD  Asthma  CAD with history of MI  Type 1 diabetes mellitus  Diverticulitis  Hypercholesterolemia  Hypertension  Liver disease    Past Surgical History:    Right heart cath in 2022  Cholecystectomy in 2011  Bowel resection for obstruction in 2014  Colonoscopy in 2021 by Dr. Klein - positive for transverse colon tubular adenoma and diverticulosis; recall recommended 5 years  Tubal ligation in 1978  Hysterectomy with oophorectomy  Oral biopsy  Ultrasound-guided lymph node biopsy in 2020    Family History:    Family History   Problem Relation Age of Onset    No Known Problems Mother     Diabetes Father     Heart disease Father     Hypertension Father      Social History:    Former smoker, smoked a pack a day for 20 years, quit in 1990  No recreational drug or alcohol use  , lives with     Allergies:   No Known Allergies    Medications:     Current Outpatient Medications:     aspirin 325 MG tablet, Take 1 tablet by mouth Daily., Disp: , Rfl:     atorvastatin (LIPITOR) 40 MG tablet, Take 1 tablet by mouth Daily. 200001, Disp: 90 tablet, Rfl: 3    cholecalciferol (VITAMIN D3) 25 MCG (1000 UT) tablet, Take 1 tablet by mouth Daily., Disp: , Rfl:     dilTIAZem (CARDIZEM) 120 MG tablet, Take 1 tablet by mouth 2 (Two) Times a Day., Disp: , Rfl:     Dulaglutide (Trulicity) 0.75 MG/0.5ML solution pen-injector, " "INJECT 0.75MG (1 PEN) UNDER THE SKIN EVERY WEEK, Disp: 12 mL, Rfl: 3    furosemide (LASIX) 20 MG tablet, Take 1 tablet by mouth Daily., Disp: 90 tablet, Rfl: 1    metFORMIN (GLUCOPHAGE) 500 MG tablet, Take 1 tablet by mouth 2 (Two) Times a Day With Meals., Disp: , Rfl:     metoprolol tartrate (LOPRESSOR) 25 MG tablet, Take 1 tablet by mouth 2 (Two) Times a Day., Disp: , Rfl:     O2 (OXYGEN), Inhale 2 L/min. 24 -7, Disp: , Rfl:     omeprazole (priLOSEC) 20 MG capsule, TAKE 1 CAPSULE BY MOUTH EVERY DAY, Disp: 90 capsule, Rfl: 2    sildenafil (Revatio) 20 MG tablet, Take 0.5 tablets by mouth 3 (Three) Times a Day., Disp: 45 tablet, Rfl: 3    spironolactone (ALDACTONE) 25 MG tablet, TAKE 1 TABLET BY MOUTH EVERY DAY, Disp: 90 tablet, Rfl: 1    tacrolimus (PROGRAF) 0.5 mg/ml oral suspension, Take 2 mL by mouth Daily., Disp: , Rfl:     Review of Systems:   Constitutional: denies fevers, chills  Skin: denies rash; +left axilla cyst    Physical Exam:   /90   Pulse 82   Ht 160 cm (62.99\")   Wt 105 kg (232 lb)   SpO2 92%   BMI 41.11 kg/m²   Constitutional: Well-developed, well-nourished  Respiratory: Normal inspiratory effort on ambulatory nasal cannula supplemental oxygen  Lymphatics: No palpable lymphadenopathy  Musculoskeletal: Symmetric strength  Psychiatric: Normal mood and affect  Skin:  Warm, dry, no rash; tiny approximately 2mm left axilla cyst with dark central punctum with no evidence of infection, consistent with epidermoid cyst    Assessment/Plan:  74-year-old lady presenting with epidermoid cyst of left axilla which is occasionally uncomfortable in which previously required incision and drainage.  I recommended to her in office excision under local anesthetic.  I discussed with her the risks (bleeding, infection, injury to surrounding structures, poor wound healing), benefits, alternatives, and postprocedure expectations.  Informed consent was obtained.  She wishes to proceed with excision.  Will " schedule her for this in the office at her convenience.  She may continue her aspirin.    Barbara Boston M.D.  General, Robotic, and Endoscopic Surgery  [facility]    [address], [address]  [place], [place], [address]  P: [phone]  F: [phone]

## 2025-02-13 ENCOUNTER — PROCEDURE VISIT (OUTPATIENT)
Dept: SURGERY | Facility: CLINIC | Age: 75
End: 2025-02-13
Payer: MEDICARE

## 2025-02-13 VITALS
SYSTOLIC BLOOD PRESSURE: 180 MMHG | DIASTOLIC BLOOD PRESSURE: 100 MMHG | HEART RATE: 86 BPM | TEMPERATURE: 97.9 F | OXYGEN SATURATION: 95 %

## 2025-02-13 DIAGNOSIS — L72.0 EPIDERMOID CYST: Primary | ICD-10-CM

## 2025-02-13 PROCEDURE — 88304 TISSUE EXAM BY PATHOLOGIST: CPT | Performed by: STUDENT IN AN ORGANIZED HEALTH CARE EDUCATION/TRAINING PROGRAM

## 2025-02-13 NOTE — PROGRESS NOTES
Procedure note :  Barbara Boston MD    Patient Name and :  Sally Rivera  1950    Procedure Date:   25    Pre-procedure diagnosis:  Epidermoid cyst of left axilla    Post-procedure diagnosis:  Epidermoid cyst of left axilla    Procedure:   Excision of epidermoid cyst of left axilla    Surgeon:   Barbara Boston MD    Assistant:   None    Anesthesia:    Local (local anesthesia only)    Estimated Blood Loss:   Minimal    Specimens:   Epidermoid cyst of left axilla    Complications:   None    Indications:  Patient is a 74-year-old lady who previously had a left axillary abscess drained on 2024 for an infected epidermoid cyst.  Her epidermoid cyst is still present and uncomfortable and she wishes to undergo excision to remove it and prevent recurrent infection.  She is aware of the risks, benefits, alternatives, and postprocedure expectations and wishes to proceed with the procedure.    Description of procedure:  After informed consent was obtained, the patient was placed supine on the procedure table and the left axilla was prepped and draped in the usual sterile fashion.  A timeout was performed.  14cc Lidocaine 1% with epinephrine was used to anesthetize the skin surrounding the cyst.  An elliptical incision measuring 1.5 x 0.5 cm was made  using a 15 blade and carried down to the subcutaneous tissue to remove the remaining epidermal cyst in its entirety.  Manual pressure was held and the wound was hemostatic.  Then the wound was closed with 3-0 Vicryl deep dermal sutures and 4-0 Monocryl subcuticular sutures.  The area was cleaned and dried.  Hemostasis was noted.  Mastisol and Steri-Strips were applied.  A sterile gauze dressing was applied.  Patient tolerated procedure without complication.    Follow up recommendations:  You may remove your outer dressing tomorrow.  Leave Steri-Strips in place.  These will fall off on their own in a couple of weeks.  You may shower over the  Steri-Strips starting tomorrow, using warm soapy water, rinse, pat dry.  No tub baths or swimming until your incision is completely healed.  You may have a small amount of drainage or bleeding from the wound.  If you have bleeding, hold firm pressure for 15 minutes and if you are still bleeding call the office to be seen.  Likewise, call the office if you develop a fever greater than 100.5 F, severe pain, foul-smelling drainage/pus, expanding redness around your wound, or any other concerns.  Follow-up with your surgeon in 2 weeks for wound check.    Patient agreeable with recommendations.    Barbara Boston MD  General Surgery  [facility]    [address], [address]  [place] [address]  P: [phone]  F: [phone]

## 2025-02-15 LAB
CYTO UR: NORMAL
LAB AP CASE REPORT: NORMAL
LAB AP CLINICAL INFORMATION: NORMAL
PATH REPORT.FINAL DX SPEC: NORMAL
PATH REPORT.GROSS SPEC: NORMAL

## 2025-03-03 ENCOUNTER — OFFICE VISIT (OUTPATIENT)
Dept: SURGERY | Facility: CLINIC | Age: 75
End: 2025-03-03
Payer: MEDICARE

## 2025-03-03 VITALS
TEMPERATURE: 97.7 F | HEART RATE: 81 BPM | SYSTOLIC BLOOD PRESSURE: 124 MMHG | HEIGHT: 63 IN | OXYGEN SATURATION: 93 % | DIASTOLIC BLOOD PRESSURE: 72 MMHG | WEIGHT: 230 LBS | BODY MASS INDEX: 40.75 KG/M2

## 2025-03-03 DIAGNOSIS — Z51.89 VISIT FOR WOUND CHECK: ICD-10-CM

## 2025-03-03 DIAGNOSIS — L72.0 EPIDERMOID CYST: Primary | ICD-10-CM

## 2025-03-03 PROCEDURE — 1160F RVW MEDS BY RX/DR IN RCRD: CPT | Performed by: STUDENT IN AN ORGANIZED HEALTH CARE EDUCATION/TRAINING PROGRAM

## 2025-03-03 PROCEDURE — 1159F MED LIST DOCD IN RCRD: CPT | Performed by: STUDENT IN AN ORGANIZED HEALTH CARE EDUCATION/TRAINING PROGRAM

## 2025-03-03 PROCEDURE — 99024 POSTOP FOLLOW-UP VISIT: CPT | Performed by: STUDENT IN AN ORGANIZED HEALTH CARE EDUCATION/TRAINING PROGRAM

## 2025-03-07 NOTE — PROGRESS NOTES
General Surgery Office Follow Up Note     History of Present Illness:    Sally Rivera is a 75 y.o. female who presents for wound check following excision of left axilla epidermoid cyst performed on 2/13/2025. Patient denies any symptoms except she feels a small bump at the site. Denies fever, chills, drainage from her incision, or pain.    Past Medical History:  Past Medical History:   Diagnosis Date    Acid reflux     Asthma June 2022    CAD (coronary artery disease)     per dr deepthi cesar office note 4/22-dd    CHF (congestive heart failure) 2022    Cirrhosis 2011    Diabetes mellitus type I     Diverticulitis     Elevated cholesterol     GERD (gastroesophageal reflux disease)     Heart attack 1995    Hyperlipidemia     Hypertension     Hypertension     Liver disease     PONV (postoperative nausea and vomiting)        Past Surgical History:  Past Surgical History:   Procedure Laterality Date    CARDIAC CATHETERIZATION N/A 11/21/2022    Procedure: Right Heart Cath with vasodilator as indicated. pHTN workup;  Surgeon: Waqar Rush MD;  Location:  [facility];  Service: Cardiovascular;  Laterality: N/A;    CHOLECYSTECTOMY  2011    COLON SURGERY  2014    bowel resection for obstruction    COLONOSCOPY  2013    Dr. Sky    COLONOSCOPY N/A 10/07/2021    Procedure: COLONOSCOPY TO CECUM WITH COLD POLYPECTOMY;  Surgeon: Facundo Klein Jr., MD;  Location: [facility] ENDOSCOPY;  Service: General;  Laterality: N/A;  PRE- H/O COLON POLYPS  POST-COLON POLYP, DIVERTICULOSIS    HYSTERECTOMY  2011    MOUTH BIOPSY      OOPHORECTOMY      TUBAL ABDOMINAL LIGATION  1978    US GUIDED LYMPH NODE BIOPSY  11/30/2020       Family History:  Family History   Problem Relation Age of Onset    No Known Problems Mother     Diabetes Father     Heart disease Father     Hypertension Father        Social History:  Social History     Socioeconomic History    Marital status:    Tobacco Use    Smoking status: Former      Current packs/day: 0.00     Average packs/day: 1 pack/day for 20.0 years (20.0 ttl pk-yrs)     Types: Cigarettes     Start date: 10/1/1970     Quit date: 1990     Years since quittin.1     Passive exposure: Past    Smokeless tobacco: Never   Vaping Use    Vaping status: Never Used   Substance and Sexual Activity    Alcohol use: No    Drug use: No    Sexual activity: Not Currently     Partners: Male     Birth control/protection: Tubal ligation       Allergies:  No Known Allergies    Meds:    Current Outpatient Medications:     aspirin 325 MG tablet, Take 1 tablet by mouth Daily., Disp: , Rfl:     atorvastatin (LIPITOR) 40 MG tablet, Take 1 tablet by mouth Daily. , Disp: 90 tablet, Rfl: 3    cholecalciferol (VITAMIN D3) 25 MCG (1000 UT) tablet, Take 1 tablet by mouth Daily., Disp: , Rfl:     dilTIAZem (CARDIZEM) 120 MG tablet, Take 1 tablet by mouth 2 (Two) Times a Day., Disp: , Rfl:     Dulaglutide (Trulicity) 0.75 MG/0.5ML solution pen-injector, INJECT 0.75MG (1 PEN) UNDER THE SKIN EVERY WEEK, Disp: 12 mL, Rfl: 3    furosemide (LASIX) 20 MG tablet, Take 1 tablet by mouth Daily., Disp: 90 tablet, Rfl: 1    metFORMIN (GLUCOPHAGE) 500 MG tablet, Take 1 tablet by mouth 2 (Two) Times a Day With Meals., Disp: , Rfl:     metoprolol tartrate (LOPRESSOR) 25 MG tablet, Take 1 tablet by mouth 2 (Two) Times a Day., Disp: , Rfl:     O2 (OXYGEN), Inhale 2 L/min. 24 -7, Disp: , Rfl:     omeprazole (priLOSEC) 20 MG capsule, TAKE 1 CAPSULE BY MOUTH EVERY DAY, Disp: 90 capsule, Rfl: 2    sildenafil (Revatio) 20 MG tablet, Take 0.5 tablets by mouth 3 (Three) Times a Day., Disp: 45 tablet, Rfl: 3    spironolactone (ALDACTONE) 25 MG tablet, TAKE 1 TABLET BY MOUTH EVERY DAY, Disp: 90 tablet, Rfl: 1    tacrolimus (PROGRAF) 0.5 mg/ml oral suspension, Take 2 mL by mouth Daily., Disp: , Rfl:     Physical Exam:   /72 (BP Location: Left arm, Patient Position: Sitting, Cuff Size: Large Adult)   Pulse 81   Temp 97.7 °F  "(36.5 °C) (Oral)   Ht 160 cm (62.99\")   Wt 104 kg (230 lb)   SpO2 93%   BMI 40.75 kg/m²   Constitutional: Well-developed well-nourished   Eyes: Conjunctiva normal, sclera nonicteric  HENT: Hearing grossly normal, oral mucosa moist  Respiratory: Normal inspiratory effort on ambulatory supplemental O2  Musculoskeletal: Symmetric strength   Psychiatric: Normal mood and affect  Skin:  Warm, dry, no rash; left axilla wound well healed with monocryl suture knot exposed- this was removed during exam      Pathology:   Clinical Information    Left axilla Epidermal cyst   Final Diagnosis   1.  Skin, left axilla, excision:               A.  Epidermal inclusion cyst.          Assessment/Plan:  75 y.o. female presenting for wound check s/p excision of epidermal inclusion cyst. Wound is healing in good order and patient presents without complication. Discussed with her that she may follow up with general surgery as needed. Patient agreeable.    Barbara Boston M.D.  General, Robotic and Endoscopic Surgery  [facility]    [address], [address]  [place], [place], [address]  P: [phone]  F: [phone]     "

## 2025-03-10 ENCOUNTER — TRANSCRIBE ORDERS (OUTPATIENT)
Dept: ADMINISTRATIVE | Facility: HOSPITAL | Age: 75
End: 2025-03-10
Payer: MEDICARE

## 2025-03-10 DIAGNOSIS — J84.9 ILD (INTERSTITIAL LUNG DISEASE): Primary | ICD-10-CM

## 2025-03-31 ENCOUNTER — HOSPITAL ENCOUNTER (OUTPATIENT)
Dept: CARDIOLOGY | Facility: HOSPITAL | Age: 75
Discharge: HOME | End: 2025-03-31
Admitting: NURSE PRACTITIONER
Payer: MEDICARE

## 2025-03-31 VITALS
WEIGHT: 227.2 LBS | HEIGHT: 63 IN | SYSTOLIC BLOOD PRESSURE: 113 MMHG | DIASTOLIC BLOOD PRESSURE: 76 MMHG | HEART RATE: 66 BPM | BODY MASS INDEX: 40.26 KG/M2 | OXYGEN SATURATION: 93 %

## 2025-03-31 DIAGNOSIS — Q20.8 ABNORMALITY OF RIGHT VENTRICLE OF HEART: ICD-10-CM

## 2025-03-31 DIAGNOSIS — I27.20 PULMONARY HYPERTENSION: Primary | ICD-10-CM

## 2025-03-31 DIAGNOSIS — I50.32 CHRONIC HEART FAILURE WITH PRESERVED EJECTION FRACTION (HFPEF): ICD-10-CM

## 2025-03-31 PROCEDURE — G0463 HOSPITAL OUTPT CLINIC VISIT: HCPCS

## 2025-03-31 PROCEDURE — 94726 PLETHYSMOGRAPHY LUNG VOLUMES: CPT | Performed by: NURSE PRACTITIONER

## 2025-03-31 RX ORDER — SILDENAFIL CITRATE 20 MG/1
10 TABLET ORAL 3 TIMES DAILY
Qty: 135 TABLET | Refills: 3 | Status: SHIPPED | OUTPATIENT
Start: 2025-03-31

## 2025-03-31 NOTE — PROGRESS NOTES
[facility]    Provider, [place] [address]    Thank you for asking me to see Sally Rivera.     Congestive Heart Failure  Associated symptoms include shortness of breath (chronic and unchanged). Pertinent negatives include no chest pain.       1. PAH   2. RV abnormality  3. HFmrEF    Subjective      Sally Antunez a 75 y.o. female who presents today for follow-up of pulmonary hypertension, RV abnormality, and HFpEF.     Clinical HX:  Last spirometry showed FEV1/FVC of 89% of predicted.  FVC was 54% of predicted.  DLCO was 53% of predicted.  TLC was 68% of predicted and consistent with restrictive physiology.  Patient also has a history of abnormal CT scans.     In April 2022 she was found to have multiple nodular opacities with the largest of 2.1 cm.  There was bilateral increased septal thickening and some areas of GGOs.   Pulmonary artery was dilated.  No history of VTE/PE.  No VQ scan.  No history of autoimmune or rheumatic diseases.  No family history of cardiomyopathy or pulmonary hypertension. 2D TTE was obtained and showed normal left ventricular function with indeterminate diastolic function.  Right ventricle was interpreted as normal.  mild to moderate mitral regurgitation and mild to moderate tricuspid regurgitation with a PA systolic pressure of 49 mmHg.   RHC which has been completed.  Primary complaint at that time was worsening exertional dyspnea and exercise intolerance.     RHC was performed and showed RA pressure of 12.  RV was 43/11.  PA pressure was 56/21 with a mean of 31.  Wedge was elevated to 15 mmHg.  PVR was abnormal at 3.9 Wood units. VD reactivity testing was not performed.  Fluid loading was not performed.     2D TTE from 12/10/2024--Left ventricular systolic function is low normal. Calculated left ventricular EF = 50.5%. Left ventricular ejection fraction appears to be 46 - 50%.Left ventricular  diastolic function is consistent with (grade I) impaired relaxation. Estimated right ventricular systolic pressure from tricuspid regurgitation is mildly elevated (35-45 mmHg).    Today she presents to clinic for follow-up.  She continues to wear LTOT.  She denies any new or worsening shortness of breath or dyspnea on exertion although states she does have chronic shortness of breath.  Reports stable weights.  Stable exercise intolerance.  Denies any leg edema.    Review of Systems - Review of Systems   Constitutional: Negative for weight gain and weight loss.   Cardiovascular:  Negative for chest pain, dyspnea on exertion, leg swelling, orthopnea, paroxysmal nocturnal dyspnea and syncope.   Respiratory:  Positive for shortness of breath (chronic and unchanged). Negative for cough, sleep disturbances due to breathing and snoring.    Gastrointestinal:  Negative for bloating.   Neurological:  Negative for dizziness, light-headedness and weakness.          Patient Active Problem List   Diagnosis    Chronic coronary artery disease    Gastroesophageal reflux disease    Hyperlipidemia    Nonalcoholic fatty liver disease    Atrial paroxysmal tachycardia    Type 2 diabetes mellitus with hyperglycemia, without long-term current use of insulin    History of rectal polypectomy    Diverticulosis of large intestine without hemorrhage    High risk medication use    Class 2 severe obesity with serious comorbidity in adult    History of colon polyps    Clinical diagnosis of COVID-19    Pneumonia    Lung nodule    Pulmonary hypertension    Hypoxia    Abnormality of right ventricle of heart    Risk factors for obstructive sleep apnea    Advance care planning    Axillary abscess    Bacterial infection    Chronic respiratory failure with hypoxia    Chronic heart failure with preserved ejection fraction (HFpEF)    Heart failure with mildly reduced ejection fraction (HFmrEF)     family history includes Diabetes in her father; Heart  disease in her father; Hypertension in her father; No Known Problems in her mother.   reports that she quit smoking about 35 years ago. Her smoking use included cigarettes. She started smoking about 54 years ago. She has a 20 pack-year smoking history. She has been exposed to tobacco smoke. She has never used smokeless tobacco. She reports that she does not drink alcohol and does not use drugs.  No Known Allergies  Physical Activity: Not on file          Current Outpatient Medications:     aspirin 325 MG tablet, Take 1 tablet by mouth Daily., Disp: , Rfl:     atorvastatin (LIPITOR) 40 MG tablet, Take 1 tablet by mouth Daily. 200001, Disp: 90 tablet, Rfl: 3    cholecalciferol (VITAMIN D3) 25 MCG (1000 UT) tablet, Take 1 tablet by mouth Daily., Disp: , Rfl:     dilTIAZem (CARDIZEM) 120 MG tablet, Take 1 tablet by mouth 2 (Two) Times a Day., Disp: , Rfl:     Dulaglutide (Trulicity) 0.75 MG/0.5ML solution pen-injector, INJECT 0.75MG (1 PEN) UNDER THE SKIN EVERY WEEK, Disp: 12 mL, Rfl: 3    furosemide (LASIX) 20 MG tablet, Take 1 tablet by mouth Daily., Disp: 90 tablet, Rfl: 1    metFORMIN (GLUCOPHAGE) 500 MG tablet, Take 1 tablet by mouth 2 (Two) Times a Day With Meals., Disp: , Rfl:     metoprolol tartrate (LOPRESSOR) 25 MG tablet, Take 1 tablet by mouth 2 (Two) Times a Day., Disp: , Rfl:     O2 (OXYGEN), Inhale 2 L/min. 24 -7, Disp: , Rfl:     omeprazole (priLOSEC) 20 MG capsule, TAKE 1 CAPSULE BY MOUTH EVERY DAY, Disp: 90 capsule, Rfl: 2    sildenafil (Revatio) 20 MG tablet, Take 0.5 tablets by mouth 3 (Three) Times a Day., Disp: 45 tablet, Rfl: 3    spironolactone (ALDACTONE) 25 MG tablet, TAKE 1 TABLET BY MOUTH EVERY DAY, Disp: 90 tablet, Rfl: 1    Objective   Vital Sign Review:   Vitals:    03/31/25 1022   BP: 113/76   Pulse: 66   SpO2: 93%       Body mass index is 40.26 kg/m².      03/31/25  1022   Weight: 103 kg (227 lb 3.2 oz)       Physical Exam:  Constitutional:       Appearance: Normal appearance.  Chronically ill-appearing.   Neck:      Vascular: No carotid bruit or JVD. JVD normal.   Pulmonary:      Effort: Pulmonary effort is normal.      Breath sounds: Normal breath sounds.   Cardiovascular:      PMI at left midclavicular line. Normal rate. Regular rhythm.   Pulses:     Intact distal pulses.   Edema:     Peripheral edema absent.   Abdominal:      Palpations: Abdomen is soft.      Tenderness: There is no abdominal tenderness.   Skin:     General: Skin is warm and dry.   Neurological:      General: No focal deficit present.      Mental Status: Alert and oriented to person, place and time.   Psychiatric:         Behavior: Behavior is cooperative.          DATA REVIEWED:   EKG:      ---------------------------------------------------  ECHO:    Results for orders placed during the hospital encounter of 12/10/24    Adult Transthoracic Echo Complete W/ Cont if Necessary Per Protocol    Interpretation Summary    Left ventricular systolic function is low normal. Calculated left ventricular EF = 50.5%. Left ventricular ejection fraction appears to be 46 - 50%.    Left ventricular diastolic function is consistent with (grade I) impaired relaxation.    Estimated right ventricular systolic pressure from tricuspid regurgitation is mildly elevated (35-45 mmHg).    Global longitudinal LV strain (GLS) = -18.4%.          -----------------------------------------------------  RHC/LHC    Results for orders placed during the hospital encounter of 22    Cardiac Catheterization/Vascular Study    Conclusion  [facility]  CARDIAC CATHETERIZATION PROCEDURE REPORT    Patient: Sally Rivera  : 1950  MRN: [id number]    Procedure Date:  22    Referring Physician:  Cory Christensen MD    Interventional Cardiologist:  Waqar Montoya MD    Indication:  Pulmonary hypertension      Clinical Presentation:  72-year-old lady past medical history hypertension, hyperlipidemia, recent diagnosis of pulmonary  interstitial disease, found to have elevated RVSP on echocardiogram, referred for right heart catheterization for further evaluation pulmonary hypertension.    Procedure performed:  Diagnostic Right Heart Catheterization    Access Sites:  right basilic vein    Findings:  1. Hemodynamics:  Right Atrium: Mean of 12 mmHg  Right Ventricle: 43/11 mmHg  Pulmonary Artery: 56/21/31 mmHg  Pulmonary Wedge: Mean of 16 mmHg  Cardiac Output/Index: 3.91 L/min 1.84 L/min/m2  PA Sat: 65%  AO Sat: 92%  PVR: 3.9 Wood units      Conclusions:  Mild precapillary hypertension (pulmonary wedge right at the cutoff of 15)      Recommendations:  Continued follow-up with pulmonology and cardiology for further evaluation and care.      Procedure Status:  Elective    Procedure Details:  Informed consent was obtained with an explanation of the risk and benefits of the procedure. The patient was brought to the Cardiac Catheterization Laboratory and was prepped and draped in a standard sterile fashion. Lidocaine 2% was used to anesthetize the right antecubital fossa. An existing IV placed by the circulating nurse was exchange over a wire for a 5 Indian sheath.    A 5 Indian PA catheter was then advanced into the heart and out into the pulmonary artery. Hemodynamics were obtained in the pulmonary wedge, pulmonary artery, right ventricle, and right atrium positions.  Saturations were obtained from the pulmonary artery. The venous sheath was removed and hemostasis achieved with manual pressure.    Patient tolerated the procedure well without any complications, and transferred to the post procedure area for recovery in a stable condition.      Complications:  None.    Estimated Blood Loss:  Minimal.    Waqar Montoya MD  [facility]  11/21/22  09:43 EST      ---------------------------------------------------------------------------  CXR/Imaging:     Imaging Results (Most Recent)       None                 -----------------------------------------------------------------------------  CT:   No radiology results for the last 30 days.        --------------------------------------------------------------------------------------------------------------    Laboratory evaluations:  Renal Function: CrCl cannot be calculated (Patient's most recent lab result is older than the maximum 30 days allowed.).    Lab Results   Component Value Date    GLUCOSE 151 (H) 12/17/2024    BUN 16 12/17/2024    CREATININE 1.03 (H) 12/17/2024    EGFRIFNONA 58 (L) 11/09/2021    EGFRIFAFRI 67 11/09/2021    BCR 15.5 12/17/2024    K 4.5 12/17/2024    CO2 25.2 12/17/2024    CALCIUM 9.6 12/17/2024    ALBUMIN 4.5 08/17/2024    AST 21 08/17/2024    ALT 17 08/17/2024     Lab Results   Component Value Date    WBC 6.21 08/19/2024    HGB 10.9 (L) 08/19/2024    HCT 33.8 (L) 08/19/2024    MCV 77.7 (L) 08/19/2024     08/19/2024     Lab Results   Component Value Date    HGBA1C 6.6 (H) 07/24/2023     Lab Results   Component Value Date    HGBA1C 6.6 (H) 07/24/2023    HGBA1C 6.70 (H) 04/03/2023    HGBA1C 7.70 (H) 11/23/2022     Lab Results   Component Value Date    MICROALBUR <3.0 04/03/2023    CREATININE 1.03 (H) 12/17/2024     Lab Results   Component Value Date    TIBC 583 06/02/2020    FERRITIN 68 08/03/2020       Result Review:  I have personally reviewed the results from the time of this admission to 3/31/2025 10:58 EDT and agree with these findings:  [x]  Laboratory list / accordion  []  Microbiology  [x]  Radiology  [x]  EKG/Telemetry   [x]  Cardiology/Vascular   []  Pathology  [x]  Old records  []  Other:             ReDS VOLUMETRIC ASSESSMENT:  ReDs Vest    Performed by: Jovanna Proctor APRN  Authorized by: Jovanna Proctor APRN    ReDS value:  37  ReDS Value Description:  36-41 (orange) = Possible Hypervolemic Status        Assessment & Plan      1. Pulmonary hypertension    2. Chronic heart failure with preserved ejection  fraction (HFpEF)    3. Abnormality of right ventricle of heart      Pulmonary Hypertension--WHO-group-1, 2, 3.  Ipc-PH.  Restrictive PFTs with decreased DLCO.  On LTOT.  VQ scan showed no evidence of CTEPH.  Had sleep study that showed no significant sleep apnea.  Marginal hemodynamics but tolerating low-dose Revatio 10 mg 3 times daily--refill sent today  Continue HFpEF GDMT    2-3.  HFmrEF-right ventricular dysfunction--patient is euvolemic today--2D TTE 12/10/2024 showed 46-50 %  Continue spironolactone 25 mg twice daily.  No SGLT2 as patient has had recurrent vaginal Candida infections  Patient is not on HF specific beta-blocker.  She is stable, symptoms are stable, therefore we will continue current GDMT       NYHA stage C FC-III     Clinical status was assessed and has remained stable.  Treatment intent: curative   ReDS reading was obtained today.  ReDS result: 37       Today, Patient appears euvolemic. and with perfusion. The patient's hemodynamics are currently acceptable. HR is: normal and is at goal. BP/MAP was reviewed and there is room for medication up-titration.  LVEF: 46-50 %.     GDMT Assessment:   GDMT changes recommended today: No changes to medication therapy.      BB:   continue Metoprolol tartrate  25mg bid  Monitor heart rate.  Please call the [facility] for HR<50, dizziness, lightheadedness, syncope    A/A/A:   Previous issues with marginal hemodynamics, blood pressure stable at this time and runs on the lower end of normal, possible future addition if needed    [id number]:  Unable to take due to vaginal candida infections    MRA:  The patient is FC-NYHA Class III and MRA is indicated.   continue Spironolactone  25mg daily  Recommended biannual GFR/electrolyte assessments---getting labs on 4/21/2025 with PCP visit--we can follow- those labs.       Diuretic Regimen:  -DIURETIC:   furosemide (LASIX) 20 mg every day  -Fluid restriction:   -requested  -2000 ml  -Patient has been asked to weigh daily and was  provided with a printed diuretic strategy.  -Sodium restriction:   -1,500 mg Na restriction was discussed.      Labs/Diagnostics/Referrals:    Labs -Chem-7       Lifestyle Advice:   - call office if I gain more than 2 pounds in one day or 5 pounds in one week   - keep legs up while sitting   - use salt in moderation   - watch for swelling in feet, ankles and legs every day   - weigh myself daily   -call for concerning s/sx   -- activity or exercise based on tolerance encouraged     CODE STATUS: FULL              Return in about 3 months (around 6/30/2025).                Thank you for allowing me to participate in the care of your patient,         Jovanna Proctor, KHALIF  [facility]  03/31/25  08:23 EDT      **Jyoti Disclaimer:**  Much of this encounter note is an electronic transcription/translation of spoken language to printed text. The electronic translation of spoken language may permit erroneous, or at times, nonsensical words or phrases to be inadvertently transcribed. Although I have reviewed the note for such errors, some may still exist.    The information in this note was reviewed and updated during the visit to be as accurate as possible. As many patients have chronic medical problems, many of their individual problems and ongoing plans do not change significantly from visit to visit.  This information is correct and is consistent with my treatment plan as of today's visit.

## 2025-03-31 NOTE — PATIENT INSTRUCTIONS
Directions for when to call the clinic reviewed with the patient to include weight gain of 2 to 3 pounds in 24 hours, weight gain of 5 to 10 pounds within 7 days; worsening shortness of breath; worsening lower extremity edema or abdominal distention.    We discussed today the mildly reduced pumping ability of the heart--we will keep medicines the same, but if you were to become more symptomatic of heart failure we would look to add/change medications for heart failure.     Otherwise for now no changes but call for any of the above symptoms.

## 2025-03-31 NOTE — LETTER
April 1, 2025     Edgar Mayers MD  [address]  [address]  [place] [id number]    Patient: Sally Rivera   YOB: 1950   Date of Visit: 3/31/2025     Dear Edgar Mayers:       Thank you for referring Sally Rivera to me for evaluation. Below are the relevant portions of my assessment and plan of care.    If you have questions, please do not hesitate to call me. I look forward to following Sally along with you.         Sincerely,        KHALIF Arredondo        CC: No Recipients    Jovanna Proctor APRN  04/01/25 0843  Signed    [facility]    Provider, [place] [address]    Thank you for asking me to see Sally Rivera.     Congestive Heart Failure  Associated symptoms include shortness of breath (chronic and unchanged). Pertinent negatives include no chest pain.       1. PAH   2. RV abnormality  3. HFmrEF    Subjective     Sally Jaysis a 75 y.o. female who presents today for follow-up of pulmonary hypertension, RV abnormality, and HFpEF.     Clinical HX:  Last spirometry showed FEV1/FVC of 89% of predicted.  FVC was 54% of predicted.  DLCO was 53% of predicted.  TLC was 68% of predicted and consistent with restrictive physiology.  Patient also has a history of abnormal CT scans.     In April 2022 she was found to have multiple nodular opacities with the largest of 2.1 cm.  There was bilateral increased septal thickening and some areas of GGOs.   Pulmonary artery was dilated.  No history of VTE/PE.  No VQ scan.  No history of autoimmune or rheumatic diseases.  No family history of cardiomyopathy or pulmonary hypertension. 2D TTE was obtained and showed normal left ventricular function with indeterminate diastolic function.  Right ventricle was interpreted as normal.  mild to moderate mitral regurgitation and mild to moderate tricuspid regurgitation with a PA systolic pressure of  49 mmHg.   RHC which has been completed.  Primary complaint at that time was worsening exertional dyspnea and exercise intolerance.     RHC was performed and showed RA pressure of 12.  RV was 43/11.  PA pressure was 56/21 with a mean of 31.  Wedge was elevated to 15 mmHg.  PVR was abnormal at 3.9 Wood units. VD reactivity testing was not performed.  Fluid loading was not performed.     2D TTE from 12/10/2024--Left ventricular systolic function is low normal. Calculated left ventricular EF = 50.5%. Left ventricular ejection fraction appears to be 46 - 50%.Left ventricular diastolic function is consistent with (grade I) impaired relaxation. Estimated right ventricular systolic pressure from tricuspid regurgitation is mildly elevated (35-45 mmHg).    Today she presents to clinic for follow-up.  She continues to wear LTOT.  She denies any new or worsening shortness of breath or dyspnea on exertion although states she does have chronic shortness of breath.  Reports stable weights.  Stable exercise intolerance.  Denies any leg edema.    Review of Systems - Review of Systems   Constitutional: Negative for weight gain and weight loss.   Cardiovascular:  Negative for chest pain, dyspnea on exertion, leg swelling, orthopnea, paroxysmal nocturnal dyspnea and syncope.   Respiratory:  Positive for shortness of breath (chronic and unchanged). Negative for cough, sleep disturbances due to breathing and snoring.    Gastrointestinal:  Negative for bloating.   Neurological:  Negative for dizziness, light-headedness and weakness.          Patient Active Problem List   Diagnosis   • Chronic coronary artery disease   • Gastroesophageal reflux disease   • Hyperlipidemia   • Nonalcoholic fatty liver disease   • Atrial paroxysmal tachycardia   • Type 2 diabetes mellitus with hyperglycemia, without long-term current use of insulin   • History of rectal polypectomy   • Diverticulosis of large intestine without hemorrhage   • High risk  medication use   • Class 2 severe obesity with serious comorbidity in adult   • History of colon polyps   • Clinical diagnosis of COVID-19   • Pneumonia   • Lung nodule   • Pulmonary hypertension   • Hypoxia   • Abnormality of right ventricle of heart   • Risk factors for obstructive sleep apnea   • Advance care planning   • Axillary abscess   • Bacterial infection   • Chronic respiratory failure with hypoxia   • Chronic heart failure with preserved ejection fraction (HFpEF)   • Heart failure with mildly reduced ejection fraction (HFmrEF)     family history includes Diabetes in her father; Heart disease in her father; Hypertension in her father; No Known Problems in her mother.   reports that she quit smoking about 35 years ago. Her smoking use included cigarettes. She started smoking about 54 years ago. She has a 20 pack-year smoking history. She has been exposed to tobacco smoke. She has never used smokeless tobacco. She reports that she does not drink alcohol and does not use drugs.  No Known Allergies  Physical Activity: Not on file          Current Outpatient Medications:   •  aspirin 325 MG tablet, Take 1 tablet by mouth Daily., Disp: , Rfl:   •  atorvastatin (LIPITOR) 40 MG tablet, Take 1 tablet by mouth Daily. 200001, Disp: 90 tablet, Rfl: 3  •  cholecalciferol (VITAMIN D3) 25 MCG (1000 UT) tablet, Take 1 tablet by mouth Daily., Disp: , Rfl:   •  dilTIAZem (CARDIZEM) 120 MG tablet, Take 1 tablet by mouth 2 (Two) Times a Day., Disp: , Rfl:   •  Dulaglutide (Trulicity) 0.75 MG/0.5ML solution pen-injector, INJECT 0.75MG (1 PEN) UNDER THE SKIN EVERY WEEK, Disp: 12 mL, Rfl: 3  •  furosemide (LASIX) 20 MG tablet, Take 1 tablet by mouth Daily., Disp: 90 tablet, Rfl: 1  •  metFORMIN (GLUCOPHAGE) 500 MG tablet, Take 1 tablet by mouth 2 (Two) Times a Day With Meals., Disp: , Rfl:   •  metoprolol tartrate (LOPRESSOR) 25 MG tablet, Take 1 tablet by mouth 2 (Two) Times a Day., Disp: , Rfl:   •  O2 (OXYGEN), Inhale 2  L/min. 24 -7, Disp: , Rfl:   •  omeprazole (priLOSEC) 20 MG capsule, TAKE 1 CAPSULE BY MOUTH EVERY DAY, Disp: 90 capsule, Rfl: 2  •  sildenafil (Revatio) 20 MG tablet, Take 0.5 tablets by mouth 3 (Three) Times a Day., Disp: 45 tablet, Rfl: 3  •  spironolactone (ALDACTONE) 25 MG tablet, TAKE 1 TABLET BY MOUTH EVERY DAY, Disp: 90 tablet, Rfl: 1    Objective  Vital Sign Review:   Vitals:    03/31/25 1022   BP: 113/76   Pulse: 66   SpO2: 93%       Body mass index is 40.26 kg/m².      03/31/25  1022   Weight: 103 kg (227 lb 3.2 oz)       Physical Exam:  Constitutional:       Appearance: Normal appearance. Chronically ill-appearing.   Neck:      Vascular: No carotid bruit or JVD. JVD normal.   Pulmonary:      Effort: Pulmonary effort is normal.      Breath sounds: Normal breath sounds.   Cardiovascular:      PMI at left midclavicular line. Normal rate. Regular rhythm.   Pulses:     Intact distal pulses.   Edema:     Peripheral edema absent.   Abdominal:      Palpations: Abdomen is soft.      Tenderness: There is no abdominal tenderness.   Skin:     General: Skin is warm and dry.   Neurological:      General: No focal deficit present.      Mental Status: Alert and oriented to person, place and time.   Psychiatric:         Behavior: Behavior is cooperative.          DATA REVIEWED:   EKG:      ---------------------------------------------------  ECHO:    Results for orders placed during the hospital encounter of 12/10/24    Adult Transthoracic Echo Complete W/ Cont if Necessary Per Protocol    Interpretation Summary  •  Left ventricular systolic function is low normal. Calculated left ventricular EF = 50.5%. Left ventricular ejection fraction appears to be 46 - 50%.  •  Left ventricular diastolic function is consistent with (grade I) impaired relaxation.  •  Estimated right ventricular systolic pressure from tricuspid regurgitation is mildly elevated (35-45 mmHg).  •  Global longitudinal LV strain (GLS) =  -18.4%.          -----------------------------------------------------  RHC/LHC    Results for orders placed during the hospital encounter of 22    Cardiac Catheterization/Vascular Study    Conclusion  [facility]  CARDIAC CATHETERIZATION PROCEDURE REPORT    Patient: Sally Rivera  : 1950  MRN: [id number]    Procedure Date:  22    Referring Physician:  Cory Christensen MD    Interventional Cardiologist:  Waqar Montoya MD    Indication:  Pulmonary hypertension      Clinical Presentation:  72-year-old lady past medical history hypertension, hyperlipidemia, recent diagnosis of pulmonary interstitial disease, found to have elevated RVSP on echocardiogram, referred for right heart catheterization for further evaluation pulmonary hypertension.    Procedure performed:  Diagnostic Right Heart Catheterization    Access Sites:  right basilic vein    Findings:  1. Hemodynamics:  Right Atrium: Mean of 12 mmHg  Right Ventricle: 43/11 mmHg  Pulmonary Artery: 56/21/31 mmHg  Pulmonary Wedge: Mean of 16 mmHg  Cardiac Output/Index: 3.91 L/min 1.84 L/min/m2  PA Sat: 65%  AO Sat: 92%  PVR: 3.9 Wood units      Conclusions:  Mild precapillary hypertension (pulmonary wedge right at the cutoff of 15)      Recommendations:  Continued follow-up with pulmonology and cardiology for further evaluation and care.      Procedure Status:  Elective    Procedure Details:  Informed consent was obtained with an explanation of the risk and benefits of the procedure. The patient was brought to the Cardiac Catheterization Laboratory and was prepped and draped in a standard sterile fashion. Lidocaine 2% was used to anesthetize the right antecubital fossa. An existing IV placed by the circulating nurse was exchange over a wire for a 5 Bulgarian sheath.    A 5 Bulgarian PA catheter was then advanced into the heart and out into the pulmonary artery. Hemodynamics were obtained in the pulmonary wedge, pulmonary artery, right  ventricle, and right atrium positions.  Saturations were obtained from the pulmonary artery. The venous sheath was removed and hemostasis achieved with manual pressure.    Patient tolerated the procedure well without any complications, and transferred to the post procedure area for recovery in a stable condition.      Complications:  None.    Estimated Blood Loss:  Minimal.    Wqaar Montoya MD  [facility]  11/21/22  09:43 EST      ---------------------------------------------------------------------------  CXR/Imaging:     Imaging Results (Most Recent)       None                -----------------------------------------------------------------------------  CT:   No radiology results for the last 30 days.        --------------------------------------------------------------------------------------------------------------    Laboratory evaluations:  Renal Function: CrCl cannot be calculated (Patient's most recent lab result is older than the maximum 30 days allowed.).    Lab Results   Component Value Date    GLUCOSE 151 (H) 12/17/2024    BUN 16 12/17/2024    CREATININE 1.03 (H) 12/17/2024    EGFRIFNONA 58 (L) 11/09/2021    EGFRIFAFRI 67 11/09/2021    BCR 15.5 12/17/2024    K 4.5 12/17/2024    CO2 25.2 12/17/2024    CALCIUM 9.6 12/17/2024    ALBUMIN 4.5 08/17/2024    AST 21 08/17/2024    ALT 17 08/17/2024     Lab Results   Component Value Date    WBC 6.21 08/19/2024    HGB 10.9 (L) 08/19/2024    HCT 33.8 (L) 08/19/2024    MCV 77.7 (L) 08/19/2024     08/19/2024     Lab Results   Component Value Date    HGBA1C 6.6 (H) 07/24/2023     Lab Results   Component Value Date    HGBA1C 6.6 (H) 07/24/2023    HGBA1C 6.70 (H) 04/03/2023    HGBA1C 7.70 (H) 11/23/2022     Lab Results   Component Value Date    MICROALBUR <3.0 04/03/2023    CREATININE 1.03 (H) 12/17/2024     Lab Results   Component Value Date    TIBC 583 06/02/2020    FERRITIN 68 08/03/2020       Result Review:  I have personally reviewed the results  from the time of this admission to 3/31/2025 10:58 EDT and agree with these findings:  [x]  Laboratory list / accordion  []  Microbiology  [x]  Radiology  [x]  EKG/Telemetry   [x]  Cardiology/Vascular   []  Pathology  [x]  Old records  []  Other:             ReDS VOLUMETRIC ASSESSMENT:  ReDs Vest    Performed by: Jovanna Proctor APRN  Authorized by: Jovanna Proctor APRN    ReDS value:  37  ReDS Value Description:  36-41 (orange) = Possible Hypervolemic Status        Assessment & Plan     1. Pulmonary hypertension    2. Chronic heart failure with preserved ejection fraction (HFpEF)    3. Abnormality of right ventricle of heart      Pulmonary Hypertension--WHO-group-1, 2, 3.  Ipc-PH.  Restrictive PFTs with decreased DLCO.  On LTOT.  VQ scan showed no evidence of CTEPH.  Had sleep study that showed no significant sleep apnea.  Marginal hemodynamics but tolerating low-dose Revatio 10 mg 3 times daily--refill sent today  Continue HFpEF GDMT    2-3.  HFmrEF-right ventricular dysfunction--patient is euvolemic today--2D TTE 12/10/2024 showed 46-50 %  Continue spironolactone 25 mg twice daily.  No SGLT2 as patient has had recurrent vaginal Candida infections  Patient is not on HF specific beta-blocker.  She is stable, symptoms are stable, therefore we will continue current GDMT       NYHA stage C FC-III     Clinical status was assessed and has remained stable.  Treatment intent: curative   ReDS reading was obtained today.  ReDS result: 37       Today, Patient appears euvolemic. and with perfusion. The patient's hemodynamics are currently acceptable. HR is: normal and is at goal. BP/MAP was reviewed and there is room for medication up-titration.  LVEF: 46-50 %.     GDMT Assessment:   GDMT changes recommended today: No changes to medication therapy.      BB:   continue Metoprolol tartrate  25mg bid  Monitor heart rate.  Please call the HF for HR<50, dizziness, lightheadedness, syncope    A/A/A:   Previous issues  with marginal hemodynamics, blood pressure stable at this time and runs on the lower end of normal, possible future addition if needed    [id number]:  Unable to take due to vaginal candida infections    MRA:  The patient is FC-NYHA Class III and MRA is indicated.   continue Spironolactone  25mg daily  Recommended biannual GFR/electrolyte assessments---getting labs on 4/21/2025 with PCP visit--we can follow- those labs.       Diuretic Regimen:  -DIURETIC:   furosemide (LASIX) 20 mg every day  -Fluid restriction:   -requested  -2000 ml  -Patient has been asked to weigh daily and was provided with a printed diuretic strategy.  -Sodium restriction:   -1,500 mg Na restriction was discussed.      Labs/Diagnostics/Referrals:    Labs -Chem-7       Lifestyle Advice:   - call office if I gain more than 2 pounds in one day or 5 pounds in one week   - keep legs up while sitting   - use salt in moderation   - watch for swelling in feet, ankles and legs every day   - weigh myself daily   -call for concerning s/sx   -- activity or exercise based on tolerance encouraged     CODE STATUS: FULL              Return in about 3 months (around 6/30/2025).                Thank you for allowing me to participate in the care of your patient,         Jovanna Proctor, APRJÚNIOR  [facility]  03/31/25  08:23 EDT      **Jyoti Disclaimer:**  Much of this encounter note is an electronic transcription/translation of spoken language to printed text. The electronic translation of spoken language may permit erroneous, or at times, nonsensical words or phrases to be inadvertently transcribed. Although I have reviewed the note for such errors, some may still exist.    The information in this note was reviewed and updated during the visit to be as accurate as possible. As many patients have chronic medical problems, many of their individual problems and ongoing plans do not change significantly from visit to visit.  This information is correct  and is consistent with my treatment plan as of today's visit.

## 2025-04-07 RX ORDER — SPIRONOLACTONE 25 MG/1
25 TABLET ORAL DAILY
Qty: 90 TABLET | Refills: 0 | Status: SHIPPED | OUTPATIENT
Start: 2025-04-07

## 2025-04-20 ENCOUNTER — HOSPITAL ENCOUNTER (INPATIENT)
Facility: HOSPITAL | Age: 75
LOS: 4 days | Discharge: HOME | DRG: 871 | End: 2025-04-24
Attending: EMERGENCY MEDICINE | Admitting: INTERNAL MEDICINE
Payer: MEDICARE

## 2025-04-20 ENCOUNTER — APPOINTMENT (OUTPATIENT)
Dept: GENERAL RADIOLOGY | Facility: HOSPITAL | Age: 75
DRG: 871 | End: 2025-04-20
Payer: MEDICARE

## 2025-04-20 DIAGNOSIS — J18.9 PNEUMONIA OF RIGHT MIDDLE LOBE DUE TO INFECTIOUS ORGANISM: Primary | ICD-10-CM

## 2025-04-20 DIAGNOSIS — R09.02 HYPOXIA: ICD-10-CM

## 2025-04-20 LAB
ALBUMIN SERPL-MCNC: 4.1 G/DL (ref 3.5–5.2)
ALBUMIN/GLOB SERPL: 1.1 G/DL
ALP SERPL-CCNC: 75 U/L (ref 39–117)
ALT SERPL W P-5'-P-CCNC: 16 U/L (ref 1–33)
ANION GAP SERPL CALCULATED.3IONS-SCNC: 19 MMOL/L (ref 5–15)
AST SERPL-CCNC: 26 U/L (ref 1–32)
B PARAPERT DNA SPEC QL NAA+PROBE: NOT DETECTED
B PERT DNA SPEC QL NAA+PROBE: NOT DETECTED
BASOPHILS # BLD MANUAL: 0 10*3/MM3 (ref 0–0.2)
BASOPHILS NFR BLD MANUAL: 0 % (ref 0–1.5)
BILIRUB SERPL-MCNC: 1.1 MG/DL (ref 0–1.2)
BUN SERPL-MCNC: 12 MG/DL (ref 8–23)
BUN/CREAT SERPL: 10.3 (ref 7–25)
C PNEUM DNA NPH QL NAA+NON-PROBE: NOT DETECTED
CALCIUM SPEC-SCNC: 8.6 MG/DL (ref 8.6–10.5)
CHLORIDE SERPL-SCNC: 95 MMOL/L (ref 98–107)
CO2 SERPL-SCNC: 19 MMOL/L (ref 22–29)
CREAT SERPL-MCNC: 1.16 MG/DL (ref 0.57–1)
D-LACTATE SERPL-SCNC: 2.5 MMOL/L (ref 0.5–2)
DEPRECATED RDW RBC AUTO: 43.4 FL (ref 37–54)
EGFRCR SERPLBLD CKD-EPI 2021: 49.6 ML/MIN/1.73
EOSINOPHIL # BLD MANUAL: 0 10*3/MM3 (ref 0–0.4)
EOSINOPHIL NFR BLD MANUAL: 0 % (ref 0.3–6.2)
ERYTHROCYTE [DISTWIDTH] IN BLOOD BY AUTOMATED COUNT: 16.6 % (ref 12.3–15.4)
FLUAV SUBTYP SPEC NAA+PROBE: NOT DETECTED
FLUBV RNA ISLT QL NAA+PROBE: NOT DETECTED
GEN 5 1HR TROPONIN T REFLEX: 14 NG/L
GLOBULIN UR ELPH-MCNC: 3.9 GM/DL
GLUCOSE BLDC GLUCOMTR-MCNC: 263 MG/DL (ref 70–130)
GLUCOSE SERPL-MCNC: 259 MG/DL (ref 65–99)
HADV DNA SPEC NAA+PROBE: NOT DETECTED
HCOV 229E RNA SPEC QL NAA+PROBE: NOT DETECTED
HCOV HKU1 RNA SPEC QL NAA+PROBE: NOT DETECTED
HCOV NL63 RNA SPEC QL NAA+PROBE: NOT DETECTED
HCOV OC43 RNA SPEC QL NAA+PROBE: NOT DETECTED
HCT VFR BLD AUTO: 35.3 % (ref 34–46.6)
HGB BLD-MCNC: 10.6 G/DL (ref 12–15.9)
HMPV RNA NPH QL NAA+NON-PROBE: NOT DETECTED
HOLD SPECIMEN: NORMAL
HOLD SPECIMEN: NORMAL
HPIV1 RNA ISLT QL NAA+PROBE: NOT DETECTED
HPIV2 RNA SPEC QL NAA+PROBE: NOT DETECTED
HPIV3 RNA NPH QL NAA+PROBE: NOT DETECTED
HPIV4 P GENE NPH QL NAA+PROBE: NOT DETECTED
LIPASE SERPL-CCNC: 13 U/L (ref 13–60)
LYMPHOCYTES # BLD MANUAL: 1.01 10*3/MM3 (ref 0.7–3.1)
LYMPHOCYTES NFR BLD MANUAL: 3 % (ref 5–12)
M PNEUMO IGG SER IA-ACNC: NOT DETECTED
MCH RBC QN AUTO: 22 PG (ref 26.6–33)
MCHC RBC AUTO-ENTMCNC: 30 G/DL (ref 31.5–35.7)
MCV RBC AUTO: 73.2 FL (ref 79–97)
MONOCYTES # BLD: 0.76 10*3/MM3 (ref 0.1–0.9)
NEUTROPHILS # BLD AUTO: 23.51 10*3/MM3 (ref 1.7–7)
NEUTROPHILS NFR BLD MANUAL: 92.9 % (ref 42.7–76)
NRBC BLD AUTO-RTO: 0 /100 WBC (ref 0–0.2)
NT-PROBNP SERPL-MCNC: 1616 PG/ML (ref 0–900)
PLAT MORPH BLD: NORMAL
PLATELET # BLD AUTO: 213 10*3/MM3 (ref 140–450)
PMV BLD AUTO: 9.5 FL (ref 6–12)
POTASSIUM SERPL-SCNC: 3.8 MMOL/L (ref 3.5–5.2)
PROCALCITONIN SERPL-MCNC: 0.38 NG/ML (ref 0–0.25)
PROT SERPL-MCNC: 8 G/DL (ref 6–8.5)
RBC # BLD AUTO: 4.82 10*6/MM3 (ref 3.77–5.28)
RBC MORPH BLD: NORMAL
RHINOVIRUS RNA SPEC NAA+PROBE: NOT DETECTED
RSV RNA NPH QL NAA+NON-PROBE: NOT DETECTED
SARS-COV-2 RNA NPH QL NAA+NON-PROBE: NOT DETECTED
SODIUM SERPL-SCNC: 133 MMOL/L (ref 136–145)
TROPONIN T % DELTA: -22
TROPONIN T NUMERIC DELTA: -4 NG/L
TROPONIN T SERPL HS-MCNC: 18 NG/L
VARIANT LYMPHS NFR BLD MANUAL: 4 % (ref 19.6–45.3)
WBC MORPH BLD: NORMAL
WBC NRBC COR # BLD AUTO: 25.31 10*3/MM3 (ref 3.4–10.8)
WHOLE BLOOD HOLD COAG: NORMAL
WHOLE BLOOD HOLD SPECIMEN: NORMAL

## 2025-04-20 PROCEDURE — 36415 COLL VENOUS BLD VENIPUNCTURE: CPT

## 2025-04-20 PROCEDURE — 36415 COLL VENOUS BLD VENIPUNCTURE: CPT | Performed by: EMERGENCY MEDICINE

## 2025-04-20 PROCEDURE — 84145 PROCALCITONIN (PCT): CPT | Performed by: NURSE PRACTITIONER

## 2025-04-20 PROCEDURE — 0202U NFCT DS 22 TRGT SARS-COV-2: CPT | Performed by: EMERGENCY MEDICINE

## 2025-04-20 PROCEDURE — 83605 ASSAY OF LACTIC ACID: CPT | Performed by: EMERGENCY MEDICINE

## 2025-04-20 PROCEDURE — 84484 ASSAY OF TROPONIN QUANT: CPT | Performed by: EMERGENCY MEDICINE

## 2025-04-20 PROCEDURE — 87040 BLOOD CULTURE FOR BACTERIA: CPT | Performed by: EMERGENCY MEDICINE

## 2025-04-20 PROCEDURE — 25010000002 ONDANSETRON PER 1 MG: Performed by: NURSE PRACTITIONER

## 2025-04-20 PROCEDURE — 85007 BL SMEAR W/DIFF WBC COUNT: CPT | Performed by: EMERGENCY MEDICINE

## 2025-04-20 PROCEDURE — 25010000002 FUROSEMIDE PER 20 MG: Performed by: EMERGENCY MEDICINE

## 2025-04-20 PROCEDURE — 71045 X-RAY EXAM CHEST 1 VIEW: CPT

## 2025-04-20 PROCEDURE — 85025 COMPLETE CBC W/AUTO DIFF WBC: CPT | Performed by: EMERGENCY MEDICINE

## 2025-04-20 PROCEDURE — 82948 REAGENT STRIP/BLOOD GLUCOSE: CPT

## 2025-04-20 PROCEDURE — 83880 ASSAY OF NATRIURETIC PEPTIDE: CPT | Performed by: EMERGENCY MEDICINE

## 2025-04-20 PROCEDURE — 93010 ELECTROCARDIOGRAM REPORT: CPT | Performed by: INTERNAL MEDICINE

## 2025-04-20 PROCEDURE — 63710000001 INSULIN LISPRO (HUMAN) PER 5 UNITS: Performed by: NURSE PRACTITIONER

## 2025-04-20 PROCEDURE — 93005 ELECTROCARDIOGRAM TRACING: CPT | Performed by: EMERGENCY MEDICINE

## 2025-04-20 PROCEDURE — 83690 ASSAY OF LIPASE: CPT | Performed by: EMERGENCY MEDICINE

## 2025-04-20 PROCEDURE — 80053 COMPREHEN METABOLIC PANEL: CPT | Performed by: EMERGENCY MEDICINE

## 2025-04-20 PROCEDURE — 25010000002 CEFTRIAXONE PER 250 MG: Performed by: EMERGENCY MEDICINE

## 2025-04-20 PROCEDURE — 99285 EMERGENCY DEPT VISIT HI MDM: CPT

## 2025-04-20 RX ORDER — ACETAMINOPHEN 160 MG/5ML
650 SOLUTION ORAL EVERY 4 HOURS PRN
Status: DISCONTINUED | OUTPATIENT
Start: 2025-04-20 | End: 2025-04-24 | Stop reason: HOSPADM

## 2025-04-20 RX ORDER — BISACODYL 5 MG/1
5 TABLET, DELAYED RELEASE ORAL DAILY PRN
Status: DISCONTINUED | OUTPATIENT
Start: 2025-04-20 | End: 2025-04-24 | Stop reason: HOSPADM

## 2025-04-20 RX ORDER — AMOXICILLIN 250 MG
2 CAPSULE ORAL 2 TIMES DAILY PRN
Status: DISCONTINUED | OUTPATIENT
Start: 2025-04-20 | End: 2025-04-24 | Stop reason: HOSPADM

## 2025-04-20 RX ORDER — DEXTROSE MONOHYDRATE 25 G/50ML
25 INJECTION, SOLUTION INTRAVENOUS
Status: DISCONTINUED | OUTPATIENT
Start: 2025-04-20 | End: 2025-04-24 | Stop reason: HOSPADM

## 2025-04-20 RX ORDER — ONDANSETRON 2 MG/ML
4 INJECTION INTRAMUSCULAR; INTRAVENOUS EVERY 6 HOURS PRN
Status: DISCONTINUED | OUTPATIENT
Start: 2025-04-20 | End: 2025-04-24 | Stop reason: HOSPADM

## 2025-04-20 RX ORDER — ALUMINA, MAGNESIA, AND SIMETHICONE 2400; 2400; 240 MG/30ML; MG/30ML; MG/30ML
15 SUSPENSION ORAL EVERY 6 HOURS PRN
Status: DISCONTINUED | OUTPATIENT
Start: 2025-04-20 | End: 2025-04-24 | Stop reason: HOSPADM

## 2025-04-20 RX ORDER — FUROSEMIDE 10 MG/ML
40 INJECTION INTRAMUSCULAR; INTRAVENOUS ONCE
Status: COMPLETED | OUTPATIENT
Start: 2025-04-20 | End: 2025-04-20

## 2025-04-20 RX ORDER — ACETAMINOPHEN 325 MG/1
650 TABLET ORAL EVERY 4 HOURS PRN
Status: DISCONTINUED | OUTPATIENT
Start: 2025-04-20 | End: 2025-04-24 | Stop reason: HOSPADM

## 2025-04-20 RX ORDER — SODIUM CHLORIDE 0.9 % (FLUSH) 0.9 %
10 SYRINGE (ML) INJECTION AS NEEDED
Status: DISCONTINUED | OUTPATIENT
Start: 2025-04-20 | End: 2025-04-24 | Stop reason: HOSPADM

## 2025-04-20 RX ORDER — ACETAMINOPHEN 650 MG/1
650 SUPPOSITORY RECTAL EVERY 4 HOURS PRN
Status: DISCONTINUED | OUTPATIENT
Start: 2025-04-20 | End: 2025-04-24 | Stop reason: HOSPADM

## 2025-04-20 RX ORDER — NITROGLYCERIN 0.4 MG/1
0.4 TABLET SUBLINGUAL
Status: DISCONTINUED | OUTPATIENT
Start: 2025-04-20 | End: 2025-04-23

## 2025-04-20 RX ORDER — IBUPROFEN 600 MG/1
1 TABLET ORAL
Status: DISCONTINUED | OUTPATIENT
Start: 2025-04-20 | End: 2025-04-24 | Stop reason: HOSPADM

## 2025-04-20 RX ORDER — ONDANSETRON 4 MG/1
4 TABLET, ORALLY DISINTEGRATING ORAL EVERY 6 HOURS PRN
Status: DISCONTINUED | OUTPATIENT
Start: 2025-04-20 | End: 2025-04-24 | Stop reason: HOSPADM

## 2025-04-20 RX ORDER — POLYETHYLENE GLYCOL 3350 17 G/17G
17 POWDER, FOR SOLUTION ORAL DAILY PRN
Status: DISCONTINUED | OUTPATIENT
Start: 2025-04-20 | End: 2025-04-24 | Stop reason: HOSPADM

## 2025-04-20 RX ORDER — NICOTINE POLACRILEX 4 MG
15 LOZENGE BUCCAL
Status: DISCONTINUED | OUTPATIENT
Start: 2025-04-20 | End: 2025-04-24 | Stop reason: HOSPADM

## 2025-04-20 RX ORDER — BISACODYL 10 MG
10 SUPPOSITORY, RECTAL RECTAL DAILY PRN
Status: DISCONTINUED | OUTPATIENT
Start: 2025-04-20 | End: 2025-04-24 | Stop reason: HOSPADM

## 2025-04-20 RX ORDER — INSULIN LISPRO 100 [IU]/ML
2-9 INJECTION, SOLUTION INTRAVENOUS; SUBCUTANEOUS
Status: DISCONTINUED | OUTPATIENT
Start: 2025-04-20 | End: 2025-04-24 | Stop reason: HOSPADM

## 2025-04-20 RX ADMIN — ONDANSETRON 4 MG: 2 INJECTION, SOLUTION INTRAMUSCULAR; INTRAVENOUS at 22:35

## 2025-04-20 RX ADMIN — INSULIN LISPRO 6 UNITS: 100 INJECTION, SOLUTION INTRAVENOUS; SUBCUTANEOUS at 22:49

## 2025-04-20 RX ADMIN — FUROSEMIDE 40 MG: 10 INJECTION, SOLUTION INTRAMUSCULAR; INTRAVENOUS at 19:07

## 2025-04-20 RX ADMIN — CEFTRIAXONE 2000 MG: 2 INJECTION, POWDER, FOR SOLUTION INTRAMUSCULAR; INTRAVENOUS at 19:23

## 2025-04-20 NOTE — ED NOTES
"Nursing report ED to floor  Sally Rivera  75 y.o.  female    HPI :  HPI  Stated Reason for Visit: Pt from home with c/o SOA with N/V since this morning    Chief Complaint  Chief Complaint   Patient presents with    Shortness of Breath    Vomiting    Nausea       Admitting doctor:   Jayleen Hall MD    Admitting diagnosis:   The primary encounter diagnosis was Pneumonia of right middle lobe due to infectious organism. A diagnosis of Hypoxia was also pertinent to this visit.    Code status:   Current Code Status       Date Active Code Status Order ID Comments User Context       4/20/2025 1911 CPR (Attempt to Resuscitate) [id number]  Kirill Mendoza, [facility]        Question Answer    Code Status (Patient has no pulse and is not breathing) CPR (Attempt to Resuscitate)    Medical Interventions (Patient has pulse or is breathing) Full Support                    Allergies:   Patient has no known allergies.    Isolation:   Enhanced Droplet/Contact     Intake and Output  No intake or output data in the 24 hours ending 04/20/25 1917    Weight:       04/20/25 1911   Weight: 102 kg (225 lb)       Most recent vitals:   Vitals:    04/20/25 1719 04/20/25 1753 04/20/25 1907 04/20/25 1911   BP: 138/92  133/73    BP Location: Right arm      Patient Position: Sitting      Pulse: 112  115    Resp: 22      Temp: 100.1 °F (37.8 °C)      TempSrc: Tympanic      SpO2: (!) 89% 94%     Weight:    102 kg (225 lb)   Height:    162.6 cm (64\")       Active LDAs/IV Access:   Lines, Drains & Airways       Active LDAs       Name Placement date Placement time Site Days    Peripheral IV 04/20/25 1754 20 G Anterior;Left Forearm 04/20/25  1754  Forearm  less than 1                    Labs (abnormal labs have a star):   Labs Reviewed   COMPREHENSIVE METABOLIC PANEL - Abnormal; Notable for the following components:       Result Value    Glucose 259 (*)     Creatinine 1.16 (*)     Sodium 133 (*)     Chloride 95 (*)     CO2 19.0 (*)     " Anion Gap 19.0 (*)     eGFR 49.6 (*)     All other components within normal limits    Narrative:     GFR Categories in Chronic Kidney Disease (CKD)      GFR Category          GFR (mL/min/1.73)    Interpretation  G1                     90 or greater         Normal or high (1)  G2                      60-89                Mild decrease (1)  G3a                   45-59                Mild to moderate decrease  G3b                   30-44                Moderate to severe decrease  G4                    15-29                Severe decrease  G5                    14 or less           Kidney failure          (1)In the absence of evidence of kidney disease, neither GFR category G1 or G2 fulfill the criteria for CKD.    eGFR calculation 2021 CKD-EPI creatinine equation, which does not include race as a factor   BNP (IN-HOUSE) - Abnormal; Notable for the following components:    proBNP 1,616.0 (*)     All other components within normal limits    Narrative:     This assay is used as an aid in the diagnosis of individuals suspected of having heart failure. It can be used as an aid in the diagnosis of acute decompensated heart failure (ADHF) in patients presenting with signs and symptoms of ADHF to the emergency department (ED). In addition, NT-proBNP of <300 pg/mL indicates ADHF is not likely.    Age Range Result Interpretation  NT-proBNP Concentration (pg/mL:      <50             Positive            >450                   Gray                 300-450                    Negative             <300    50-75           Positive            >900                  Gray                300-900                  Negative            <300      >75             Positive            >1800                  Gray                300-1800                  Negative            <300   TROPONIN - Abnormal; Notable for the following components:    HS Troponin T 18 (*)     All other components within normal limits    Narrative:     High Sensitive Troponin  T Reference Range:  <14.0 ng/L- Negative Female for AMI  <22.0 ng/L- Negative Male for AMI  >=14 - Abnormal Female indicating possible myocardial injury.  >=22 - Abnormal Male indicating possible myocardial injury.   Clinicians would have to utilize clinical acumen, EKG, Troponin, and serial changes to determine if it is an Acute Myocardial Infarction or myocardial injury due to an underlying chronic condition.        CBC WITH AUTO DIFFERENTIAL - Abnormal; Notable for the following components:    WBC 25.31 (*)     Hemoglobin 10.6 (*)     MCV 73.2 (*)     MCH 22.0 (*)     MCHC 30.0 (*)     RDW 16.6 (*)     All other components within normal limits   LIPASE - Normal   RESPIRATORY PANEL PCR W/ COVID-19 (SARS-COV-2), NP SWAB IN UTM/VTP, 2 HR TAT   BLOOD CULTURE   BLOOD CULTURE   RAINBOW DRAW    Narrative:     The following orders were created for panel order [place] Draw.  Procedure                               Abnormality         Status                     ---------                               -----------         ------                     Green Top (Gel)[[id number]]                                  Final result               Lavender Top[[id number]]                                     Final result               Gold Top - SST[[id number]]                                   Final result               Light Blue Top[[id number]]                                   Final result                 Please view results for these tests on the individual orders.   MANUAL DIFFERENTIAL   LACTIC ACID, PLASMA   HIGH SENSITIVITIY TROPONIN T 1HR   CBC AND DIFFERENTIAL    Narrative:     The following orders were created for panel order CBC & Differential.  Procedure                               Abnormality         Status                     ---------                               -----------         ------                     CBC Auto Differential[[id number]]        Abnormal            Final result                 Please view results for  these tests on the individual orders.   GREEN TOP   LAVENDER TOP   GOLD TOP - SST   LIGHT BLUE TOP       EKG:   ECG 12 Lead ED Triage Standing Order; SOA   Preliminary Result   HEART CYQB=957  bpm   RR Lfdiwsiy=999  ms   NH Pdfblyxq=855  ms   P Horizontal Axis=27  deg   P Front Axis=64  deg   QRSD Interval=92  ms   QT Kxlyeakt=945  ms   GLlG=161  ms   QRS Axis=20  deg   T Wave Axis=126  deg   - ABNORMAL ECG -   Sinus tachycardia   Ventricular premature complex   Repol abnrm suggests ischemia, lateral leads   Date and Time of Study:2025-04-20 17:36:39          Meds given in ED:   Medications   sodium chloride 0.9 % flush 10 mL (has no administration in time range)   cefTRIAXone (ROCEPHIN) 2,000 mg in sodium chloride 0.9 % 100 mL MBP (has no administration in time range)   sodium chloride 0.9 % flush 10 mL (has no administration in time range)   nitroglycerin (NITROSTAT) SL tablet 0.4 mg (has no administration in time range)   acetaminophen (TYLENOL) tablet 650 mg (has no administration in time range)     Or   acetaminophen (TYLENOL) 160 MG/5ML oral solution 650 mg (has no administration in time range)     Or   acetaminophen (TYLENOL) suppository 650 mg (has no administration in time range)   sennosides-docusate (PERICOLACE) 8.6-50 MG per tablet 2 tablet (has no administration in time range)     And   polyethylene glycol (MIRALAX) packet 17 g (has no administration in time range)     And   bisacodyl (DULCOLAX) EC tablet 5 mg (has no administration in time range)     And   bisacodyl (DULCOLAX) suppository 10 mg (has no administration in time range)   melatonin tablet 5 mg (has no administration in time range)   ondansetron ODT (ZOFRAN-ODT) disintegrating tablet 4 mg (has no administration in time range)     Or   ondansetron (ZOFRAN) injection 4 mg (has no administration in time range)   aluminum-magnesium hydroxide-simethicone (MAALOX MAX) 400-400-40 MG/5ML suspension 15 mL (has no administration in time range)    furosemide (LASIX) injection 40 mg (40 mg Intravenous Given 25 748)       Imaging results:  XR Chest 1 View  Result Date: 2025   The heart size is within normal limits.  The lungs are normally aerated. There is no pleural effusion or pneumothorax.  Mild left pulmonary interstitial prominence.  Dense right perihilar fullness, suspect right lower lobe consolidation. Consider chest CT for further evaluation.  This report was finalized on 2025 6:25 PM by Dr. Richie Brewer M.D on Workstation: [id number]        Ambulatory status:   - assist    Social issues:   Social History     Socioeconomic History    Marital status:    Tobacco Use    Smoking status: Former     Current packs/day: 0.00     Average packs/day: 1 pack/day for 20.0 years (20.0 ttl pk-yrs)     Types: Cigarettes     Start date: 10/1/1970     Quit date: 1990     Years since quittin.2     Passive exposure: Past    Smokeless tobacco: Never   Vaping Use    Vaping status: Never Used   Substance and Sexual Activity    Alcohol use: No    Drug use: No    Sexual activity: Not Currently     Partners: Male     Birth control/protection: Tubal ligation       Peripheral Neurovascular  Peripheral Neurovascular (Adult)  Peripheral Neurovascular WDL: WDL    Neuro Cognitive  Neuro Cognitive (Adult)  Cognitive/Neuro/Behavioral WDL: WDL    Learning  Learning Assessment  Learning Readiness and Ability: no barriers identified    Respiratory  Respiratory WDL  Respiratory WDL: .WDL except  Breath Sounds  All Lung Fields Breath Sounds: All Fields  All Lung Fields Breath Sounds: Posterior:, coarse    Abdominal Pain       Pain Assessments  Pain (Adult)  (0-10) Pain Rating: Rest: 0  (0-10) Pain Rating: Activity: 0    NIH Stroke Scale       Maria Dolores Brown RN  25 19:17 EDT

## 2025-04-21 PROBLEM — L02.419 AXILLARY ABSCESS: Status: RESOLVED | Noted: 2024-08-17 | Resolved: 2025-04-21

## 2025-04-21 PROBLEM — A49.9 BACTERIAL INFECTION: Status: RESOLVED | Noted: 2024-08-19 | Resolved: 2025-04-21

## 2025-04-21 PROBLEM — J96.21 ACUTE ON CHRONIC RESPIRATORY FAILURE WITH HYPOXIA: Status: ACTIVE | Noted: 2024-08-20

## 2025-04-21 PROBLEM — A41.9 SEPSIS: Status: ACTIVE | Noted: 2025-04-21

## 2025-04-21 PROBLEM — U07.1 CLINICAL DIAGNOSIS OF COVID-19: Status: RESOLVED | Noted: 2021-09-17 | Resolved: 2025-04-21

## 2025-04-21 PROBLEM — Z71.89 ADVANCE CARE PLANNING: Status: RESOLVED | Noted: 2024-03-19 | Resolved: 2025-04-21

## 2025-04-21 LAB
ALBUMIN SERPL-MCNC: 3.5 G/DL (ref 3.5–5.2)
ALBUMIN/GLOB SERPL: 0.9 G/DL
ALP SERPL-CCNC: 71 U/L (ref 39–117)
ALT SERPL W P-5'-P-CCNC: 14 U/L (ref 1–33)
ANION GAP SERPL CALCULATED.3IONS-SCNC: 17.3 MMOL/L (ref 5–15)
ARTERIAL PATENCY WRIST A: ABNORMAL
AST SERPL-CCNC: 20 U/L (ref 1–32)
ATMOSPHERIC PRESS: 748 MMHG
BASE EXCESS BLDA CALC-SCNC: 1.3 MMOL/L (ref 0–2)
BASOPHILS # BLD AUTO: 0.07 10*3/MM3 (ref 0–0.2)
BASOPHILS NFR BLD AUTO: 0.2 % (ref 0–1.5)
BDY SITE: ABNORMAL
BILIRUB SERPL-MCNC: 1 MG/DL (ref 0–1.2)
BUN SERPL-MCNC: 15 MG/DL (ref 8–23)
BUN/CREAT SERPL: 13.6 (ref 7–25)
CALCIUM SPEC-SCNC: 7.9 MG/DL (ref 8.6–10.5)
CHLORIDE SERPL-SCNC: 96 MMOL/L (ref 98–107)
CO2 SERPL-SCNC: 21.7 MMOL/L (ref 22–29)
CREAT SERPL-MCNC: 1.1 MG/DL (ref 0.57–1)
D-LACTATE SERPL-SCNC: 1.4 MMOL/L (ref 0.5–2)
D-LACTATE SERPL-SCNC: 2.1 MMOL/L (ref 0.5–2)
D-LACTATE SERPL-SCNC: 2.9 MMOL/L (ref 0.5–2)
DEPRECATED RDW RBC AUTO: 41.9 FL (ref 37–54)
DEVICE COMMENT: ABNORMAL
EGFRCR SERPLBLD CKD-EPI 2021: 52.8 ML/MIN/1.73
EOSINOPHIL # BLD AUTO: 0 10*3/MM3 (ref 0–0.4)
EOSINOPHIL NFR BLD AUTO: 0 % (ref 0.3–6.2)
ERYTHROCYTE [DISTWIDTH] IN BLOOD BY AUTOMATED COUNT: 16.9 % (ref 12.3–15.4)
GAS FLOW AIRWAY: 15 LPM
GLOBULIN UR ELPH-MCNC: 4 GM/DL
GLUCOSE BLDC GLUCOMTR-MCNC: 195 MG/DL (ref 70–130)
GLUCOSE BLDC GLUCOMTR-MCNC: 205 MG/DL (ref 70–130)
GLUCOSE BLDC GLUCOMTR-MCNC: 248 MG/DL (ref 70–130)
GLUCOSE BLDC GLUCOMTR-MCNC: 255 MG/DL (ref 70–130)
GLUCOSE SERPL-MCNC: 208 MG/DL (ref 65–99)
HBA1C MFR BLD: 8.1 % (ref 4.8–5.6)
HCO3 BLDA-SCNC: 24.3 MMOL/L (ref 22–28)
HCT VFR BLD AUTO: 32.4 % (ref 34–46.6)
HEMODILUTION: NO
HGB BLD-MCNC: 10 G/DL (ref 12–15.9)
IMM GRANULOCYTES # BLD AUTO: 0.63 10*3/MM3 (ref 0–0.05)
IMM GRANULOCYTES NFR BLD AUTO: 2.2 % (ref 0–0.5)
L PNEUMO1 AG UR QL IA: NEGATIVE
LYMPHOCYTES # BLD AUTO: 2.95 10*3/MM3 (ref 0.7–3.1)
LYMPHOCYTES NFR BLD AUTO: 10.4 % (ref 19.6–45.3)
MAGNESIUM SERPL-MCNC: 1.1 MG/DL (ref 1.6–2.4)
MCH RBC QN AUTO: 21.6 PG (ref 26.6–33)
MCHC RBC AUTO-ENTMCNC: 30.9 G/DL (ref 31.5–35.7)
MCV RBC AUTO: 70.1 FL (ref 79–97)
MODALITY: ABNORMAL
MONOCYTES # BLD AUTO: 1.81 10*3/MM3 (ref 0.1–0.9)
MONOCYTES NFR BLD AUTO: 6.4 % (ref 5–12)
MRSA DNA SPEC QL NAA+PROBE: NORMAL
NEUTROPHILS NFR BLD AUTO: 23.04 10*3/MM3 (ref 1.7–7)
NEUTROPHILS NFR BLD AUTO: 80.8 % (ref 42.7–76)
PCO2 BLDA: 32.9 MM HG (ref 35–45)
PH BLDA: 7.48 PH UNITS (ref 7.35–7.45)
PHOSPHATE SERPL-MCNC: 2.4 MG/DL (ref 2.5–4.5)
PLATELET # BLD AUTO: 220 10*3/MM3 (ref 140–450)
PMV BLD AUTO: 9.9 FL (ref 6–12)
PO2 BLDA: 72 MM HG (ref 80–100)
POTASSIUM SERPL-SCNC: 3.4 MMOL/L (ref 3.5–5.2)
PROT SERPL-MCNC: 7.5 G/DL (ref 6–8.5)
QT INTERVAL: 341 MS
QTC INTERVAL: 477 MS
RBC # BLD AUTO: 4.62 10*6/MM3 (ref 3.77–5.28)
S PNEUM AG SPEC QL LA: NEGATIVE
SAO2 % BLDCOA: 95.5 % (ref 92–98.5)
SET MECH RESP RATE: 26
SODIUM SERPL-SCNC: 135 MMOL/L (ref 136–145)
TOTAL RATE: 26 BREATHS/MINUTE
WBC NRBC COR # BLD AUTO: 28.5 10*3/MM3 (ref 3.4–10.8)

## 2025-04-21 PROCEDURE — 94761 N-INVAS EAR/PLS OXIMETRY MLT: CPT

## 2025-04-21 PROCEDURE — 94799 UNLISTED PULMONARY SVC/PX: CPT

## 2025-04-21 PROCEDURE — 94640 AIRWAY INHALATION TREATMENT: CPT

## 2025-04-21 PROCEDURE — 82948 REAGENT STRIP/BLOOD GLUCOSE: CPT

## 2025-04-21 PROCEDURE — 87070 CULTURE OTHR SPECIMN AEROBIC: CPT | Performed by: INTERNAL MEDICINE

## 2025-04-21 PROCEDURE — 25010000002 MAGNESIUM SULFATE 2 GM/50ML SOLUTION: Performed by: INTERNAL MEDICINE

## 2025-04-21 PROCEDURE — 87205 SMEAR GRAM STAIN: CPT | Performed by: INTERNAL MEDICINE

## 2025-04-21 PROCEDURE — 83605 ASSAY OF LACTIC ACID: CPT | Performed by: EMERGENCY MEDICINE

## 2025-04-21 PROCEDURE — 85025 COMPLETE CBC W/AUTO DIFF WBC: CPT | Performed by: NURSE PRACTITIONER

## 2025-04-21 PROCEDURE — 36600 WITHDRAWAL OF ARTERIAL BLOOD: CPT | Performed by: NURSE PRACTITIONER

## 2025-04-21 PROCEDURE — 87899 AGENT NOS ASSAY W/OPTIC: CPT | Performed by: NURSE PRACTITIONER

## 2025-04-21 PROCEDURE — 63710000001 INSULIN LISPRO (HUMAN) PER 5 UNITS: Performed by: INTERNAL MEDICINE

## 2025-04-21 PROCEDURE — 84100 ASSAY OF PHOSPHORUS: CPT | Performed by: INTERNAL MEDICINE

## 2025-04-21 PROCEDURE — 87449 NOS EACH ORGANISM AG IA: CPT | Performed by: NURSE PRACTITIONER

## 2025-04-21 PROCEDURE — 94664 DEMO&/EVAL PT USE INHALER: CPT

## 2025-04-21 PROCEDURE — 80053 COMPREHEN METABOLIC PANEL: CPT | Performed by: NURSE PRACTITIONER

## 2025-04-21 PROCEDURE — 25010000002 FUROSEMIDE PER 20 MG: Performed by: INTERNAL MEDICINE

## 2025-04-21 PROCEDURE — 94760 N-INVAS EAR/PLS OXIMETRY 1: CPT

## 2025-04-21 PROCEDURE — 82803 BLOOD GASES ANY COMBINATION: CPT | Performed by: NURSE PRACTITIONER

## 2025-04-21 PROCEDURE — 25010000002 ENOXAPARIN PER 10 MG: Performed by: INTERNAL MEDICINE

## 2025-04-21 PROCEDURE — 83735 ASSAY OF MAGNESIUM: CPT | Performed by: INTERNAL MEDICINE

## 2025-04-21 PROCEDURE — 99222 1ST HOSP IP/OBS MODERATE 55: CPT | Performed by: INTERNAL MEDICINE

## 2025-04-21 PROCEDURE — 25810000003 SODIUM CHLORIDE 0.9 % SOLUTION: Performed by: NURSE PRACTITIONER

## 2025-04-21 PROCEDURE — 83036 HEMOGLOBIN GLYCOSYLATED A1C: CPT | Performed by: INTERNAL MEDICINE

## 2025-04-21 PROCEDURE — 25010000002 CEFTRIAXONE PER 250 MG: Performed by: INTERNAL MEDICINE

## 2025-04-21 PROCEDURE — 87641 MR-STAPH DNA AMP PROBE: CPT | Performed by: NURSE PRACTITIONER

## 2025-04-21 PROCEDURE — 63710000001 INSULIN LISPRO (HUMAN) PER 5 UNITS: Performed by: NURSE PRACTITIONER

## 2025-04-21 RX ORDER — DILTIAZEM HYDROCHLORIDE 30 MG/1
120 TABLET, FILM COATED ORAL EVERY 12 HOURS SCHEDULED
Status: DISCONTINUED | OUTPATIENT
Start: 2025-04-21 | End: 2025-04-24 | Stop reason: HOSPADM

## 2025-04-21 RX ORDER — METOPROLOL TARTRATE 25 MG/1
25 TABLET, FILM COATED ORAL 2 TIMES DAILY
Status: DISCONTINUED | OUTPATIENT
Start: 2025-04-21 | End: 2025-04-24 | Stop reason: HOSPADM

## 2025-04-21 RX ORDER — ATORVASTATIN CALCIUM 20 MG/1
40 TABLET, FILM COATED ORAL NIGHTLY
Status: DISCONTINUED | OUTPATIENT
Start: 2025-04-21 | End: 2025-04-24 | Stop reason: HOSPADM

## 2025-04-21 RX ORDER — SODIUM CHLORIDE 9 MG/ML
100 INJECTION, SOLUTION INTRAVENOUS CONTINUOUS
Status: DISCONTINUED | OUTPATIENT
Start: 2025-04-21 | End: 2025-04-21

## 2025-04-21 RX ORDER — ASPIRIN 325 MG
325 TABLET ORAL DAILY
Status: DISCONTINUED | OUTPATIENT
Start: 2025-04-21 | End: 2025-04-24 | Stop reason: HOSPADM

## 2025-04-21 RX ORDER — ENOXAPARIN SODIUM 100 MG/ML
40 INJECTION SUBCUTANEOUS EVERY 24 HOURS
Status: DISCONTINUED | OUTPATIENT
Start: 2025-04-21 | End: 2025-04-24 | Stop reason: HOSPADM

## 2025-04-21 RX ORDER — PANTOPRAZOLE SODIUM 40 MG/1
40 TABLET, DELAYED RELEASE ORAL
Status: DISCONTINUED | OUTPATIENT
Start: 2025-04-21 | End: 2025-04-24 | Stop reason: HOSPADM

## 2025-04-21 RX ORDER — GUAIFENESIN 600 MG/1
1200 TABLET, EXTENDED RELEASE ORAL EVERY 12 HOURS SCHEDULED
Status: DISCONTINUED | OUTPATIENT
Start: 2025-04-21 | End: 2025-04-24 | Stop reason: HOSPADM

## 2025-04-21 RX ORDER — BUDESONIDE AND FORMOTEROL FUMARATE DIHYDRATE 160; 4.5 UG/1; UG/1
2 AEROSOL RESPIRATORY (INHALATION)
Status: DISCONTINUED | OUTPATIENT
Start: 2025-04-21 | End: 2025-04-21

## 2025-04-21 RX ORDER — FUROSEMIDE 10 MG/ML
40 INJECTION INTRAMUSCULAR; INTRAVENOUS ONCE
Status: COMPLETED | OUTPATIENT
Start: 2025-04-21 | End: 2025-04-21

## 2025-04-21 RX ORDER — FUROSEMIDE 40 MG/1
20 TABLET ORAL DAILY
Status: DISCONTINUED | OUTPATIENT
Start: 2025-04-21 | End: 2025-04-21

## 2025-04-21 RX ORDER — MAGNESIUM SULFATE HEPTAHYDRATE 40 MG/ML
2 INJECTION, SOLUTION INTRAVENOUS
Status: COMPLETED | OUTPATIENT
Start: 2025-04-21 | End: 2025-04-21

## 2025-04-21 RX ORDER — IPRATROPIUM BROMIDE AND ALBUTEROL SULFATE 2.5; .5 MG/3ML; MG/3ML
3 SOLUTION RESPIRATORY (INHALATION)
Status: DISCONTINUED | OUTPATIENT
Start: 2025-04-21 | End: 2025-04-24 | Stop reason: HOSPADM

## 2025-04-21 RX ADMIN — SODIUM CHLORIDE 500 ML: 9 INJECTION, SOLUTION INTRAVENOUS at 01:30

## 2025-04-21 RX ADMIN — FUROSEMIDE 20 MG: 40 TABLET ORAL at 08:44

## 2025-04-21 RX ADMIN — GUAIFENESIN 1200 MG: 600 TABLET, EXTENDED RELEASE ORAL at 21:49

## 2025-04-21 RX ADMIN — DILTIAZEM HYDROCHLORIDE 120 MG: 30 TABLET, FILM COATED ORAL at 09:59

## 2025-04-21 RX ADMIN — METOPROLOL TARTRATE 25 MG: 25 TABLET, FILM COATED ORAL at 21:49

## 2025-04-21 RX ADMIN — ATORVASTATIN CALCIUM 40 MG: 20 TABLET, FILM COATED ORAL at 02:12

## 2025-04-21 RX ADMIN — MAGNESIUM SULFATE IN WATER FOR 2 G: 40 INJECTION INTRAVENOUS at 17:46

## 2025-04-21 RX ADMIN — IPRATROPIUM BROMIDE AND ALBUTEROL SULFATE 3 ML: .5; 3 SOLUTION RESPIRATORY (INHALATION) at 15:54

## 2025-04-21 RX ADMIN — MAGNESIUM SULFATE IN WATER FOR 2 G: 40 INJECTION INTRAVENOUS at 19:19

## 2025-04-21 RX ADMIN — IPRATROPIUM BROMIDE AND ALBUTEROL SULFATE 3 ML: .5; 3 SOLUTION RESPIRATORY (INHALATION) at 19:26

## 2025-04-21 RX ADMIN — GUAIFENESIN 1200 MG: 600 TABLET, EXTENDED RELEASE ORAL at 02:12

## 2025-04-21 RX ADMIN — ENOXAPARIN SODIUM 40 MG: 100 INJECTION SUBCUTANEOUS at 14:47

## 2025-04-21 RX ADMIN — DILTIAZEM HYDROCHLORIDE 120 MG: 30 TABLET, FILM COATED ORAL at 21:49

## 2025-04-21 RX ADMIN — INSULIN LISPRO 4 UNITS: 100 INJECTION, SOLUTION INTRAVENOUS; SUBCUTANEOUS at 07:01

## 2025-04-21 RX ADMIN — FUROSEMIDE 40 MG: 10 INJECTION, SOLUTION INTRAMUSCULAR; INTRAVENOUS at 14:43

## 2025-04-21 RX ADMIN — INSULIN LISPRO 4 UNITS: 100 INJECTION, SOLUTION INTRAVENOUS; SUBCUTANEOUS at 21:49

## 2025-04-21 RX ADMIN — PANTOPRAZOLE SODIUM 40 MG: 40 TABLET, DELAYED RELEASE ORAL at 07:01

## 2025-04-21 RX ADMIN — ASPIRIN 325 MG ORAL TABLET 325 MG: 325 PILL ORAL at 08:45

## 2025-04-21 RX ADMIN — DILTIAZEM HYDROCHLORIDE 120 MG: 30 TABLET, FILM COATED ORAL at 02:12

## 2025-04-21 RX ADMIN — ATORVASTATIN CALCIUM 40 MG: 20 TABLET, FILM COATED ORAL at 21:53

## 2025-04-21 RX ADMIN — GUAIFENESIN 1200 MG: 600 TABLET, EXTENDED RELEASE ORAL at 09:59

## 2025-04-21 RX ADMIN — METOPROLOL TARTRATE 25 MG: 25 TABLET, FILM COATED ORAL at 08:44

## 2025-04-21 RX ADMIN — SODIUM CHLORIDE 100 ML/HR: 9 INJECTION, SOLUTION INTRAVENOUS at 02:31

## 2025-04-21 RX ADMIN — CEFTRIAXONE 2000 MG: 2 INJECTION, POWDER, FOR SOLUTION INTRAMUSCULAR; INTRAVENOUS at 17:07

## 2025-04-21 RX ADMIN — SODIUM CHLORIDE 100 ML/HR: 9 INJECTION, SOLUTION INTRAVENOUS at 06:00

## 2025-04-21 RX ADMIN — MAGNESIUM SULFATE IN WATER FOR 2 G: 40 INJECTION INTRAVENOUS at 21:42

## 2025-04-21 RX ADMIN — INSULIN LISPRO 6 UNITS: 100 INJECTION, SOLUTION INTRAVENOUS; SUBCUTANEOUS at 11:34

## 2025-04-21 RX ADMIN — INSULIN LISPRO 5 UNITS: 100 INJECTION, SOLUTION INTRAVENOUS; SUBCUTANEOUS at 17:08

## 2025-04-21 NOTE — CONSULTS
Date of Hospital Visit: 25  Encounter Provider: Daniele Lutz MD  Place of Service: [facility] CARDIOLOGY  Patient Name: Sally Rivera  :1950  Referral Provider: Jayleen Hall MD    Chief complaint: SOA    History of Present Illness    Ms. Rivera is a 75 year old female who presented to the emergency room yesterday with acute on chronic SOA. Over the last few days she has had N/V and worsening dyspnea. She uses 2L O2 at home. She has stable CAD, DM2, chronic HFmrEF. She has pulmonary HTN that is well out of proportion to her degree of LVEDP elevation. She has significant RV dilation/dysfunction. She has a history of PAT.    She denies orthopnea, worsening edema, or chest pain.    She is found to have bilateral pneumonia. She's hypomagnesemic and hypophosphatemic. Her hsTn is normal. Her proBNP is 1600. Her WBc is 29.     Echocardiogram 12/10/24      Left ventricular systolic function is low normal. Calculated left ventricular EF = 50.5%. Left ventricular ejection fraction appears to be 46 - 50%.    Left ventricular diastolic function is consistent with (grade I) impaired relaxation.    Estimated right ventricular systolic pressure from tricuspid regurgitation is mildly elevated (35-45 mmHg).    Global longitudinal LV strain (GLS) = -18.4%.    Cardiac catheterization 22    Findings:  1. Hemodynamics:  Right Atrium: Mean of 12 mmHg  Right Ventricle: 43/11 mmHg  Pulmonary Artery: 56/21/31 mmHg  Pulmonary Wedge: Mean of 16 mmHg  Cardiac Output/Index: 3.91 L/min 1.84 L/min/m2  PA Sat: 65%  AO Sat: 92%  PVR: 3.9 Wood units        Conclusions:  Mild precapillary hypertension (pulmonary wedge right at the cutoff of 15)        Recommendations:   Continued follow-up with pulmonology and cardiology for further evaluation and care.                  Past Medical History:   Diagnosis Date    Acid reflux     Asthma 2022    CAD (coronary artery disease)     per   deepthi cesar office note 4/22-dd    Chronic heart failure with preserved ejection fraction (HFpEF)     Cirrhosis 2011    CKD (chronic kidney disease) stage 3, GFR 30-59 ml/min     Diverticulitis     GERD (gastroesophageal reflux disease)     Hyperlipidemia     Hypertension     Hypertension     BERNARD (nonalcoholic steatohepatitis)     PONV (postoperative nausea and vomiting)     Pulmonary hypertension     Type 2 diabetes mellitus        Past Surgical History:   Procedure Laterality Date    CARDIAC CATHETERIZATION N/A 11/21/2022    Procedure: Right Heart Cath with vasodilator as indicated. pHTN workup;  Surgeon: Waqar Rush MD;  Location:  [facility];  Service: Cardiovascular;  Laterality: N/A;    CHOLECYSTECTOMY  2011    COLON SURGERY  2014    bowel resection for obstruction    COLONOSCOPY  2013    Dr. Sky    COLONOSCOPY N/A 10/07/2021    Procedure: COLONOSCOPY TO CECUM WITH COLD POLYPECTOMY;  Surgeon: Facundo Klein Jr., MD;  Location:  [facility];  Service: General;  Laterality: N/A;  PRE- H/O COLON POLYPS  POST-COLON POLYP, DIVERTICULOSIS    HYSTERECTOMY  2011    MOUTH BIOPSY      OOPHORECTOMY      TUBAL ABDOMINAL LIGATION  1978    US GUIDED LYMPH NODE BIOPSY  11/30/2020       Prior to Admission medications    Medication Sig Start Date End Date Taking? Authorizing Provider   aspirin 325 MG tablet Take 1 tablet by mouth Daily.   Yes ProviderDebbie MD   atorvastatin (LIPITOR) 40 MG tablet Take 1 tablet by mouth Daily. 200001  Patient taking differently: Take 1 tablet by mouth Every Night. 200001 11/30/22  Yes Enrique Kelley MD   cholecalciferol (VITAMIN D3) 25 MCG (1000 UT) tablet Take 1 tablet by mouth Daily.   Yes ProviderDebbie MD   dilTIAZem (CARDIZEM) 120 MG tablet Take 1 tablet by mouth 2 (Two) Times a Day.   Yes Debbie Alcantar MD   Dulaglutide (Trulicity) 0.75 MG/0.5ML solution pen-injector INJECT 0.75MG (1 PEN) UNDER THE SKIN EVERY WEEK 12/19/23  Yes  Lazara Jeff APRN   furosemide (LASIX) 20 MG tablet Take 1 tablet by mouth Daily. 3/19/24  Yes Heron Martínez MD PhD   metFORMIN (GLUCOPHAGE) 500 MG tablet Take 1 tablet by mouth 2 (Two) Times a Day With Meals.   Yes ProviderDebbie MD   metoprolol tartrate (LOPRESSOR) 25 MG tablet Take 1 tablet by mouth 2 (Two) Times a Day.   Yes ProviderDebbie MD   O2 (OXYGEN) Inhale 2 L/min. 24 -7   Yes ProviderDebbie MD   omeprazole (priLOSEC) 20 MG capsule TAKE 1 CAPSULE BY MOUTH EVERY DAY 10/11/23  Yes Lazara Jeff APRN   sildenafil (Revatio) 20 MG tablet Take 0.5 tablets by mouth 3 (Three) Times a Day. 3/31/25  Yes Jovanna Proctor APRN   spironolactone (ALDACTONE) 25 MG tablet TAKE 1 TABLET BY MOUTH EVERY DAY 25  Yes Jovanna Proctor APRN       Social History     Socioeconomic History    Marital status:    Tobacco Use    Smoking status: Former     Current packs/day: 0.00     Average packs/day: 1 pack/day for 20.0 years (20.0 ttl pk-yrs)     Types: Cigarettes     Start date: 10/1/1970     Quit date: 1990     Years since quittin.2     Passive exposure: Past    Smokeless tobacco: Never   Vaping Use    Vaping status: Never Used   Substance and Sexual Activity    Alcohol use: No    Drug use: No    Sexual activity: Not Currently     Partners: Male     Birth control/protection: Tubal ligation       Family History   Problem Relation Age of Onset    No Known Problems Mother     Diabetes Father     Heart disease Father     Hypertension Father        Review of Systems   Constitutional:  Positive for fatigue.   Respiratory:  Positive for shortness of breath and wheezing.         Objective:     Vitals:    25 1540 25 1554 25 1900 25 1926   BP: 102/65  103/55    BP Location: Left arm  Left arm    Patient Position: Sitting  Lying    Pulse: 91 88  97   Resp: 16 16 18 24   Temp: 98.4 °F (36.9 °C)  98.4 °F (36.9 °C)    TempSrc: Oral  Oral    SpO2: 93%  "94%  93%   Weight:       Height:         Body mass index is 38.17 kg/m².    Last Weight and Admission Weight        04/21/25  0500   Weight: 101 kg (222 lb 6.4 oz)     Flowsheet Rows      Flowsheet Row First Filed Value   Admission Height 162.6 cm (64\") Documented at 04/20/2025 1911   Admission Weight 102 kg (225 lb) Documented at 04/20/2025 1911              Intake/Output Summary (Last 24 hours) at 4/21/2025 2048  Last data filed at 4/21/2025 1511  Gross per 24 hour   Intake 480 ml   Output 375 ml   Net 105 ml         Physical Exam  Constitutional:       General: She is not in acute distress.  HENT:      Mouth/Throat:      Pharynx: Oropharynx is clear.   Eyes:      Conjunctiva/sclera: Conjunctivae normal.   Cardiovascular:      Rate and Rhythm: Normal rate and regular rhythm.   Pulmonary:      Breath sounds: Wheezing and rhonchi present.   Abdominal:      Palpations: Abdomen is soft.      Tenderness: There is no abdominal tenderness.   Musculoskeletal:         General: No swelling.   Neurological:      General: No focal deficit present.   Psychiatric:         Mood and Affect: Mood normal.                 Lab Review:                Results from last 7 days   Lab Units 04/21/25  0453   SODIUM mmol/L 135*   POTASSIUM mmol/L 3.4*   CHLORIDE mmol/L 96*   CO2 mmol/L 21.7*   BUN mg/dL 15   CREATININE mg/dL 1.10*   GLUCOSE mg/dL 208*   CALCIUM mg/dL 7.9*     Results from last 7 days   Lab Units 04/20/25  1907 04/20/25  1748   HSTROP T ng/L 14* 18*     Results from last 7 days   Lab Units 04/21/25  0453   WBC 10*3/mm3 28.50*   HEMOGLOBIN g/dL 10.0*   HEMATOCRIT % 32.4*   PLATELETS 10*3/mm3 220             Results from last 7 days   Lab Units 04/21/25  1552   MAGNESIUM mg/dL 1.1*             I personally viewed and interpreted the patient's EKG/Telemetry data                      Assessment/Plan:     1. Acute on chronic hypoxemic respiratory failure  2. PNA  3. Chronic HFpEF  4. CAD without angina  5. PHTN, combined WHO " groups 2 and 3 and possibly 1  6. DM2  7. HTN  8. CKD3  9. PAT -- currently in NSR    *Agree w CT  *Will challenge with IV diuretics, she has received two doses of furosemide 40mg, will assess response in AM   *Continue CCB/BB  *Does not need repeat echo at this time  *Continue sildenafil (will need primary team vs pulmonary to order)    We will follow.    Daniele Lutz MD

## 2025-04-21 NOTE — PLAN OF CARE
Problem: Adult Inpatient Plan of Care  Goal: Plan of Care Review  Outcome: Progressing  Flowsheets (Taken 4/21/2025 1343)  Progress: no change  Outcome Evaluation: Alert and oriented, pleasant and cooperative. No c/o pain or nausea this shift. One assist with transfering to [facility]. 10 LPM HF for meals. 15 LPM NRB  for sleep maintains saturation. Will quickly desat if O2 removed. Patient educated.  at bedside, supportive. Report called to Nathalie on 6 east as patient has been assigned to [facility].  Plan of Care Reviewed With:   patient   spouse  Goal: Patient-Specific Goal (Individualized)  Outcome: Progressing  Goal: Absence of Hospital-Acquired Illness or Injury  Outcome: Progressing  Intervention: Identify and Manage Fall Risk  Description: Perform standard risk assessment on admission using a validated tool or comprehensive approach appropriate to the patient; reassess fall risk frequently, with change in status or transfer to another level of care.Communicate risk to interprofessional healthcare team; ensure fall risk visible cue.Determine need for increased observation, equipment and environmental modification, as well as use of supportive, nonskid footwear.Adjust safety measures to individual needs and identified risk factors.Reinforce the importance of active participation with fall risk prevention, safety, and physical activity with the patient and family.Perform regular intentional rounding to assess need for position change, pain assessment and personal needs, including assistance with toileting.  Recent Flowsheet Documentation  Taken 4/21/2025 1348 by Silvana Tristan, RN  Safety Promotion/Fall Prevention:   assistive device/personal items within reach   clutter free environment maintained   fall prevention program maintained   nonskid shoes/slippers when out of bed   room organization consistent   safety round/check completed  Taken 4/21/2025 1149 by Silvana Tristan, RN  Safety Promotion/Fall  Prevention:   assistive device/personal items within reach   clutter free environment maintained   fall prevention program maintained   nonskid shoes/slippers when out of bed   room organization consistent   safety round/check completed  Taken 4/21/2025 1000 by Silvana Tristan, RN  Safety Promotion/Fall Prevention:   assistive device/personal items within reach   clutter free environment maintained   fall prevention program maintained   nonskid shoes/slippers when out of bed   room organization consistent   safety round/check completed  Taken 4/21/2025 0810 by Silvana Tristan, RN  Safety Promotion/Fall Prevention:   assistive device/personal items within reach   clutter free environment maintained   fall prevention program maintained   nonskid shoes/slippers when out of bed   room organization consistent   safety round/check completed  Intervention: Prevent Skin Injury  Description: Perform a screening for skin injury risk, such as pressure or moisture-associated skin damage on admission and at regular intervals throughout hospital stay.Keep all areas of skin (especially folds) clean and dry.Maintain adequate skin hydration.Relieve and redistribute pressure and protect bony prominences and skin at risk for injury; implement measures based on patient-specific risk factors.Match turning and repositioning schedule to clinical condition.Encourage weight shift frequently; assist with reposition if unable to complete independently.Float heels off bed; avoid pressure on the Achilles tendon.Keep skin free from extended contact with medical devices.Optimize nutrition and hydration.Encourage functional activity and mobility, as early as tolerated.Use aids (e.g., slide boards, mechanical lift) during transfer.  Recent Flowsheet Documentation  Taken 4/21/2025 1347 by Silvana Tristan, RN  Body Position:   left   tilted   position changed independently   neutral body alignment   neutral head position  Taken 4/21/2025 1149 by Azalea  ADAN Pina  Body Position:   position changed independently   neutral body alignment   neutral head position   sitting up in bed  Skin Protection:   incontinence pads utilized   skin sealant/moisture barrier applied  Taken 4/21/2025 1000 by Silvana Tristan RN  Body Position:   position changed independently   right   side-lying  Taken 4/21/2025 0810 by Silvana Tristan RN  Body Position:   position changed independently   dangle, side of bed   neutral body alignment   neutral head position   weight shifting  Intervention: Prevent Infection  Description: Maintain skin and mucous membrane integrity; promote hand, oral and pulmonary hygiene.Optimize fluid balance, nutrition, sleep and glycemic control to maximize infection resistance.Identify potential sources of infection early to prevent or mitigate progression of infection (e.g., wound, lines, devices).Evaluate ongoing need for invasive devices; remove promptly when no longer indicated.Review vaccination status.  Recent Flowsheet Documentation  Taken 4/21/2025 1347 by Silvana Tristan RN  Infection Prevention:   environmental surveillance performed   equipment surfaces disinfected   hand hygiene promoted   single patient room provided  Taken 4/21/2025 1149 by Silvana Tristan RN  Infection Prevention:   equipment surfaces disinfected   hand hygiene promoted   single patient room provided  Taken 4/21/2025 1000 by Silvana Tristan RN  Infection Prevention:   equipment surfaces disinfected   hand hygiene promoted   single patient room provided   personal protective equipment utilized  Taken 4/21/2025 0810 by Silvana Tristan RN  Infection Prevention:   environmental surveillance performed   equipment surfaces disinfected   hand hygiene promoted   single patient room provided  Goal: Optimal Comfort and Wellbeing  Outcome: Progressing  Intervention: Provide Person-Centered Care  Description: Use a family-focused approach to care; encourage support system presence and  participation.Develop trust and rapport by proactively providing information, encouraging questions, addressing concerns and offering reassurance.Acknowledge emotional response to hospitalization.Recognize and utilize personal coping strategies and strengths; develop goals via shared decision-making.Honor spiritual and cultural preferences.  Recent Flowsheet Documentation  Taken 4/21/2025 1149 by Silvana Tristan RN  Trust Relationship/Rapport:   care explained   choices provided   emotional support provided   questions answered   questions encouraged   empathic listening provided   reassurance provided   thoughts/feelings acknowledged  Taken 4/21/2025 0810 by Silvana Tristan RN  Trust Relationship/Rapport:   care explained   choices provided   emotional support provided   empathic listening provided   questions answered   reassurance provided   questions encouraged   thoughts/feelings acknowledged  Goal: Readiness for Transition of Care  Outcome: Progressing     Problem: Comorbidity Management  Goal: Maintenance of Behavioral Health Symptom Control  Outcome: Progressing  Intervention: Maintain Behavioral Health Symptom Control  Description: Confirm mental health diagnosis and current treatment.Evaluate participation with previously identified self-management plan.Advocate continuation of home strategies, including medication.Evaluate effectiveness of self-management strategies and coping skills.Consider impact of mental health diagnosis on the current acute medical condition.Communicate with providers to ensure continuity and follow-up at transition.  Recent Flowsheet Documentation  Taken 4/21/2025 1347 by Silvana Tristan RN  Medication Review/Management: medications reviewed  Taken 4/21/2025 1149 by Silvana Tristan RN  Medication Review/Management: medications reviewed  Taken 4/21/2025 1000 by Silvana Tristan RN  Medication Review/Management: medications reviewed  Taken 4/21/2025 0810 by Silvana Tristan  RN  Medication Review/Management: medications reviewed  Goal: Blood Glucose Level Within Target Range  Outcome: Progressing  Intervention: Monitor and Manage Glycemia  Description: Establish target blood glucose levels based on patient-specific factors, such as age, diabetes-related complications and illness severity.Document blood glucose levels and monitor trend.Provide antihyperglycemic pharmacologic therapy to maintain blood glucose levels within targeted range.Advocate for patient use of home devices such as insulin pump, continuous glucose monitor; assess for safe and competent participation in care.Check blood glucose level if there is a change in mental or cognitive status.Recognize, treat and document hypoglycemia event and potential cause.Assess current lifestyle patterns; acknowledging positive patterns supporting wellbeing.Evaluate effectiveness of coping skills; encourage expression of feelings, expectations and concerns related to disease management and quality of life; reinforce education to enhance management plan and wellbeing.  Recent Flowsheet Documentation  Taken 4/21/2025 1347 by Silvana Tristan RN  Medication Review/Management: medications reviewed  Taken 4/21/2025 1149 by Silvana Tristan RN  Medication Review/Management: medications reviewed  Taken 4/21/2025 1000 by Silvana Tristan RN  Medication Review/Management: medications reviewed  Taken 4/21/2025 0810 by Silvana Tristan RN  Medication Review/Management: medications reviewed  Goal: Blood Pressure in Desired Range  Outcome: Progressing  Intervention: Maintain Blood Pressure Management  Description: Evaluate adherence to home antihypertensive regimen (e.g., exercise and activity, diet modification, medication).Provide scheduled antihypertensive medication; consider administration time and effects (e.g., avoid giving diuretic prior to bedtime).Monitor response to antihypertensive medication therapy (e.g., blood pressure, electrolyte levels,  medication effects).Minimize risk of orthostatic hypotension; encourage caution with position changes, particularly if elderly.  Recent Flowsheet Documentation  Taken 4/21/2025 1347 by Silvana Tristan RN  Medication Review/Management: medications reviewed  Taken 4/21/2025 1149 by Silvana Tristan RN  Medication Review/Management: medications reviewed  Taken 4/21/2025 1000 by Silvana Tristan RN  Medication Review/Management: medications reviewed  Taken 4/21/2025 0810 by Silvana Tristan RN  Medication Review/Management: medications reviewed     Problem: Sepsis/Septic Shock  Goal: Optimal Coping  Outcome: Progressing  Intervention: Support Patient and Family Response  Description: Acknowledge, normalize, validate intensity and complexity of patient and support system response to situation.Provide opportunity for expression of thoughts, feelings and concerns; respond with compassion and reassurance.Decrease stress and anxiety by providing information about patient's status and treatment.Facilitate support system presence and participation in care; consider providing a diary in intensive care situation.Support coping by recognizing current coping strategies; provide aid in developing new strategies.Assess and monitor for signs and symptoms of psychologic distress, anxiety and depression.Utilize complementary therapy, such as music, massage or guided imagery.Engage in proactive and ongoing discussion about goals of care and facilitate shared decision-making.Connect with community resources for ongoing support, such as counseling and group support.  Recent Flowsheet Documentation  Taken 4/21/2025 1149 by Silvana Tristan RN  Family/Support System Care: support provided  Goal: Absence of Bleeding  Outcome: Progressing  Goal: Blood Glucose Level Within Target Range  Outcome: Progressing  Intervention: Optimize Glycemic Control  Description: Establish target blood glucose levels based on patient-specific factors, such as  illness severity and comorbidity.Document blood glucose levels and monitor trend.If elevated blood glucose level is not within desired range, initiate insulin therapy using an insulin management protocol.If change in mental or cognitive status, check blood glucose level and trend.Manage hypoglycemia with oral fast-acting (pure) carbohydrate. If not alert and unable to swallow safely, administer IV dextrose or glucagon.Document potential cause of the hypoglycemia event to avoid reoccurrence.Prevent hypoglycemic episodes by proactively adjusting insulin therapy if there is a change in condition, treatment, illness severity, medication or nutrition support therapy.  Recent Flowsheet Documentation  Taken 4/21/2025 1149 by Silvana Tristan, RN  Hyperglycemia Management:   correctional insulin given   blood glucose monitored  Hypoglycemia Management: blood glucose monitored  Taken 4/21/2025 0810 by Silvana Tristan RN  Hyperglycemia Management: blood glucose monitored  Hypoglycemia Management: blood glucose monitored  Goal: Absence of Infection Signs and Symptoms  Outcome: Progressing  Intervention: Initiate Sepsis Management  Description: Provide fluid therapy, such as crystalloid or albumin, to increase intravascular volume, organ perfusion and oxygen delivery.Provide respiratory support, such as oxygen therapy, noninvasive or invasive positive pressure ventilation, to achieve oxygenation and ventilation goal; avoid hyperoxemia.Obtain cultures prior to initiating antimicrobial therapy when possible. Do not delay treatment for laboratory results in the presence of high suspicion or clinical indicators.Administer intravenous broad-spectrum antimicrobial therapy promptly.Implement hemodynamic monitoring to guide intravascular support based on individual targeted parameters.Determine and address underlying source of infection aggressively; implement transmission-based precautions and isolation, as indicated.  Recent Flowsheet  Documentation  Taken 4/21/2025 1347 by Silvana Tristan RN  Infection Prevention:   environmental surveillance performed   equipment surfaces disinfected   hand hygiene promoted   single patient room provided  Taken 4/21/2025 1149 by Silvana Tristan RN  Infection Prevention:   equipment surfaces disinfected   hand hygiene promoted   single patient room provided  Taken 4/21/2025 1000 by Silvana Tristan RN  Infection Prevention:   equipment surfaces disinfected   hand hygiene promoted   single patient room provided   personal protective equipment utilized  Isolation Precautions: precautions discontinued  Taken 4/21/2025 0810 by Silvana Tristan RN  Infection Prevention:   environmental surveillance performed   equipment surfaces disinfected   hand hygiene promoted   single patient room provided  Isolation Precautions:   precautions maintained   enhanced contact  Intervention: Promote Stabilization  Description: Monitor for signs of fluid responsiveness and overload; consider fluid adjustment and diuretic therapy.Anticipate use of vasoactive agent to support microperfusion and oxygen delivery; titrate to response.Monitor laboratory value, diagnostic test and clinical status trends for signs of infection progression and multiple organ failure.Assess effectiveness of, and potential for, de-escalation of the antimicrobial regimen daily; promote antimicrobial stewardship.Provide fever-reduction and comfort measures.Monitor and manage electrolyte imbalance, such as hypocalcemia.Use lung protective ventilation measures, such as low volume, inspiratory pressure, optimal positive end-expiratory pressure, to minimize the risk of ventilator-induced lung injury; ensure minute volume demands.Prepare for supportive therapy, such as prone positioning, corticosteroid therapy, coagulopathy management, CRRT (continuous renal replacement therapy), hemofiltration and cardiac-assist device.  Recent Flowsheet Documentation  Taken 4/21/2025  1149 by Silvana Tristan RN  Fluid/Electrolyte Management: fluids provided  Taken 4/21/2025 0810 by Silvana Tristan RN  Fluid/Electrolyte Management: fluids provided  Goal: Optimal Nutrition Delivery  Outcome: Progressing     Problem: Fall Injury Risk  Goal: Absence of Fall and Fall-Related Injury  Outcome: Progressing  Intervention: Identify and Manage Contributors  Description: Develop a fall prevention plan, considering patient-centered interventions and family/caregiver involvement; identify and address patient's facilitators and barriers.Provide reorientation, appropriate sensory stimulation and routines with changes in mental status to decrease risk of fall.Promote use of personal vision and auditory aids.Assess assistance level required for safe and effective self-care; provide support as needed, such as toileting and mobilization. For age 65 and older, implement timed toileting with assistance.Encourage physical activity, such as performance of mobility and self-care at highest level of patient ability, multicomponent exercise program and provision of appropriate assistive devices.If fall occurs, assess the severity of injury; implement fall injury protocol. Determine the cause and revise fall injury prevention plan.Regularly review and advocate for medication adjustment to decrease fall risk; consider administration times, polypharmacy and age.Balance adequate pain management with potential for oversedation.  Recent Flowsheet Documentation  Taken 4/21/2025 1347 by Silvana Tristan, RN  Medication Review/Management: medications reviewed  Taken 4/21/2025 1149 by Silvana Tristan RN  Medication Review/Management: medications reviewed  Taken 4/21/2025 1000 by Silvana Tristan, RN  Medication Review/Management: medications reviewed  Taken 4/21/2025 0810 by Silvana Tristan, RN  Medication Review/Management: medications reviewed  Intervention: Promote Injury-Free Environment  Description: Provide a safe, barrier-free  environment that encourages independent activity.Keep care area uncluttered and well-lighted.Determine need for increased observation or monitoring.Avoid use of devices that minimize mobility, such as restraints or indwelling urinary catheter.  Recent Flowsheet Documentation  Taken 4/21/2025 1347 by Silvana Tristan, RN  Safety Promotion/Fall Prevention:   assistive device/personal items within reach   clutter free environment maintained   fall prevention program maintained   nonskid shoes/slippers when out of bed   room organization consistent   safety round/check completed  Taken 4/21/2025 1149 by Silvana Tristan, RN  Safety Promotion/Fall Prevention:   assistive device/personal items within reach   clutter free environment maintained   fall prevention program maintained   nonskid shoes/slippers when out of bed   room organization consistent   safety round/check completed  Taken 4/21/2025 1000 by Silvana Tristan, RN  Safety Promotion/Fall Prevention:   assistive device/personal items within reach   clutter free environment maintained   fall prevention program maintained   nonskid shoes/slippers when out of bed   room organization consistent   safety round/check completed  Taken 4/21/2025 0810 by Silvana Tristan, RN  Safety Promotion/Fall Prevention:   assistive device/personal items within reach   clutter free environment maintained   fall prevention program maintained   nonskid shoes/slippers when out of bed   room organization consistent   safety round/check completed   Goal Outcome Evaluation:  Plan of Care Reviewed With: patient, spouse        Progress: no change  Outcome Evaluation: Alert and oriented, pleasant and cooperative. No c/o pain or nausea this shift. One assist with transfering to [facility]. 10 LPM HF for meals. 15 LPM NRB  for sleep maintains saturation. Will quickly desat if O2 removed. Patient educated.  at bedside, supportive. Report called to Nathalie on 6 east as patient has been assigned to [facility]  661.

## 2025-04-21 NOTE — CASE MANAGEMENT/SOCIAL WORK
Discharge Planning Assessment  [facility]     Patient Name: Sally Rivera  MRN: [id number]  Today's Date: 4/21/2025    Admit Date: 4/20/2025    Plan: Return home with family   Discharge Needs Assessment       Row Name 04/21/25 1124       Living Environment    People in Home grandchild(renzo);spouse    Current Living Arrangements home    Potentially Unsafe Housing Conditions none    Primary Care Provided by self    Provides Primary Care For grandchild(renzo)    Family Caregiver if Needed spouse    Quality of Family Relationships helpful;involved;supportive    Able to Return to Prior Arrangements yes       Resource/Environmental Concerns    Resource/Environmental Concerns none    Transportation Concerns none       Transition Planning    Patient/Family Anticipates Transition to home with family    Patient/Family Anticipated Services at Transition none    Transportation Anticipated family or friend will provide       Discharge Needs Assessment    Readmission Within the Last 30 Days no previous admission in last 30 days    Equipment Currently Used at Home cane, straight;oxygen;walker, rolling    Concerns to be Addressed no discharge needs identified;denies needs/concerns at this time    Anticipated Changes Related to Illness none    Equipment Needed After Discharge none    Provided Post Acute Provider List? N/A    Provided Post Acute Provider Quality & Resource List? N/A    Offered/Gave Vendor List no                   Discharge Plan       Row Name 04/21/25 1125       Plan    Plan Return home with family    Patient/Family in Agreement with Plan yes    Plan Comments IMM noted. CCP met with the patient and her  Ernie at bedside with the patient’s verbal permission. CCP introduced self, explained the role of CCP, and confirmed the information on her face sheet was accurate. She said she lives in a 1-level home with her  Ernie and their 2 grand-daughters ages 6 and 9. Ernie cares for the grandchildren  when they aren’t at school while the patient is in the hospital. She said she normally enters the home through the back door and there are 4 steps with a handrail on the left-hand side. She confirmed her PCP is Edgar Mayers. She denied any history of HH/SNF/Acute Rehab. She uses oxygen from [facility] at 2 liters continually and a cane in the home and walker while out in the community. Her local pharmacy is [facility]. She denied any difficulty affording or taking her medicine. Ernie to transport at discharge. CCP to follow. CD, CSW.                    Expected Discharge Date and Time       Expected Discharge Date Expected Discharge Time    Apr 25, 2025            Demographic Summary       Row Name 04/21/25 1119       General Information    Admission Type inpatient    Arrived From emergency department    Required Notices Provided Important Message from Medicare    Referral Source admission list    Reason for Consult discharge planning    Preferred Language English                   Functional Status       Row Name 04/21/25 1124       Functional Status    Usual Activity Tolerance good    Current Activity Tolerance good       Functional Status, IADL    Medications assistive equipment    Meal Preparation assistive equipment    Housekeeping assistive equipment    Laundry assistive equipment    Shopping assistive equipment       Mental Status    General Appearance WDL WDL       Mental Status Summary    Recent Changes in Mental Status/Cognitive Functioning no changes       Employment/    Employment Status retired                   Psychosocial    No documentation.                  Abuse/Neglect    No documentation.                  Legal    No documentation.                  Substance Abuse    No documentation.                  Patient Forms    No documentation.

## 2025-04-21 NOTE — CONSULTS
Patient Identification:  Sally Rivera  75 y.o.  female  1950  [id number]          LOS 1    Requesting physician: Dr Hall    Reason for Consultation: Sepsis with acute on chronic hypoxemic respiratory failure due to bilateral pneumonia    History of Present Illness:   75-year-old female presents with shortness of breath, nausea, vomiting.  Has chronic respiratory failure on supplemental oxygen at home at 2 L/min.  Has pulm hypertension and congestive heart failure.  Has obstructive lung disease with asthma.  Her shortness of breath has been associated with a productive cough of gray phlegm.  Symptoms started this past Sunday.  Denies chest pain.  Denies fevers or chills at home but noted to have a low-grade fever here.  On 10 L high flow supplemental oxygen with saturations 94% at time of visit.    Past Medical History:  Past Medical History:   Diagnosis Date    Acid reflux     Asthma June 2022    CAD (coronary artery disease)     per dr deepthi [facility] office note 4/22-dd    CHF (congestive heart failure) 2022    Cirrhosis 2011    Diabetes mellitus type I     Diverticulitis     Elevated cholesterol     GERD (gastroesophageal reflux disease)     Heart attack 1995    Hyperlipidemia     Hypertension     Hypertension     Liver disease     PONV (postoperative nausea and vomiting)        Past Surgical History:  Past Surgical History:   Procedure Laterality Date    CARDIAC CATHETERIZATION N/A 11/21/2022    Procedure: Right Heart Cath with vasodilator as indicated. pHTN workup;  Surgeon: Waqar Rush MD;  Location: [facility] CATH INVASIVE LOCATION;  Service: Cardiovascular;  Laterality: N/A;    CHOLECYSTECTOMY  2011    COLON SURGERY  2014    bowel resection for obstruction    COLONOSCOPY  2013    Dr. Sky    COLONOSCOPY N/A 10/07/2021    Procedure: COLONOSCOPY TO CECUM WITH COLD POLYPECTOMY;  Surgeon: Facundo Klein Jr., MD;  Location: [facility] ENDOSCOPY;  Service: General;  Laterality: N/A;  PRE- H/O COLON  POLYPS  POST-COLON POLYP, DIVERTICULOSIS    HYSTERECTOMY      MOUTH BIOPSY      OOPHORECTOMY      TUBAL ABDOMINAL LIGATION      US GUIDED LYMPH NODE BIOPSY  2020        Home Meds:  Medications Prior to Admission   Medication Sig Dispense Refill Last Dose/Taking    aspirin 325 MG tablet Take 1 tablet by mouth Daily.   2025    atorvastatin (LIPITOR) 40 MG tablet Take 1 tablet by mouth Daily.  (Patient taking differently: Take 1 tablet by mouth Every Night. ) 90 tablet 3 2025 Evening    cholecalciferol (VITAMIN D3) 25 MCG (1000 UT) tablet Take 1 tablet by mouth Daily.   2025    dilTIAZem (CARDIZEM) 120 MG tablet Take 1 tablet by mouth 2 (Two) Times a Day.   2025 Morning    Dulaglutide (Trulicity) 0.75 MG/0.5ML solution pen-injector INJECT 0.75MG (1 PEN) UNDER THE SKIN EVERY WEEK 12 mL 3 Past Week    furosemide (LASIX) 20 MG tablet Take 1 tablet by mouth Daily. 90 tablet 1 2025    metFORMIN (GLUCOPHAGE) 500 MG tablet Take 1 tablet by mouth 2 (Two) Times a Day With Meals.   2025 Morning    metoprolol tartrate (LOPRESSOR) 25 MG tablet Take 1 tablet by mouth 2 (Two) Times a Day.   2025 Morning    O2 (OXYGEN) Inhale 2 L/min. 24 -7   2025    omeprazole (priLOSEC) 20 MG capsule TAKE 1 CAPSULE BY MOUTH EVERY DAY 90 capsule 2 2025 Morning    sildenafil (Revatio) 20 MG tablet Take 0.5 tablets by mouth 3 (Three) Times a Day. 135 tablet 3 2025 Morning    spironolactone (ALDACTONE) 25 MG tablet TAKE 1 TABLET BY MOUTH EVERY DAY 90 tablet 0 2025 Morning         Allergies:  No Known Allergies    Social History:   Social History     Socioeconomic History    Marital status:    Tobacco Use    Smoking status: Former     Current packs/day: 0.00     Average packs/day: 1 pack/day for 20.0 years (20.0 ttl pk-yrs)     Types: Cigarettes     Start date: 10/1/1970     Quit date: 1990     Years since quittin.2     Passive exposure: Past    Smokeless  "tobacco: Never   Vaping Use    Vaping status: Never Used   Substance and Sexual Activity    Alcohol use: No    Drug use: No    Sexual activity: Not Currently     Partners: Male     Birth control/protection: Tubal ligation       Family History:  Family History   Problem Relation Age of Onset    No Known Problems Mother     Diabetes Father     Heart disease Father     Hypertension Father        Review of Systems:  Denies fevers or chills  Denies nausea or vomiting  No new vision or hearing changes  No chest pain  Cough and shortness of breath  No diarrhea, hematemesis or hematochezia, no dysuria or frequency  No musculoskeletal complaints  No heat or cold intolerance  No skin rashes  No dizziness or confusion.  No seizure activity  No new anxiety or depression  12 system review of systems performed and all else negative    Objective:    PHYSICAL EXAM:    /63 (BP Location: Right arm, Patient Position: Lying)   Pulse 89   Temp 98 °F (36.7 °C) (Oral)   Resp 16   Ht 162.6 cm (64\")   Wt 101 kg (222 lb 6.4 oz)   SpO2 93%   BMI 38.17 kg/m²  Body mass index is 38.17 kg/m². 93% 101 kg (222 lb 6.4 oz)    GENERAL APPEARANCE:   Well developed  Well nourished  No acute distress   EYES:    PERRL                                                                           Conjunctivae normal  Sclerae nonicteric.  HENT:   Atraumatic, normocephalic  External ears and nose normal  Moist mucous membranes and no ulcers  NECK:  Thyroid not enlarged  Trachea midline   RESPIRATORY:    Nonlabored breathing   Diminished bilateral breath sounds  No rales. No wheezing  No dullness  CARDIOVASCULAR:    RRR  Normal S1, S2  No murmur  Lower extremity edema: none    GI:   Bowel sounds normal  Abdomen soft , nondistended, nontender  No abdominal masses  MUSCULOSKELETAL:  Normal movement of extremities  No tenderness, no deformities  No clubbing or cyanosis   Skin:    No visible rashes  No palpable nodules  Cap refill normal.  No mottling. "   PSYCHIATRIC:  Speech and behavior appropriate  Normal mood and affect  Oriented to person, place and time  NEUROLOGIC:  Cranial nerves II through XII grossly intact.  Sensation intact.      Lab Review:   Results from last 7 days   Lab Units 04/21/25  0453 04/20/25  1748   WBC 10*3/mm3 28.50* 25.31*   HEMOGLOBIN g/dL 10.0* 10.6*   HEMATOCRIT % 32.4* 35.3   PLATELETS 10*3/mm3 220 213     Results from last 7 days   Lab Units 04/21/25  0453 04/20/25  1748   SODIUM mmol/L 135* 133*   POTASSIUM mmol/L 3.4* 3.8   CHLORIDE mmol/L 96* 95*   CO2 mmol/L 21.7* 19.0*   BUN mg/dL 15 12   CREATININE mg/dL 1.10* 1.16*   CALCIUM mg/dL 7.9* 8.6   BILIRUBIN mg/dL 1.0 1.1   ALK PHOS U/L 71 75   ALT (SGPT) U/L 14 16   AST (SGOT) U/L 20 26   GLUCOSE mg/dL 208* 259*         Lab 04/21/25  0744 04/21/25  0453 04/20/25  2313 04/20/25  1907   LACTATE 1.4 2.1* 2.9* 2.5*     Procalitonin Results:      Lab 04/20/25  1907   PROCALCITONIN 0.38*        04/21/2025 0559 04/21/2025 0657 S. Pneumo Ag Urine or CSF - Urine, Urine, Clean Catch [[id number]]   Urine, Clean Catch    Final result Component Value   Strep Pneumo Ag Negative             04/21/2025 0559 04/21/2025 0657 Legionella Antigen, Urine - Urine, Urine, Clean Catch [[id number]]   Urine, Clean Catch    Final result Component Value   LEGIONELLA ANTIGEN, URINE Negative                   Imaging reviewed  chest X-ray reviewed shows dense right perihilar fullness consistent with left lower lobe pneumonia.           Assessment:  Pneumonia  Sepsis  Chronic heart failure with preserved ejection fraction  Pulm hypertension  Asthma: Currently without exacerbation  Obesity    Recommendations:  Plan for CT chest to further evaluate hilar fullness suggestive of pneumonia.  Unable to rule out potential malignancy.    Continue antibiotics but only 3 days of antibiotics have been written and we will extend this to a 7-day course.  Send sputum for culture.  Wean oxygen for goal saturation greater than  90%.  Bronchodilators to help with clearance and for asthma symptoms.  Defer diuretics to admitting service for CHF.        Thank you for allowing me to participate in the care of this patient.  I will continue to follow along with you.      Rudy Berry MD  [facility], [facility]  Pulmonary and Critical Care Medicine    4/21/2025  15:07 EDT

## 2025-04-21 NOTE — PLAN OF CARE
Goal Outcome Evaluation:  Plan of Care Reviewed With: patient        Progress: no change  Outcome Evaluation: Pt to CVU presenting with SOA and nausea. Nausea treated per mar. Pt aox4, Sinus tach on monitor 120s-130s, O2 80s on 6L NC with increased SOA upon exertion. Placed on nonrebreather mask 15L contacted respiratory, Lactate elevated. On call [facility] notified, came to see patient. Positive sepsis, working through checklist, on Continuous fluids per mar, pulmonology consulted, contacted per provider. All other VSS, HR improved this morning 100s-90s Continue plan of care.

## 2025-04-21 NOTE — H&P
Patient Name:  Sally Rivera  YOB: 1950  MRN:  [id number]  Admit Date:  4/20/2025  Patient Care Team:  Edgar Mayers as PCP - General (Family Medicine)  Parker Sky MD as Consulting Physician (Gastroenterology)  Rudy Berry MD as Consulting Physician (Pulmonary Disease)  Heron Martínez MD PhD as Consulting Physician (Cardiology)  Cory Christensen MD as Referring Physician (Cardiology)      Subjective   History Present Illness     Chief Complaint   Patient presents with    Shortness of Breath    Vomiting    Nausea     History of Present Illness  Ms. Rivera is a 75 y.o. former smoker with a history of CAD, CHF, chronic respiratory failure with hypoxia, obesity, GERD, HLD, pulmonary hypertension, and non insulin-dependent type 2 diabetes that presents to [facility] complaining of worsening shortness of breath for the past 2 days.  Patient reports that she wears 2 L of supplemental oxygen at home and over the last couple days she has experienced worsening shortness of breath accompanied with a productive cough.  She denies any chest pain, fever, or chills.  She does have a history of CHF but denies any weight gain or bilateral lower extremity swelling.  Imaging performed in the emergency department shows a right lower lobe consolidation.  Labs obtained show white count of 25, lactate 2.5 and 2.9 respectively, Pro-Pancho 0.38.  RVP unremarkable.    Review of Systems   Constitutional:  Negative for chills and fever.   HENT:  Negative for congestion and rhinorrhea.    Eyes:  Negative for photophobia and visual disturbance.   Respiratory:  Positive for cough and shortness of breath.    Cardiovascular:  Negative for chest pain and palpitations.   Gastrointestinal:  Positive for nausea. Negative for constipation, diarrhea and vomiting.   Endocrine: Negative for cold intolerance and heat intolerance.   Genitourinary:  Negative for difficulty urinating and dysuria.    Musculoskeletal:  Negative for gait problem and joint swelling.   Skin:  Negative for rash and wound.   Neurological:  Negative for dizziness, light-headedness and headaches.   Psychiatric/Behavioral:  Negative for sleep disturbance and suicidal ideas.         Personal History     Past Medical History:   Diagnosis Date    Acid reflux     Asthma 2022    CAD (coronary artery disease)     per dr deepthi cesar office note -dd    CHF (congestive heart failure)     Cirrhosis     Diabetes mellitus type I     Diverticulitis     Elevated cholesterol     GERD (gastroesophageal reflux disease)     Heart attack     Hyperlipidemia     Hypertension     Hypertension     Liver disease     PONV (postoperative nausea and vomiting)      Past Surgical History:   Procedure Laterality Date    CARDIAC CATHETERIZATION N/A 2022    Procedure: Right Heart Cath with vasodilator as indicated. pHTN workup;  Surgeon: Waqar Rush MD;  Location:  [facility];  Service: Cardiovascular;  Laterality: N/A;    CHOLECYSTECTOMY      COLON SURGERY      bowel resection for obstruction    COLONOSCOPY      Dr. Sky    COLONOSCOPY N/A 10/07/2021    Procedure: COLONOSCOPY TO CECUM WITH COLD POLYPECTOMY;  Surgeon: Facundo Klein Jr., MD;  Location:  [facility];  Service: General;  Laterality: N/A;  PRE- H/O COLON POLYPS  POST-COLON POLYP, DIVERTICULOSIS    HYSTERECTOMY      MOUTH BIOPSY      OOPHORECTOMY      TUBAL ABDOMINAL LIGATION      US GUIDED LYMPH NODE BIOPSY  2020     Family History   Problem Relation Age of Onset    No Known Problems Mother     Diabetes Father     Heart disease Father     Hypertension Father      Social History     Tobacco Use    Smoking status: Former     Current packs/day: 0.00     Average packs/day: 1 pack/day for 20.0 years (20.0 ttl pk-yrs)     Types: Cigarettes     Start date: 10/1/1970     Quit date: 1990     Years since quittin.2     Passive  exposure: Past    Smokeless tobacco: Never   Vaping Use    Vaping status: Never Used   Substance Use Topics    Alcohol use: No    Drug use: No     No current facility-administered medications on file prior to encounter.     Current Outpatient Medications on File Prior to Encounter   Medication Sig Dispense Refill    aspirin 325 MG tablet Take 1 tablet by mouth Daily.      atorvastatin (LIPITOR) 40 MG tablet Take 1 tablet by mouth Daily. 200001 (Patient taking differently: Take 1 tablet by mouth Every Night. 200001) 90 tablet 3    cholecalciferol (VITAMIN D3) 25 MCG (1000 UT) tablet Take 1 tablet by mouth Daily.      dilTIAZem (CARDIZEM) 120 MG tablet Take 1 tablet by mouth 2 (Two) Times a Day.      Dulaglutide (Trulicity) 0.75 MG/0.5ML solution pen-injector INJECT 0.75MG (1 PEN) UNDER THE SKIN EVERY WEEK 12 mL 3    furosemide (LASIX) 20 MG tablet Take 1 tablet by mouth Daily. 90 tablet 1    metFORMIN (GLUCOPHAGE) 500 MG tablet Take 1 tablet by mouth 2 (Two) Times a Day With Meals.      metoprolol tartrate (LOPRESSOR) 25 MG tablet Take 1 tablet by mouth 2 (Two) Times a Day.      O2 (OXYGEN) Inhale 2 L/min. 24 -7      omeprazole (priLOSEC) 20 MG capsule TAKE 1 CAPSULE BY MOUTH EVERY DAY 90 capsule 2    sildenafil (Revatio) 20 MG tablet Take 0.5 tablets by mouth 3 (Three) Times a Day. 135 tablet 3    spironolactone (ALDACTONE) 25 MG tablet TAKE 1 TABLET BY MOUTH EVERY DAY 90 tablet 0     No Known Allergies    Objective    Objective     Vital Signs  Temp:  [98.3 °F (36.8 °C)-100.1 °F (37.8 °C)] 98.3 °F (36.8 °C)  Heart Rate:  [112-129] 129  Resp:  [20-26] 26  BP: (112-139)/(61-92) 139/82  SpO2:  [89 %-94 %] 92 %  on  Flow (L/min) (Oxygen Therapy):  [5-15] 15;   Device (Oxygen Therapy): nonrebreather mask  Body mass index is 38.42 kg/m².    Physical Exam  Vitals and nursing note reviewed.   Constitutional:       General: She is not in acute distress.     Appearance: She is well-developed. She is ill-appearing. She is not  toxic-appearing.   HENT:      Head: Normocephalic and atraumatic.   Eyes:      General: No scleral icterus.        Right eye: No discharge.         Left eye: No discharge.      Conjunctiva/sclera: Conjunctivae normal.   Neck:      Vascular: No JVD.   Cardiovascular:      Rate and Rhythm: Regular rhythm. Tachycardia present.      Heart sounds: Normal heart sounds. No murmur heard.     No friction rub. No gallop.   Pulmonary:      Effort: Pulmonary effort is normal. No respiratory distress.      Breath sounds: Normal breath sounds. No wheezing or rales.      Comments: 15 L nonrebreather  Abdominal:      General: Bowel sounds are normal. There is no distension.      Palpations: Abdomen is soft.      Tenderness: There is no abdominal tenderness. There is no guarding.   Musculoskeletal:         General: No tenderness or deformity. Normal range of motion.      Cervical back: Normal range of motion and neck supple.   Skin:     General: Skin is warm and dry.      Capillary Refill: Capillary refill takes less than 2 seconds.   Neurological:      Mental Status: She is alert and oriented to person, place, and time.   Psychiatric:         Behavior: Behavior normal.     Results Review:  I reviewed the patient's new clinical results.  I reviewed the patient's new imaging results and agree with the interpretation.  I reviewed the patient's other test results and agree with the interpretation  I personally viewed and interpreted the patient's EKG/Telemetry data  Discussed with ED provider.    Lab Results (last 24 hours)       Procedure Component Value Units Date/Time    CBC & Differential [[id number]]  (Abnormal) Collected: 04/20/25 1748    Specimen: Blood Updated: 04/20/25 1822    Narrative:      The following orders were created for panel order CBC & Differential.  Procedure                               Abnormality         Status                     ---------                               -----------         ------                      CBC Auto Differential[[id number]]        Abnormal            Final result                 Please view results for these tests on the individual orders.    Comprehensive Metabolic Panel [[id number]]  (Abnormal) Collected: 04/20/25 1748    Specimen: Blood Updated: 04/20/25 1839     Glucose 259 mg/dL      BUN 12 mg/dL      Creatinine 1.16 mg/dL      Sodium 133 mmol/L      Potassium 3.8 mmol/L      Comment: Slight hemolysis detected by analyzer. Result may be falsely elevated.        Chloride 95 mmol/L      CO2 19.0 mmol/L      Calcium 8.6 mg/dL      Total Protein 8.0 g/dL      Albumin 4.1 g/dL      ALT (SGPT) 16 U/L      AST (SGOT) 26 U/L      Comment: Slight hemolysis detected by analyzer. Result may be falsely elevated.        Alkaline Phosphatase 75 U/L      Total Bilirubin 1.1 mg/dL      Globulin 3.9 gm/dL      A/G Ratio 1.1 g/dL      BUN/Creatinine Ratio 10.3     Anion Gap 19.0 mmol/L      eGFR 49.6 mL/min/1.73     Narrative:      GFR Categories in Chronic Kidney Disease (CKD)      GFR Category          GFR (mL/min/1.73)    Interpretation  G1                     90 or greater         Normal or high (1)  G2                      60-89                Mild decrease (1)  G3a                   45-59                Mild to moderate decrease  G3b                   30-44                Moderate to severe decrease  G4                    15-29                Severe decrease  G5                    14 or less           Kidney failure          (1)In the absence of evidence of kidney disease, neither GFR category G1 or G2 fulfill the criteria for CKD.    eGFR calculation 2021 CKD-EPI creatinine equation, which does not include race as a factor    BNP [[id number]]  (Abnormal) Collected: 04/20/25 1748    Specimen: Blood Updated: 04/20/25 1825     proBNP 1,616.0 pg/mL     Narrative:      This assay is used as an aid in the diagnosis of individuals suspected of having heart failure. It can be used as an aid in the diagnosis of  acute decompensated heart failure (ADHF) in patients presenting with signs and symptoms of ADHF to the emergency department (ED). In addition, NT-proBNP of <300 pg/mL indicates ADHF is not likely.    Age Range Result Interpretation  NT-proBNP Concentration (pg/mL:      <50             Positive            >450                   Gray                 300-450                    Negative             <300    50-75           Positive            >900                  Gray                300-900                  Negative            <300      >75             Positive            >1800                  Gray                300-1800                  Negative            <300    High Sensitivity Troponin T [[id number]]  (Abnormal) Collected: 04/20/25 1748    Specimen: Blood Updated: 04/20/25 1825     HS Troponin T 18 ng/L     Narrative:      High Sensitive Troponin T Reference Range:  <14.0 ng/L- Negative Female for AMI  <22.0 ng/L- Negative Male for AMI  >=14 - Abnormal Female indicating possible myocardial injury.  >=22 - Abnormal Male indicating possible myocardial injury.   Clinicians would have to utilize clinical acumen, EKG, Troponin, and serial changes to determine if it is an Acute Myocardial Infarction or myocardial injury due to an underlying chronic condition.         CBC Auto Differential [[id number]]  (Abnormal) Collected: 04/20/25 1748    Specimen: Blood Updated: 04/20/25 1822     WBC 25.31 10*3/mm3      RBC 4.82 10*6/mm3      Hemoglobin 10.6 g/dL      Hematocrit 35.3 %      MCV 73.2 fL      MCH 22.0 pg      MCHC 30.0 g/dL      RDW 16.6 %      RDW-SD 43.4 fl      MPV 9.5 fL      Platelets 213 10*3/mm3      nRBC 0.0 /100 WBC     Lipase [[id number]]  (Normal) Collected: 04/20/25 1748    Specimen: Blood Updated: 04/20/25 1825     Lipase 13 U/L     Respiratory Panel PCR w/COVID-19(SARS-CoV-2) [facility] In-House, NP Swab in [facility] 2 HR TAT - Swab, Nasopharynx [[id number]]  (Normal) Collected: 04/20/25 1748     Specimen: Swab from Nasopharynx Updated: 04/20/25 1932     ADENOVIRUS, PCR Not Detected     Coronavirus 229E Not Detected     Coronavirus HKU1 Not Detected     Coronavirus NL63 Not Detected     Coronavirus OC43 Not Detected     COVID19 Not Detected     Human Metapneumovirus Not Detected     Human Rhinovirus/Enterovirus Not Detected     Influenza A PCR Not Detected     Influenza B PCR Not Detected     Parainfluenza Virus 1 Not Detected     Parainfluenza Virus 2 Not Detected     Parainfluenza Virus 3 Not Detected     Parainfluenza Virus 4 Not Detected     RSV, PCR Not Detected     Bordetella pertussis pcr Not Detected     Bordetella parapertussis PCR Not Detected     Chlamydophila pneumoniae PCR Not Detected     Mycoplasma pneumo by PCR Not Detected    Narrative:      In the setting of a positive respiratory panel with a viral infection PLUS a negative procalcitonin without other underlying concern for bacterial infection, consider observing off antibiotics or discontinuation of antibiotics and continue supportive care. If the respiratory panel is positive for atypical bacterial infection (Bordetella pertussis, Chlamydophila pneumoniae, or Mycoplasma pneumoniae), consider antibiotic de-escalation to target atypical bacterial infection.    Manual Differential [[id number]]  (Abnormal) Collected: 04/20/25 1748    Specimen: Blood Updated: 04/20/25 1930     Neutrophil % 92.9 %      Lymphocyte % 4.0 %      Monocyte % 3.0 %      Eosinophil % 0.0 %      Basophil % 0.0 %      Neutrophils Absolute 23.51 10*3/mm3      Lymphocytes Absolute 1.01 10*3/mm3      Monocytes Absolute 0.76 10*3/mm3      Eosinophils Absolute 0.00 10*3/mm3      Basophils Absolute 0.00 10*3/mm3      RBC Morphology Normal     WBC Morphology Normal     Platelet Morphology Normal    Blood Culture - Blood, Arm, Right [[id number]] Collected: 04/20/25 1839    Specimen: Blood from Arm, Right Updated: 04/20/25 1852    Blood Culture - Blood, Arm, Right [[id number]]  "Collected: 04/20/25 1907    Specimen: Blood from Arm, Right Updated: 04/20/25 1912    Lactic Acid, Plasma [[id number]]  (Abnormal) Collected: 04/20/25 1907    Specimen: Blood from Arm, Right Updated: 04/20/25 1945     Lactate 2.5 mmol/L     High Sensitivity Troponin T 1Hr [[id number]]  (Abnormal) Collected: 04/20/25 1907    Specimen: Blood from Arm, Right Updated: 04/20/25 1937     HS Troponin T 14 ng/L      Troponin T Numeric Delta -4 ng/L      Troponin T % Delta -22    Narrative:      High Sensitive Troponin T Reference Range:  <14.0 ng/L- Negative Female for AMI  <22.0 ng/L- Negative Male for AMI  >=14 - Abnormal Female indicating possible myocardial injury.  >=22 - Abnormal Male indicating possible myocardial injury.   Clinicians would have to utilize clinical acumen, EKG, Troponin, and serial changes to determine if it is an Acute Myocardial Infarction or myocardial injury due to an underlying chronic condition.         Procalcitonin [[id number]]  (Abnormal) Collected: 04/20/25 1907    Specimen: Blood from Arm, Right Updated: 04/20/25 2103     Procalcitonin 0.38 ng/mL     Narrative:      As a Marker for Sepsis (Non-Neonates):    1. <0.5 ng/mL represents a low risk of severe sepsis and/or septic shock.  2. >2 ng/mL represents a high risk of severe sepsis and/or septic shock.    As a Marker for Lower Respiratory Tract Infections that require antibiotic therapy:    PCT on Admission    Antibiotic Therapy       6-12 Hrs later    >0.5                Strongly Recommended  >0.25 - <0.5        Recommended   0.1 - 0.25          Discouraged              Remeasure/reassess PCT  <0.1                Strongly Discouraged     Remeasure/reassess PCT    As 28 day mortality risk marker: \"Change in Procalcitonin Result\" (>80% or <=80%) if Day 0 (or Day 1) and Day 4 values are available. Refer to http://www.[facility]-pct-calculator.com    Change in PCT <=80%  A decrease of PCT levels below or equal to 80% defines a positive change in PCT " test result representing a higher risk for 28-day all-cause mortality of patients diagnosed with severe sepsis for septic shock.    Change in PCT >80%  A decrease of PCT levels of more than 80% defines a negative change in PCT result representing a lower risk for 28-day all-cause mortality of patients diagnosed with severe sepsis or septic shock.       POC Glucose Once [[id number]]  (Abnormal) Collected: 04/20/25 2243    Specimen: Blood Updated: 04/20/25 2244     Glucose 263 mg/dL     STAT Lactic Acid, Reflex [[id number]]  (Abnormal) Collected: 04/20/25 2313    Specimen: Blood Updated: 04/21/25 0011     Lactate 2.9 mmol/L             Imaging Results (Last 24 Hours)       Procedure Component Value Units Date/Time    XR Chest 1 View [[id number]] Collected: 04/20/25 1824     Updated: 04/20/25 1828    Narrative:      CXR ONE VIEW      HISTORY: SOA Triage Protocol     COMPARISON: 8/18/2024     TECHNIQUE: single portable AP       Impression:         The heart size is within normal limits.     The lungs are normally aerated. There is no pleural effusion or  pneumothorax.     Mild left pulmonary interstitial prominence.     Dense right perihilar fullness, suspect right lower lobe consolidation.  Consider chest CT for further evaluation.     This report was finalized on 4/20/2025 6:25 PM by Dr. Richie Brewer M.D on Workstation: [id number]               Results for orders placed during the hospital encounter of 12/10/24    Adult Transthoracic Echo Complete W/ Cont if Necessary Per Protocol    Interpretation Summary    Left ventricular systolic function is low normal. Calculated left ventricular EF = 50.5%. Left ventricular ejection fraction appears to be 46 - 50%.    Left ventricular diastolic function is consistent with (grade I) impaired relaxation.    Estimated right ventricular systolic pressure from tricuspid regurgitation is mildly elevated (35-45 mmHg).    Global longitudinal LV strain (GLS) = -18.4%.      ECG 12  Lead ED Triage Standing Order; SOA   Preliminary Result   HEART XKDW=699  bpm   RR Ponkrdrr=708  ms   NJ Llxtnqvr=128  ms   P Horizontal Axis=27  deg   P Front Axis=64  deg   QRSD Interval=92  ms   QT Euirdqxz=267  ms   ZPrB=686  ms   QRS Axis=20  deg   T Wave Axis=126  deg   - ABNORMAL ECG -   Sinus tachycardia   Ventricular premature complex   Repol abnrm suggests ischemia, lateral leads   Date and Time of Study:2025-04-20 17:36:39           Assessment/Plan     Active Hospital Problems    Diagnosis  POA    **Pneumonia [J18.9]  Yes    Sepsis [A41.9]  Yes    Chronic heart failure with preserved ejection fraction (HFpEF) [I50.32]  Yes    Acute on chronic respiratory failure with hypoxia [J96.21]  Yes    Pulmonary hypertension [I27.20]  Yes    Class 2 severe obesity with serious comorbidity in adult [E66.812, E66.01]  Yes    Chronic coronary artery disease [I25.10]  Yes    Gastroesophageal reflux disease [K21.9]  Yes    Hyperlipidemia [E78.5]  Yes    Type 2 diabetes mellitus with hyperglycemia, without long-term current use of insulin [E11.65]  Yes      Resolved Hospital Problems   No resolved problems to display.     Ms. Rivera is a 75 y.o. former smoker with a history of  CAD, CHF, chronic respiratory failure with hypoxia, obesity, GERD, HLD, pulmonary hypertension, and non insulin-dependent type 2 diabetes who presents with worsening dyspnea and productive cough found to have pneumonia.    Sepsis secondary to community-acquired pneumonia  Acute on chronic respiratory failure  -Labs show white count 25, Pro-Pancho 0.38, lactate 2.5 and 2.9 respectively.  IV bolus has been ordered.  -RVP negative.  Will discontinue azithromycin and continue IV Rocephin   -Check urine for Legionella and S pneumoniae   -Check for MRSA swab of nares   -Respiratory culture  -Supplemental O2 as needed  -Currently on 15 L nonrebreather.  Will consult pulmonology    Chronic diastolic congestive heart failure  -Patient denies any recent  weight gain or lower extremity edema.  BNP elevated upon admission.  She did receive 1 dose of IV Lasix in the ED by the ED provider.  Will hold on any further diuretics for now.  -Daily weights  -Strict I's and O  -Resume home regimen    Type 2 diabetes mellitus  -Accu-Cheks 4 times a day with meals and at bedtime  -Moderate dose correctional factor with hypoglycemic protocol  -Check hemoglobin A1c  -Hold oral diabetic medication    CAD  HLD  -Denies chest pain  -Continue statin therapy    I discussed the patient's findings and my recommendations with patient and ED provider.    VTE Prophylaxis - SCDs.  Code Status - Full code.       KHALIF Hanson  [facility]  04/21/25  01:26 EDT

## 2025-04-22 ENCOUNTER — APPOINTMENT (OUTPATIENT)
Dept: CT IMAGING | Facility: HOSPITAL | Age: 75
DRG: 871 | End: 2025-04-22
Payer: MEDICARE

## 2025-04-22 LAB
ANION GAP SERPL CALCULATED.3IONS-SCNC: 10 MMOL/L (ref 5–15)
BASOPHILS # BLD AUTO: 0.05 10*3/MM3 (ref 0–0.2)
BASOPHILS NFR BLD AUTO: 0.3 % (ref 0–1.5)
BUN SERPL-MCNC: 14 MG/DL (ref 8–23)
BUN/CREAT SERPL: 15.7 (ref 7–25)
CALCIUM SPEC-SCNC: 7.8 MG/DL (ref 8.6–10.5)
CHLORIDE SERPL-SCNC: 99 MMOL/L (ref 98–107)
CO2 SERPL-SCNC: 23 MMOL/L (ref 22–29)
CREAT SERPL-MCNC: 0.89 MG/DL (ref 0.57–1)
DEPRECATED RDW RBC AUTO: 42.4 FL (ref 37–54)
EGFRCR SERPLBLD CKD-EPI 2021: 68.1 ML/MIN/1.73
EOSINOPHIL # BLD AUTO: 0.21 10*3/MM3 (ref 0–0.4)
EOSINOPHIL NFR BLD AUTO: 1.2 % (ref 0.3–6.2)
ERYTHROCYTE [DISTWIDTH] IN BLOOD BY AUTOMATED COUNT: 16.8 % (ref 12.3–15.4)
GLUCOSE BLDC GLUCOMTR-MCNC: 174 MG/DL (ref 70–130)
GLUCOSE BLDC GLUCOMTR-MCNC: 251 MG/DL (ref 70–130)
GLUCOSE BLDC GLUCOMTR-MCNC: 295 MG/DL (ref 70–130)
GLUCOSE BLDC GLUCOMTR-MCNC: 296 MG/DL (ref 70–130)
GLUCOSE SERPL-MCNC: 182 MG/DL (ref 65–99)
HCT VFR BLD AUTO: 30.2 % (ref 34–46.6)
HGB BLD-MCNC: 9.3 G/DL (ref 12–15.9)
IMM GRANULOCYTES # BLD AUTO: 0.19 10*3/MM3 (ref 0–0.05)
IMM GRANULOCYTES NFR BLD AUTO: 1 % (ref 0–0.5)
LYMPHOCYTES # BLD AUTO: 1.97 10*3/MM3 (ref 0.7–3.1)
LYMPHOCYTES NFR BLD AUTO: 10.8 % (ref 19.6–45.3)
MAGNESIUM SERPL-MCNC: 2.6 MG/DL (ref 1.6–2.4)
MCH RBC QN AUTO: 21.7 PG (ref 26.6–33)
MCHC RBC AUTO-ENTMCNC: 30.8 G/DL (ref 31.5–35.7)
MCV RBC AUTO: 70.4 FL (ref 79–97)
MONOCYTES # BLD AUTO: 0.78 10*3/MM3 (ref 0.1–0.9)
MONOCYTES NFR BLD AUTO: 4.3 % (ref 5–12)
NEUTROPHILS NFR BLD AUTO: 15.02 10*3/MM3 (ref 1.7–7)
NEUTROPHILS NFR BLD AUTO: 82.4 % (ref 42.7–76)
PLATELET # BLD AUTO: 178 10*3/MM3 (ref 140–450)
PMV BLD AUTO: 9.9 FL (ref 6–12)
POTASSIUM SERPL-SCNC: 3.5 MMOL/L (ref 3.5–5.2)
POTASSIUM SERPL-SCNC: 3.8 MMOL/L (ref 3.5–5.2)
RBC # BLD AUTO: 4.29 10*6/MM3 (ref 3.77–5.28)
SODIUM SERPL-SCNC: 132 MMOL/L (ref 136–145)
WBC NRBC COR # BLD AUTO: 18.22 10*3/MM3 (ref 3.4–10.8)

## 2025-04-22 PROCEDURE — 83735 ASSAY OF MAGNESIUM: CPT | Performed by: INTERNAL MEDICINE

## 2025-04-22 PROCEDURE — 25010000002 CEFTRIAXONE PER 250 MG: Performed by: INTERNAL MEDICINE

## 2025-04-22 PROCEDURE — 63710000001 INSULIN LISPRO (HUMAN) PER 5 UNITS: Performed by: INTERNAL MEDICINE

## 2025-04-22 PROCEDURE — 94761 N-INVAS EAR/PLS OXIMETRY MLT: CPT

## 2025-04-22 PROCEDURE — 71275 CT ANGIOGRAPHY CHEST: CPT

## 2025-04-22 PROCEDURE — 25510000001 IOPAMIDOL PER 1 ML: Performed by: STUDENT IN AN ORGANIZED HEALTH CARE EDUCATION/TRAINING PROGRAM

## 2025-04-22 PROCEDURE — 97166 OT EVAL MOD COMPLEX 45 MIN: CPT

## 2025-04-22 PROCEDURE — 63710000001 INSULIN GLARGINE PER 5 UNITS: Performed by: STUDENT IN AN ORGANIZED HEALTH CARE EDUCATION/TRAINING PROGRAM

## 2025-04-22 PROCEDURE — 82948 REAGENT STRIP/BLOOD GLUCOSE: CPT

## 2025-04-22 PROCEDURE — 85025 COMPLETE CBC W/AUTO DIFF WBC: CPT | Performed by: INTERNAL MEDICINE

## 2025-04-22 PROCEDURE — 94664 DEMO&/EVAL PT USE INHALER: CPT

## 2025-04-22 PROCEDURE — 94799 UNLISTED PULMONARY SVC/PX: CPT

## 2025-04-22 PROCEDURE — 25010000002 ENOXAPARIN PER 10 MG: Performed by: INTERNAL MEDICINE

## 2025-04-22 PROCEDURE — 99232 SBSQ HOSP IP/OBS MODERATE 35: CPT | Performed by: INTERNAL MEDICINE

## 2025-04-22 PROCEDURE — 84132 ASSAY OF SERUM POTASSIUM: CPT | Performed by: STUDENT IN AN ORGANIZED HEALTH CARE EDUCATION/TRAINING PROGRAM

## 2025-04-22 PROCEDURE — 80048 BASIC METABOLIC PNL TOTAL CA: CPT | Performed by: INTERNAL MEDICINE

## 2025-04-22 RX ORDER — IOPAMIDOL 755 MG/ML
100 INJECTION, SOLUTION INTRAVASCULAR
Status: COMPLETED | OUTPATIENT
Start: 2025-04-22 | End: 2025-04-22

## 2025-04-22 RX ORDER — SILDENAFIL CITRATE 20 MG/1
20 TABLET ORAL 3 TIMES DAILY
Status: DISCONTINUED | OUTPATIENT
Start: 2025-04-22 | End: 2025-04-24 | Stop reason: HOSPADM

## 2025-04-22 RX ORDER — CALCIUM CARBONATE 500 MG/1
2 TABLET, CHEWABLE ORAL 3 TIMES DAILY PRN
Status: DISCONTINUED | OUTPATIENT
Start: 2025-04-22 | End: 2025-04-24 | Stop reason: HOSPADM

## 2025-04-22 RX ORDER — FUROSEMIDE 40 MG/1
40 TABLET ORAL DAILY
Status: DISCONTINUED | OUTPATIENT
Start: 2025-04-23 | End: 2025-04-24 | Stop reason: HOSPADM

## 2025-04-22 RX ORDER — POTASSIUM CHLORIDE 1500 MG/1
40 TABLET, EXTENDED RELEASE ORAL EVERY 4 HOURS
Status: COMPLETED | OUTPATIENT
Start: 2025-04-22 | End: 2025-04-22

## 2025-04-22 RX ADMIN — ALUMINUM HYDROXIDE, MAGNESIUM HYDROXIDE, AND DIMETHICONE 15 ML: 400; 400; 40 SUSPENSION ORAL at 18:31

## 2025-04-22 RX ADMIN — POTASSIUM CHLORIDE 40 MEQ: 1500 TABLET, EXTENDED RELEASE ORAL at 07:53

## 2025-04-22 RX ADMIN — IPRATROPIUM BROMIDE AND ALBUTEROL SULFATE 3 ML: .5; 3 SOLUTION RESPIRATORY (INHALATION) at 12:20

## 2025-04-22 RX ADMIN — SILDENAFIL 20 MG: 20 TABLET, FILM COATED ORAL at 16:08

## 2025-04-22 RX ADMIN — IOPAMIDOL 90 ML: 755 INJECTION, SOLUTION INTRAVENOUS at 09:45

## 2025-04-22 RX ADMIN — IPRATROPIUM BROMIDE AND ALBUTEROL SULFATE 3 ML: .5; 3 SOLUTION RESPIRATORY (INHALATION) at 16:27

## 2025-04-22 RX ADMIN — CEFTRIAXONE 2000 MG: 2 INJECTION, POWDER, FOR SOLUTION INTRAMUSCULAR; INTRAVENOUS at 16:44

## 2025-04-22 RX ADMIN — INSULIN LISPRO 6 UNITS: 100 INJECTION, SOLUTION INTRAVENOUS; SUBCUTANEOUS at 12:55

## 2025-04-22 RX ADMIN — DILTIAZEM HYDROCHLORIDE 120 MG: 30 TABLET, FILM COATED ORAL at 07:53

## 2025-04-22 RX ADMIN — SILDENAFIL 20 MG: 20 TABLET, FILM COATED ORAL at 20:29

## 2025-04-22 RX ADMIN — SILDENAFIL 20 MG: 20 TABLET, FILM COATED ORAL at 07:52

## 2025-04-22 RX ADMIN — INSULIN LISPRO 6 UNITS: 100 INJECTION, SOLUTION INTRAVENOUS; SUBCUTANEOUS at 16:43

## 2025-04-22 RX ADMIN — ENOXAPARIN SODIUM 40 MG: 100 INJECTION SUBCUTANEOUS at 13:32

## 2025-04-22 RX ADMIN — IPRATROPIUM BROMIDE AND ALBUTEROL SULFATE 3 ML: .5; 3 SOLUTION RESPIRATORY (INHALATION) at 08:03

## 2025-04-22 RX ADMIN — PANTOPRAZOLE SODIUM 40 MG: 40 TABLET, DELAYED RELEASE ORAL at 06:29

## 2025-04-22 RX ADMIN — ANTACID TABLETS 2 TABLET: 500 TABLET, CHEWABLE ORAL at 16:08

## 2025-04-22 RX ADMIN — INSULIN LISPRO 2 UNITS: 100 INJECTION, SOLUTION INTRAVENOUS; SUBCUTANEOUS at 07:53

## 2025-04-22 RX ADMIN — POTASSIUM CHLORIDE 40 MEQ: 1500 TABLET, EXTENDED RELEASE ORAL at 12:55

## 2025-04-22 RX ADMIN — IPRATROPIUM BROMIDE AND ALBUTEROL SULFATE 3 ML: .5; 3 SOLUTION RESPIRATORY (INHALATION) at 20:03

## 2025-04-22 RX ADMIN — INSULIN GLARGINE 10 UNITS: 100 INJECTION, SOLUTION SUBCUTANEOUS at 16:43

## 2025-04-22 RX ADMIN — INSULIN LISPRO 6 UNITS: 100 INJECTION, SOLUTION INTRAVENOUS; SUBCUTANEOUS at 20:29

## 2025-04-22 RX ADMIN — METOPROLOL TARTRATE 25 MG: 25 TABLET, FILM COATED ORAL at 20:28

## 2025-04-22 RX ADMIN — GUAIFENESIN 1200 MG: 600 TABLET, EXTENDED RELEASE ORAL at 07:52

## 2025-04-22 RX ADMIN — DILTIAZEM HYDROCHLORIDE 120 MG: 30 TABLET, FILM COATED ORAL at 20:28

## 2025-04-22 RX ADMIN — GUAIFENESIN 1200 MG: 600 TABLET, EXTENDED RELEASE ORAL at 20:28

## 2025-04-22 RX ADMIN — ATORVASTATIN CALCIUM 40 MG: 20 TABLET, FILM COATED ORAL at 20:29

## 2025-04-22 RX ADMIN — ASPIRIN 325 MG ORAL TABLET 325 MG: 325 PILL ORAL at 07:52

## 2025-04-22 NOTE — PROGRESS NOTES
Name: Sally iRvera ADMIT: 2025   : 1950  PCP: Edgar Mayers    MRN: [id number] LOS: 2 days   AGE/SEX: 75 y.o. female  ROOM: [facility]     Subjective   Subjective   Patient resting in bed.  Having some shortness of breath/fatigue.   at bedside.  No nausea or vomiting.  Tolerating a diet.    Review of Systems  As above     Objective   Objective   Vital Signs  Temp:  [97.3 °F (36.3 °C)-98.4 °F (36.9 °C)] 97.3 °F (36.3 °C)  Heart Rate:  [] 88  Resp:  [16-24] 20  BP: ()/(55-65) 106/64  SpO2:  [84 %-100 %] 100 %  on  Flow (L/min) (Oxygen Therapy):  [8-13] 8;   Device (Oxygen Therapy): high-flow nasal cannula  Body mass index is 38.56 kg/m².  Physical Exam  Constitutional:       General: She is not in acute distress.     Appearance: She is ill-appearing.   Cardiovascular:      Rate and Rhythm: Normal rate and regular rhythm.   Pulmonary:      Effort: Pulmonary effort is normal. No respiratory distress.      Comments: Coarse breath sounds bilaterally  Abdominal:      General: Abdomen is flat. There is no distension.      Tenderness: There is no abdominal tenderness.   Musculoskeletal:         General: No swelling or deformity. Normal range of motion.   Skin:     General: Skin is warm and dry.   Neurological:      General: No focal deficit present.      Mental Status: She is alert. Mental status is at baseline.         Results Review     I reviewed the patient's new clinical results.  Results from last 7 days   Lab Units 25  0515 25  0453 25  1748   WBC 10*3/mm3 18.22* 28.50* 25.31*   HEMOGLOBIN g/dL 9.3* 10.0* 10.6*   PLATELETS 10*3/mm3 178 220 213     Results from last 7 days   Lab Units 25  0515 25  0453 25  1748   SODIUM mmol/L 132* 135* 133*   POTASSIUM mmol/L 3.5 3.4* 3.8   CHLORIDE mmol/L 99 96* 95*   CO2 mmol/L 23.0 21.7* 19.0*   BUN mg/dL 14 15 12   CREATININE mg/dL 0.89 1.10* 1.16*   GLUCOSE mg/dL 182* 208* 259*   Estimated Creatinine  Clearance: 64.4 mL/min (by C-G formula based on SCr of 0.89 mg/dL).  Results from last 7 days   Lab Units 04/21/25  0453 04/20/25  1748   ALBUMIN g/dL 3.5 4.1   BILIRUBIN mg/dL 1.0 1.1   ALK PHOS U/L 71 75   AST (SGOT) U/L 20 26   ALT (SGPT) U/L 14 16     Results from last 7 days   Lab Units 04/22/25  0515 04/21/25  1552 04/21/25  0453 04/20/25  1748   CALCIUM mg/dL 7.8*  --  7.9* 8.6   ALBUMIN g/dL  --   --  3.5 4.1   MAGNESIUM mg/dL 2.6* 1.1*  --   --    PHOSPHORUS mg/dL  --  2.4*  --   --      Results from last 7 days   Lab Units 04/21/25  0744 04/21/25  0453 04/20/25  2313 04/20/25  1907   PROCALCITONIN ng/mL  --   --   --  0.38*   LACTATE mmol/L 1.4 2.1* 2.9* 2.5*     COVID19   Date Value Ref Range Status   04/20/2025 Not Detected Not Detected - Ref. Range Final   03/23/2024 Detected (C) Not Detected - Ref. Range Final     Hemoglobin A1C   Date/Time Value Ref Range Status   04/21/2025 1552 8.10 (H) 4.80 - 5.60 % Final     Glucose   Date/Time Value Ref Range Status   04/22/2025 1026 296 (H) 70 - 130 mg/dL Final   04/22/2025 0633 174 (H) 70 - 130 mg/dL Final   04/21/2025 2124 248 (H) 70 - 130 mg/dL Final   04/21/2025 1540 195 (H) 70 - 130 mg/dL Final   04/21/2025 1053 255 (H) 70 - 130 mg/dL Final   04/21/2025 0623 205 (H) 70 - 130 mg/dL Final   04/20/2025 2243 263 (H) 70 - 130 mg/dL Final       XR Chest 1 View  Narrative: CXR ONE VIEW      HISTORY: SOA Triage Protocol     COMPARISON: 8/18/2024     TECHNIQUE: single portable AP     Impression:    The heart size is within normal limits.     The lungs are normally aerated. There is no pleural effusion or  pneumothorax.     Mild left pulmonary interstitial prominence.     Dense right perihilar fullness, suspect right lower lobe consolidation.  Consider chest CT for further evaluation.     This report was finalized on 4/20/2025 6:25 PM by Dr. Richie Brewer M.D on Workstation: [id number]       I reviewed the patient's daily medications.  Scheduled  Medications  aspirin, 325 mg, Oral, Daily  atorvastatin, 40 mg, Oral, Nightly  cefTRIAXone, 2,000 mg, Intravenous, Q24H  dilTIAZem, 120 mg, Oral, Q12H  enoxaparin sodium, 40 mg, Subcutaneous, Q24H  guaiFENesin, 1,200 mg, Oral, Q12H  insulin lispro, 2-9 Units, Subcutaneous, 4x Daily AC & at Bedtime  ipratropium-albuterol, 3 mL, Nebulization, 4x Daily - RT  metoprolol tartrate, 25 mg, Oral, BID  pantoprazole, 40 mg, Oral, Q AM  sildenafil, 20 mg, Oral, TID    Infusions   Diet  Diet: Cardiac, Diabetic; Healthy Heart (2-3 Na+); Consistent Carbohydrate; Fluid Consistency: Thin (IDDSI 0)         I have personally reviewed:  [x]  Laboratory   [x]  Microbiology   [x]  Radiology   []  EKG/Telemetry   []  Cardiology/Vascular   []  Pathology   [x]  Records     Assessment/Plan     Active Hospital Problems    Diagnosis  POA    **Pneumonia [J18.9]  Yes    Sepsis [A41.9]  Yes    CKD (chronic kidney disease) stage 3, GFR 30-59 ml/min [N18.30]  Unknown    Chronic heart failure with preserved ejection fraction (HFpEF) [I50.32]  Yes    Acute on chronic respiratory failure with hypoxia [J96.21]  Yes    Pulmonary hypertension [I27.20]  Yes    Class 2 severe obesity with serious comorbidity in adult [E66.812, E66.01]  Yes    Chronic coronary artery disease [I25.10]  Yes    Gastroesophageal reflux disease [K21.9]  Yes    Hyperlipidemia [E78.5]  Yes    Type 2 diabetes mellitus with hyperglycemia, without long-term current use of insulin [E11.65]  Yes      Resolved Hospital Problems   No resolved problems to display.       75 y.o. female admitted with Pneumonia.    Acute on chronic (2 L at baseline) hypoxic respiratory failure  Bilateral pneumonia with sepsis  - CTA chest pending.  Continue Rocephin, DuoNebs, Mucinex.  - Pulmonary following  - On 9 L, titrate as tolerated    Chronic diastolic heart failure  Coronary artery disease  Hypertension  Pulmonary hypertension  - Continue home sildenafil  - Cardiology consulted, appreciate  recommendations.  Spot dosing diuretics  - Continue Cardizem, aspirin, atorvastatin, metoprolol    Diabetes type 2, A1c 8.1%  - Sliding scale insulin, titrate as needed    CKD 3A, stable, baseline    Lovenox 40 mg SC daily for DVT prophylaxis.  Full code.  Discussed CODE STATUS with patient and .  They are to consider their options.  Discussed with patient and family.  Anticipate discharge home with HH vs SNU facility in 2-3 days.    Expected Discharge Date: 4/25/2025; Expected Discharge Time: 12:00 PM      Seth Dixon MD  [facility]  04/22/25  13:24 EDT

## 2025-04-22 NOTE — PROGRESS NOTES
[facility] INPATIENT PROGRESS NOTE         [address]    2025      PATIENT IDENTIFICATION:  Name: Sally Rivera ADMIT: 2025   : 1950  PCP: Edgar Mayers    MRN: [id number] LOS: 2 days   AGE/SEX: 75 y.o. female  ROOM: [facility]                     LOS 2    Reason for visit: Sepsis with acute on chronic hypoxemic respiratory failure due to bilateral pneumonia      SUBJECTIVE:      Has moderate cough still.  Says that she is producing sputum better today.  Oxygen at 9 L at time of visit.  CT chest still pending.    Objective   OBJECTIVE:    Vital Sign Min/Max for last 24 hours  Temp  Min: 97.5 °F (36.4 °C)  Max: 98.4 °F (36.9 °C)   BP  Min: 99/64  Max: 121/63   Pulse  Min: 80  Max: 102   Resp  Min: 16  Max: 24   SpO2  Min: 84 %  Max: 97 %   No data recorded   Weight  Min: 102 kg (224 lb 10.4 oz)  Max: 102 kg (224 lb 10.4 oz)    Vitals:    25 0637 25 0700 25 0803 25 0807   BP:  99/64     BP Location:  Left arm     Patient Position:  Lying     Pulse:  83 80 82   Resp:  20 20 20   Temp:  97.5 °F (36.4 °C)     TempSrc:  Oral     SpO2:  (!) 84% 90% 95%   Weight: 102 kg (224 lb 10.4 oz)      Height:                25  2141 25  0500 25  0637   Weight: 102 kg (223 lb 12.8 oz) 101 kg (222 lb 6.4 oz) 102 kg (224 lb 10.4 oz)       Body mass index is 38.56 kg/m².                          Body mass index is 38.56 kg/m².    Intake/Output Summary (Last 24 hours) at 2025 0904  Last data filed at 2025 0752  Gross per 24 hour   Intake 480 ml   Output 500 ml   Net -20 ml         Exam:  GEN:  No distress, appears stated age  EYES:   PERRL, anicteric sclerae  ENT:    External ears/nose normal, OP clear  NECK:  No adenopathy, midline trachea  LUNGS: Normal chest on inspection, palpation and auscultation  CV:  Normal S1S2, without murmur  ABD:  Nontender, nondistended, no hepatosplenomegaly, +BS  EXT:  No edema.  No cyanosis or clubbing.  No mottling  and normal cap refill.    Assessment     Scheduled meds:  aspirin, 325 mg, Oral, Daily  atorvastatin, 40 mg, Oral, Nightly  cefTRIAXone, 2,000 mg, Intravenous, Q24H  dilTIAZem, 120 mg, Oral, Q12H  enoxaparin sodium, 40 mg, Subcutaneous, Q24H  guaiFENesin, 1,200 mg, Oral, Q12H  insulin lispro, 2-9 Units, Subcutaneous, 4x Daily AC & at Bedtime  ipratropium-albuterol, 3 mL, Nebulization, 4x Daily - RT  metoprolol tartrate, 25 mg, Oral, BID  pantoprazole, 40 mg, Oral, Q AM  potassium chloride ER, 40 mEq, Oral, Q4H  sildenafil, 20 mg, Oral, TID      IV meds:                         Data Review:  Results from last 7 days   Lab Units 04/22/25  0515 04/21/25 0453 04/20/25  1748   SODIUM mmol/L 132* 135* 133*   POTASSIUM mmol/L 3.5 3.4* 3.8   CHLORIDE mmol/L 99 96* 95*   CO2 mmol/L 23.0 21.7* 19.0*   BUN mg/dL 14 15 12   CREATININE mg/dL 0.89 1.10* 1.16*   GLUCOSE mg/dL 182* 208* 259*   CALCIUM mg/dL 7.8* 7.9* 8.6         Estimated Creatinine Clearance: 64.4 mL/min (by C-G formula based on SCr of 0.89 mg/dL).  Results from last 7 days   Lab Units 04/22/25  0515 04/21/25 0453 04/20/25  1748   WBC 10*3/mm3 18.22* 28.50* 25.31*   HEMOGLOBIN g/dL 9.3* 10.0* 10.6*   PLATELETS 10*3/mm3 178 220 213         Results from last 7 days   Lab Units 04/21/25  0453 04/20/25  1748   ALT (SGPT) U/L 14 16   AST (SGOT) U/L 20 26     Results from last 7 days   Lab Units 04/21/25  0158   PH, ARTERIAL pH units 7.477*   PO2 ART mm Hg 72.0*   PCO2, ARTERIAL mm Hg 32.9*   HCO3 ART mmol/L 24.3     Results from last 7 days   Lab Units 04/21/25  0744 04/21/25  0453 04/20/25  2313 04/20/25  1907   PROCALCITONIN ng/mL  --   --   --  0.38*   LACTATE mmol/L 1.4 2.1* 2.9* 2.5*         Hemoglobin A1C   Date/Time Value Ref Range Status   04/21/2025 1552 8.10 (H) 4.80 - 5.60 % Final     Glucose   Date/Time Value Ref Range Status   04/22/2025 0633 174 (H) 70 - 130 mg/dL Final   04/21/2025 2124 248 (H) 70 - 130 mg/dL Final   04/21/2025 1540 195 (H) 70 - 130  mg/dL Final   04/21/2025 1053 255 (H) 70 - 130 mg/dL Final   04/21/2025 0623 205 (H) 70 - 130 mg/dL Final   04/20/2025 2243 263 (H) 70 - 130 mg/dL Final         Imaging reviewed  Chest x-ray 4/20 reviewed    Microbiology reviewed            Active Hospital Problems    Diagnosis  POA    **Pneumonia [J18.9]  Yes    Sepsis [A41.9]  Yes    CKD (chronic kidney disease) stage 3, GFR 30-59 ml/min [N18.30]  Unknown    Chronic heart failure with preserved ejection fraction (HFpEF) [I50.32]  Yes    Acute on chronic respiratory failure with hypoxia [J96.21]  Yes    Pulmonary hypertension [I27.20]  Yes    Class 2 severe obesity with serious comorbidity in adult [E66.812, E66.01]  Yes    Chronic coronary artery disease [I25.10]  Yes    Gastroesophageal reflux disease [K21.9]  Yes    Hyperlipidemia [E78.5]  Yes    Type 2 diabetes mellitus with hyperglycemia, without long-term current use of insulin [E11.65]  Yes      Resolved Hospital Problems   No resolved problems to display.         ASSESSMENT:  Pneumonia  Sepsis  Chronic heart failure with preserved ejection fraction  Pulm hypertension  Asthma: Currently without exacerbation  Obesity      PLAN:  Plan for CT chest to further evaluate hilar fullness suggestive of pneumonia.  Unable to rule out potential malignancy.    Continue antibiotics.  Send sputum for culture.  Wean oxygen for goal saturation greater than 90%.  Bronchodilators to help with clearance and for asthma symptoms.      Rudy Berry MD  Pulmonary and Critical Care Medicine  [facility], [facility]  4/22/2025    09:04 EDT

## 2025-04-22 NOTE — PLAN OF CARE
Goal Outcome Evaluation:      Pt alert and oriented. VSS. On 11L on non re-breather mask while asleep. NSR on telemetry. SOB with exertion. Plan of care ongoing.

## 2025-04-22 NOTE — PROGRESS NOTES
"   LOS: 2 days   Patient Care Team:  Edgar Mayers as PCP - General (Family Medicine)  Parker Sky MD as Consulting Physician (Gastroenterology)  Rudy Berry MD as Consulting Physician (Pulmonary Disease)  Heron Martínez MD PhD as Consulting Physician (Cardiology)  Cory Christensen MD as Referring Physician (Cardiology)    Chief Complaint: SOA       Interval History: Still very SOA with any movement. Reports good UOP but it's not recorded.      Objective   Vital Signs  Temp:  [97.3 °F (36.3 °C)-98.4 °F (36.9 °C)] 97.3 °F (36.3 °C)  Heart Rate:  [] 88  Resp:  [16-24] 20  BP: ()/(55-65) 106/64    Intake/Output Summary (Last 24 hours) at 4/22/2025 1448  Last data filed at 4/22/2025 0752  Gross per 24 hour   Intake 240 ml   Output 500 ml   Net -260 ml       Last Weight and Admission Weight        04/22/25  0637   Weight: 102 kg (224 lb 10.4 oz)     Flowsheet Rows      Flowsheet Row First Filed Value   Admission Height 162.6 cm (64\") Documented at 04/20/2025 1911   Admission Weight 102 kg (225 lb) Documented at 04/20/2025 1911                    Physical Exam  Vitals reviewed.   Constitutional:       Comments: obese   HENT:      Head: Normocephalic.      Nose: Nose normal.   Eyes:      Conjunctiva/sclera: Conjunctivae normal.   Neck:      Comments: Cannot assess JVD due to body habitus  Cardiovascular:      Rate and Rhythm: Normal rate and regular rhythm.      Heart sounds: Normal heart sounds.   Pulmonary:      Effort: Tachypnea present.      Comments: Dry rales both bases  Abdominal:      Palpations: Abdomen is soft.      Tenderness: There is no abdominal tenderness.      Comments: Cannot feel organs or aorta   Musculoskeletal:         General: No swelling. Normal range of motion.      Cervical back: Normal range of motion.   Skin:     General: Skin is warm and dry.   Neurological:      General: No focal deficit present.      Mental Status: She is alert.         Results Review:      Results " from last 7 days   Lab Units 04/22/25  0515 04/21/25  0453 04/20/25  1748   SODIUM mmol/L 132* 135* 133*   POTASSIUM mmol/L 3.5 3.4* 3.8   CHLORIDE mmol/L 99 96* 95*   CO2 mmol/L 23.0 21.7* 19.0*   BUN mg/dL 14 15 12   CREATININE mg/dL 0.89 1.10* 1.16*   GLUCOSE mg/dL 182* 208* 259*   CALCIUM mg/dL 7.8* 7.9* 8.6     Results from last 7 days   Lab Units 04/20/25  1907 04/20/25  1748   HSTROP T ng/L 14* 18*     Results from last 7 days   Lab Units 04/22/25  0515 04/21/25  0453 04/20/25  1748   WBC 10*3/mm3 18.22* 28.50* 25.31*   HEMOGLOBIN g/dL 9.3* 10.0* 10.6*   HEMATOCRIT % 30.2* 32.4* 35.3   PLATELETS 10*3/mm3 178 220 213             Results from last 7 days   Lab Units 04/22/25  0515   MAGNESIUM mg/dL 2.6*           I reviewed the patient's new clinical results.  I personally viewed and interpreted the patient's EKG/Telemetry data        Medication Review:   aspirin, 325 mg, Oral, Daily  atorvastatin, 40 mg, Oral, Nightly  cefTRIAXone, 2,000 mg, Intravenous, Q24H  dilTIAZem, 120 mg, Oral, Q12H  enoxaparin sodium, 40 mg, Subcutaneous, Q24H  guaiFENesin, 1,200 mg, Oral, Q12H  insulin lispro, 2-9 Units, Subcutaneous, 4x Daily AC & at Bedtime  ipratropium-albuterol, 3 mL, Nebulization, 4x Daily - RT  metoprolol tartrate, 25 mg, Oral, BID  pantoprazole, 40 mg, Oral, Q AM  sildenafil, 20 mg, Oral, TID         Assessment & Plan     1. Acute on chronic hypoxemic respiratory failure  2. PNA  3. Chronic HFpEF  4. CAD without angina  5. 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 DM2  7. HTN  8. CKD3  9. PAT -- currently in NSR     *CT not reported yet, I reviewed it and it shows a dense consolidation in the right base as well as scattered consolidations elsewhere.  *She weighs every day; prior to admission, she had not had any weight gain or pedal edema. I do not feel this is acute HF. I do think it's very reasonable to keep her on the drier side while she has this acute process. I will increase her home dose of  furosemide from 20mg to 40mg daily.  *Continue CCB/BB  *Does not need repeat echo at this time  *Continue sildenafil     Daniele Lutz MD  04/22/25  14:48 EDT

## 2025-04-22 NOTE — THERAPY EVALUATION
Patient Name: Sally Rivera  : 1950    MRN: [id number]                              Today's Date: 2025       Admit Date: 2025    Visit Dx:     ICD-10-CM ICD-9-CM   1. Pneumonia of right middle lobe due to infectious organism  J18.9 486   2. Hypoxia  R09.02 799.02     Patient Active Problem List   Diagnosis    Chronic coronary artery disease    Gastroesophageal reflux disease    Hyperlipidemia    Nonalcoholic fatty liver disease    Atrial paroxysmal tachycardia    Type 2 diabetes mellitus with hyperglycemia, without long-term current use of insulin    History of rectal polypectomy    Diverticulosis of large intestine without hemorrhage    High risk medication use    Class 2 severe obesity with serious comorbidity in adult    History of colon polyps    Pneumonia    Lung nodule    Pulmonary hypertension    Hypoxia    Abnormality of right ventricle of heart    Risk factors for obstructive sleep apnea    Acute on chronic respiratory failure with hypoxia    Chronic heart failure with preserved ejection fraction (HFpEF)    Heart failure with mildly reduced ejection fraction (HFmrEF)    Sepsis    CKD (chronic kidney disease) stage 3, GFR 30-59 ml/min     Past Medical History:   Diagnosis Date    Acid reflux     Asthma 2022    CAD (coronary artery disease)     per dr deepthi cesar office note -dd    Chronic heart failure with preserved ejection fraction (HFpEF)     Cirrhosis     CKD (chronic kidney disease) stage 3, GFR 30-59 ml/min     Diverticulitis     GERD (gastroesophageal reflux disease)     Hyperlipidemia     Hypertension     Hypertension     BERNARD (nonalcoholic steatohepatitis)     PONV (postoperative nausea and vomiting)     Pulmonary hypertension     Type 2 diabetes mellitus      Past Surgical History:   Procedure Laterality Date    CARDIAC CATHETERIZATION N/A 2022    Procedure: Right Heart Cath with vasodilator as indicated. pHTN workup;  Surgeon: Waqar Ruhs MD;   Location: [facility] CATH INVASIVE LOCATION;  Service: Cardiovascular;  Laterality: N/A;    CHOLECYSTECTOMY  2011    COLON SURGERY  2014    bowel resection for obstruction    COLONOSCOPY  2013    Dr. Sky    COLONOSCOPY N/A 10/07/2021    Procedure: COLONOSCOPY TO CECUM WITH COLD POLYPECTOMY;  Surgeon: Facundo Klein Jr., MD;  Location: [facility] ENDOSCOPY;  Service: General;  Laterality: N/A;  PRE- H/O COLON POLYPS  POST-COLON POLYP, DIVERTICULOSIS    HYSTERECTOMY  2011    MOUTH BIOPSY      OOPHORECTOMY      TUBAL ABDOMINAL LIGATION  1978    US GUIDED LYMPH NODE BIOPSY  11/30/2020      General Information       Row Name 04/22/25 1443          OT Time and Intention    Subjective Information no complaints  -PP     Document Type therapy note (daily note)  -PP     Mode of Treatment individual therapy;occupational therapy  -PP     Patient Effort good  -PP     Symptoms Noted During/After Treatment none  -PP       Row Name 04/22/25 1443          General Information    Patient Profile Reviewed yes  -PP     Existing Precautions/Restrictions oxygen therapy device and L/min  on 2L now  -PP       Row Name 04/22/25 1443          Cognition    Orientation Status (Cognition) oriented x 3  -PP       Row Name 04/22/25 1443          Safety Issues/Impairments Affecting Functional Mobility    Impairments Affecting Function (Mobility) balance;strength;shortness of breath  -PP     Comment, Safety Issues/Impairments (Mobility) gait belt and non skid socks worn for safety  -PP               User Key  (r) = Recorded By, (t) = Taken By, (c) = Cosigned By      Initials Name Provider Type    PP Maria De Jesus Barajas OT Occupational Therapist                     Mobility/ADL's       Row Name 04/22/25 1446          Bed Mobility    Bed Mobility supine-sit;sit-supine  -PP     Supine-Sit Perkins (Bed Mobility) modified independence  -PP     Sit-Supine Perkins (Bed Mobility) modified independence  -PP     Assistive Device (Bed Mobility) bed rails   -PP       Row Name 04/22/25 1446          Transfers    Transfers stand-sit transfer;sit-stand transfer;toilet transfer  -PP       Row Name 04/22/25 144          Sit-Stand Transfer    Sit-Stand Esmeralda (Transfers) supervision  -PP       Row Name 04/22/25 144          Toilet Transfer    Type (Toilet Transfer) stand pivot/stand step  -PP     Esmeralda Level (Toilet Transfer) supervision;standby assist  -PP     Assistive Device (Toilet Transfer) commode, bedside without drop arms  -PP     Comment, (Toilet Transfer) BSC placed in close proximity of bed for SPS xfer, BUE support from armrest and bed rail used.  -PP       Row Name 04/22/25 1446          Functional Mobility    Functional Mobility- Ind. Level contact guard assist  -PP     Functional Mobility- Device walker, front-wheeled  -PP     Functional Mobility- Comment Pt trialed short distance fxnl mob to BR with Cga/ rwx but limited in distance by short high flow cord.  -PP       Row Name 04/22/25 144          Activities of Daily Living    BADL Assessment/Intervention lower body dressing;grooming;feeding;toileting  -PP       Row Name 04/22/25 144          Self-Feeding Assessment/Training    Esmeralda Level (Feeding) feeding skills;independent  -PP       Row Name 04/22/25 [facility]          Lower Body Dressing Assessment/Training    Esmeralda Level (Lower Body Dressing) doff;don;socks;set up  -PP     Position (Lower Body Dressing) sitting up in bed  -PP       Row Name 04/22/25 144          Grooming Assessment/Training    Esmeralda Level (Grooming) set up;grooming skills  -PP       Row Name 04/22/25 [facility]          Toileting Assessment/Training    Esmeralda Level (Toileting) toileting skills;set up;supervision  -PP     Assistive Devices (Toileting) commode, bedside without drop arms  -PP     Comment, (Toileting) pull up worn, pt able to xfer to BSC with Sv from family or nsg. RN aware. Pt demo's good safety awareness and self monitoring skills.  -PP                User Key  (r) = Recorded By, (t) = Taken By, (c) = Cosigned By      Initials Name Provider Type    PP Maria De Jesus Barajas OT Occupational Therapist                   Obj/Interventions       Row Name 04/22/25 1451          Sensory Assessment (Somatosensory)    Sensory Assessment (Somatosensory) UE sensation intact  -PP     Sensory Assessment per pt report  -PP       Row Name 04/22/25 1451          Vision Assessment/Intervention    Visual Impairment/Limitations WFL  -PP       Row Name 04/22/25 1451          Range of Motion Comprehensive    General Range of Motion bilateral upper extremity ROM WFL  -PP       [facility] Name 04/22/25 1451          Strength Comprehensive (MMT)    General Manual Muscle Testing (MMT) Assessment upper extremity strength deficits identified  -PP     Comment, General Manual Muscle Testing (MMT) Assessment Gen BUE weakness noted, grossly 4/5  -PP       [facility] Name 04/22/25 1451          Motor Skills    Motor Skills functional endurance  -PP     Functional Endurance fair  -PP       [facility] Name 04/22/25 1451          Balance    Balance Assessment sitting static balance;standing static balance;standing dynamic balance  -PP     Static Sitting Balance independent  -PP     Position, Sitting Balance sitting edge of bed;unsupported  -PP     Static Standing Balance standby assist;supervision  -PP     Dynamic Standing Balance standby assist;contact guard  -PP     Position/Device Used, Standing Balance supported;walker, front-wheeled  -PP     Balance Interventions sitting;standing;dynamic;static  -PP     Comment, Balance Independent for sitting bal, and Sv-Sba for static standing/ Sba-Cga for dynamic standing bal  -PP               User Key  (r) = Recorded By, (t) = Taken By, (c) = Cosigned By      Initials Name Provider Type    PP Maria De Jesus Barajas OT Occupational Therapist                   Goals/Plan       Row Name 04/22/25 1453          Bed Mobility Goal 1 (OT)    Activity/Assistive Device (Bed  Mobility Goal 1, OT) bed mobility activities, all  -PP     Loving Level/Cues Needed (Bed Mobility Goal 1, OT) modified independence  -PP     Time Frame (Bed Mobility Goal 1, OT) short term goal (STG);2 weeks  -PP       Row Name 04/22/25 1453          Transfer Goal 1 (OT)    Activity/Assistive Device (Transfer Goal 1, OT) transfers, all  -PP     Loving Level/Cues Needed (Transfer Goal 1, OT) modified independence  -PP     Time Frame (Transfer Goal 1, OT) short term goal (STG);2 weeks  -PP     Progress/Outcome (Transfer Goal 1, OT) new goal  -PP       Row Name 04/22/25 1453          Dressing Goal 1 (OT)    Activity/Device (Dressing Goal 1, OT) dressing skills, all;upper body dressing;lower body dressing  -PP     Loving/Cues Needed (Dressing Goal 1, OT) modified independence  -PP     Time Frame (Dressing Goal 1, OT) short term goal (STG);2 weeks  -PP     Progress/Outcome (Dressing Goal 1, OT) new goal  -PP       Row Name 04/22/25 1453          Toileting Goal 1 (OT)    Activity/Device (Toileting Goal 1, OT) toileting skills, all  -PP     Loving Level/Cues Needed (Toileting Goal 1, OT) modified independence  -PP     Time Frame (Toileting Goal 1, OT) short term goal (STG);2 weeks  -PP     Progress/Outcome (Toileting Goal 1, OT) new goal  -PP       Row Name 04/22/25 1453          Grooming Goal 1 (OT)    Activity/Device (Grooming Goal 1, OT) grooming skills, all  -PP     Loving (Grooming Goal 1, OT) modified independence  -PP     Time Frame (Grooming Goal 1, OT) short term goal (STG);2 weeks  -PP     Progress/Outcome (Grooming Goal 1, OT) new goal  -PP       Row Name 04/22/25 1453          Therapy Assessment/Plan (OT)    Planned Therapy Interventions (OT) activity tolerance training;BADL retraining;functional balance retraining;occupation/activity based interventions;patient/caregiver education/training;strengthening exercise;transfer/mobility retraining  -PP               User Key  (r) =  Recorded By, (t) = Taken By, (c) = Cosigned By      Initials Name Provider Type    PP Maria De Jesus Barajas, RADHA Occupational Therapist                   Clinical Impression       Row Name 04/22/25 1512          Pain Assessment    Pretreatment Pain Rating 0/10 - no pain  -PP     Posttreatment Pain Rating 0/10 - no pain  -PP       Row Name 04/22/25 1512          Plan of Care Review    Plan of Care Reviewed With patient  -PP     Progress no change  -PP     Outcome Evaluation Patient is a 75 y.o. female who presents to the ED with c/o shortness of breath. W/u revealing pneumonia with hypoxia, sepsis (+). Pmhx includes congestive heart failure, CAD, DM2, pulmonary hypertension, chronic respiratory failure- wears O2 2 L at baseline. Pt lives with grandchildren and spouse. Today, pt presents to OT Eval with deficits in strength, balance and SOB on 9L high flow. O2 varying from 93-88% during act but improves quickly with rest and deep breathing. Pt demo's good safety awareness and self monitoring skills. Pt Mod I for bed mob and Sv-Sba for STS from EOB and SPS xfer to BSC placed closely to bed using BUE support from bed rials and arm rests. She completes toileting with (I) and UB ADLs with S/u. IPOT will continue to monitor pt for return to baseline function prior to dc home.  -PP       Row Name 04/22/25 1512          Therapy Assessment/Plan (OT)    Rehab Potential (OT) good  -PP     Criteria for Skilled Therapeutic Interventions Met (OT) yes;skilled treatment is necessary  -PP     Therapy Frequency (OT) 5 times/wk  -PP       Row Name 04/22/25 1512          Therapy Plan Review/Discharge Plan (OT)    Anticipated Discharge Disposition (OT) home with assist  -PP       Row Name 04/22/25 1512          Vital Signs    Pre SpO2 (%) 93  -PP     O2 Delivery Pre Treatment hi-flow  9 L  -PP     Intra SpO2 (%) 88  -PP     O2 Delivery Intra Treatment hi-flow  9 L  -PP     Post SpO2 (%) 93  -PP     O2 Delivery Post Treatment hi-flow  9L  -PP      Pre Patient Position Supine  -PP     Intra Patient Position Standing  -PP     Post Patient Position Supine  -PP       Row Name 04/22/25 1512          Positioning and Restraints    Pre-Treatment Position in bed  -PP     Post Treatment Position bed  -PP     In Bed notified nsg;fowlers;call light within reach;encouraged to call for assist;exit alarm on;with family/caregiver  -PP               User Key  (r) = Recorded By, (t) = Taken By, (c) = Cosigned By      Initials Name Provider Type    Maria De Jesus De Jesus OT Occupational Therapist                   Outcome Measures       Row Name 04/22/25 1453          How much help from another is currently needed...    Putting on and taking off regular lower body clothing? 3  -PP     Bathing (including washing, rinsing, and drying) 3  -PP     Toileting (which includes using toilet bed pan or urinal) 3  -PP     Putting on and taking off regular upper body clothing 4  -PP     Taking care of personal grooming (such as brushing teeth) 4  -PP     Eating meals 4  -PP     AM-PAC 6 Clicks Score (OT) 21  -PP       Row Name 04/22/25 0803          How much help from another person do you currently need...    Turning from your back to your side while in flat bed without using bedrails? 4  -RW     Moving from lying on back to sitting on the side of a flat bed without bedrails? 4  -RW     Moving to and from a bed to a chair (including a wheelchair)? 4  -RW     Standing up from a chair using your arms (e.g., wheelchair, bedside chair)? 4  -RW     Climbing 3-5 steps with a railing? 3  -RW     To walk in hospital room? 3  -RW     AM-PAC 6 Clicks Score (PT) 22  -RW       Row Name 04/22/25 1453          Functional Assessment    Outcome Measure Options AM-PAC 6 Clicks Daily Activity (OT)  -PP               User Key  (r) = Recorded By, (t) = Taken By, (c) = Cosigned By      Initials Name Provider Type    Woo Olivera RN Registered Nurse    Maria De Jesus De Jesus OT Occupational Therapist                       OT Recommendation and Plan  Planned Therapy Interventions (OT): activity tolerance training, BADL retraining, functional balance retraining, occupation/activity based interventions, patient/caregiver education/training, strengthening exercise, transfer/mobility retraining  Therapy Frequency (OT): 5 times/wk  Plan of Care Review  Plan of Care Reviewed With: patient  Progress: no change  Outcome Evaluation: Patient is a 75 y.o. female who presents to the ED with c/o shortness of breath. W/u revealing pneumonia with hypoxia, sepsis (+). Pmhx includes congestive heart failure, CAD, DM2, pulmonary hypertension, chronic respiratory failure- wears O2 2 L at baseline. Pt lives with grandchildren and spouse. Today, pt presents to OT Eval with deficits in strength, balance and SOB on 9L high flow. O2 varying from 93-88% during act but improves quickly with rest and deep breathing. Pt demo's good safety awareness and self monitoring skills. Pt Mod I for bed mob and Sv-Sba for STS from EOB and SPS xfer to BSC placed closely to bed using BUE support from bed rials and arm rests. She completes toileting with (I) and UB ADLs with S/u. IPOT will continue to monitor pt for return to baseline function prior to dc home.     Time Calculation:         Time Calculation- OT       Row Name 04/22/25 1454             Time Calculation- OT    OT Start Time 1054  -PP      OT Stop Time 1106  -PP      OT Time Calculation (min) 12 min  -PP      OT Received On 04/22/25  -PP      OT - Next Appointment 04/23/25  -PP      OT Goal Re-Cert Due Date 05/06/25  -PP         Untimed Charges    OT Eval/Re-eval Minutes 12  -PP         Total Minutes    Untimed Charges Total Minutes 12  -PP       Total Minutes 12  -PP                User Key  (r) = Recorded By, (t) = Taken By, (c) = Cosigned By      Initials Name Provider Type    PP Maria De Jesus Barajas, RADHA Occupational Therapist                  Therapy Charges for Today       Code  Description Service Date Service Provider Modifiers Qty    [id number] HC OT EVAL MOD COMPLEXITY 2 4/22/2025 Maria De Jesus Barajas OT GO 1                 Maria De Jesus Barajas OT  4/22/2025

## 2025-04-22 NOTE — PLAN OF CARE
Goal Outcome Evaluation:  Plan of Care Reviewed With: patient        Progress: no change  Outcome Evaluation: Patient is a 75 y.o. female who presents to the ED with c/o shortness of breath. W/u revealing pneumonia with hypoxia, sepsis (+). Pmhx includes congestive heart failure, CAD, DM2, pulmonary hypertension, chronic respiratory failure- wears O2 2 L at baseline. Pt lives with grandchildren and spouse. Today, pt presents to OT Eval with deficits in strength, balance and SOB on 9L high flow. O2 varying from 93-88% during act but improves quickly with rest and deep breathing. Pt demo's good safety awareness and self monitoring skills. Pt Mod I for bed mob and Sv-Sba for STS from EOB and SPS xfer to BSC placed closely to bed using BUE support from bed rials and arm rests. She completes toileting with (I) and UB ADLs with S/u. IPOT will continue to monitor pt for return to baseline function prior to dc home.    Anticipated Discharge Disposition (OT): home with assist

## 2025-04-23 LAB
ANION GAP SERPL CALCULATED.3IONS-SCNC: 7.9 MMOL/L (ref 5–15)
BACTERIA SPEC RESP CULT: NORMAL
BASOPHILS # BLD AUTO: 0.05 10*3/MM3 (ref 0–0.2)
BASOPHILS NFR BLD AUTO: 0.3 % (ref 0–1.5)
BUN SERPL-MCNC: 13 MG/DL (ref 8–23)
BUN/CREAT SERPL: 14 (ref 7–25)
CALCIUM SPEC-SCNC: 8.8 MG/DL (ref 8.6–10.5)
CHLORIDE SERPL-SCNC: 102 MMOL/L (ref 98–107)
CO2 SERPL-SCNC: 22.1 MMOL/L (ref 22–29)
CREAT SERPL-MCNC: 0.93 MG/DL (ref 0.57–1)
DEPRECATED RDW RBC AUTO: 43.7 FL (ref 37–54)
EGFRCR SERPLBLD CKD-EPI 2021: 64.6 ML/MIN/1.73
EOSINOPHIL # BLD AUTO: 0.42 10*3/MM3 (ref 0–0.4)
EOSINOPHIL NFR BLD AUTO: 2.8 % (ref 0.3–6.2)
ERYTHROCYTE [DISTWIDTH] IN BLOOD BY AUTOMATED COUNT: 16.9 % (ref 12.3–15.4)
GLUCOSE BLDC GLUCOMTR-MCNC: 163 MG/DL (ref 70–130)
GLUCOSE BLDC GLUCOMTR-MCNC: 207 MG/DL (ref 70–130)
GLUCOSE BLDC GLUCOMTR-MCNC: 258 MG/DL (ref 70–130)
GLUCOSE BLDC GLUCOMTR-MCNC: 260 MG/DL (ref 70–130)
GLUCOSE SERPL-MCNC: 187 MG/DL (ref 65–99)
GRAM STN SPEC: NORMAL
HCT VFR BLD AUTO: 31.3 % (ref 34–46.6)
HGB BLD-MCNC: 9.5 G/DL (ref 12–15.9)
IMM GRANULOCYTES # BLD AUTO: 0.09 10*3/MM3 (ref 0–0.05)
IMM GRANULOCYTES NFR BLD AUTO: 0.6 % (ref 0–0.5)
LYMPHOCYTES # BLD AUTO: 2.21 10*3/MM3 (ref 0.7–3.1)
LYMPHOCYTES NFR BLD AUTO: 14.9 % (ref 19.6–45.3)
MCH RBC QN AUTO: 21.9 PG (ref 26.6–33)
MCHC RBC AUTO-ENTMCNC: 30.4 G/DL (ref 31.5–35.7)
MCV RBC AUTO: 72.3 FL (ref 79–97)
MONOCYTES # BLD AUTO: 0.79 10*3/MM3 (ref 0.1–0.9)
MONOCYTES NFR BLD AUTO: 5.3 % (ref 5–12)
NEUTROPHILS NFR BLD AUTO: 11.23 10*3/MM3 (ref 1.7–7)
NEUTROPHILS NFR BLD AUTO: 76.1 % (ref 42.7–76)
PLATELET # BLD AUTO: 232 10*3/MM3 (ref 140–450)
PMV BLD AUTO: 10.5 FL (ref 6–12)
POTASSIUM SERPL-SCNC: 4.5 MMOL/L (ref 3.5–5.2)
RBC # BLD AUTO: 4.33 10*6/MM3 (ref 3.77–5.28)
SODIUM SERPL-SCNC: 132 MMOL/L (ref 136–145)
WBC NRBC COR # BLD AUTO: 14.79 10*3/MM3 (ref 3.4–10.8)

## 2025-04-23 PROCEDURE — 99232 SBSQ HOSP IP/OBS MODERATE 35: CPT | Performed by: NURSE PRACTITIONER

## 2025-04-23 PROCEDURE — 97162 PT EVAL MOD COMPLEX 30 MIN: CPT

## 2025-04-23 PROCEDURE — 85025 COMPLETE CBC W/AUTO DIFF WBC: CPT | Performed by: INTERNAL MEDICINE

## 2025-04-23 PROCEDURE — 63710000001 INSULIN GLARGINE PER 5 UNITS: Performed by: STUDENT IN AN ORGANIZED HEALTH CARE EDUCATION/TRAINING PROGRAM

## 2025-04-23 PROCEDURE — 25010000002 CEFTRIAXONE PER 250 MG: Performed by: INTERNAL MEDICINE

## 2025-04-23 PROCEDURE — 25010000002 ENOXAPARIN PER 10 MG: Performed by: INTERNAL MEDICINE

## 2025-04-23 PROCEDURE — 82948 REAGENT STRIP/BLOOD GLUCOSE: CPT

## 2025-04-23 PROCEDURE — 94760 N-INVAS EAR/PLS OXIMETRY 1: CPT

## 2025-04-23 PROCEDURE — 94799 UNLISTED PULMONARY SVC/PX: CPT

## 2025-04-23 PROCEDURE — 80048 BASIC METABOLIC PNL TOTAL CA: CPT | Performed by: INTERNAL MEDICINE

## 2025-04-23 PROCEDURE — 94761 N-INVAS EAR/PLS OXIMETRY MLT: CPT

## 2025-04-23 PROCEDURE — 97530 THERAPEUTIC ACTIVITIES: CPT

## 2025-04-23 PROCEDURE — 94664 DEMO&/EVAL PT USE INHALER: CPT

## 2025-04-23 PROCEDURE — 97535 SELF CARE MNGMENT TRAINING: CPT

## 2025-04-23 PROCEDURE — 63710000001 INSULIN LISPRO (HUMAN) PER 5 UNITS: Performed by: INTERNAL MEDICINE

## 2025-04-23 RX ORDER — LOPERAMIDE HYDROCHLORIDE 2 MG/1
2 CAPSULE ORAL 4 TIMES DAILY PRN
Status: DISCONTINUED | OUTPATIENT
Start: 2025-04-23 | End: 2025-04-24 | Stop reason: HOSPADM

## 2025-04-23 RX ADMIN — PANTOPRAZOLE SODIUM 40 MG: 40 TABLET, DELAYED RELEASE ORAL at 06:05

## 2025-04-23 RX ADMIN — ASPIRIN 325 MG ORAL TABLET 325 MG: 325 PILL ORAL at 09:45

## 2025-04-23 RX ADMIN — ENOXAPARIN SODIUM 40 MG: 100 INJECTION SUBCUTANEOUS at 14:02

## 2025-04-23 RX ADMIN — SILDENAFIL 20 MG: 20 TABLET, FILM COATED ORAL at 20:28

## 2025-04-23 RX ADMIN — ACETAMINOPHEN 650 MG: 325 TABLET, FILM COATED ORAL at 00:33

## 2025-04-23 RX ADMIN — GUAIFENESIN 1200 MG: 600 TABLET, EXTENDED RELEASE ORAL at 20:29

## 2025-04-23 RX ADMIN — INSULIN LISPRO 4 UNITS: 100 INJECTION, SOLUTION INTRAVENOUS; SUBCUTANEOUS at 09:45

## 2025-04-23 RX ADMIN — IPRATROPIUM BROMIDE AND ALBUTEROL SULFATE 3 ML: .5; 3 SOLUTION RESPIRATORY (INHALATION) at 14:56

## 2025-04-23 RX ADMIN — DILTIAZEM HYDROCHLORIDE 120 MG: 30 TABLET, FILM COATED ORAL at 20:28

## 2025-04-23 RX ADMIN — CEFTRIAXONE 2000 MG: 2 INJECTION, POWDER, FOR SOLUTION INTRAMUSCULAR; INTRAVENOUS at 17:14

## 2025-04-23 RX ADMIN — SILDENAFIL 20 MG: 20 TABLET, FILM COATED ORAL at 17:14

## 2025-04-23 RX ADMIN — IPRATROPIUM BROMIDE AND ALBUTEROL SULFATE 3 ML: .5; 3 SOLUTION RESPIRATORY (INHALATION) at 11:00

## 2025-04-23 RX ADMIN — IPRATROPIUM BROMIDE AND ALBUTEROL SULFATE 3 ML: .5; 3 SOLUTION RESPIRATORY (INHALATION) at 20:19

## 2025-04-23 RX ADMIN — ATORVASTATIN CALCIUM 40 MG: 20 TABLET, FILM COATED ORAL at 20:29

## 2025-04-23 RX ADMIN — GUAIFENESIN 1200 MG: 600 TABLET, EXTENDED RELEASE ORAL at 09:45

## 2025-04-23 RX ADMIN — ALUMINUM HYDROXIDE, MAGNESIUM HYDROXIDE, AND DIMETHICONE 15 ML: 400; 400; 40 SUSPENSION ORAL at 14:02

## 2025-04-23 RX ADMIN — DILTIAZEM HYDROCHLORIDE 120 MG: 30 TABLET, FILM COATED ORAL at 09:45

## 2025-04-23 RX ADMIN — LOPERAMIDE HYDROCHLORIDE 2 MG: 2 CAPSULE ORAL at 10:44

## 2025-04-23 RX ADMIN — INSULIN GLARGINE 10 UNITS: 100 INJECTION, SOLUTION SUBCUTANEOUS at 09:45

## 2025-04-23 RX ADMIN — INSULIN LISPRO 6 UNITS: 100 INJECTION, SOLUTION INTRAVENOUS; SUBCUTANEOUS at 23:28

## 2025-04-23 RX ADMIN — METOPROLOL TARTRATE 25 MG: 25 TABLET, FILM COATED ORAL at 09:45

## 2025-04-23 RX ADMIN — INSULIN LISPRO 2 UNITS: 100 INJECTION, SOLUTION INTRAVENOUS; SUBCUTANEOUS at 17:13

## 2025-04-23 RX ADMIN — IPRATROPIUM BROMIDE AND ALBUTEROL SULFATE 3 ML: .5; 3 SOLUTION RESPIRATORY (INHALATION) at 07:02

## 2025-04-23 RX ADMIN — SILDENAFIL 20 MG: 20 TABLET, FILM COATED ORAL at 09:45

## 2025-04-23 RX ADMIN — FUROSEMIDE 40 MG: 40 TABLET ORAL at 09:45

## 2025-04-23 RX ADMIN — METOPROLOL TARTRATE 25 MG: 25 TABLET, FILM COATED ORAL at 20:28

## 2025-04-23 RX ADMIN — INSULIN LISPRO 6 UNITS: 100 INJECTION, SOLUTION INTRAVENOUS; SUBCUTANEOUS at 11:57

## 2025-04-23 NOTE — PROGRESS NOTES
[facility] INPATIENT PROGRESS NOTE         [address]    2025      PATIENT IDENTIFICATION:  Name: Sally Rivera ADMIT: 2025   : 1950  PCP: Edgar Mayers    MRN: [id number] LOS: 3 days   AGE/SEX: 75 y.o. female  ROOM: [facility]                     LOS 3    Reason for visit: Sepsis with acute on chronic hypoxemic respiratory failure due to bilateral pneumonia      SUBJECTIVE:      Has moderate cough still.  Says that she is producing sputum better today.  Oxygen at 9 L at time of visit.  CT chest still pending.    Objective   OBJECTIVE:    Vital Sign Min/Max for last 24 hours  Temp  Min: 97.3 °F (36.3 °C)  Max: 98.4 °F (36.9 °C)   BP  Min: 106/59  Max: 121/65   Pulse  Min: 82  Max: 96   Resp  Min: 20  Max: 20   SpO2  Min: 92 %  Max: 100 %   No data recorded   No data recorded    Vitals:    25 2005 25 2348 25 0702 25 0710   BP:  106/59  118/56   BP Location:  Left arm  Right arm   Patient Position:  Lying  Sitting   Pulse: 95 92     Resp: 20 20 20 20   Temp:  98.4 °F (36.9 °C)  97.7 °F (36.5 °C)   TempSrc:  Oral  Oral   SpO2: 95% 95%     Weight:       Height:                25  2141 25  0500 25  0637   Weight: 102 kg (223 lb 12.8 oz) 101 kg (222 lb 6.4 oz) 102 kg (224 lb 10.4 oz)       Body mass index is 38.56 kg/m².                          Body mass index is 38.56 kg/m².    Intake/Output Summary (Last 24 hours) at 2025 0929  Last data filed at 2025 0534  Gross per 24 hour   Intake 240 ml   Output 1100 ml   Net -860 ml         Exam:  GEN:  No distress, appears stated age  EYES:   PERRL, anicteric sclerae  ENT:    External ears/nose normal, OP clear  NECK:  No adenopathy, midline trachea  LUNGS: Normal chest on inspection, palpation and auscultation  CV:  Normal S1S2, without murmur  ABD:  Nontender, nondistended, no hepatosplenomegaly, +BS  EXT:  No edema.  No cyanosis or clubbing.  No mottling and normal cap  refill.    Assessment     Scheduled meds:  aspirin, 325 mg, Oral, Daily  atorvastatin, 40 mg, Oral, Nightly  cefTRIAXone, 2,000 mg, Intravenous, Q24H  dilTIAZem, 120 mg, Oral, Q12H  enoxaparin sodium, 40 mg, Subcutaneous, Q24H  furosemide, 40 mg, Oral, Daily  guaiFENesin, 1,200 mg, Oral, Q12H  insulin glargine, 10 Units, Subcutaneous, Daily  insulin lispro, 2-9 Units, Subcutaneous, 4x Daily AC & at Bedtime  ipratropium-albuterol, 3 mL, Nebulization, 4x Daily - RT  metoprolol tartrate, 25 mg, Oral, BID  pantoprazole, 40 mg, Oral, Q AM  sildenafil, 20 mg, Oral, TID      IV meds:                         Data Review:  Results from last 7 days   Lab Units 04/23/25  0547 04/22/25  1532 04/22/25  0515 04/21/25  0453 04/20/25  1748   SODIUM mmol/L 132*  --  132* 135* 133*   POTASSIUM mmol/L 4.5 3.8 3.5 3.4* 3.8   CHLORIDE mmol/L 102  --  99 96* 95*   CO2 mmol/L 22.1  --  23.0 21.7* 19.0*   BUN mg/dL 13  --  14 15 12   CREATININE mg/dL 0.93  --  0.89 1.10* 1.16*   GLUCOSE mg/dL 187*  --  182* 208* 259*   CALCIUM mg/dL 8.8  --  7.8* 7.9* 8.6         Estimated Creatinine Clearance: 61.7 mL/min (by C-G formula based on SCr of 0.93 mg/dL).  Results from last 7 days   Lab Units 04/23/25  0547 04/22/25  0515 04/21/25  0453 04/20/25  1748   WBC 10*3/mm3 14.79* 18.22* 28.50* 25.31*   HEMOGLOBIN g/dL 9.5* 9.3* 10.0* 10.6*   PLATELETS 10*3/mm3 232 178 220 213         Results from last 7 days   Lab Units 04/21/25  0453 04/20/25  1748   ALT (SGPT) U/L 14 16   AST (SGOT) U/L 20 26     Results from last 7 days   Lab Units 04/21/25  0158   PH, ARTERIAL pH units 7.477*   PO2 ART mm Hg 72.0*   PCO2, ARTERIAL mm Hg 32.9*   HCO3 ART mmol/L 24.3     Results from last 7 days   Lab Units 04/21/25  0744 04/21/25  0453 04/20/25  2313 04/20/25  1907   PROCALCITONIN ng/mL  --   --   --  0.38*   LACTATE mmol/L 1.4 2.1* 2.9* 2.5*         Hemoglobin A1C   Date/Time Value Ref Range Status   04/21/2025 1552 8.10 (H) 4.80 - 5.60 % Final     Glucose    Date/Time Value Ref Range Status   04/23/2025 0628 207 (H) 70 - 130 mg/dL Final   04/22/2025 1948 251 (H) 70 - 130 mg/dL Final   04/22/2025 1529 295 (H) 70 - 130 mg/dL Final   04/22/2025 1026 296 (H) 70 - 130 mg/dL Final   04/22/2025 0633 174 (H) 70 - 130 mg/dL Final   04/21/2025 2124 248 (H) 70 - 130 mg/dL Final   04/21/2025 1540 195 (H) 70 - 130 mg/dL Final         Imaging reviewed  Chest x-ray 4/20 reviewed    CT chest/22 reviewed: Right lower lobe consolidation with pneumonia.  No masses seen.  Pulmonary artery dilation.  No evidence of PE.  8 mm new right middle lobe solid nodule potentially pleural lymph node.  Mediastinal and hilar lymphadenopathy favored to be reactive.  Aneurysmal ascending thoracic aorta noted.        Microbiology reviewed            Active Hospital Problems    Diagnosis  POA    **Pneumonia [J18.9]  Yes    Sepsis [A41.9]  Yes    CKD (chronic kidney disease) stage 3, GFR 30-59 ml/min [N18.30]  Unknown    Chronic heart failure with preserved ejection fraction (HFpEF) [I50.32]  Yes    Acute on chronic respiratory failure with hypoxia [J96.21]  Yes    Pulmonary hypertension [I27.20]  Yes    Class 2 severe obesity with serious comorbidity in adult [E66.812, E66.01]  Yes    Chronic coronary artery disease [I25.10]  Yes    Gastroesophageal reflux disease [K21.9]  Yes    Hyperlipidemia [E78.5]  Yes    Type 2 diabetes mellitus with hyperglycemia, without long-term current use of insulin [E11.65]  Yes      Resolved Hospital Problems   No resolved problems to display.         ASSESSMENT:  Pneumonia  Sepsis  Chronic heart failure with preserved ejection fraction  Pulm hypertension  Asthma: Currently without exacerbation  Obesity  8 mm right middle lobe nodule  Reactive lymphadenopathy  Aneurysmal ascending thoracic aorta        PLAN:  Encourage pulmonary toilet.  Continue antibiotics.    Wean oxygen for goal saturation greater than 90%.  Bronchodilators to help with clearance and for asthma  symptoms.  Repeat CT chest in 6 to 8 weeks to assure resolution.      Rudy Berry MD  Pulmonary and Critical Care Medicine  [facility]  4/23/2025    09:29 EDT

## 2025-04-23 NOTE — THERAPY EVALUATION
Patient Name: Sally Rivera  : 1950    MRN: [id number]                              Today's Date: 2025       Admit Date: 2025    Visit Dx:     ICD-10-CM ICD-9-CM   1. Pneumonia of right middle lobe due to infectious organism  J18.9 486   2. Hypoxia  R09.02 799.02     Patient Active Problem List   Diagnosis    Chronic coronary artery disease    Gastroesophageal reflux disease    Hyperlipidemia    Nonalcoholic fatty liver disease    Atrial paroxysmal tachycardia    Type 2 diabetes mellitus with hyperglycemia, without long-term current use of insulin    History of rectal polypectomy    Diverticulosis of large intestine without hemorrhage    High risk medication use    Class 2 severe obesity with serious comorbidity in adult    History of colon polyps    Pneumonia    Lung nodule    Pulmonary hypertension    Hypoxia    Abnormality of right ventricle of heart    Risk factors for obstructive sleep apnea    Acute on chronic respiratory failure with hypoxia    Chronic heart failure with preserved ejection fraction (HFpEF)    Heart failure with mildly reduced ejection fraction (HFmrEF)    Sepsis    CKD (chronic kidney disease) stage 3, GFR 30-59 ml/min     Past Medical History:   Diagnosis Date    Acid reflux     Asthma 2022    CAD (coronary artery disease)     per dr deepthi cesar office note -dd    Chronic heart failure with preserved ejection fraction (HFpEF)     Cirrhosis     CKD (chronic kidney disease) stage 3, GFR 30-59 ml/min     Diverticulitis     GERD (gastroesophageal reflux disease)     Hyperlipidemia     Hypertension     Hypertension     BERNARD (nonalcoholic steatohepatitis)     PONV (postoperative nausea and vomiting)     Pulmonary hypertension     Type 2 diabetes mellitus      Past Surgical History:   Procedure Laterality Date    CARDIAC CATHETERIZATION N/A 2022    Procedure: Right Heart Cath with vasodilator as indicated. pHTN workup;  Surgeon: Waqar Rush MD;   Location: [facility] CATH INVASIVE LOCATION;  Service: Cardiovascular;  Laterality: N/A;    CHOLECYSTECTOMY  2011    COLON SURGERY  2014    bowel resection for obstruction    COLONOSCOPY  2013    Dr. Sky    COLONOSCOPY N/A 10/07/2021    Procedure: COLONOSCOPY TO CECUM WITH COLD POLYPECTOMY;  Surgeon: Facundo Klein Jr., MD;  Location: [facility] ENDOSCOPY;  Service: General;  Laterality: N/A;  PRE- H/O COLON POLYPS  POST-COLON POLYP, DIVERTICULOSIS    HYSTERECTOMY  2011    MOUTH BIOPSY      OOPHORECTOMY      TUBAL ABDOMINAL LIGATION  1978    US GUIDED LYMPH NODE BIOPSY  11/30/2020      General Information       Row Name 04/23/25 1134          Physical Therapy Time and Intention    Document Type evaluation  -ST     Mode of Treatment physical therapy;co-treatment;occupational therapy  two sets of skilled hands d/t decreased endurance and activity tolerance  -       Row Name 04/23/25 1134          General Information    Patient Profile Reviewed yes  -ST     Existing Precautions/Restrictions oxygen therapy device and L/min  8L  -ST     Barriers to Rehab previous functional deficit  -       Row Name 04/23/25 1134          Living Environment    Current Living Arrangements home  -ST     People in Home spouse  -       Row Name 04/23/25 1134          Home Main Entrance    Number of Stairs, Main Entrance four  -ST     Stair Railings, Main Entrance railings safe and in good condition  -       Row Name 04/23/25 1134          Cognition    Orientation Status (Cognition) oriented x 4  -ST       Row Name 04/23/25 1134          Safety Issues/Impairments Affecting Functional Mobility    Impairments Affecting Function (Mobility) balance;endurance/activity tolerance;shortness of breath  -ST     Comment, Safety Issues/Impairments (Mobility) gait belt, nonskid socks donned  -ST               User Key  (r) = Recorded By, (t) = Taken By, (c) = Cosigned By      Initials Name Provider Type    Maria Dolores Duarte PT Physical Therapist                    Mobility       Row Name 04/23/25 1135          Bed Mobility    Comment, (Bed Mobility) sitting EOB at start and end of session  -ST       Row Name 04/23/25 1135          Sit-Stand Transfer    Sit-Stand Sutter (Transfers) standby assist  -ST       Row Name 04/23/25 1135          Gait/Stairs (Locomotion)    Sutter Level (Gait) standby assist;contact guard  -ST     Assistive Device (Gait) --  with and without RWX  -ST     Distance in Feet (Gait) 15  -ST     Deviations/Abnormal Patterns (Gait) gait speed decreased;stride length decreased;chitra decreased  -ST     Bilateral Gait Deviations forward flexed posture;heel strike decreased  -ST     Comment, (Gait/Stairs) HHA and more CGA initially without AD as pt reaching for furniture, improved to SBA with use of rwx  -ST               User Key  (r) = Recorded By, (t) = Taken By, (c) = Cosigned By      Initials Name Provider Type    ST Maria Dolores Monsalve, PT Physical Therapist                   Obj/Interventions       Row Name 04/23/25 1137          Range of Motion Comprehensive    Comment, General Range of Motion bilat LE WFL  -ST       [facility] 04/23/25 1137          Strength Comprehensive (MMT)    Comment, General Manual Muscle Testing (MMT) Assessment bilat LE L  -ST       [facility] 04/23/25 1137          Balance    Comment, Balance no overt LOB. tolerates prolonged standing at sink with OT during session - cues for breathing throughout as pt can be very talkative  -ST               User Key  (r) = Recorded By, (t) = Taken By, (c) = Cosigned By      Initials Name Provider Type    ST Ruben Monsalvea, PT Physical Therapist                   Goals/Plan       Row Name 04/23/25 1138          Bed Mobility Goal 1 (PT)    Activity/Assistive Device (Bed Mobility Goal 1, PT) bed mobility activities, all  -ST     Sutter Level/Cues Needed (Bed Mobility Goal 1, PT) independent  -ST     Time Frame (Bed Mobility Goal 1, PT) 1 week  -ST      Progress/Outcomes (Bed Mobility Goal 1, PT) new goal  -ST       Row Name 04/23/25 1138          Transfer Goal 1 (PT)    Activity/Assistive Device (Transfer Goal 1, PT) sit-to-stand/stand-to-sit;bed-to-chair/chair-to-bed  -ST     Campbell Level/Cues Needed (Transfer Goal 1, PT) modified independence  -ST     Time Frame (Transfer Goal 1, PT) 1 week  -ST     Progress/Outcome (Transfer Goal 1, PT) new goal  -ST       Row Name 04/23/25 1138          Gait Training Goal 1 (PT)    Activity/Assistive Device (Gait Training Goal 1, PT) gait (walking locomotion);assistive device use  -ST     Campbell Level (Gait Training Goal 1, PT) modified independence  -ST     Distance (Gait Training Goal 1, PT) 75'  -ST     Time Frame (Gait Training Goal 1, PT) 1 week  -ST     Progress/Outcome (Gait Training Goal 1, PT) new goal  -ST               User Key  (r) = Recorded By, (t) = Taken By, (c) = Cosigned By      Initials Name Provider Type    ST Maria Dolores Monsalve, PT Physical Therapist                   Clinical Impression       Row Name 04/23/25 1137          Pain    Pretreatment Pain Rating 0/10 - no pain  -ST     Posttreatment Pain Rating 0/10 - no pain  -ST       Row Name 04/23/25 1137          Plan of Care Review    Plan of Care Reviewed With patient  -ST     Outcome Evaluation Pt is 74 y/o F admitted to [facility] on 4/20/25 with SOB, PMH: CAD, CHF, GERD, HLD, pulmonary HTN. At baseline pt is from home with spouse, uses rollator in the community. Pt currently demos SBA/CGA for transfers and room ambulation - progressing to SBA with use of rwx vs no AD - tendency to reach for furniture. Pt is on 8L throughout sats 85% at lowest but recovers within 10 sec of seated rest to 90s. SOA throughout and cues for breathing strategies. Pt demos mobility below baseline and therefore would benefit from acute skilled PT to address functional mobility deficits. Anticipate d/c home with home health/OP PT for further endurance training as needed.   -ST       Row Name 04/23/25 1137          Therapy Assessment/Plan (PT)    Rehab Potential (PT) good  -ST     Criteria for Skilled Interventions Met (PT) yes  -ST     Therapy Frequency (PT) 3 times/wk  -ST     Predicted Duration of Therapy Intervention (PT) 1 week  -ST       Row Name 04/23/25 1137          Positioning and Restraints    Pre-Treatment Position in bed  -ST     Post Treatment Position bed  -ST     In Bed notified nsg;sitting EOB;call light within reach;encouraged to call for assist  -ST               User Key  (r) = Recorded By, (t) = Taken By, (c) = Cosigned By      Initials Name Provider Type    Maria Dolores Duarte PT Physical Therapist                   Outcome Measures       Row Name 04/23/25 1138 04/23/25 0945       How much help from another person do you currently need...    Turning from your back to your side while in flat bed without using bedrails? 4  -ST 4  -PB    Moving from lying on back to sitting on the side of a flat bed without bedrails? 4  -ST 4  -PB    Moving to and from a bed to a chair (including a wheelchair)? 3  -ST 4  -PB    Standing up from a chair using your arms (e.g., wheelchair, bedside chair)? 4  -ST 3  -PB    Climbing 3-5 steps with a railing? 3  -ST 3  -PB    To walk in hospital room? 3  -ST 3  -PB    AM-PAC 6 Clicks Score (PT) 21  -ST 21  -PB              User Key  (r) = Recorded By, (t) = Taken By, (c) = Cosigned By      Initials Name Provider Type    Luh Camacho RN Registered Nurse    Maria Dolores Duarte PT Physical Therapist                                 Physical Therapy Education       Title: PT OT SLP Therapies (In Progress)       Topic: Physical Therapy (In Progress)       Point: Mobility training (Done)       Learning Progress Summary            Patient Acceptance, E, MAEGAN,NR by  at 4/23/2025 1138                      Point: Home exercise program (Not Started)       Learner Progress:  Not documented in this visit.              Point: Body  mechanics (Done)       Learning Progress Summary            Patient Acceptance, E, VU,NR by  at 4/23/2025 1138                      Point: Precautions (Not Started)       Learner Progress:  Not documented in this visit.                              User Key       Initials Effective Dates Name Provider Type Discipline    ST 09/22/22 -  Maria Dolores Monsalve PT Physical Therapist PT                  PT Recommendation and Plan     Outcome Evaluation: Pt is 74 y/o F admitted to [facility] on 4/20/25 with SOB, PMH: CAD, CHF, GERD, HLD, pulmonary HTN. At baseline pt is from home with spouse, uses rollator in the community. Pt currently demos SBA/CGA for transfers and room ambulation - progressing to SBA with use of rwx vs no AD - tendency to reach for furniture. Pt is on 8L throughout sats 85% at lowest but recovers within 10 sec of seated rest to 90s. SOA throughout and cues for breathing strategies. Pt demos mobility below baseline and therefore would benefit from acute skilled PT to address functional mobility deficits. Anticipate d/c home with home health/OP PT for further endurance training as needed.     Time Calculation:         PT Charges       Row Name 04/23/25 1141             Time Calculation    Start Time 0838  -ST      Stop Time 0908  -ST      Time Calculation (min) 30 min  -ST      PT Received On 04/23/25  -ST      PT - Next Appointment 04/25/25  -ST      PT Goal Re-Cert Due Date 04/30/25  -ST         Time Calculation- PT    Total Timed Code Minutes- PT 10 minute(s)  -ST         Timed Charges    84727 - PT Therapeutic Activity Minutes 10  -ST         Total Minutes    Timed Charges Total Minutes 10  -ST       Total Minutes 10  -ST                User Key  (r) = Recorded By, (t) = Taken By, (c) = Cosigned By      Initials Name Provider Type    ST Maria Dolores Monsalve PT Physical Therapist                  Therapy Charges for Today       Code Description Service Date Service Provider Modifiers Qty    [id number]   PT THERAPEUTIC ACT EA 15 MIN 4/23/2025 Maria Dolores Monsalve, PT GP 1    95748087713 HC PT EVAL MOD COMPLEXITY 2 4/23/2025 Maria Dolores Monsalve, PT GP 1            PT G-Codes  Outcome Measure Options: AM-PAC 6 Clicks Daily Activity (OT)  AM-PAC 6 Clicks Score (PT): 21  AM-PAC 6 Clicks Score (OT): 21  PT Discharge Summary  Anticipated Discharge Disposition (PT): home with assist, home with home health    Maria Dolores Monsalve, PT  4/23/2025

## 2025-04-23 NOTE — PLAN OF CARE
Goal Outcome Evaluation:               Pt resting in bed quietly. On 9 L HF NC. Medicated with tylenol for headache. Up with standby assist.

## 2025-04-23 NOTE — CASE MANAGEMENT/SOCIAL WORK
Continued Stay Note  [facility]     Patient Name: Sally Rivera  MRN: [id number]  Today's Date: 4/23/2025    Admit Date: 4/20/2025    Plan: Plan home with family.  DEYVI Hall RN   Discharge Plan       Row Name 04/23/25 6292       Plan    Plan Plan home with family.  DEYVI Hall RN    Patient/Family in Agreement with Plan yes    Plan Comments Spoke with pt and pt's spouse (Ernie) at bedside.  Pt denies any discharge needs.  Pt is on home O2 supplied by [employer].  Pt states her spouse will assist her at home if needed and he will transport her home. Plan home with family.  DEYVI Hall RN                   Discharge Codes    No documentation.                 Expected Discharge Date and Time       Expected Discharge Date Expected Discharge Time    Apr 25, 2025               Conchis Hall RN

## 2025-04-23 NOTE — PLAN OF CARE
Goal Outcome Evaluation:           Progress: improving  Outcome Evaluation: Pt AOx4. Titrated to 4L from 9L this am. NSR on monitor. Pt c/o diarrhea- prn imodium per MAR. C/o gas- prn per MAR. Up in chair and working with PT/OT. Possible d/c tomorrow. IS encouraged. Coughing up thick sputum. Pt not in acute distress. Denies pain. Plan of care ongoing

## 2025-04-23 NOTE — PROGRESS NOTES
Name: Sally Rivera ADMIT: 2025   : 1950  PCP: Edgar Mayers    MRN: [id number] LOS: 3 days   AGE/SEX: 75 y.o. female  ROOM: [facility]     Subjective   Subjective   Patient shortness of breath improving.  Feeling a lot better today.  Oxygen down to 5 L.  Family at bedside.    Review of Systems  As above     Objective   Objective   Vital Signs  Temp:  [97.7 °F (36.5 °C)-98.4 °F (36.9 °C)] 97.7 °F (36.5 °C)  Heart Rate:  [84-96] 84  Resp:  [18-20] 18  BP: (106-121)/(56-65) 118/56  SpO2:  [93 %-96 %] 96 %  on  Flow (L/min) (Oxygen Therapy):  [5-8] 5;   Device (Oxygen Therapy): humidified;nasal cannula  Body mass index is 38.56 kg/m².  Physical Exam  Constitutional:       General: She is not in acute distress.     Appearance: She is ill-appearing.   Cardiovascular:      Rate and Rhythm: Normal rate and regular rhythm.   Pulmonary:      Effort: Pulmonary effort is normal. No respiratory distress.      Comments: Coarse breath sounds bilaterally  Abdominal:      General: Abdomen is flat. There is no distension.      Tenderness: There is no abdominal tenderness.   Musculoskeletal:         General: No swelling or deformity. Normal range of motion.   Skin:     General: Skin is warm and dry.   Neurological:      General: No focal deficit present.      Mental Status: She is alert. Mental status is at baseline.         Results Review     I reviewed the patient's new clinical results.  Results from last 7 days   Lab Units 25  0547 25  0515 25  0453 25  1748   WBC 10*3/mm3 14.79* 18.22* 28.50* 25.31*   HEMOGLOBIN g/dL 9.5* 9.3* 10.0* 10.6*   PLATELETS 10*3/mm3 232 178 220 213     Results from last 7 days   Lab Units 25  0547 25  1532 25  0515 25  0453 25  1748   SODIUM mmol/L 132*  --  132* 135* 133*   POTASSIUM mmol/L 4.5 3.8 3.5 3.4* 3.8   CHLORIDE mmol/L 102  --  99 96* 95*   CO2 mmol/L 22.1  --  23.0 21.7* 19.0*   BUN mg/dL 13  --  14 15 12   CREATININE  mg/dL 0.93  --  0.89 1.10* 1.16*   GLUCOSE mg/dL 187*  --  182* 208* 259*   Estimated Creatinine Clearance: 61.7 mL/min (by C-G formula based on SCr of 0.93 mg/dL).  Results from last 7 days   Lab Units 04/21/25  0453 04/20/25  1748   ALBUMIN g/dL 3.5 4.1   BILIRUBIN mg/dL 1.0 1.1   ALK PHOS U/L 71 75   AST (SGOT) U/L 20 26   ALT (SGPT) U/L 14 16     Results from last 7 days   Lab Units 04/23/25  0547 04/22/25  0515 04/21/25  1552 04/21/25  0453 04/20/25  1748   CALCIUM mg/dL 8.8 7.8*  --  7.9* 8.6   ALBUMIN g/dL  --   --   --  3.5 4.1   MAGNESIUM mg/dL  --  2.6* 1.1*  --   --    PHOSPHORUS mg/dL  --   --  2.4*  --   --      Results from last 7 days   Lab Units 04/21/25  0744 04/21/25  0453 04/20/25  2313 04/20/25  1907   PROCALCITONIN ng/mL  --   --   --  0.38*   LACTATE mmol/L 1.4 2.1* 2.9* 2.5*     COVID19   Date Value Ref Range Status   04/20/2025 Not Detected Not Detected - Ref. Range Final   03/23/2024 Detected (C) Not Detected - Ref. Range Final     Hemoglobin A1C   Date/Time Value Ref Range Status   04/21/2025 1552 8.10 (H) 4.80 - 5.60 % Final     Glucose   Date/Time Value Ref Range Status   04/23/2025 1038 258 (H) 70 - 130 mg/dL Final   04/23/2025 0628 207 (H) 70 - 130 mg/dL Final   04/22/2025 1948 251 (H) 70 - 130 mg/dL Final   04/22/2025 1529 295 (H) 70 - 130 mg/dL Final   04/22/2025 1026 296 (H) 70 - 130 mg/dL Final   04/22/2025 0633 174 (H) 70 - 130 mg/dL Final   04/21/2025 2124 248 (H) 70 - 130 mg/dL Final       CT Angiogram Chest  Narrative: CT ANGIOGRAM CHEST-     TECHNIQUE: Radiation dose reduction techniques were utilized, including  automated exposure control and exposure modulation based on body size. 2  mm images were obtained through the chest after the administration of IV  contrast. 3D reformat images were created. Multiple coronal, sagittal,  and 3-D reconstruction images were obtained.     HISTORY: Clinical concern for pneumonia.     COMPARISON: CT chest 8/7/2023. Chest radiograph  8/18/2024           FINDINGS: Asymmetric but subcentimeter left axillary lymph nodes have  decreased in size from 2023. Dilated main pulmonary artery (33 mm).  Pulmonary arteries are patent. Heart is normal in size. Small volume  pericardial fluid. Aneurysmal ascending thoracic aorta (40 mm). New  mediastinal and right hilar lymphadenopathy. Reference 1.8 cm right  hilar lymph node (series 4/image 61). Reference 2.1 cm subcarinal lymph  node (series 4/image 65) and 1.6 cm lower right paratracheal lymph node  (series 4/image 52). Subcentimeter left hilar lymph nodes. Decompressed  esophagus.     Trachea and main bronchi are patent. No bronchiectasis or bronchial wall  thickening. No emphysematous changes. Previously present diffuse  bilateral mosaic attenuation of the lungs. Multisegmental consolidation  with surrounding glass opacities within the posterior superior right  lower lobe (series 5/image 68 and series 7/image 93). New small focus of  consolidation within the lingula (series 5/image 70). New small  multifocal groundglass opacities bilaterally. Reference right upper lobe  (series 5/image 23 and left upper lobe (series 5/image 62). New diffuse  mild interlobular septal thickening with multifocal right greater than  left multi lobar likely linear atelectasis. New 8 mm right middle lobe  geometrically shaped, smoothly marginated solid nodule along the right  diaphragmatic pleura (series 6/image 62). Few calcified granulomas. No  pleural effusion.            Impression: 1. No evidence of pulmonary thromboembolism. Dilated main pulmonary  artery (33 mm).  2. Posterior superior right lower lobe multisegmental consolidation with  surrounding groundglass opacities suggesting pneumonitis/pneumonia.  Recommend a follow-up chest CT with IV contrast in 2 to 3 months to  ensure clearance, especially given below-mentioned lymphadenopathy.  3. New small focus of consolidation within the lingula and small  multifocal  groundglass opacities bilaterally, also likely  infectious/inflammatory.  4. New 8 mm geometrically shaped, smoothly marginated right middle lobe  solid nodule along the right diaphragmatic pleura which may represent a  pleural lymph node. Attention on follow-up.  5. New mediastinal and right hilar lymphadenopathy is presumed  reactive/inflammatory. Attention on follow-up.  6. Previously present diffuse mosaic attenuation of the lungs, most  likely small airways disease.  7. Aneurysmal ascending thoracic aorta (40 mm).        This report was finalized on 4/22/2025 3:36 PM by Dr. Jostin Goss M.D on Workstation: [id number]       I reviewed the patient's daily medications.  Scheduled Medications  aspirin, 325 mg, Oral, Daily  atorvastatin, 40 mg, Oral, Nightly  cefTRIAXone, 2,000 mg, Intravenous, Q24H  dilTIAZem, 120 mg, Oral, Q12H  enoxaparin sodium, 40 mg, Subcutaneous, Q24H  furosemide, 40 mg, Oral, Daily  guaiFENesin, 1,200 mg, Oral, Q12H  insulin glargine, 10 Units, Subcutaneous, Daily  insulin lispro, 2-9 Units, Subcutaneous, 4x Daily AC & at Bedtime  ipratropium-albuterol, 3 mL, Nebulization, 4x Daily - RT  metoprolol tartrate, 25 mg, Oral, BID  pantoprazole, 40 mg, Oral, Q AM  sildenafil, 20 mg, Oral, TID    Infusions   Diet  Diet: Cardiac, Diabetic; Healthy Heart (2-3 Na+); Consistent Carbohydrate; Fluid Consistency: Thin (IDDSI 0)         I have personally reviewed:  [x]  Laboratory   [x]  Microbiology   [x]  Radiology   []  EKG/Telemetry   []  Cardiology/Vascular   []  Pathology   [x]  Records     Assessment/Plan     Active Hospital Problems    Diagnosis  POA    **Pneumonia [J18.9]  Yes    Sepsis [A41.9]  Yes    CKD (chronic kidney disease) stage 3, GFR 30-59 ml/min [N18.30]  Unknown    Chronic heart failure with preserved ejection fraction (HFpEF) [I50.32]  Yes    Acute on chronic respiratory failure with hypoxia [J96.21]  Yes    Pulmonary hypertension [I27.20]  Yes    Class 2 severe obesity with  serious comorbidity in adult [E66.812, E66.01]  Yes    Chronic coronary artery disease [I25.10]  Yes    Gastroesophageal reflux disease [K21.9]  Yes    Hyperlipidemia [E78.5]  Yes    Type 2 diabetes mellitus with hyperglycemia, without long-term current use of insulin [E11.65]  Yes      Resolved Hospital Problems   No resolved problems to display.       75 y.o. female admitted with Pneumonia.    Acute on chronic (2 L at baseline) hypoxic respiratory failure  Bilateral pneumonia with sepsis  - CTA chest without PE.  Continue Rocephin, DuoNebs, Mucinex.  - Pulmonary following  - On 5 L, titrate as tolerated    Chronic diastolic heart failure  Coronary artery disease  Hypertension  Pulmonary hypertension  - Continue home sildenafil  - Cardiology consulted, appreciate recommendations.  No acute heart failure.  Would like to keep patient on the dry side while acutely ill, Home Lasix has been increased to 40 mg daily  - Continue Cardizem, aspirin, atorvastatin, metoprolol    Diabetes type 2, A1c 8.1%  - Continue Lantus 10 units  - Sliding scale insulin, titrate as needed    CKD 3A, stable, baseline    Lovenox 40 mg SC daily for DVT prophylaxis.  Full code.    Discussed with patient and family.  Anticipate discharge home with home health in 1-2 days.  If oxygen requirements continue to improve    Expected Discharge Date: 4/25/2025; Expected Discharge Time: 12:00 PM      Seth Dixon MD  [facility]  04/23/25  13:25 EDT

## 2025-04-23 NOTE — PROGRESS NOTES
"    Patient Name: Sally Rivera  :1950  75 y.o.      Patient Care Team:  Edgar Mayers as PCP - General (Family Medicine)  Parker Sky MD as Consulting Physician (Gastroenterology)  Rudy Berry MD as Consulting Physician (Pulmonary Disease)  Heron Martínez MD PhD as Consulting Physician (Cardiology)  Cory Christensen MD as Referring Physician (Cardiology)    Chief Complaint: follow up chronic HFpEF, respiratory failure , PAT    Interval History: renal function stable. Good UOP. Weight up 2 lbs but remains near baseline. Vitals stable.  She is down to 5 liters.        Objective   Vital Signs  Temp:  [97.3 °F (36.3 °C)-98.4 °F (36.9 °C)] 97.7 °F (36.5 °C)  Heart Rate:  [82-96] 84  Resp:  [18-20] 18  BP: (106-121)/(56-65) 118/56    Intake/Output Summary (Last 24 hours) at 2025 1120  Last data filed at 2025 0945  Gross per 24 hour   Intake 480 ml   Output 1100 ml   Net -620 ml     Flowsheet Rows      Flowsheet Row First Filed Value   Admission Height 162.6 cm (64\") Documented at 2025   Admission Weight 102 kg (225 lb) Documented at 2025            Physical Exam:   General Appearance:    Alert, cooperative, in no acute distress   Lungs:     Clear to auscultation.  Normal respiratory effort and rate.      Heart:    Regular rhythm and normal rate, normal S1 and S2, no murmurs, gallops or rubs.     Chest Wall:    No abnormalities observed   Abdomen:     Soft, nontender, positive bowel sounds.     Extremities:   no cyanosis, clubbing or edema.  No marked joint deformities.  Adequate musculoskeletal strength.       Results Review:    Results from last 7 days   Lab Units 25  0547   SODIUM mmol/L 132*   POTASSIUM mmol/L 4.5   CHLORIDE mmol/L 102   CO2 mmol/L 22.1   BUN mg/dL 13   CREATININE mg/dL 0.93   GLUCOSE mg/dL 187*   CALCIUM mg/dL 8.8     Results from last 7 days   Lab Units 25  1907 25  1748   HSTROP T ng/L 14* 18*     Results from last 7 " days   Lab Units 04/23/25  0547   WBC 10*3/mm3 14.79*   HEMOGLOBIN g/dL 9.5*   HEMATOCRIT % 31.3*   PLATELETS 10*3/mm3 232         Results from last 7 days   Lab Units 04/22/25  0515   MAGNESIUM mg/dL 2.6*                   Medication Review:   aspirin, 325 mg, Oral, Daily  atorvastatin, 40 mg, Oral, Nightly  cefTRIAXone, 2,000 mg, Intravenous, Q24H  dilTIAZem, 120 mg, Oral, Q12H  enoxaparin sodium, 40 mg, Subcutaneous, Q24H  furosemide, 40 mg, Oral, Daily  guaiFENesin, 1,200 mg, Oral, Q12H  insulin glargine, 10 Units, Subcutaneous, Daily  insulin lispro, 2-9 Units, Subcutaneous, 4x Daily AC & at Bedtime  ipratropium-albuterol, 3 mL, Nebulization, 4x Daily - RT  metoprolol tartrate, 25 mg, Oral, BID  pantoprazole, 40 mg, Oral, Q AM  sildenafil, 20 mg, Oral, TID              Assessment & Plan   Acute on chronic hypoxemic respiratory failure due to PNA - down to 5 liters today.   Chronic HFpEF - not acutely decompensated. Home oral diuretic was increased to keep her dry during this acute illness.   CAD without angina  Pulmonary hypertension  Diabetes mellitus type II  Chronic kidney disease   HTN  PAT , has been maintaining SR. Tele reviewed.     Continue current lasix dose.   Rhythm has been stable on metop and dilt.   Will follow.    KHALIF Barrow  [facility]  04/23/25  11:20 EDT

## 2025-04-23 NOTE — PLAN OF CARE
Goal Outcome Evaluation:  Plan of Care Reviewed With: patient        Progress: improving  Outcome Evaluation: Pt seen for OT session, agreeable and A&Ox4. Pt tolerated session well, demo'ing improvements but still limited by O2 needs now on 8L high flow. Pt reqs SBA/CGA for fxnl transfers and in room ambulation - progressing to SBA with use of rwx vs no AD. Pt tolerated standing at sink for ~3mins during G&H with S/u. Pt sats 85% at lowest but recovers within 10 sec of seated rest to 90s. Cueing req for deep breathing tech. IPOT will continue to monitor.    Anticipated Discharge Disposition (OT): home with assist

## 2025-04-23 NOTE — THERAPY TREATMENT NOTE
Patient Name: Sally Rivera  : 1950    MRN: [id number]                              Today's Date: 2025       Admit Date: 2025    Visit Dx:     ICD-10-CM ICD-9-CM   1. Pneumonia of right middle lobe due to infectious organism  J18.9 486   2. Hypoxia  R09.02 799.02     Patient Active Problem List   Diagnosis    Chronic coronary artery disease    Gastroesophageal reflux disease    Hyperlipidemia    Nonalcoholic fatty liver disease    Atrial paroxysmal tachycardia    Type 2 diabetes mellitus with hyperglycemia, without long-term current use of insulin    History of rectal polypectomy    Diverticulosis of large intestine without hemorrhage    High risk medication use    Class 2 severe obesity with serious comorbidity in adult    History of colon polyps    Pneumonia    Lung nodule    Pulmonary hypertension    Hypoxia    Abnormality of right ventricle of heart    Risk factors for obstructive sleep apnea    Acute on chronic respiratory failure with hypoxia    Chronic heart failure with preserved ejection fraction (HFpEF)    Heart failure with mildly reduced ejection fraction (HFmrEF)    Sepsis    CKD (chronic kidney disease) stage 3, GFR 30-59 ml/min     Past Medical History:   Diagnosis Date    Acid reflux     Asthma 2022    CAD (coronary artery disease)     per dr deepthi cesar office note -dd    Chronic heart failure with preserved ejection fraction (HFpEF)     Cirrhosis     CKD (chronic kidney disease) stage 3, GFR 30-59 ml/min     Diverticulitis     GERD (gastroesophageal reflux disease)     Hyperlipidemia     Hypertension     Hypertension     BERNARD (nonalcoholic steatohepatitis)     PONV (postoperative nausea and vomiting)     Pulmonary hypertension     Type 2 diabetes mellitus      Past Surgical History:   Procedure Laterality Date    CARDIAC CATHETERIZATION N/A 2022    Procedure: Right Heart Cath with vasodilator as indicated. pHTN workup;  Surgeon: Waqar Rush MD;   Location: [facility] CATH INVASIVE LOCATION;  Service: Cardiovascular;  Laterality: N/A;    CHOLECYSTECTOMY  2011    COLON SURGERY  2014    bowel resection for obstruction    COLONOSCOPY  2013    Dr. Sky    COLONOSCOPY N/A 10/07/2021    Procedure: COLONOSCOPY TO CECUM WITH COLD POLYPECTOMY;  Surgeon: Facundo Klein Jr., MD;  Location: [facility] ENDOSCOPY;  Service: General;  Laterality: N/A;  PRE- H/O COLON POLYPS  POST-COLON POLYP, DIVERTICULOSIS    HYSTERECTOMY  2011    MOUTH BIOPSY      OOPHORECTOMY      TUBAL ABDOMINAL LIGATION  1978    US GUIDED LYMPH NODE BIOPSY  11/30/2020      General Information       Row Name 04/23/25 1549          OT Time and Intention    Subjective Information no complaints  -PP     Document Type therapy note (daily note)  -PP     Mode of Treatment physical therapy;co-treatment;occupational therapy  -PP     Patient Effort good  -PP     Symptoms Noted During/After Treatment none  -PP     Comment co-tx appropriate d/t pt's activity limitation due to fatigue/weakness and acuity level; pt working on functional balance and strengthening while performing ADL's.  -PP       Row Name 04/23/25 1549          General Information    Patient Profile Reviewed yes  -PP     Existing Precautions/Restrictions oxygen therapy device and L/min  on 8 L high flow  -PP     Barriers to Rehab previous functional deficit  -PP       Row Name 04/23/25 1549          Cognition    Orientation Status (Cognition) oriented x 4  -PP       Row Name 04/23/25 1549          Safety Issues/Impairments Affecting Functional Mobility    Impairments Affecting Function (Mobility) balance;endurance/activity tolerance;shortness of breath  -PP     Comment, Safety Issues/Impairments (Mobility) gait belt and non skid socks worn for safety  -PP               User Key  (r) = Recorded By, (t) = Taken By, (c) = Cosigned By      Initials Name Provider Type    PP Maria De Jesus Barajas, OT Occupational Therapist                     Mobility/ADL's        Row Name 04/23/25 1550          Bed Mobility    Bed Mobility --  -PP     Supine-Sit Dukes (Bed Mobility) --  -PP     Sit-Supine Dukes (Bed Mobility) --  -PP     Assistive Device (Bed Mobility) --  -PP     Comment, (Bed Mobility) Pt sitting EOB at session start/end  -PP       Row Name 04/23/25 1550          Transfers    Transfers sit-stand transfer  -PP     Comment, (Transfers) Sba for all fxnl xfers w/ rwx and w/o AD.  -PP       Row Name 04/23/25 1550          Sit-Stand Transfer    Sit-Stand Dukes (Transfers) standby assist  -PP     Assistive Device (Sit-Stand Transfers) walker, front-wheeled;other (see comments)  -PP       Row Name 04/23/25 1550          Functional Mobility    Functional Mobility- Ind. Level standby assist  -PP     Functional Mobility- Device walker, front-wheeled  -PP     Functional Mobility- Comment fxnl mob in room short distance 2x with rest breaks each time  -PP       Row Name 04/23/25 1550          Grooming Assessment/Training    Dukes Level (Grooming) set up;grooming skills;oral care regimen;wash face, hands  -PP     Position (Grooming) supported standing  -PP     Comment, (Grooming) ~3 mins standing at sink with Sba  -PP               User Key  (r) = Recorded By, (t) = Taken By, (c) = Cosigned By      Initials Name Provider Type    PP Maria De Jesus Barajas OT Occupational Therapist                   Obj/Interventions       Row Name 04/23/25 1553          Balance    Static Sitting Balance independent  -PP     Position, Sitting Balance sitting edge of bed  -PP     Static Standing Balance standby assist;supervision  -PP     Dynamic Standing Balance standby assist  -PP     Position/Device Used, Standing Balance supported;walker, front-wheeled  -PP     Balance Interventions sitting;standing;static;dynamic;occupation based/functional task;supported  -PP               User Key  (r) = Recorded By, (t) = Taken By, (c) = Cosigned By      Initials Name Provider Type    PP  Maria De Jesus Barajas OT Occupational Therapist                   Goals/Plan    No documentation.                  Clinical Impression       Row Name 04/23/25 1553          Pain Assessment    Pretreatment Pain Rating 0/10 - no pain  -PP     Posttreatment Pain Rating 0/10 - no pain  -PP       Row Name 04/23/25 6376          Plan of Care Review    Plan of Care Reviewed With patient  -PP     Progress improving  -PP     Outcome Evaluation Pt seen for OT session, agreeable and A&Ox4. Pt tolerated session well, demo'ing improvements but still limited by O2 needs now on 8L high flow. Pt reqs SBA/CGA for fxnl transfers and in room ambulation - progressing to SBA with use of rwx vs no AD. Pt tolerated standing at sink for ~3mins during G&H with S/u. Pt sats 85% at lowest but recovers within 10 sec of seated rest to 90s. Cueing req for deep breathing tech. IPOT will continue to monitor.  -PP       Row Name 04/23/25 5435          Therapy Plan Review/Discharge Plan (OT)    Anticipated Discharge Disposition (OT) home with assist  -PP       Row Name 04/23/25 4420          Vital Signs    O2 Delivery Pre Treatment hi-flow  8L  -PP     O2 Delivery Intra Treatment hi-flow  -PP     O2 Delivery Post Treatment hi-flow  -PP       Row Name 04/23/25 1763          Positioning and Restraints    Pre-Treatment Position in bed  -PP     Post Treatment Position bed  -PP     In Bed notified nsg;sitting EOB;call light within reach;encouraged to call for assist  -PP               User Key  (r) = Recorded By, (t) = Taken By, (c) = Cosigned By      Initials Name Provider Type    PP Maria De Jesus Barajas OT Occupational Therapist                   Outcome Measures       Row Name 04/23/25 1601          How much help from another is currently needed...    Putting on and taking off regular lower body clothing? 3  -PP     Bathing (including washing, rinsing, and drying) 3  -PP     Toileting (which includes using toilet bed pan or urinal) 3  -PP     Taking care  of personal grooming (such as brushing teeth) 4  -PP     Eating meals 4  -PP       Row Name 04/23/25 1138 04/23/25 9526       How much help from another person do you currently need...    Turning from your back to your side while in flat bed without using bedrails? 4  -ST 4  -PB    Moving from lying on back to sitting on the side of a flat bed without bedrails? 4  -ST 4  -PB    Moving to and from a bed to a chair (including a wheelchair)? 3  -ST 4  -PB    Standing up from a chair using your arms (e.g., wheelchair, bedside chair)? 4  -ST 3  -PB    Climbing 3-5 steps with a railing? 3  -ST 3  -PB    To walk in hospital room? 3  -ST 3  -PB    AM-PAC 6 Clicks Score (PT) 21  -ST 21  -PB              User Key  (r) = Recorded By, (t) = Taken By, (c) = Cosigned By      Initials Name Provider Type    Luh Camacho, RN Registered Nurse    Maria Dolores Duarte, PT Physical Therapist    Maria De Jesus De Jesus, OT Occupational Therapist                      OT Recommendation and Plan  Planned Therapy Interventions (OT): activity tolerance training, BADL retraining, functional balance retraining, occupation/activity based interventions, patient/caregiver education/training, strengthening exercise, transfer/mobility retraining  Therapy Frequency (OT): 5 times/wk  Plan of Care Review  Plan of Care Reviewed With: patient  Progress: improving  Outcome Evaluation: Pt seen for OT session, agreeable and A&Ox4. Pt tolerated session well, demo'ing improvements but still limited by O2 needs now on 8L high flow. Pt reqs SBA/CGA for fxnl transfers and in room ambulation - progressing to SBA with use of rwx vs no AD. Pt tolerated standing at sink for ~3mins during G&H with S/u. Pt sats 85% at lowest but recovers within 10 sec of seated rest to 90s. Cueing req for deep breathing tech. IPOT will continue to monitor.     Time Calculation:         Time Calculation- OT       Row Name 04/23/25 0221             Time Calculation- OT    OT Start  Time 0838  -PP      OT Stop Time 0906  -PP      OT Time Calculation (min) 28 min  -PP      Total Timed Code Minutes- OT 28 minute(s)  -PP      OT Received On 04/23/25  -PP      OT - Next Appointment 04/25/25  -PP         Timed Charges    65205 - OT Therapeutic Activity Minutes 18  -PP      96277 - OT Self Care/Mgmt Minutes 10  -PP         Total Minutes    Timed Charges Total Minutes 28  -PP       Total Minutes 28  -PP                User Key  (r) = Recorded By, (t) = Taken By, (c) = Cosigned By      Initials Name Provider Type    PP Maria De Jesus Barajas, OT Occupational Therapist                  Therapy Charges for Today       Code Description Service Date Service Provider Modifiers Qty    65321676514 HC OT EVAL MOD COMPLEXITY 2 4/22/2025 Maria De Jesus Barajas, OT GO 1    00562299242 HC OT THERAPEUTIC ACT EA 15 MIN 4/23/2025 Maria De Jesus Barajas, OT GO 1    60955588760 HC OT SELF CARE/MGMT/TRAIN EA 15 MIN 4/23/2025 Maria De Jesus Barajas, OT GO 1                 Maria De Jesus Barajas OT  4/23/2025

## 2025-04-23 NOTE — PLAN OF CARE
Goal Outcome Evaluation:  Plan of Care Reviewed With: patient      Pt is 76 y/o F admitted to [facility] on 4/20/25 with SOB, PMH: CAD, CHF, GERD, HLD, pulmonary HTN. At baseline pt is from home with spouse, uses rollator in the community. Pt currently demos SBA/CGA for transfers and room ambulation - progressing to SBA with use of rwx vs no AD - tendency to reach for furniture. Pt is on 8L throughout sats 85% at lowest but recovers within 10 sec of seated rest to 90s. SOA throughout and cues for breathing strategies. Pt demos mobility below baseline and therefore would benefit from acute skilled PT to address functional mobility deficits. Anticipate d/c home with home health/OP PT for further endurance training as needed.            Anticipated Discharge Disposition (PT): home with assist, home with home health

## 2025-04-24 ENCOUNTER — READMISSION MANAGEMENT (OUTPATIENT)
Dept: CALL CENTER | Facility: HOSPITAL | Age: 75
End: 2025-04-24
Payer: MEDICARE

## 2025-04-24 VITALS
DIASTOLIC BLOOD PRESSURE: 72 MMHG | OXYGEN SATURATION: 94 % | HEART RATE: 97 BPM | WEIGHT: 226.19 LBS | RESPIRATION RATE: 16 BRPM | TEMPERATURE: 97.9 F | SYSTOLIC BLOOD PRESSURE: 119 MMHG | HEIGHT: 64 IN | BODY MASS INDEX: 38.62 KG/M2

## 2025-04-24 PROBLEM — J18.9 PNEUMONIA, UNSPECIFIED ORGANISM: Status: ACTIVE | Noted: 2025-04-24

## 2025-04-24 LAB
ANION GAP SERPL CALCULATED.3IONS-SCNC: 10.5 MMOL/L (ref 5–15)
BASOPHILS # BLD AUTO: 0.06 10*3/MM3 (ref 0–0.2)
BASOPHILS NFR BLD AUTO: 0.6 % (ref 0–1.5)
BUN SERPL-MCNC: 11 MG/DL (ref 8–23)
BUN/CREAT SERPL: 13.3 (ref 7–25)
CALCIUM SPEC-SCNC: 9.1 MG/DL (ref 8.6–10.5)
CHLORIDE SERPL-SCNC: 100 MMOL/L (ref 98–107)
CO2 SERPL-SCNC: 23.5 MMOL/L (ref 22–29)
CREAT SERPL-MCNC: 0.83 MG/DL (ref 0.57–1)
DEPRECATED RDW RBC AUTO: 43 FL (ref 37–54)
EGFRCR SERPLBLD CKD-EPI 2021: 74.1 ML/MIN/1.73
EOSINOPHIL # BLD AUTO: 0.46 10*3/MM3 (ref 0–0.4)
EOSINOPHIL NFR BLD AUTO: 4.7 % (ref 0.3–6.2)
ERYTHROCYTE [DISTWIDTH] IN BLOOD BY AUTOMATED COUNT: 17 % (ref 12.3–15.4)
GLUCOSE BLDC GLUCOMTR-MCNC: 170 MG/DL (ref 70–130)
GLUCOSE BLDC GLUCOMTR-MCNC: 270 MG/DL (ref 70–130)
GLUCOSE SERPL-MCNC: 162 MG/DL (ref 65–99)
HCT VFR BLD AUTO: 31.8 % (ref 34–46.6)
HGB BLD-MCNC: 9.7 G/DL (ref 12–15.9)
IMM GRANULOCYTES # BLD AUTO: 0.05 10*3/MM3 (ref 0–0.05)
IMM GRANULOCYTES NFR BLD AUTO: 0.5 % (ref 0–0.5)
LYMPHOCYTES # BLD AUTO: 2.37 10*3/MM3 (ref 0.7–3.1)
LYMPHOCYTES NFR BLD AUTO: 24 % (ref 19.6–45.3)
MCH RBC QN AUTO: 21.8 PG (ref 26.6–33)
MCHC RBC AUTO-ENTMCNC: 30.5 G/DL (ref 31.5–35.7)
MCV RBC AUTO: 71.5 FL (ref 79–97)
MONOCYTES # BLD AUTO: 0.63 10*3/MM3 (ref 0.1–0.9)
MONOCYTES NFR BLD AUTO: 6.4 % (ref 5–12)
NEUTROPHILS NFR BLD AUTO: 6.3 10*3/MM3 (ref 1.7–7)
NEUTROPHILS NFR BLD AUTO: 63.8 % (ref 42.7–76)
PLATELET # BLD AUTO: 257 10*3/MM3 (ref 140–450)
PMV BLD AUTO: 10.3 FL (ref 6–12)
POTASSIUM SERPL-SCNC: 4.2 MMOL/L (ref 3.5–5.2)
PROCALCITONIN SERPL-MCNC: 0.29 NG/ML (ref 0–0.25)
RBC # BLD AUTO: 4.45 10*6/MM3 (ref 3.77–5.28)
SODIUM SERPL-SCNC: 134 MMOL/L (ref 136–145)
WBC NRBC COR # BLD AUTO: 9.87 10*3/MM3 (ref 3.4–10.8)

## 2025-04-24 PROCEDURE — 99232 SBSQ HOSP IP/OBS MODERATE 35: CPT | Performed by: NURSE PRACTITIONER

## 2025-04-24 PROCEDURE — 94799 UNLISTED PULMONARY SVC/PX: CPT

## 2025-04-24 PROCEDURE — 84145 PROCALCITONIN (PCT): CPT | Performed by: HOSPITALIST

## 2025-04-24 PROCEDURE — 85025 COMPLETE CBC W/AUTO DIFF WBC: CPT | Performed by: INTERNAL MEDICINE

## 2025-04-24 PROCEDURE — 63710000001 INSULIN GLARGINE PER 5 UNITS: Performed by: STUDENT IN AN ORGANIZED HEALTH CARE EDUCATION/TRAINING PROGRAM

## 2025-04-24 PROCEDURE — 82948 REAGENT STRIP/BLOOD GLUCOSE: CPT

## 2025-04-24 PROCEDURE — 94664 DEMO&/EVAL PT USE INHALER: CPT

## 2025-04-24 PROCEDURE — 94761 N-INVAS EAR/PLS OXIMETRY MLT: CPT

## 2025-04-24 PROCEDURE — 94760 N-INVAS EAR/PLS OXIMETRY 1: CPT

## 2025-04-24 PROCEDURE — 63710000001 INSULIN LISPRO (HUMAN) PER 5 UNITS: Performed by: INTERNAL MEDICINE

## 2025-04-24 PROCEDURE — 80048 BASIC METABOLIC PNL TOTAL CA: CPT | Performed by: INTERNAL MEDICINE

## 2025-04-24 RX ORDER — GUAIFENESIN 600 MG/1
1200 TABLET, EXTENDED RELEASE ORAL EVERY 12 HOURS SCHEDULED
Start: 2025-04-24

## 2025-04-24 RX ORDER — IPRATROPIUM BROMIDE AND ALBUTEROL SULFATE 2.5; .5 MG/3ML; MG/3ML
3 SOLUTION RESPIRATORY (INHALATION) 3 TIMES DAILY
Qty: 270 ML | Refills: 0 | Status: SHIPPED | OUTPATIENT
Start: 2025-04-24

## 2025-04-24 RX ORDER — CEFDINIR 300 MG/1
300 CAPSULE ORAL 2 TIMES DAILY
Qty: 6 CAPSULE | Refills: 0 | Status: SHIPPED | OUTPATIENT
Start: 2025-04-24 | End: 2025-04-24 | Stop reason: ALTCHOICE

## 2025-04-24 RX ORDER — IPRATROPIUM BROMIDE AND ALBUTEROL SULFATE 2.5; .5 MG/3ML; MG/3ML
3 SOLUTION RESPIRATORY (INHALATION)
Start: 2025-04-24 | End: 2025-04-24

## 2025-04-24 RX ADMIN — ACETAMINOPHEN 650 MG: 325 TABLET, FILM COATED ORAL at 01:47

## 2025-04-24 RX ADMIN — GUAIFENESIN 1200 MG: 600 TABLET, EXTENDED RELEASE ORAL at 09:33

## 2025-04-24 RX ADMIN — DILTIAZEM HYDROCHLORIDE 120 MG: 30 TABLET, FILM COATED ORAL at 09:33

## 2025-04-24 RX ADMIN — INSULIN LISPRO 6 UNITS: 100 INJECTION, SOLUTION INTRAVENOUS; SUBCUTANEOUS at 11:58

## 2025-04-24 RX ADMIN — INSULIN LISPRO 2 UNITS: 100 INJECTION, SOLUTION INTRAVENOUS; SUBCUTANEOUS at 09:34

## 2025-04-24 RX ADMIN — SILDENAFIL 20 MG: 20 TABLET, FILM COATED ORAL at 09:33

## 2025-04-24 RX ADMIN — INSULIN GLARGINE 10 UNITS: 100 INJECTION, SOLUTION SUBCUTANEOUS at 09:33

## 2025-04-24 RX ADMIN — IPRATROPIUM BROMIDE AND ALBUTEROL SULFATE 3 ML: .5; 3 SOLUTION RESPIRATORY (INHALATION) at 07:37

## 2025-04-24 RX ADMIN — ASPIRIN 325 MG ORAL TABLET 325 MG: 325 PILL ORAL at 09:33

## 2025-04-24 RX ADMIN — PANTOPRAZOLE SODIUM 40 MG: 40 TABLET, DELAYED RELEASE ORAL at 06:40

## 2025-04-24 RX ADMIN — METOPROLOL TARTRATE 25 MG: 25 TABLET, FILM COATED ORAL at 09:33

## 2025-04-24 RX ADMIN — FUROSEMIDE 40 MG: 40 TABLET ORAL at 09:33

## 2025-04-24 NOTE — OUTREACH NOTE
Prep Survey      Flowsheet Row Responses   [facility] patient discharged from? [facility]   Is LACE score < 7 ? No   Eligibility Readm Mgmt   Discharge diagnosis Pneumonia, sepsis   Does the patient have one of the following disease processes/diagnoses(primary or secondary)? Sepsis   Is there a DME ordered? Yes   What DME was ordered? nebulizer supplied by [facility].   Prep survey completed? Yes            Marline HANNON - Registered Nurse

## 2025-04-24 NOTE — PROGRESS NOTES
[facility] INPATIENT PROGRESS NOTE         [address]    2025      PATIENT IDENTIFICATION:  Name: Sally Rivera ADMIT: 2025   : 1950  PCP: Edgar Mayers    MRN: [id number] LOS: 4 days   AGE/SEX: 75 y.o. female  ROOM: [facility]                     LOS 4    Reason for visit: Sepsis with acute on chronic hypoxemic respiratory failure due to bilateral pneumonia      SUBJECTIVE:      She is feeling much better.  Says that she feels that she is about 75 to 80% back to baseline.  Oxygen down to 2 L with saturations 95% at time of visit.  Discussed with her again results of the CT.  No objection to discharge.  May require oxygen at discharge.  Follow-up with with us in the office for this and we plan to repeat a CT in 6 to 8 weeks.    Objective   OBJECTIVE:    Vital Sign Min/Max for last 24 hours  Temp  Min: 97.7 °F (36.5 °C)  Max: 98.4 °F (36.9 °C)   BP  Min: 91/50  Max: 119/72   Pulse  Min: 78  Max: 97   Resp  Min: 16  Max: 20   SpO2  Min: 91 %  Max: 100 %   No data recorded   Weight  Min: 103 kg (226 lb 3.1 oz)  Max: 103 kg (226 lb 3.1 oz)    Vitals:    25 2320 25 0548 25 0700 25 0737   BP: 91/50  119/72    BP Location:   Left arm    Patient Position:   Sitting    Pulse: 87  78 97   Resp: 16  16 16   Temp: 98.4 °F (36.9 °C)  97.9 °F (36.6 °C)    TempSrc: Oral  Oral    SpO2: 100%  95% 94%   Weight:  103 kg (226 lb 3.1 oz)     Height:                25  0500 25  0637 25  0548   Weight: 101 kg (222 lb 6.4 oz) 102 kg (224 lb 10.4 oz) 103 kg (226 lb 3.1 oz)       Body mass index is 38.83 kg/m².                          Body mass index is 38.83 kg/m².    Intake/Output Summary (Last 24 hours) at 2025 1004  Last data filed at 2025 0547  Gross per 24 hour   Intake 90 ml   Output --   Net 90 ml         Exam:  GEN:  No distress, appears stated age  EYES:   PERRL, anicteric sclerae  ENT:    External ears/nose normal, OP clear  NECK:  No  adenopathy, midline trachea  LUNGS: Normal chest on inspection, palpation and auscultation  CV:  Normal S1S2, without murmur  ABD:  Nontender, nondistended, no hepatosplenomegaly, +BS  EXT:  No edema.  No cyanosis or clubbing.  No mottling and normal cap refill.    Assessment     Scheduled meds:  aspirin, 325 mg, Oral, Daily  atorvastatin, 40 mg, Oral, Nightly  cefTRIAXone, 2,000 mg, Intravenous, Q24H  dilTIAZem, 120 mg, Oral, Q12H  enoxaparin sodium, 40 mg, Subcutaneous, Q24H  furosemide, 40 mg, Oral, Daily  guaiFENesin, 1,200 mg, Oral, Q12H  insulin glargine, 10 Units, Subcutaneous, Daily  insulin lispro, 2-9 Units, Subcutaneous, 4x Daily AC & at Bedtime  ipratropium-albuterol, 3 mL, Nebulization, 4x Daily - RT  metoprolol tartrate, 25 mg, Oral, BID  pantoprazole, 40 mg, Oral, Q AM  sildenafil, 20 mg, Oral, TID      IV meds:                         Data Review:  Results from last 7 days   Lab Units 04/24/25  0629 04/23/25  0547 04/22/25  1532 04/22/25  0515 04/21/25  0453 04/20/25  1748   SODIUM mmol/L 134* 132*  --  132* 135* 133*   POTASSIUM mmol/L 4.2 4.5 3.8 3.5 3.4* 3.8   CHLORIDE mmol/L 100 102  --  99 96* 95*   CO2 mmol/L 23.5 22.1  --  23.0 21.7* 19.0*   BUN mg/dL 11 13  --  14 15 12   CREATININE mg/dL 0.83 0.93  --  0.89 1.10* 1.16*   GLUCOSE mg/dL 162* 187*  --  182* 208* 259*   CALCIUM mg/dL 9.1 8.8  --  7.8* 7.9* 8.6         Estimated Creatinine Clearance: 69.5 mL/min (by C-G formula based on SCr of 0.83 mg/dL).  Results from last 7 days   Lab Units 04/24/25  0629 04/23/25  0547 04/22/25  0515 04/21/25  0453 04/20/25  1748   WBC 10*3/mm3 9.87 14.79* 18.22* 28.50* 25.31*   HEMOGLOBIN g/dL 9.7* 9.5* 9.3* 10.0* 10.6*   PLATELETS 10*3/mm3 257 232 178 220 213         Results from last 7 days   Lab Units 04/21/25  0453 04/20/25  1748   ALT (SGPT) U/L 14 16   AST (SGOT) U/L 20 26     Results from last 7 days   Lab Units 04/21/25  0158   PH, ARTERIAL pH units 7.477*   PO2 ART mm Hg 72.0*   PCO2, ARTERIAL mm Hg  32.9*   HCO3 ART mmol/L 24.3     Results from last 7 days   Lab Units 04/21/25  0744 04/21/25  0453 04/20/25  2313 04/20/25  1907   PROCALCITONIN ng/mL  --   --   --  0.38*   LACTATE mmol/L 1.4 2.1* 2.9* 2.5*         Hemoglobin A1C   Date/Time Value Ref Range Status   04/21/2025 1552 8.10 (H) 4.80 - 5.60 % Final     Glucose   Date/Time Value Ref Range Status   04/24/2025 0630 170 (H) 70 - 130 mg/dL Final   04/23/2025 2055 260 (H) 70 - 130 mg/dL Final   04/23/2025 1615 163 (H) 70 - 130 mg/dL Final   04/23/2025 1038 258 (H) 70 - 130 mg/dL Final   04/23/2025 0628 207 (H) 70 - 130 mg/dL Final   04/22/2025 1948 251 (H) 70 - 130 mg/dL Final   04/22/2025 1529 295 (H) 70 - 130 mg/dL Final         Imaging reviewed  Chest x-ray 4/20 reviewed    CT chest/22 reviewed: Right lower lobe consolidation with pneumonia.  No masses seen.  Pulmonary artery dilation.  No evidence of PE.  8 mm new right middle lobe solid nodule potentially pleural lymph node.  Mediastinal and hilar lymphadenopathy favored to be reactive.  Aneurysmal ascending thoracic aorta noted.        Microbiology reviewed            Active Hospital Problems    Diagnosis  POA    **Pneumonia [J18.9]  Yes    Sepsis [A41.9]  Yes    CKD (chronic kidney disease) stage 3, GFR 30-59 ml/min [N18.30]  Unknown    Chronic heart failure with preserved ejection fraction (HFpEF) [I50.32]  Yes    Acute on chronic respiratory failure with hypoxia [J96.21]  Yes    Pulmonary hypertension [I27.20]  Yes    Class 2 severe obesity with serious comorbidity in adult [E66.812, E66.01]  Yes    Chronic coronary artery disease [I25.10]  Yes    Gastroesophageal reflux disease [K21.9]  Yes    Hyperlipidemia [E78.5]  Yes    Type 2 diabetes mellitus with hyperglycemia, without long-term current use of insulin [E11.65]  Yes      Resolved Hospital Problems   No resolved problems to display.         ASSESSMENT:  Pneumonia  Sepsis  Chronic heart failure with preserved ejection fraction  Pulm  hypertension  Asthma: Currently without exacerbation  Obesity  8 mm right middle lobe nodule  Reactive lymphadenopathy  Aneurysmal ascending thoracic aorta        PLAN:  Encourage pulmonary toilet.  Continue antibiotics.    Wean oxygen for goal saturation greater than 90%.  Bronchodilators to help with clearance and for asthma symptoms.  Repeat CT chest in 6 to 8 weeks to assure resolution.      Rudy Berry MD  Pulmonary and Critical Care Medicine  [facility], [facility]  4/24/2025    10:04 EDT

## 2025-04-24 NOTE — PLAN OF CARE
Goal Outcome Evaluation:  Plan of Care Reviewed With: patient        Progress: no change  Outcome Evaluation: Pt resting at ths time, received tylenol for c/o back pain , med effective 4 to 0, up to br per self, gait steady remains on 4L O2, satting 90-92, attempt to decreased to 3L but pt dropping to 80-85%, nad, sr on tele, hs snack provided, intermittent dry loose cough. afebrile.

## 2025-04-24 NOTE — PROGRESS NOTES
"    Patient Name: Sally Rivera  :1950  75 y.o.      Patient Care Team:  Edgar Mayers as PCP - General (Family Medicine)  Parker Sky MD as Consulting Physician (Gastroenterology)  Rudy Berry MD as Consulting Physician (Pulmonary Disease)  Heron Martínez MD PhD as Consulting Physician (Cardiology)  Cory Christensen MD as Referring Physician (Cardiology)    Chief Complaint: follow up chronic HFpEF, respiratory failure , PAT    Interval History: down to 3 liters BP on the low side. Labs stable. Tele reviewed. Occasional PVCs.        Objective   Vital Signs  Temp:  [97.7 °F (36.5 °C)-98.4 °F (36.9 °C)] 98.4 °F (36.9 °C)  Heart Rate:  [84-96] 87  Resp:  [16-20] 16  BP: ()/(50-81) 91/50    Intake/Output Summary (Last 24 hours) at 2025 0642  Last data filed at 2025 0547  Gross per 24 hour   Intake 330 ml   Output --   Net 330 ml     Flowsheet Rows      Flowsheet Row First Filed Value   Admission Height 162.6 cm (64\") Documented at 2025   Admission Weight 102 kg (225 lb) Documented at 2025            Physical Exam:   General Appearance:    Alert, cooperative, in no acute distress   Lungs:     Clear to auscultation.  Normal respiratory effort and rate.      Heart:    Regular rhythm and normal rate, normal S1 and S2, no murmurs, gallops or rubs.     Chest Wall:    No abnormalities observed   Abdomen:     Soft, nontender, positive bowel sounds.     Extremities:   no cyanosis, clubbing or edema.  No marked joint deformities.  Adequate musculoskeletal strength.       Results Review:    Results from last 7 days   Lab Units 25  0547   SODIUM mmol/L 132*   POTASSIUM mmol/L 4.5   CHLORIDE mmol/L 102   CO2 mmol/L 22.1   BUN mg/dL 13   CREATININE mg/dL 0.93   GLUCOSE mg/dL 187*   CALCIUM mg/dL 8.8     Results from last 7 days   Lab Units 25  1907 25  1748   HSTROP T ng/L 14* 18*     Results from last 7 days   Lab Units 25  0547   WBC " 10*3/mm3 14.79*   HEMOGLOBIN g/dL 9.5*   HEMATOCRIT % 31.3*   PLATELETS 10*3/mm3 232         Results from last 7 days   Lab Units 04/22/25  0515   MAGNESIUM mg/dL 2.6*                   Medication Review:   aspirin, 325 mg, Oral, Daily  atorvastatin, 40 mg, Oral, Nightly  cefTRIAXone, 2,000 mg, Intravenous, Q24H  dilTIAZem, 120 mg, Oral, Q12H  enoxaparin sodium, 40 mg, Subcutaneous, Q24H  furosemide, 40 mg, Oral, Daily  guaiFENesin, 1,200 mg, Oral, Q12H  insulin glargine, 10 Units, Subcutaneous, Daily  insulin lispro, 2-9 Units, Subcutaneous, 4x Daily AC & at Bedtime  ipratropium-albuterol, 3 mL, Nebulization, 4x Daily - RT  metoprolol tartrate, 25 mg, Oral, BID  pantoprazole, 40 mg, Oral, Q AM  sildenafil, 20 mg, Oral, TID              Assessment & Plan   Acute on chronic hypoxemic respiratory failure due to PNA - down to 3 liters today.   Chronic HFpEF - not acutely decompensated. Home oral diuretic was increased to keep her dry during this acute illness.   CAD without angina  Pulmonary hypertension  Diabetes mellitus type II  Chronic kidney disease   HTN  PAT , has been maintaining SR. Tele reviewed.     Back to home dose of lasix at dc. Will keep at 40 mg daily while here.   Rhythm has been stable on metop and dilt.   No objection to dc. Follows with the heart failure clinic. Will also arrange follow up with Dr. Christensen in 3-4 weeks.    KHALIF Barrow  [facility]  04/24/25  06:42 EDT

## 2025-04-24 NOTE — CASE MANAGEMENT/SOCIAL WORK
Case Management Discharge Note      Final Note: Pt discharged to home with spouse and nebulizer supplied by [facility], [facility]    Provided Post Acute Provider List?: N/A  Provided Post Acute Provider Quality & Resource List?: N/A    Selected Continued Care - Admitted Since 4/20/2025       Destination    No services have been selected for the patient.                Durable Medical Equipment       Service Provider Services Address Phone Fax Patient Preferred    [facility] [address] [address] [phone] --              Dialysis/Infusion    No services have been selected for the patient.                Home Medical Care    No services have been selected for the patient.                Therapy    No services have been selected for the patient.                Community Resources    No services have been selected for the patient.                Community & DME    No services have been selected for the patient.                    Transportation Services  Private: Car    Final Discharge Disposition Code: 01 - home or self-care

## 2025-04-24 NOTE — DISCHARGE SUMMARY
Patient Name: Sally Rivera  : 1950  MRN: [id number]    Date of Admission: 2025  Date of Discharge:  2025  Primary Care Physician: Edgar Mayers      Chief Complaint:   Shortness of Breath, Vomiting, and Nausea      Discharge Diagnoses     Active Hospital Problems    Diagnosis  POA    **Pneumonia [J18.9]  Yes    Pneumonia, unspecified organism [J18.9]  Yes    Sepsis [A41.9]  Yes    CKD (chronic kidney disease) stage 3, GFR 30-59 ml/min [N18.30]  Unknown    Chronic heart failure with preserved ejection fraction (HFpEF) [I50.32]  Yes    Acute on chronic respiratory failure with hypoxia [J96.21]  Yes    Pulmonary hypertension [I27.20]  Yes    Class 2 severe obesity with serious comorbidity in adult [E66.812, E66.01]  Yes    Chronic coronary artery disease [I25.10]  Yes    Gastroesophageal reflux disease [K21.9]  Yes    Hyperlipidemia [E78.5]  Yes    Type 2 diabetes mellitus with hyperglycemia, without long-term current use of insulin [E11.65]  Yes      Resolved Hospital Problems   No resolved problems to display.        Hospital Course     Ms. Rivera is a 75 y.o. female with a history of diabetes, CAD, GERD, diastolic CHF, CKD 3, chronic hypoxic respiratory failure and pulmonary hypertension who presented to [facility] initially complaining of shortness of breath.  Please see the admitting history and physical for further details.  She was requiring high flow oxygen rather than her usual 2 to 3 L she uses at home.  She was noted to have bilateral infiltrates consistent with pneumonia and was started on antibiotics.  RVP was negative.  CTA was done showing no PE, posterior right lower lobe consolidation with groundglass opacities suggestive of pneumonitis or pneumonia, lingular consolidation and an 8 mm right middle lobe nodule along with mediastinal and right hilar adenopathy.  There are chronic changes noted as well with full report detailed below.  Pulmonology followed  her while she was here and cardiology was consulted as well.  She was able to steadily wean down her oxygen flows and was down to 2 L by the time of discharge.  She had just returned to bed from the bathroom when I saw her and was saturating 92% and did not appear extremely winded.  She was felt to be euvolemic with no changes to her usual cardiac regimen.  She is requesting discharge today and does appear to be stable.  She can complete the remainder of her antibiotic course orally.  She will follow-up with pulmonology for repeat CT scan in 6 to 8 weeks.    Day of Discharge     Subjective:  Feeling okay.  Less winded when walking to the bathroom    Physical Exam:  Temp:  [97.9 °F (36.6 °C)-98.4 °F (36.9 °C)] 97.9 °F (36.6 °C)  Heart Rate:  [78-97] 97  Resp:  [16-20] 16  BP: ()/(50-72) 119/72  Body mass index is 38.83 kg/m².  Physical Exam  Vitals reviewed.   Constitutional:       General: She is not in acute distress.     Appearance: She is not ill-appearing.   Cardiovascular:      Rate and Rhythm: Normal rate and regular rhythm.   Pulmonary:      Effort: Pulmonary effort is normal. No respiratory distress.      Comments: Bilateral dry rales  Abdominal:      General: There is no distension.      Palpations: Abdomen is soft.      Tenderness: There is no abdominal tenderness.   Musculoskeletal:         General: No swelling or deformity. Normal range of motion.   Skin:     General: Skin is warm and dry.   Neurological:      General: No focal deficit present.      Mental Status: She is alert. Mental status is at baseline.         Consultants     Consult Orders (all) (From admission, onward)       Start     Ordered    04/21/25 1336  Inpatient Cardiology Consult  Once        Specialty:  Cardiology  Provider:  Cory Christensen MD    04/21/25 1336    04/21/25 0121  Inpatient Pulmonology Consult  Once        Specialty:  Pulmonary Disease  Provider:  Rudy Berry MD    04/21/25 0120    04/20/25 1831  A (on-call  MD unless specified) Details  Once,   Status:  Canceled        Specialty:  Hospitalist  Provider:  (Not yet assigned)    04/20/25 1830                  Procedures       Imaging Results (All)       Procedure Component Value Units Date/Time    CT Angiogram Chest [[id number]] Collected: 04/22/25 1511     Updated: 04/22/25 1539    Narrative:      CT ANGIOGRAM CHEST-     TECHNIQUE: Radiation dose reduction techniques were utilized, including  automated exposure control and exposure modulation based on body size. 2  mm images were obtained through the chest after the administration of IV  contrast. 3D reformat images were created. Multiple coronal, sagittal,  and 3-D reconstruction images were obtained.     HISTORY: Clinical concern for pneumonia.     COMPARISON: CT chest 8/7/2023. Chest radiograph 8/18/2024           FINDINGS: Asymmetric but subcentimeter left axillary lymph nodes have  decreased in size from 2023. Dilated main pulmonary artery (33 mm).  Pulmonary arteries are patent. Heart is normal in size. Small volume  pericardial fluid. Aneurysmal ascending thoracic aorta (40 mm). New  mediastinal and right hilar lymphadenopathy. Reference 1.8 cm right  hilar lymph node (series 4/image 61). Reference 2.1 cm subcarinal lymph  node (series 4/image 65) and 1.6 cm lower right paratracheal lymph node  (series 4/image 52). Subcentimeter left hilar lymph nodes. Decompressed  esophagus.     Trachea and main bronchi are patent. No bronchiectasis or bronchial wall  thickening. No emphysematous changes. Previously present diffuse  bilateral mosaic attenuation of the lungs. Multisegmental consolidation  with surrounding glass opacities within the posterior superior right  lower lobe (series 5/image 68 and series 7/image 93). New small focus of  consolidation within the lingula (series 5/image 70). New small  multifocal groundglass opacities bilaterally. Reference right upper lobe  (series 5/image 23 and left upper lobe (series  5/image 62). New diffuse  mild interlobular septal thickening with multifocal right greater than  left multi lobar likely linear atelectasis. New 8 mm right middle lobe  geometrically shaped, smoothly marginated solid nodule along the right  diaphragmatic pleura (series 6/image 62). Few calcified granulomas. No  pleural effusion.              Impression:      1. No evidence of pulmonary thromboembolism. Dilated main pulmonary  artery (33 mm).  2. Posterior superior right lower lobe multisegmental consolidation with  surrounding groundglass opacities suggesting pneumonitis/pneumonia.  Recommend a follow-up chest CT with IV contrast in 2 to 3 months to  ensure clearance, especially given below-mentioned lymphadenopathy.  3. New small focus of consolidation within the lingula and small  multifocal groundglass opacities bilaterally, also likely  infectious/inflammatory.  4. New 8 mm geometrically shaped, smoothly marginated right middle lobe  solid nodule along the right diaphragmatic pleura which may represent a  pleural lymph node. Attention on follow-up.  5. New mediastinal and right hilar lymphadenopathy is presumed  reactive/inflammatory. Attention on follow-up.  6. Previously present diffuse mosaic attenuation of the lungs, most  likely small airways disease.  7. Aneurysmal ascending thoracic aorta (40 mm).        This report was finalized on 4/22/2025 3:36 PM by Dr. Jostin Goss M.D on Workstation: [id number]       XR Chest 1 View [[id number]] Collected: 04/20/25 1824     Updated: 04/20/25 1828    Narrative:      CXR ONE VIEW      HISTORY: SOA Triage Protocol     COMPARISON: 8/18/2024     TECHNIQUE: single portable AP       Impression:         The heart size is within normal limits.     The lungs are normally aerated. There is no pleural effusion or  pneumothorax.     Mild left pulmonary interstitial prominence.     Dense right perihilar fullness, suspect right lower lobe consolidation.  Consider chest CT for  "further evaluation.     This report was finalized on 4/20/2025 6:25 PM by Dr. Richie Brewer M.D on Workstation: [id number]                 Pertinent Labs     Results from last 7 days   Lab Units 04/24/25  0629 04/23/25  0547 04/22/25  0515 04/21/25  0453   WBC 10*3/mm3 9.87 14.79* 18.22* 28.50*   HEMOGLOBIN g/dL 9.7* 9.5* 9.3* 10.0*   PLATELETS 10*3/mm3 257 232 178 220     Results from last 7 days   Lab Units 04/24/25  0629 04/23/25  0547 04/22/25  1532 04/22/25  0515 04/21/25  0453   SODIUM mmol/L 134* 132*  --  132* 135*   POTASSIUM mmol/L 4.2 4.5 3.8 3.5 3.4*   CHLORIDE mmol/L 100 102  --  99 96*   CO2 mmol/L 23.5 22.1  --  23.0 21.7*   BUN mg/dL 11 13  --  14 15   CREATININE mg/dL 0.83 0.93  --  0.89 1.10*   GLUCOSE mg/dL 162* 187*  --  182* 208*   EGFR mL/min/1.73 74.1 64.6  --  68.1 52.8*     Results from last 7 days   Lab Units 04/21/25 0453 04/20/25  1748   ALBUMIN g/dL 3.5 4.1   BILIRUBIN mg/dL 1.0 1.1   ALK PHOS U/L 71 75   AST (SGOT) U/L 20 26   ALT (SGPT) U/L 14 16     Results from last 7 days   Lab Units 04/24/25  0629 04/23/25  0547 04/22/25  0515 04/21/25  1552 04/21/25 0453 04/20/25  1748   CALCIUM mg/dL 9.1 8.8 7.8*  --  7.9* 8.6   ALBUMIN g/dL  --   --   --   --  3.5 4.1   MAGNESIUM mg/dL  --   --  2.6* 1.1*  --   --    PHOSPHORUS mg/dL  --   --   --  2.4*  --   --      Results from last 7 days   Lab Units 04/20/25  1748   LIPASE U/L 13     Results from last 7 days   Lab Units 04/20/25  1907 04/20/25  1748   HSTROP T ng/L 14* 18*   PROBNP pg/mL  --  1,616.0*           Invalid input(s): \"LDLCALC\"  Results from last 7 days   Lab Units 04/21/25  1707 04/21/25  0218 04/20/25  1907 04/20/25  1839   BLOODCX   --   --  No growth at 3 days No growth at 3 days   RESPCX  Scant growth (1+) Normal respiratory ruby. No S. aureus or Pseudomonas aeruginosa detected. Final report.  --   --   --    MRSAPCR   --  No MRSA Detected  --   --      Results from last 7 days   Lab Units 04/20/25 1748   COVID19  " Not Detected       Test Results Pending at Discharge     Pending Results       None              Discharge Details        Discharge Medications        New Medications        Instructions Start Date   amoxicillin-clavulanate 875-125 MG per tablet  Commonly known as: AUGMENTIN   1 tablet, Oral, 2 Times Daily      guaiFENesin 600 MG 12 hr tablet  Commonly known as: MUCINEX   1,200 mg, Oral, Every 12 Hours Scheduled      ipratropium-albuterol 0.5-2.5 mg/3 ml nebulizer  Commonly known as: DUO-NEB   3 mL, Nebulization, 3 times daily             Continue These Medications        Instructions Start Date   aspirin 325 MG tablet   325 mg, Daily      atorvastatin 40 MG tablet  Commonly known as: LIPITOR   40 mg, Oral, Daily, 200001      cholecalciferol 25 MCG (1000 UT) tablet  Commonly known as: VITAMIN D3   1,000 Units, Daily      dilTIAZem 120 MG tablet  Commonly known as: CARDIZEM   120 mg, 2 Times Daily      furosemide 20 MG tablet  Commonly known as: LASIX   20 mg, Oral, Daily      metFORMIN 500 MG tablet  Commonly known as: GLUCOPHAGE   500 mg, 2 Times Daily With Meals      metoprolol tartrate 25 MG tablet  Commonly known as: LOPRESSOR   25 mg, 2 Times Daily      O2  Commonly known as: OXYGEN   2 L/min      omeprazole 20 MG capsule  Commonly known as: priLOSEC   20 mg, Oral, Daily      sildenafil 20 MG tablet  Commonly known as: Revatio   10 mg, Oral, 3 Times Daily      spironolactone 25 MG tablet  Commonly known as: ALDACTONE   25 mg, Oral, Daily      Trulicity 0.75 MG/0.5ML solution pen-injector  Generic drug: Dulaglutide   INJECT 0.75MG (1 PEN) UNDER THE SKIN EVERY WEEK               No Known Allergies    Discharge Disposition:  Home or Self Care      Discharge Diet:  No active diet order      Discharge Activity:       CODE STATUS:    Code Status and Medical Interventions: CPR (Attempt to Resuscitate); Full Support   Ordered at: 04/20/25 9521     Code Status (Patient has no pulse and is not breathing):    CPR (Attempt  to Resuscitate)     Medical Interventions (Patient has pulse or is breathing):    Full Support       Future Appointments   Date Time Provider Department Center   5/21/2025  2:00 PM Daniele Lutz MD [id number]   6/30/2025 10:00 AM Jovanna Proctor APRN [facility]   8/11/2025 11:00 AM [facility] 1 [facility]      Contact information for follow-up providers       Edgar Mayers Follow up in 1 week(s).    Specialty: Family Medicine  Contact information:  [address]  [place] [id number]  [phone]               Rudy Berry MD Follow up in 6 week(s).    Specialties: Pulmonary Disease, Intensive Care  Contact information:  [address]    [place] [id number]  [phone]                       Contact information for after-discharge care       Durable Medical Equipment       [facility] .    Service: Durable Medical Equipment  Contact information:  [address]  [facility] [address]  [phone]                                   Time Spent on Discharge:  Greater than 30 minutes      Jostin Morse MD  [facility]  04/24/25  17:19 EDT

## 2025-04-24 NOTE — CASE MANAGEMENT/SOCIAL WORK
Continued Stay Note  [facility]     Patient Name: Sally Rivera  MRN: [id number]  Today's Date: 4/24/2025    Admit Date: 4/20/2025    Plan: Plan home with family.  DEYVI Hall RN   Discharge Plan       Row Name 04/24/25 1214       Plan    Plan Plan home with family.  DEYVI Hall RN    Patient/Family in Agreement with Plan yes    Plan Comments Per MD pt needs a nebulizer for home.  Spoke with pt and she would like to use [employer] since she is current with them for O2.  Noé  ( [phone]) called to follow.  Plan home with family.  DEYVI Hall RN                   Discharge Codes    No documentation.                 Expected Discharge Date and Time       Expected Discharge Date Expected Discharge Time    Apr 24, 2025               Conchis Hall RN

## 2025-04-24 NOTE — CASE MANAGEMENT/SOCIAL WORK
Continued Stay Note  [facility]     Patient Name: Sally Rivera  MRN: [id number]  Today's Date: 4/24/2025    Admit Date: 4/20/2025    Plan: Plan home with family.  DEYVI Hall RN   Discharge Plan       Row Name 04/24/25 1405       Plan    Plan Plan home with family.  DEYVI Hall RN    Plan Comments Per pt [employer] to deliver nebulizer to pt's home.  ADAN Castano      Row Name 04/24/25 1329       Plan    Final Discharge Disposition Code 01 - home or self-care    Final Note Pt discharged to home with spouse and nebulizer supplied by [employer].  DEYVI Hall RN      Row Name 04/24/25 1214       Plan    Plan Plan home with family.  DEYVI Hall RN    Patient/Family in Agreement with Plan yes    Plan Comments Per MD pt needs a nebulizer for home.  Spoke with pt and she would like to use [employer] since she is current with them for O2.  Noé  ( [phone]) called to follow.  Plan home with family.  DEYVI Hall RN                   Discharge Codes    No documentation.                 Expected Discharge Date and Time       Expected Discharge Date Expected Discharge Time    Apr 24, 2025               Conchis Hall RN

## 2025-04-24 NOTE — DISCHARGE PLACEMENT REQUEST
"Jose Angel Rivera (75 y.o. Female)       Date of Birth   1950    Social Security Number       Address   [address] [address]    Home Phone   [phone]    MRN   [id number]       [facility]    Marital Status                               Admission Date   4/20/2025    Admission Type   Emergency    Admitting Provider   Jayleen Hall MD    Attending Provider   Jostin Morse MD    Department, Room/Bed   [address], E[address]/1       Discharge Date       Discharge Disposition   Home or Self Care    Discharge Destination                                 Attending Provider: Jostin Morse MD    Allergies: No Known Allergies    Isolation: None   Infection: None   Code Status: CPR    Ht: 162.6 cm (64\")   Wt: 103 kg (226 lb 3.1 oz)    Admission Cmt: None   Principal Problem: Pneumonia [J18.9]                   Active Insurance as of 4/20/2025       Primary Coverage       Payor Plan Insurance Group Employer/Plan Group    [employer] MEDICARE REPLACEMENT [employer] MEDICARE ADVANTAGE GROUP [id number]       Payor Plan Address Payor Plan Phone Number Payor Plan Fax Number Effective Dates    [address] [address] [phone]  1/1/2018 - None Entered    [facility] [address]         Subscriber Name Subscriber Birth Date Member ID       JOSE ANGEL RIVERA 1950 [id number]                     Emergency Contacts        (Rel.) Home Phone Work Phone Mobile Phone    Miguel,George (Spouse) -- -- [phone]                "

## 2025-04-25 LAB
BACTERIA SPEC AEROBE CULT: NORMAL
BACTERIA SPEC AEROBE CULT: NORMAL

## 2025-05-14 ENCOUNTER — READMISSION MANAGEMENT (OUTPATIENT)
Dept: CALL CENTER | Facility: HOSPITAL | Age: 75
End: 2025-05-14
Payer: MEDICARE

## 2025-05-14 NOTE — OUTREACH NOTE
Sepsis Week 3 Survey      Flowsheet Row Responses   [facility] patient discharged from? [place]   Does the patient have one of the following disease processes/diagnoses(primary or secondary)? Sepsis   Week 3 attempt successful? Yes   Call start time 1622   Call end time 1624   Discharge diagnosis Pneumonia, sepsis   Person spoke with today (if not patient) and relationship Patient   Meds reviewed with patient/caregiver? Yes   Is the patient having any side effects they believe may be caused by any medication additions or changes? No   Does the patient have all medications related to this admission filled (includes all antibiotics, inhalers, nebulizers,steroids,etc.) Yes   Prescription comments No questions or concerns with medications.   Is the patient taking all medications as directed (includes completed medication regime)? Yes   Comments regarding appointments Cardiology hospital f/u appt on 5/21/25 at 2:00 PM with Dr. Daniele Lutz.   Does the patient have a primary care provider?  Yes   Comments regarding PCP PCP--Edgar Mayers   Has the patient kept scheduled appointments due by today? Yes   Has home health visited the patient within 72 hours of discharge? N/A   What DME was ordered? nebulizer supplied by [employer].   Has all DME been delivered? Yes   Psychosocial issues? No   Did the patient receive a copy of their discharge instructions? Yes   Nursing interventions Reviewed instructions with patient   What is the patient's perception of their health status since discharge? Returned to baseline/stable   Nursing interventions Nurse provided patient education   Is the patient/caregiver able to teach back TIME? T emperature - higher or lower than normal, I nfection - may have signs and symptoms of an infection, M ental Decline - confused, sleepy, difficult to arouse, E xtremely Ill - severe pain, discomfort, shortness of breath   Is the patient/caregiver able to teach back signs and symptoms of worsening  condition: Fever, Rapid heart rate (>90), Shortness of breath/rapid respiratory rate, Edema   If the patient is a current smoker, are they able to teach back resources for cessation? Not a smoker   Is the patient/caregiver able to teach back the hierarchy of who to call/visit for symptoms/problems? PCP, Specialist, Home health nurse, Urgent Care, ED, 911 Yes   Week 3 call completed? Yes   Graduated Yes   Is the patient interested in additional calls from an ambulatory ? No   Would this patient benefit from a Referral to Amb Social Work? No   Wrap up additional comments patient doing very well, no needs, concerns or questions.   Call end time [id number]            Yuki DAVIS - Registered Nurse        Yuki DAVIS - Registered Nurse

## 2025-05-23 ENCOUNTER — OFFICE VISIT (OUTPATIENT)
Dept: CARDIOLOGY | Age: 75
End: 2025-05-23
Payer: MEDICARE

## 2025-05-23 VITALS
OXYGEN SATURATION: 98 % | WEIGHT: 226.8 LBS | BODY MASS INDEX: 38.72 KG/M2 | SYSTOLIC BLOOD PRESSURE: 116 MMHG | HEIGHT: 64 IN | DIASTOLIC BLOOD PRESSURE: 76 MMHG

## 2025-05-23 DIAGNOSIS — J96.21 ACUTE ON CHRONIC RESPIRATORY FAILURE WITH HYPOXIA: ICD-10-CM

## 2025-05-23 DIAGNOSIS — I77.810 DILATION OF THORACIC AORTA: ICD-10-CM

## 2025-05-23 DIAGNOSIS — I27.20 PULMONARY HYPERTENSION: ICD-10-CM

## 2025-05-23 DIAGNOSIS — N18.31 STAGE 3A CHRONIC KIDNEY DISEASE: ICD-10-CM

## 2025-05-23 DIAGNOSIS — I50.32 CHRONIC HEART FAILURE WITH PRESERVED EJECTION FRACTION (HFPEF): Primary | ICD-10-CM

## 2025-05-23 RX ORDER — IPRATROPIUM BROMIDE AND ALBUTEROL SULFATE 2.5; .5 MG/3ML; MG/3ML
3 SOLUTION RESPIRATORY (INHALATION) 3 TIMES DAILY
Qty: 270 ML | Refills: 0 | Status: SHIPPED | OUTPATIENT
Start: 2025-05-23

## 2025-05-23 NOTE — PROGRESS NOTES
[facility]    Subjective:     Encounter Date:05/23/25      Patient ID: Sally Rivera is a 75 y.o. female.    Chief Complaint:   Chief Complaint   Patient presents with    Pulmonary hypertension     Patient is in the office today for an 4 week follow up appointment.       History of Present Illness    Sally Rivera is a 75 y.o. lady who presents for further evaluation of heart failure with preserved ejection fraction.    She has chronic respiratory failure on 2 L of oxygen nasal cannula, coronary disease, type 2 diabetes, and pulmonary hypertension, precapillary.  She has restrictive lung disease.    She is felt to have Group 1 versus 3 pulmonary hypertension.    She is on low-dose for body per the heart failure clinic and is on spironolactone 25.    She was initially hospitalized for concerns for CHF but ultimately her symptoms were thought related to pneumonia from the CT finding.    She is recovering from her pneumonia well.  She presents today for follow-up since she was initially lost to follow-up from the general cardiology office.  She has some intermittent episodes of paroxysmal atrial tachycardia for which she takes diltiazem but has not had any significant return to recurrence.    Previous Cardiac Testing:  Right heart catheterization November 2022:  1. Hemodynamics:  Right Atrium: Mean of 12 mmHg  Right Ventricle: 43/11 mmHg  Pulmonary Artery: 56/21/31 mmHg  Pulmonary Wedge: Mean of 16 mmHg  Cardiac Output/Index: 3.91 L/min 1.84 L/min/m2  PA Sat: 65%  AO Sat: 92%  PVR: 3.9 Wood units        Conclusions:  Mild precapillary hypertension (pulmonary wedge right at the cutoff of 15)    The following portions of the patient's history were reviewed and updated as appropriate: allergies, current medications, past family history, past medical history, past social history, past surgical history and problem list.    Past Medical History:   Diagnosis Date    Acid reflux      "Asthma June 2022    CAD (coronary artery disease)     per dr deepthi cesar office note 4/22-dd    Chronic heart failure with preserved ejection fraction (HFpEF)     Cirrhosis 2011    CKD (chronic kidney disease) stage 3, GFR 30-59 ml/min     Diverticulitis     GERD (gastroesophageal reflux disease)     Hyperlipidemia     Hypertension     Hypertension     BERNARD (nonalcoholic steatohepatitis)     PONV (postoperative nausea and vomiting)     Pulmonary hypertension     Type 2 diabetes mellitus        Past Surgical History:   Procedure Laterality Date    CARDIAC CATHETERIZATION N/A 11/21/2022    Procedure: Right Heart Cath with vasodilator as indicated. pHTN workup;  Surgeon: Waqar Rush MD;  Location:  [facility];  Service: Cardiovascular;  Laterality: N/A;    CHOLECYSTECTOMY  2011    COLON SURGERY  2014    bowel resection for obstruction    COLONOSCOPY  2013    Dr. Sky    COLONOSCOPY N/A 10/07/2021    Procedure: COLONOSCOPY TO CECUM WITH COLD POLYPECTOMY;  Surgeon: Facundo Klein Jr., MD;  Location: [facility] ENDOSCOPY;  Service: General;  Laterality: N/A;  PRE- H/O COLON POLYPS  POST-COLON POLYP, DIVERTICULOSIS    HYSTERECTOMY  2011    MOUTH BIOPSY      OOPHORECTOMY      TUBAL ABDOMINAL LIGATION  1978    US GUIDED LYMPH NODE BIOPSY  11/30/2020     I reviewed her EKG from her presentation to the hospital that was obtained on April 23, 2025.  It shows sinus tachycardia with lateral ST segment depressions    Procedures       Objective:     Vitals:    05/23/25 0815   BP: 116/76   BP Location: Left arm   Patient Position: Sitting   Cuff Size: Adult   SpO2: 98%   Weight: 103 kg (226 lb 12.8 oz)   Height: 162.6 cm (64\")         Constitutional:       Appearance: Healthy appearance. Not in distress.   Neck:      Vascular: JVD normal.   Pulmonary:      Effort: Pulmonary effort is normal.      Breath sounds: Normal air entry.      Comments: Velcro-like breath sounds to bilateral bases.  Normal work of breathing " on 2 L nasal  Cardiovascular:      PMI at left midclavicular line. Normal rate. Regular rhythm. Normal S2.       Murmurs: There is no murmur.   Pulses:     Intact distal pulses.   Edema:     Peripheral edema absent.   Skin:     General: Skin is warm and dry.   Neurological:      General: No focal deficit present.      Mental Status: Alert, oriented to person, place, and time and oriented to person, place and time.   Psychiatric:         Mood and Affect: Mood and affect normal.         Lab Review:       BUN   Date Value Ref Range Status   04/24/2025 11 8 - 23 mg/dL Final     Creatinine   Date Value Ref Range Status   04/24/2025 0.83 0.57 - 1.00 mg/dL Final   11/11/2022 0.80 0.60 - 1.30 mg/dL Final     Comment:     Serial Number: [id number]:  [id number]     Potassium   Date Value Ref Range Status   04/24/2025 4.2 3.5 - 5.2 mmol/L Final     ALT (SGPT)   Date Value Ref Range Status   04/21/2025 14 1 - 33 U/L Final     AST (SGOT)   Date Value Ref Range Status   04/21/2025 20 1 - 32 U/L Final         Performed        Assessment:          Diagnosis Plan   1. Chronic heart failure with preserved ejection fraction (HFpEF)        2. Pulmonary hypertension  Adult Transthoracic Echo Complete w/ Color, Spectral and Contrast if Necessary Per Protocol      3. Stage 3a chronic kidney disease        4. Acute on chronic respiratory failure with hypoxia        5. Dilation of thoracic aorta  Adult Transthoracic Echo Complete w/ Color, Spectral and Contrast if Necessary Per Protocol             Plan:         Chronic hypoxic respiratory failure on 2 to 3 L nasal cannula : She follows with Dr. Berry for restrictive lung disease.  She reports she was going to run out of her nebulizer so I renewed those until her follow-up in the next 2 weeks with her pulmonologist  Chronic HFpEF with borderline EF: Most recent EF obtained in December showed EF of 50%.  Per below  She appears euvolemic today  She has recurrent vaginal candidiasis and is  not currently on SGLT2 inhibitors for this reason  On spironolactone, continue  Pulmonary hypertension: Group 1 versus 3.  She follows with Dr. Martínez in the CHF clinic  She is on low-dose sildenafil  She has intermittent episodes of paroxysmal atrial tachycardia in the past and is on diltiazem for this.  I do feel that is be reasonable to come off the diltiazem if her sildenafil needs to be increased in the future and we can consider ambulatory rhythm monitoring if we need to  She still has ongoing dyspnea, I am going to repeat an echo in 6 months, if it shows pulmonary hypertension persistent, and her EF remains low-normal or mildly reduced, she may need a right and left heart catheterization to investigate cardiomyopathy.  She does have significant coronary arterial calcifications on CT imaging and may ultimately need an ischemic evaluation with this.  No current angina to warrant this is needed urgently  Recent pneumonia.  No overt CHF.  She reports she is otherwise feeling well.  I did renew her some nebulizers but she has an appoint with her pulmonologist in the next few weeks  Possible dilated thoracic aorta on CT imaging.  There is a lot of cardiac motion, I doubt that this represents a true aneurysm but will get another echo just to reevaluate      RTC 6 months with echo same day    Cory Christensen MD  [facility]  05/23/25  08:52 EDT       Current Outpatient Medications:     aspirin 325 MG tablet, Take 1 tablet by mouth Daily., Disp: , Rfl:     atorvastatin (LIPITOR) 40 MG tablet, Take 1 tablet by mouth Daily. 200001 (Patient taking differently: Take 1 tablet by mouth Every Night. 200001), Disp: 90 tablet, Rfl: 3    cholecalciferol (VITAMIN D3) 25 MCG (1000 UT) tablet, Take 1 tablet by mouth Daily., Disp: , Rfl:     dilTIAZem (CARDIZEM) 120 MG tablet, Take 1 tablet by mouth 2 (Two) Times a Day., Disp: , Rfl:     Dulaglutide (Trulicity) 0.75 MG/0.5ML solution pen-injector, INJECT 0.75MG (1  PEN) UNDER THE SKIN EVERY WEEK, Disp: 12 mL, Rfl: 3    furosemide (LASIX) 20 MG tablet, Take 1 tablet by mouth Daily., Disp: 90 tablet, Rfl: 1    guaiFENesin (MUCINEX) 600 MG 12 hr tablet, Take 2 tablets by mouth Every 12 (Twelve) Hours., Disp: , Rfl:     ipratropium-albuterol (DUO-NEB) 0.5-2.5 mg/3 ml nebulizer, Take 3 mL by nebulization 3 times a day., Disp: 270 mL, Rfl: 0    metFORMIN (GLUCOPHAGE) 500 MG tablet, Take 1 tablet by mouth 2 (Two) Times a Day With Meals., Disp: , Rfl:     metoprolol tartrate (LOPRESSOR) 25 MG tablet, Take 1 tablet by mouth 2 (Two) Times a Day., Disp: , Rfl:     O2 (OXYGEN), Inhale 2 L/min. 24 -7, Disp: , Rfl:     omeprazole (priLOSEC) 20 MG capsule, TAKE 1 CAPSULE BY MOUTH EVERY DAY, Disp: 90 capsule, Rfl: 2    sildenafil (Revatio) 20 MG tablet, Take 0.5 tablets by mouth 3 (Three) Times a Day., Disp: 135 tablet, Rfl: 3    spironolactone (ALDACTONE) 25 MG tablet, TAKE 1 TABLET BY MOUTH EVERY DAY, Disp: 90 tablet, Rfl: 0    amoxicillin-clavulanate (AUGMENTIN) 875-125 MG per tablet, Take 1 tablet by mouth 2 (Two) Times a Day., Disp: 6 tablet, Rfl: 0         Return in about 6 months (around 11/23/2025).      Part of this note may be an electronic transcription/translation of spoken language to printed text using the Dragon Dictation System.

## 2025-06-18 ENCOUNTER — HOSPITAL ENCOUNTER (OUTPATIENT)
Dept: CT IMAGING | Facility: HOSPITAL | Age: 75
Discharge: HOME | End: 2025-06-18
Admitting: INTERNAL MEDICINE
Payer: MEDICARE

## 2025-06-18 DIAGNOSIS — J84.9 ILD (INTERSTITIAL LUNG DISEASE): ICD-10-CM

## 2025-06-18 PROCEDURE — 71250 CT THORAX DX C-: CPT

## 2025-06-27 ENCOUNTER — TRANSCRIBE ORDERS (OUTPATIENT)
Age: 75
End: 2025-06-27
Payer: MEDICARE

## 2025-06-27 DIAGNOSIS — Z09 FOLLOW-UP EXAM: Primary | ICD-10-CM

## 2025-06-30 ENCOUNTER — HOSPITAL ENCOUNTER (OUTPATIENT)
Dept: CARDIOLOGY | Facility: HOSPITAL | Age: 75
Discharge: HOME | End: 2025-06-30
Admitting: NURSE PRACTITIONER
Payer: MEDICARE

## 2025-06-30 ENCOUNTER — RESULTS FOLLOW-UP (OUTPATIENT)
Dept: CARDIOLOGY | Facility: HOSPITAL | Age: 75
End: 2025-06-30
Payer: MEDICARE

## 2025-06-30 VITALS
BODY MASS INDEX: 38.68 KG/M2 | OXYGEN SATURATION: 95 % | WEIGHT: 226.6 LBS | DIASTOLIC BLOOD PRESSURE: 74 MMHG | HEIGHT: 64 IN | HEART RATE: 86 BPM | SYSTOLIC BLOOD PRESSURE: 116 MMHG

## 2025-06-30 DIAGNOSIS — I27.20 PULMONARY HYPERTENSION: ICD-10-CM

## 2025-06-30 DIAGNOSIS — I27.20 PULMONARY HYPERTENSION: Primary | ICD-10-CM

## 2025-06-30 DIAGNOSIS — I25.84 CORONARY ATHEROSCLEROSIS DUE TO SEVERELY CALCIFIED CORONARY LESION: ICD-10-CM

## 2025-06-30 DIAGNOSIS — R06.09 DYSPNEA ON EXERTION: Primary | ICD-10-CM

## 2025-06-30 DIAGNOSIS — I50.22 HEART FAILURE WITH MILDLY REDUCED EJECTION FRACTION (HFMREF): ICD-10-CM

## 2025-06-30 LAB
ALBUMIN SERPL-MCNC: 4.2 G/DL (ref 3.5–5.2)
ALBUMIN/GLOB SERPL: 1.1 G/DL
ALP SERPL-CCNC: 73 U/L (ref 39–117)
ALT SERPL W P-5'-P-CCNC: 19 U/L (ref 1–33)
ANION GAP SERPL CALCULATED.3IONS-SCNC: 14 MMOL/L (ref 5–15)
AST SERPL-CCNC: 31 U/L (ref 1–32)
BILIRUB SERPL-MCNC: 0.4 MG/DL (ref 0–1.2)
BUN SERPL-MCNC: 10 MG/DL (ref 8–23)
BUN/CREAT SERPL: 10.6 (ref 7–25)
CALCIUM SPEC-SCNC: 9.9 MG/DL (ref 8.6–10.5)
CHLORIDE SERPL-SCNC: 101 MMOL/L (ref 98–107)
CO2 SERPL-SCNC: 23 MMOL/L (ref 22–29)
CREAT SERPL-MCNC: 0.94 MG/DL (ref 0.57–1)
DEPRECATED RDW RBC AUTO: 47.3 FL (ref 37–54)
EGFRCR SERPLBLD CKD-EPI 2021: 63.8 ML/MIN/1.73
ERYTHROCYTE [DISTWIDTH] IN BLOOD BY AUTOMATED COUNT: 17.5 % (ref 12.3–15.4)
GLOBULIN UR ELPH-MCNC: 3.9 GM/DL
GLUCOSE SERPL-MCNC: 227 MG/DL (ref 65–99)
HCT VFR BLD AUTO: 33.8 % (ref 34–46.6)
HGB BLD-MCNC: 10.7 G/DL (ref 12–15.9)
MCH RBC QN AUTO: 23.7 PG (ref 26.6–33)
MCHC RBC AUTO-ENTMCNC: 31.7 G/DL (ref 31.5–35.7)
MCV RBC AUTO: 74.8 FL (ref 79–97)
PLATELET # BLD AUTO: 235 10*3/MM3 (ref 140–450)
PMV BLD AUTO: 10.2 FL (ref 6–12)
POTASSIUM SERPL-SCNC: 3.7 MMOL/L (ref 3.5–5.2)
PROT SERPL-MCNC: 8.1 G/DL (ref 6–8.5)
RBC # BLD AUTO: 4.52 10*6/MM3 (ref 3.77–5.28)
SODIUM SERPL-SCNC: 138 MMOL/L (ref 136–145)
WBC NRBC COR # BLD AUTO: 7.58 10*3/MM3 (ref 3.4–10.8)

## 2025-06-30 PROCEDURE — G0463 HOSPITAL OUTPT CLINIC VISIT: HCPCS

## 2025-06-30 PROCEDURE — 94726 PLETHYSMOGRAPHY LUNG VOLUMES: CPT | Performed by: NURSE PRACTITIONER

## 2025-06-30 PROCEDURE — 80053 COMPREHEN METABOLIC PANEL: CPT | Performed by: NURSE PRACTITIONER

## 2025-06-30 PROCEDURE — 85027 COMPLETE CBC AUTOMATED: CPT | Performed by: NURSE PRACTITIONER

## 2025-06-30 PROCEDURE — 99214 OFFICE O/P EST MOD 30 MIN: CPT | Performed by: NURSE PRACTITIONER

## 2025-06-30 RX ORDER — FERROUS SULFATE 325(65) MG
325 TABLET, DELAYED RELEASE (ENTERIC COATED) ORAL 3 TIMES WEEKLY
COMMUNITY
Start: 2025-05-09 | End: 2026-05-09

## 2025-06-30 RX ORDER — FUROSEMIDE 20 MG/1
40 TABLET ORAL DAILY
Start: 2025-06-30 | End: 2025-06-30

## 2025-06-30 RX ORDER — FUROSEMIDE 20 MG/1
TABLET ORAL
Qty: 270 TABLET | Refills: 3 | Status: SHIPPED | OUTPATIENT
Start: 2025-06-30

## 2025-06-30 NOTE — PATIENT INSTRUCTIONS
Increase torsemide to 40mg in the morning and 20mg in the afternoon.   Ok to start the tyvaso. We will check with Dr Berry's office if we need to prescribe.   Discuss with dr Berry, the NAC causing nausea    Directions for when to call the clinic reviewed with the patient to include weight gain of over 2 to 3 pounds in 24 hours, weight gain of over 5 pounds within 7 days; worsening shortness of breath; worsening lower extremity edema or abdominal distention.

## 2025-06-30 NOTE — Clinical Note
Hi Dr Christensen,  I saw Mrs Rivera in HF clinic today (also reviewed her case with Dr Martínez) She has had continued with some worsening shortness of breath/SERRANO, today i increased her diuretics since she is having more edema again. We are starting tyvaso for her PH and ILD, also a likely cause of worsening shortness of breath  What do you think about getting a RHC for her(last was in 2022), we dont need it to start Tyvaso but would be helpful with evaluating her worsening dyspnea? I dont see it in his note, but she also said Dr Berry was asking about RHC. Thank you KHALIF Wren

## 2025-06-30 NOTE — ADDENDUM NOTE
Encounter addended by: Sharona Thomas MA on: 6/30/2025 11:34 AM   Actions taken: Charge Capture section accepted

## 2025-06-30 NOTE — PROGRESS NOTES
[facility]    Cory Christensen MD  [address]  [address]  [place] [address]    Thank you for asking me to see Sally Rivera.     Congestive Heart Failure  Associated symptoms include shortness of breath (chronic and unchanged). Pertinent negatives include no chest pain.       1. PAH   2. RV abnormality  3. HFmrEF    Subjective      Sally Jaysis a 75 y.o. female who presents today for follow-up of pulmonary hypertension, RV abnormality, and HFpEF.     Clinical HX:  Last spirometry showed FEV1/FVC of 89% of predicted.  FVC was 54% of predicted.  DLCO was 53% of predicted.  TLC was 68% of predicted and consistent with restrictive physiology.  Patient also has a history of abnormal CT scans.     In April 2022 she was found to have multiple nodular opacities with the largest of 2.1 cm.  There was bilateral increased septal thickening and some areas of GGOs.   Pulmonary artery was dilated.  No history of VTE/PE.  No VQ scan.  No history of autoimmune or rheumatic diseases.  No family history of cardiomyopathy or pulmonary hypertension. 2D TTE was obtained and showed normal left ventricular function with indeterminate diastolic function.  Right ventricle was interpreted as normal.  mild to moderate mitral regurgitation and mild to moderate tricuspid regurgitation with a PA systolic pressure of 49 mmHg.   RHC which has been completed.  Primary complaint at that time was worsening exertional dyspnea and exercise intolerance.     RHC was performed and showed RA pressure of 12.  RV was 43/11.  PA pressure was 56/21 with a mean of 31.  Wedge was elevated to 15 mmHg.  PVR was abnormal at 3.9 Wood units. VD reactivity testing was not performed.  Fluid loading was not performed.     2D TTE from 12/10/2024--Left ventricular systolic function is low normal. Calculated left ventricular EF = 50.5%. Left ventricular ejection fraction appears to be 46 - 50%.Left ventricular  diastolic function is consistent with (grade I) impaired relaxation. Estimated right ventricular systolic pressure from tricuspid regurgitation is mildly elevated (35-45 mmHg).    Today she presents to clinic for follow-up.  She continues to wear LTOT.  She denies any new or worsening shortness of breath or dyspnea on exertion although states she does have chronic shortness of breath.  Reports stable weights.  Stable exercise intolerance.  Denies any leg edema.    Review of Systems - Review of Systems   Constitutional: Negative for weight gain and weight loss.   Cardiovascular:  Negative for chest pain, dyspnea on exertion, leg swelling, orthopnea, paroxysmal nocturnal dyspnea and syncope.   Respiratory:  Positive for shortness of breath (chronic and unchanged). Negative for cough, sleep disturbances due to breathing and snoring.    Gastrointestinal:  Negative for bloating.   Neurological:  Negative for dizziness, light-headedness and weakness.          Patient Active Problem List   Diagnosis    Chronic coronary artery disease    Gastroesophageal reflux disease    Hyperlipidemia    Nonalcoholic fatty liver disease    Atrial paroxysmal tachycardia    Type 2 diabetes mellitus with hyperglycemia, without long-term current use of insulin    History of rectal polypectomy    Diverticulosis of large intestine without hemorrhage    High risk medication use    Class 2 severe obesity with serious comorbidity in adult    History of colon polyps    Pneumonia    Lung nodule    Pulmonary hypertension    Hypoxia    Abnormality of right ventricle of heart    Risk factors for obstructive sleep apnea    Acute on chronic respiratory failure with hypoxia    Chronic heart failure with preserved ejection fraction (HFpEF)    Heart failure with mildly reduced ejection fraction (HFmrEF)    Sepsis    CKD (chronic kidney disease) stage 3, GFR 30-59 ml/min    Pneumonia, unspecified organism     family history includes Diabetes in her father;  Heart disease in her father; Hypertension in her father; No Known Problems in her mother.   reports that she quit smoking about 35 years ago. Her smoking use included cigarettes. She started smoking about 54 years ago. She has a 20 pack-year smoking history. She has been exposed to tobacco smoke. She has never used smokeless tobacco. She reports that she does not drink alcohol and does not use drugs.  No Known Allergies  Physical Activity: Not on file          Current Outpatient Medications:     Acetylcysteine capsule capsule, Take 1 capsule by mouth 2 (Two) Times a Day., Disp: , Rfl:     aspirin 325 MG tablet, Take 1 tablet by mouth Daily., Disp: , Rfl:     atorvastatin (LIPITOR) 40 MG tablet, Take 1 tablet by mouth Daily. 200001, Disp: 90 tablet, Rfl: 3    cholecalciferol (VITAMIN D3) 25 MCG (1000 UT) tablet, Take 1 tablet by mouth Daily., Disp: , Rfl:     dilTIAZem (CARDIZEM) 120 MG tablet, Take 1 tablet by mouth 2 (Two) Times a Day., Disp: , Rfl:     Dulaglutide (Trulicity) 0.75 MG/0.5ML solution pen-injector, INJECT 0.75MG (1 PEN) UNDER THE SKIN EVERY WEEK, Disp: 12 mL, Rfl: 3    ferrous sulfate 325 (65 FE) MG EC tablet, Take 1 tablet by mouth 3 (Three) Times a Week., Disp: , Rfl:     furosemide (LASIX) 20 MG tablet, Take 1 tablet by mouth Daily. (Patient taking differently: Take 2 tablets by mouth Daily.), Disp: 90 tablet, Rfl: 1    ipratropium-albuterol (DUO-NEB) 0.5-2.5 mg/3 ml nebulizer, Take 3 mL by nebulization 3 times a day., Disp: 270 mL, Rfl: 0    metFORMIN (GLUCOPHAGE) 500 MG tablet, Take 1 tablet by mouth 2 (Two) Times a Day With Meals., Disp: , Rfl:     metoprolol tartrate (LOPRESSOR) 25 MG tablet, Take 1 tablet by mouth 2 (Two) Times a Day., Disp: , Rfl:     O2 (OXYGEN), Inhale 2 L/min. 24 -7, Disp: , Rfl:     omeprazole (priLOSEC) 20 MG capsule, TAKE 1 CAPSULE BY MOUTH EVERY DAY, Disp: 90 capsule, Rfl: 2    sildenafil (Revatio) 20 MG tablet, Take 0.5 tablets by mouth 3 (Three) Times a Day., Disp:  135 tablet, Rfl: 3    spironolactone (ALDACTONE) 25 MG tablet, TAKE 1 TABLET BY MOUTH EVERY DAY, Disp: 90 tablet, Rfl: 0    Objective   Vital Sign Review:   Vitals:    06/30/25 0940   BP: 116/74   Pulse: 86   SpO2: 95%       Body mass index is 38.88 kg/m².      06/30/25  0940   Weight: 103 kg (226 lb 9.6 oz)       Physical Exam:  Constitutional:       Appearance: Normal appearance. Chronically ill-appearing.   Neck:      Vascular: No carotid bruit or JVD. JVD normal.   Pulmonary:      Effort: Pulmonary effort is normal.      Breath sounds: Normal breath sounds.   Cardiovascular:      PMI at left midclavicular line. Normal rate. Regular rhythm.   Pulses:     Intact distal pulses.   Edema:     Peripheral edema absent.   Abdominal:      Palpations: Abdomen is soft.      Tenderness: There is no abdominal tenderness.   Skin:     General: Skin is warm and dry.   Neurological:      General: No focal deficit present.      Mental Status: Alert and oriented to person, place and time.   Psychiatric:         Behavior: Behavior is cooperative.          DATA REVIEWED:   EKG:      ---------------------------------------------------  ECHO:    Results for orders placed during the hospital encounter of 12/10/24    Adult Transthoracic Echo Complete W/ Cont if Necessary Per Protocol    Interpretation Summary    Left ventricular systolic function is low normal. Calculated left ventricular EF = 50.5%. Left ventricular ejection fraction appears to be 46 - 50%.    Left ventricular diastolic function is consistent with (grade I) impaired relaxation.    Estimated right ventricular systolic pressure from tricuspid regurgitation is mildly elevated (35-45 mmHg).    Global longitudinal LV strain (GLS) = -18.4%.          -----------------------------------------------------  RHC/LHC    Results for orders placed during the hospital encounter of 11/21/22    Cardiac Catheterization/Vascular Study    Conclusion  [facility]  CARDIAC  CATHETERIZATION PROCEDURE REPORT    Patient: Sally Rivera  : 1950  MRN: [id number]    Procedure Date:  22    Referring Physician:  Cory Christensen MD    Interventional Cardiologist:  Waqar Montoya MD    Indication:  Pulmonary hypertension      Clinical Presentation:  72-year-old lady past medical history hypertension, hyperlipidemia, recent diagnosis of pulmonary interstitial disease, found to have elevated RVSP on echocardiogram, referred for right heart catheterization for further evaluation pulmonary hypertension.    Procedure performed:  Diagnostic Right Heart Catheterization    Access Sites:  right basilic vein    Findings:  1. Hemodynamics:  Right Atrium: Mean of 12 mmHg  Right Ventricle: 43/11 mmHg  Pulmonary Artery: 56/21/31 mmHg  Pulmonary Wedge: Mean of 16 mmHg  Cardiac Output/Index: 3.91 L/min 1.84 L/min/m2  PA Sat: 65%  AO Sat: 92%  PVR: 3.9 Wood units      Conclusions:  Mild precapillary hypertension (pulmonary wedge right at the cutoff of 15)      Recommendations:  Continued follow-up with pulmonology and cardiology for further evaluation and care.      Procedure Status:  Elective    Procedure Details:  Informed consent was obtained with an explanation of the risk and benefits of the procedure. The patient was brought to the Cardiac Catheterization Laboratory and was prepped and draped in a standard sterile fashion. Lidocaine 2% was used to anesthetize the right antecubital fossa. An existing IV placed by the circulating nurse was exchange over a wire for a 5 Lithuanian sheath.    A 5 Lithuanian PA catheter was then advanced into the heart and out into the pulmonary artery. Hemodynamics were obtained in the pulmonary wedge, pulmonary artery, right ventricle, and right atrium positions.  Saturations were obtained from the pulmonary artery. The venous sheath was removed and hemostasis achieved with manual pressure.    Patient tolerated the procedure well without any complications, and transferred  to the post procedure area for recovery in a stable condition.      Complications:  None.    Estimated Blood Loss:  Minimal.    Waqar Montoya MD  [facility]  11/21/22  09:43 EST      ---------------------------------------------------------------------------  CXR/Imaging:     Imaging Results (Most Recent)       None                -----------------------------------------------------------------------------  CT:   CT Chest Hi Resolution Diagnostic  Result Date: 6/25/2025  1.  Significant interval improvement in the previously seen masslike pulmonary consolidation of the right lower lobe suggestive of resolving pneumonia. However, mediastinal and hilar adenopathy which is new since 2023 persists with index nodes grossly unchanged since 4/20/2025. Therefore, continued attention follow-up with CT chest in 2 months is recommended to ensure stability or resolution exclude the possibility of malignancy. 2.  Findings of marked air trapping throughout the bilateral lungs, as before. The degree of subpleural reticular groundglass opacification within the lungs appears to have mildly worsened since 8/7/2023. There is traction bronchiolectasis. Findings are nonspecific as the degree of air trapping is atypical for UIP and correlation with patient history is recommended as well as continued attention on follow-up to determine the most appropriate etiology. 3.  Findings suggestive of cirrhosis within the visualized liver, only partially seen and cannot be evaluated. Please ensure patient is receiving appropriate HCC screening and gastroenterology follow-up per current recommendations. 4.  Dilation of main pulmonary artery suggestive of pulm arterial hypertension, as before. 5.  Other findings above  This report was finalized on 6/25/2025 6:59 AM by Dr. Henry Tinoco M.D on Workstation: [id number]             --------------------------------------------------------------------------------------------------------------    Laboratory evaluations:  Renal Function: CrCl cannot be calculated (Patient's most recent lab result is older than the maximum 30 days allowed.).    Lab Results   Component Value Date    GLUCOSE 162 (H) 04/24/2025    BUN 11 04/24/2025    CREATININE 0.83 04/24/2025    EGFRIFNONA 58 (L) 11/09/2021    EGFRIFAFRI 67 11/09/2021    BCR 13.3 04/24/2025    K 4.2 04/24/2025    CO2 23.5 04/24/2025    CALCIUM 9.1 04/24/2025    ALBUMIN 3.5 04/21/2025    AST 20 04/21/2025    ALT 14 04/21/2025     Lab Results   Component Value Date    WBC 9.87 04/24/2025    HGB 9.7 (L) 04/24/2025    HCT 31.8 (L) 04/24/2025    MCV 71.5 (L) 04/24/2025     04/24/2025     Lab Results   Component Value Date    HGBA1C 8.10 (H) 04/21/2025     Lab Results   Component Value Date    HGBA1C 8.10 (H) 04/21/2025    HGBA1C 6.6 (H) 07/24/2023    HGBA1C 6.70 (H) 04/03/2023     Lab Results   Component Value Date    MICROALBUR <3.0 04/03/2023    CREATININE 0.83 04/24/2025     Lab Results   Component Value Date    TIBC 583 06/02/2020    FERRITIN 68 08/03/2020       Result Review:  I have personally reviewed the results from the time of this admission to 6/30/2025 09:55 EDT and agree with these findings:  [x]  Laboratory list / accordion  []  Microbiology  [x]  Radiology  [x]  EKG/Telemetry   [x]  Cardiology/Vascular   []  Pathology  [x]  Old records  []  Other:             ReDS VOLUMETRIC ASSESSMENT:  ReDs Vest    Performed by: Jovanna Proctor APRN  Authorized by: Jovanna Proctor APRN    ReDS value:  37  ReDS Value Description:  36-41 (orange) = Possible Hypervolemic Status        Assessment & Plan      1. Pulmonary hypertension    2. Heart failure with mildly reduced ejection fraction (HFmrEF)        Pulmonary Hypertension--WHO-group-1, 2, 3.  Ipc-PH.  On LTOT.  VQ scan showed no evidence of CTEPH.  Had sleep study that  showed no significant sleep apnea.  Marginal hemodynamics but tolerating low-dose Revatio 10 mg 3 times daily  She reports continued dyspnea and SERRANO  She also follows with Dr Berry for ILD.   She had HRCT 6/18/2025   Discussed case with Dr Martínez and question of adding Tyvaso per Dr Berry.   Dr Martínez is agreeable to starting Tyvaso. She states she has already signed start forms with Dr Brery's office.  She is having overall continued/worsening dyspnea, last RHC was in 2022--will discuss getting updated RHC ---however we do not need RHC to start tyvaso  I will have patient follow-up with Dr Martínez at next visit      2-3.  HFmrEF-right ventricular dysfunction--2D TTE 12/10/2024 showed 46-50 %  In addition to some worsening dyspnea, she also has had increased juan leg edema. Patient appears mildly volume overloaded.   She has already increased her Lasix from 20 mg daily to 40 mg daily over the past 2 weeks.  I will increase her Lasix further to 40 mg in the morning, 20 mg in the afternoon.  No SGLT2 as patient has had recurrent vaginal Candida infections  Will check CBC and CMP today  GDMT listed below       NYHA stage C FC-III     Clinical status was assessed and has remained stable.  Treatment intent: curative   ReDS reading was obtained today.  ReDS result: 37       Today, Patient appears euvolemic. and with perfusion. The patient's hemodynamics are currently acceptable. HR is: normal and is at goal. BP/MAP was reviewed and there is room for medication up-titration.  LVEF: 46-50 %.     GDMT Assessment:   GDMT changes recommended today: No changes to medication therapy.      BB:   continue Metoprolol tartrate 25mg bid  Monitor heart rate.  Please call the [facility] for HR<50, dizziness, lightheadedness, syncope    A/A/A:   Previous issues with marginal hemodynamics, blood pressure stable at this time and runs on the lower end of normal, possible future addition if needed    [id number]:  Unable to take due to  vaginal candida infections    MRA:  The patient is FC-NYHA Class III and MRA is indicated.   continue Spironolactone 25mg daily  Recommended biannual GFR/electrolyte assessments       Diuretic Regimen:  -DIURETIC:   furosemide (LASIX) 20 mg every day  -Fluid restriction:   -requested  -2000 ml  -Patient has been asked to weigh daily and was provided with a printed diuretic strategy.  -Sodium restriction:   -1,500 mg Na restriction was discussed.          Lifestyle Advice:   - call office if I gain more than 2 pounds in one day or 5 pounds in one week   - keep legs up while sitting   - use salt in moderation   - watch for swelling in feet, ankles and legs every day   - weigh myself daily   -call for concerning s/sx   -- activity or exercise based on tolerance encouraged     CODE STATUS: FULL              Return in about 3 months (around 9/30/2025).                Thank you for allowing me to participate in the care of your patient,         Jovanna Proctor, KHALIF  \[facility]\"" HEART FAILURE  06/30/25  08:23 EDT      **Jyoti Disclaimer:**  Much of this encounter note is an electronic transcription/translation of spoken language to printed text. The electronic translation of spoken language may permit erroneous, or at times, nonsensical words or phrases to be inadvertently transcribed. Although I have reviewed the note for such errors, some may still exist.    The information in this note was reviewed and updated during the visit to be as accurate as possible. As many patients have chronic medical problems, many of their individual problems and ongoing plans do not change significantly from visit to visit.  This information is correct and is consistent with my treatment plan as of today's visit.

## 2025-06-30 NOTE — H&P (VIEW-ONLY)
[facility]    Cory Christensen MD  [address]  [address]  [place] [address]    Thank you for asking me to see Sally Rivera.     Congestive Heart Failure  Associated symptoms include shortness of breath (chronic and unchanged). Pertinent negatives include no chest pain.       1. PAH   2. RV abnormality  3. HFmrEF    Subjective      Sally Jaysis a 75 y.o. female who presents today for follow-up of pulmonary hypertension, RV abnormality, and HFpEF.     Clinical HX:  Last spirometry showed FEV1/FVC of 89% of predicted.  FVC was 54% of predicted.  DLCO was 53% of predicted.  TLC was 68% of predicted and consistent with restrictive physiology.  Patient also has a history of abnormal CT scans.     In April 2022 she was found to have multiple nodular opacities with the largest of 2.1 cm.  There was bilateral increased septal thickening and some areas of GGOs.   Pulmonary artery was dilated.  No history of VTE/PE.  No VQ scan.  No history of autoimmune or rheumatic diseases.  No family history of cardiomyopathy or pulmonary hypertension. 2D TTE was obtained and showed normal left ventricular function with indeterminate diastolic function.  Right ventricle was interpreted as normal.  mild to moderate mitral regurgitation and mild to moderate tricuspid regurgitation with a PA systolic pressure of 49 mmHg.   RHC which has been completed.  Primary complaint at that time was worsening exertional dyspnea and exercise intolerance.     RHC was performed and showed RA pressure of 12.  RV was 43/11.  PA pressure was 56/21 with a mean of 31.  Wedge was elevated to 15 mmHg.  PVR was abnormal at 3.9 Wood units. VD reactivity testing was not performed.  Fluid loading was not performed.     2D TTE from 12/10/2024--Left ventricular systolic function is low normal. Calculated left ventricular EF = 50.5%. Left ventricular ejection fraction appears to be 46 - 50%.Left ventricular  diastolic function is consistent with (grade I) impaired relaxation. Estimated right ventricular systolic pressure from tricuspid regurgitation is mildly elevated (35-45 mmHg).    Today she presents to clinic for follow-up.  She continues to wear LTOT.  She denies any new or worsening shortness of breath or dyspnea on exertion although states she does have chronic shortness of breath.  Reports stable weights.  Stable exercise intolerance.  Denies any leg edema.    Review of Systems - Review of Systems   Constitutional: Negative for weight gain and weight loss.   Cardiovascular:  Negative for chest pain, dyspnea on exertion, leg swelling, orthopnea, paroxysmal nocturnal dyspnea and syncope.   Respiratory:  Positive for shortness of breath (chronic and unchanged). Negative for cough, sleep disturbances due to breathing and snoring.    Gastrointestinal:  Negative for bloating.   Neurological:  Negative for dizziness, light-headedness and weakness.          Patient Active Problem List   Diagnosis    Chronic coronary artery disease    Gastroesophageal reflux disease    Hyperlipidemia    Nonalcoholic fatty liver disease    Atrial paroxysmal tachycardia    Type 2 diabetes mellitus with hyperglycemia, without long-term current use of insulin    History of rectal polypectomy    Diverticulosis of large intestine without hemorrhage    High risk medication use    Class 2 severe obesity with serious comorbidity in adult    History of colon polyps    Pneumonia    Lung nodule    Pulmonary hypertension    Hypoxia    Abnormality of right ventricle of heart    Risk factors for obstructive sleep apnea    Acute on chronic respiratory failure with hypoxia    Chronic heart failure with preserved ejection fraction (HFpEF)    Heart failure with mildly reduced ejection fraction (HFmrEF)    Sepsis    CKD (chronic kidney disease) stage 3, GFR 30-59 ml/min    Pneumonia, unspecified organism     family history includes Diabetes in her father;  Heart disease in her father; Hypertension in her father; No Known Problems in her mother.   reports that she quit smoking about 35 years ago. Her smoking use included cigarettes. She started smoking about 54 years ago. She has a 20 pack-year smoking history. She has been exposed to tobacco smoke. She has never used smokeless tobacco. She reports that she does not drink alcohol and does not use drugs.  No Known Allergies  Physical Activity: Not on file          Current Outpatient Medications:     Acetylcysteine capsule capsule, Take 1 capsule by mouth 2 (Two) Times a Day., Disp: , Rfl:     aspirin 325 MG tablet, Take 1 tablet by mouth Daily., Disp: , Rfl:     atorvastatin (LIPITOR) 40 MG tablet, Take 1 tablet by mouth Daily. 200001, Disp: 90 tablet, Rfl: 3    cholecalciferol (VITAMIN D3) 25 MCG (1000 UT) tablet, Take 1 tablet by mouth Daily., Disp: , Rfl:     dilTIAZem (CARDIZEM) 120 MG tablet, Take 1 tablet by mouth 2 (Two) Times a Day., Disp: , Rfl:     Dulaglutide (Trulicity) 0.75 MG/0.5ML solution pen-injector, INJECT 0.75MG (1 PEN) UNDER THE SKIN EVERY WEEK, Disp: 12 mL, Rfl: 3    ferrous sulfate 325 (65 FE) MG EC tablet, Take 1 tablet by mouth 3 (Three) Times a Week., Disp: , Rfl:     furosemide (LASIX) 20 MG tablet, Take 1 tablet by mouth Daily. (Patient taking differently: Take 2 tablets by mouth Daily.), Disp: 90 tablet, Rfl: 1    ipratropium-albuterol (DUO-NEB) 0.5-2.5 mg/3 ml nebulizer, Take 3 mL by nebulization 3 times a day., Disp: 270 mL, Rfl: 0    metFORMIN (GLUCOPHAGE) 500 MG tablet, Take 1 tablet by mouth 2 (Two) Times a Day With Meals., Disp: , Rfl:     metoprolol tartrate (LOPRESSOR) 25 MG tablet, Take 1 tablet by mouth 2 (Two) Times a Day., Disp: , Rfl:     O2 (OXYGEN), Inhale 2 L/min. 24 -7, Disp: , Rfl:     omeprazole (priLOSEC) 20 MG capsule, TAKE 1 CAPSULE BY MOUTH EVERY DAY, Disp: 90 capsule, Rfl: 2    sildenafil (Revatio) 20 MG tablet, Take 0.5 tablets by mouth 3 (Three) Times a Day., Disp:  135 tablet, Rfl: 3    spironolactone (ALDACTONE) 25 MG tablet, TAKE 1 TABLET BY MOUTH EVERY DAY, Disp: 90 tablet, Rfl: 0    Objective   Vital Sign Review:   Vitals:    06/30/25 0940   BP: 116/74   Pulse: 86   SpO2: 95%       Body mass index is 38.88 kg/m².      06/30/25  0940   Weight: 103 kg (226 lb 9.6 oz)       Physical Exam:  Constitutional:       Appearance: Normal appearance. Chronically ill-appearing.   Neck:      Vascular: No carotid bruit or JVD. JVD normal.   Pulmonary:      Effort: Pulmonary effort is normal.      Breath sounds: Normal breath sounds.   Cardiovascular:      PMI at left midclavicular line. Normal rate. Regular rhythm.   Pulses:     Intact distal pulses.   Edema:     Peripheral edema absent.   Abdominal:      Palpations: Abdomen is soft.      Tenderness: There is no abdominal tenderness.   Skin:     General: Skin is warm and dry.   Neurological:      General: No focal deficit present.      Mental Status: Alert and oriented to person, place and time.   Psychiatric:         Behavior: Behavior is cooperative.          DATA REVIEWED:   EKG:      ---------------------------------------------------  ECHO:    Results for orders placed during the hospital encounter of 12/10/24    Adult Transthoracic Echo Complete W/ Cont if Necessary Per Protocol    Interpretation Summary    Left ventricular systolic function is low normal. Calculated left ventricular EF = 50.5%. Left ventricular ejection fraction appears to be 46 - 50%.    Left ventricular diastolic function is consistent with (grade I) impaired relaxation.    Estimated right ventricular systolic pressure from tricuspid regurgitation is mildly elevated (35-45 mmHg).    Global longitudinal LV strain (GLS) = -18.4%.          -----------------------------------------------------  RHC/LHC    Results for orders placed during the hospital encounter of 11/21/22    Cardiac Catheterization/Vascular Study    Conclusion  [facility]  CARDIAC  CATHETERIZATION PROCEDURE REPORT    Patient: Sally Rivera  : 1950  MRN: [id number]    Procedure Date:  22    Referring Physician:  Cory Christensen MD    Interventional Cardiologist:  Waqar Montoya MD    Indication:  Pulmonary hypertension      Clinical Presentation:  72-year-old lady past medical history hypertension, hyperlipidemia, recent diagnosis of pulmonary interstitial disease, found to have elevated RVSP on echocardiogram, referred for right heart catheterization for further evaluation pulmonary hypertension.    Procedure performed:  Diagnostic Right Heart Catheterization    Access Sites:  right basilic vein    Findings:  1. Hemodynamics:  Right Atrium: Mean of 12 mmHg  Right Ventricle: 43/11 mmHg  Pulmonary Artery: 56/21/31 mmHg  Pulmonary Wedge: Mean of 16 mmHg  Cardiac Output/Index: 3.91 L/min 1.84 L/min/m2  PA Sat: 65%  AO Sat: 92%  PVR: 3.9 Wood units      Conclusions:  Mild precapillary hypertension (pulmonary wedge right at the cutoff of 15)      Recommendations:  Continued follow-up with pulmonology and cardiology for further evaluation and care.      Procedure Status:  Elective    Procedure Details:  Informed consent was obtained with an explanation of the risk and benefits of the procedure. The patient was brought to the Cardiac Catheterization Laboratory and was prepped and draped in a standard sterile fashion. Lidocaine 2% was used to anesthetize the right antecubital fossa. An existing IV placed by the circulating nurse was exchange over a wire for a 5 Czech sheath.    A 5 Czech PA catheter was then advanced into the heart and out into the pulmonary artery. Hemodynamics were obtained in the pulmonary wedge, pulmonary artery, right ventricle, and right atrium positions.  Saturations were obtained from the pulmonary artery. The venous sheath was removed and hemostasis achieved with manual pressure.    Patient tolerated the procedure well without any complications, and transferred  to the post procedure area for recovery in a stable condition.      Complications:  None.    Estimated Blood Loss:  Minimal.    Waqar Montoya MD  [facility]  11/21/22  09:43 EST      ---------------------------------------------------------------------------  CXR/Imaging:     Imaging Results (Most Recent)       None                -----------------------------------------------------------------------------  CT:   CT Chest Hi Resolution Diagnostic  Result Date: 6/25/2025  1.  Significant interval improvement in the previously seen masslike pulmonary consolidation of the right lower lobe suggestive of resolving pneumonia. However, mediastinal and hilar adenopathy which is new since 2023 persists with index nodes grossly unchanged since 4/20/2025. Therefore, continued attention follow-up with CT chest in 2 months is recommended to ensure stability or resolution exclude the possibility of malignancy. 2.  Findings of marked air trapping throughout the bilateral lungs, as before. The degree of subpleural reticular groundglass opacification within the lungs appears to have mildly worsened since 8/7/2023. There is traction bronchiolectasis. Findings are nonspecific as the degree of air trapping is atypical for UIP and correlation with patient history is recommended as well as continued attention on follow-up to determine the most appropriate etiology. 3.  Findings suggestive of cirrhosis within the visualized liver, only partially seen and cannot be evaluated. Please ensure patient is receiving appropriate HCC screening and gastroenterology follow-up per current recommendations. 4.  Dilation of main pulmonary artery suggestive of pulm arterial hypertension, as before. 5.  Other findings above  This report was finalized on 6/25/2025 6:59 AM by Dr. Henry Tinoco M.D on Workstation: [id number]             --------------------------------------------------------------------------------------------------------------    Laboratory evaluations:  Renal Function: CrCl cannot be calculated (Patient's most recent lab result is older than the maximum 30 days allowed.).    Lab Results   Component Value Date    GLUCOSE 162 (H) 04/24/2025    BUN 11 04/24/2025    CREATININE 0.83 04/24/2025    EGFRIFNONA 58 (L) 11/09/2021    EGFRIFAFRI 67 11/09/2021    BCR 13.3 04/24/2025    K 4.2 04/24/2025    CO2 23.5 04/24/2025    CALCIUM 9.1 04/24/2025    ALBUMIN 3.5 04/21/2025    AST 20 04/21/2025    ALT 14 04/21/2025     Lab Results   Component Value Date    WBC 9.87 04/24/2025    HGB 9.7 (L) 04/24/2025    HCT 31.8 (L) 04/24/2025    MCV 71.5 (L) 04/24/2025     04/24/2025     Lab Results   Component Value Date    HGBA1C 8.10 (H) 04/21/2025     Lab Results   Component Value Date    HGBA1C 8.10 (H) 04/21/2025    HGBA1C 6.6 (H) 07/24/2023    HGBA1C 6.70 (H) 04/03/2023     Lab Results   Component Value Date    MICROALBUR <3.0 04/03/2023    CREATININE 0.83 04/24/2025     Lab Results   Component Value Date    TIBC 583 06/02/2020    FERRITIN 68 08/03/2020       Result Review:  I have personally reviewed the results from the time of this admission to 6/30/2025 09:55 EDT and agree with these findings:  [x]  Laboratory list / accordion  []  Microbiology  [x]  Radiology  [x]  EKG/Telemetry   [x]  Cardiology/Vascular   []  Pathology  [x]  Old records  []  Other:             ReDS VOLUMETRIC ASSESSMENT:  ReDs Vest    Performed by: Jovanna Proctor APRN  Authorized by: Jovanna Proctor APRN    ReDS value:  37  ReDS Value Description:  36-41 (orange) = Possible Hypervolemic Status        Assessment & Plan      1. Pulmonary hypertension    2. Heart failure with mildly reduced ejection fraction (HFmrEF)        Pulmonary Hypertension--WHO-group-1, 2, 3.  Ipc-PH.  On LTOT.  VQ scan showed no evidence of CTEPH.  Had sleep study that  showed no significant sleep apnea.  Marginal hemodynamics but tolerating low-dose Revatio 10 mg 3 times daily  She reports continued dyspnea and SERRANO  She also follows with Dr Berry for ILD.   She had HRCT 6/18/2025   Discussed case with Dr Martínez and question of adding Tyvaso per Dr Berry.   Dr Martínez is agreeable to starting Tyvaso. She states she has already signed start forms with Dr Berry's office.  She is having overall continued/worsening dyspnea, last RHC was in 2022--will discuss getting updated RHC ---however we do not need RHC to start tyvaso  I will have patient follow-up with Dr Martínez at next visit      2-3.  HFmrEF-right ventricular dysfunction--2D TTE 12/10/2024 showed 46-50 %  In addition to some worsening dyspnea, she also has had increased juan leg edema. Patient appears mildly volume overloaded.   She has already increased her Lasix from 20 mg daily to 40 mg daily over the past 2 weeks.  I will increase her Lasix further to 40 mg in the morning, 20 mg in the afternoon.  No SGLT2 as patient has had recurrent vaginal Candida infections  Will check CBC and CMP today  GDMT listed below       NYHA stage C FC-III     Clinical status was assessed and has remained stable.  Treatment intent: curative   ReDS reading was obtained today.  ReDS result: 37       Today, Patient appears euvolemic. and with perfusion. The patient's hemodynamics are currently acceptable. HR is: normal and is at goal. BP/MAP was reviewed and there is room for medication up-titration.  LVEF: 46-50 %.     GDMT Assessment:   GDMT changes recommended today: No changes to medication therapy.      BB:   continue Metoprolol tartrate 25mg bid  Monitor heart rate.  Please call the [facility] for HR<50, dizziness, lightheadedness, syncope    A/A/A:   Previous issues with marginal hemodynamics, blood pressure stable at this time and runs on the lower end of normal, possible future addition if needed    [id number]:  Unable to take due to  vaginal candida infections    MRA:  The patient is FC-NYHA Class III and MRA is indicated.   continue Spironolactone 25mg daily  Recommended biannual GFR/electrolyte assessments       Diuretic Regimen:  -DIURETIC:   furosemide (LASIX) 20 mg every day  -Fluid restriction:   -requested  -2000 ml  -Patient has been asked to weigh daily and was provided with a printed diuretic strategy.  -Sodium restriction:   -1,500 mg Na restriction was discussed.          Lifestyle Advice:   - call office if I gain more than 2 pounds in one day or 5 pounds in one week   - keep legs up while sitting   - use salt in moderation   - watch for swelling in feet, ankles and legs every day   - weigh myself daily   -call for concerning s/sx   -- activity or exercise based on tolerance encouraged     CODE STATUS: FULL              Return in about 3 months (around 9/30/2025).                Thank you for allowing me to participate in the care of your patient,         Jovanna Proctor, KHALIF  [facility]  06/30/25  08:23 EDT      **Jyoti Disclaimer:**  Much of this encounter note is an electronic transcription/translation of spoken language to printed text. The electronic translation of spoken language may permit erroneous, or at times, nonsensical words or phrases to be inadvertently transcribed. Although I have reviewed the note for such errors, some may still exist.    The information in this note was reviewed and updated during the visit to be as accurate as possible. As many patients have chronic medical problems, many of their individual problems and ongoing plans do not change significantly from visit to visit.  This information is correct and is consistent with my treatment plan as of today's visit.

## 2025-06-30 NOTE — Clinical Note
I saw her in [facility] clinic today and discussed her case with Dr Martínez.  He is agreeable with starting Tyvaso for her, she told me she signed all the start forms for tyvaso while in office with you. The RX to order should have been on the start forms, but let me know if we need to do anything with ordering that instead.  Thanks  KHALIF Wren

## 2025-07-02 ENCOUNTER — TELEPHONE (OUTPATIENT)
Dept: CARDIOLOGY | Age: 75
End: 2025-07-02
Payer: MEDICARE

## 2025-07-02 NOTE — TELEPHONE ENCOUNTER
Procedure Confirmed With Patient On: 7.2.2025    Date Scheduled & Patient Was Given Instructions: 7.2.2025                    Via: PHONE / [employer]    Labs Completed On: 6.30.2025    Patient Verbalized Understanding Of Arrival Time (8:00 am) Where To Park, Instructions For Procedure, and Medications To Hold: Yes

## 2025-07-07 RX ORDER — SPIRONOLACTONE 25 MG/1
25 TABLET ORAL DAILY
Qty: 90 TABLET | Refills: 0 | Status: SHIPPED | OUTPATIENT
Start: 2025-07-07

## 2025-07-10 ENCOUNTER — HOSPITAL ENCOUNTER (OUTPATIENT)
Facility: HOSPITAL | Age: 75
Setting detail: HOSPITAL OUTPATIENT SURGERY
Discharge: HOME | End: 2025-07-10
Attending: INTERNAL MEDICINE | Admitting: INTERNAL MEDICINE
Payer: MEDICARE

## 2025-07-10 VITALS
RESPIRATION RATE: 16 BRPM | OXYGEN SATURATION: 92 % | SYSTOLIC BLOOD PRESSURE: 99 MMHG | HEART RATE: 73 BPM | DIASTOLIC BLOOD PRESSURE: 59 MMHG | BODY MASS INDEX: 37.54 KG/M2 | WEIGHT: 219.9 LBS | TEMPERATURE: 96.6 F | HEIGHT: 64 IN

## 2025-07-10 DIAGNOSIS — I27.20 PULMONARY HYPERTENSION: ICD-10-CM

## 2025-07-10 DIAGNOSIS — I25.84 CORONARY ATHEROSCLEROSIS DUE TO SEVERELY CALCIFIED CORONARY LESION: ICD-10-CM

## 2025-07-10 DIAGNOSIS — R06.09 DYSPNEA ON EXERTION: ICD-10-CM

## 2025-07-10 LAB
GLUCOSE BLDC GLUCOMTR-MCNC: 285 MG/DL (ref 70–130)
HCO3 BLDA-SCNC: 24.3 MMOL/L (ref 21–28)
HCT VFR BLDA CALC: 32 % (ref 38–51)
HGB BLDA-MCNC: 10.9 G/DL (ref 12–17)
PCO2 BLDV: 37.5 MMHG (ref 41–51)
PH BLDA: 7.42 [PH] (ref 7.31–7.41)
PO2 BLDV: 34 MM HG (ref 35–42)
POTASSIUM BLDA-SCNC: 3.9 MMOL/L (ref 3.5–4.9)
SAO2 % BLDCOA: 66 % (ref 45–75)

## 2025-07-10 PROCEDURE — 85018 HEMOGLOBIN: CPT

## 2025-07-10 PROCEDURE — 25010000002 MIDAZOLAM PER 1 MG: Performed by: INTERNAL MEDICINE

## 2025-07-10 PROCEDURE — 82803 BLOOD GASES ANY COMBINATION: CPT

## 2025-07-10 PROCEDURE — 93460 R&L HRT ART/VENTRICLE ANGIO: CPT | Performed by: INTERNAL MEDICINE

## 2025-07-10 PROCEDURE — 85014 HEMATOCRIT: CPT

## 2025-07-10 PROCEDURE — C1894 INTRO/SHEATH, NON-LASER: HCPCS | Performed by: INTERNAL MEDICINE

## 2025-07-10 PROCEDURE — 25510000001 IOPAMIDOL PER 1 ML: Performed by: INTERNAL MEDICINE

## 2025-07-10 PROCEDURE — 25010000002 LIDOCAINE 2% SOLUTION: Performed by: INTERNAL MEDICINE

## 2025-07-10 PROCEDURE — 25010000002 FENTANYL CITRATE (PF) 50 MCG/ML SOLUTION: Performed by: INTERNAL MEDICINE

## 2025-07-10 PROCEDURE — 25810000003 SODIUM CHLORIDE 0.9 % SOLUTION: Performed by: INTERNAL MEDICINE

## 2025-07-10 PROCEDURE — 25010000002 HEPARIN (PORCINE) PER 1000 UNITS: Performed by: INTERNAL MEDICINE

## 2025-07-10 PROCEDURE — 82948 REAGENT STRIP/BLOOD GLUCOSE: CPT

## 2025-07-10 PROCEDURE — 82810 BLOOD GASES O2 SAT ONLY: CPT

## 2025-07-10 PROCEDURE — C1769 GUIDE WIRE: HCPCS | Performed by: INTERNAL MEDICINE

## 2025-07-10 RX ORDER — SODIUM CHLORIDE 0.9 % (FLUSH) 0.9 %
10 SYRINGE (ML) INJECTION AS NEEDED
Status: DISCONTINUED | OUTPATIENT
Start: 2025-07-10 | End: 2025-07-10 | Stop reason: HOSPADM

## 2025-07-10 RX ORDER — LIDOCAINE HYDROCHLORIDE 20 MG/ML
INJECTION, SOLUTION INFILTRATION; PERINEURAL
Status: DISCONTINUED | OUTPATIENT
Start: 2025-07-10 | End: 2025-07-10 | Stop reason: HOSPADM

## 2025-07-10 RX ORDER — HEPARIN SODIUM 1000 [USP'U]/ML
INJECTION, SOLUTION INTRAVENOUS; SUBCUTANEOUS
Status: DISCONTINUED | OUTPATIENT
Start: 2025-07-10 | End: 2025-07-10 | Stop reason: HOSPADM

## 2025-07-10 RX ORDER — FENTANYL CITRATE 50 UG/ML
INJECTION, SOLUTION INTRAMUSCULAR; INTRAVENOUS
Status: DISCONTINUED | OUTPATIENT
Start: 2025-07-10 | End: 2025-07-10 | Stop reason: HOSPADM

## 2025-07-10 RX ORDER — MIDAZOLAM HYDROCHLORIDE 1 MG/ML
INJECTION, SOLUTION INTRAMUSCULAR; INTRAVENOUS
Status: DISCONTINUED | OUTPATIENT
Start: 2025-07-10 | End: 2025-07-10 | Stop reason: HOSPADM

## 2025-07-10 RX ORDER — VERAPAMIL HYDROCHLORIDE 2.5 MG/ML
INJECTION INTRAVENOUS
Status: DISCONTINUED | OUTPATIENT
Start: 2025-07-10 | End: 2025-07-10 | Stop reason: HOSPADM

## 2025-07-10 RX ORDER — IOPAMIDOL 755 MG/ML
INJECTION, SOLUTION INTRAVASCULAR
Status: DISCONTINUED | OUTPATIENT
Start: 2025-07-10 | End: 2025-07-10 | Stop reason: HOSPADM

## 2025-07-10 RX ORDER — SODIUM CHLORIDE 0.9 % (FLUSH) 0.9 %
10 SYRINGE (ML) INJECTION EVERY 12 HOURS SCHEDULED
Status: DISCONTINUED | OUTPATIENT
Start: 2025-07-10 | End: 2025-07-10 | Stop reason: HOSPADM

## 2025-07-10 RX ORDER — NITROGLYCERIN 0.4 MG/1
0.4 TABLET SUBLINGUAL
Status: DISCONTINUED | OUTPATIENT
Start: 2025-07-10 | End: 2025-07-10 | Stop reason: HOSPADM

## 2025-07-10 RX ORDER — ACETAMINOPHEN 325 MG/1
650 TABLET ORAL EVERY 4 HOURS PRN
Status: DISCONTINUED | OUTPATIENT
Start: 2025-07-10 | End: 2025-07-10 | Stop reason: HOSPADM

## 2025-07-10 RX ORDER — SODIUM CHLORIDE 9 MG/ML
75 INJECTION, SOLUTION INTRAVENOUS CONTINUOUS
Status: DISCONTINUED | OUTPATIENT
Start: 2025-07-10 | End: 2025-07-10 | Stop reason: HOSPADM

## 2025-07-10 RX ADMIN — SODIUM CHLORIDE 75 ML/HR: 9 INJECTION, SOLUTION INTRAVENOUS at 08:39

## 2025-07-10 NOTE — Clinical Note
Hemostasis started on the right radial artery. R-Band was used in achieving hemostasis. Radial compression device applied to vessel. Hemostasis achieved successfully. Closure device additional comment: 13cc tr band

## 2025-07-10 NOTE — Clinical Note
Hemostasis started on the right brachial vein. Manual pressure applied to vessel and radial compression device applied to vessel. Manual pressure was held by Eloisa RTR. Manual pressure was held for 5 min. Hemostasis achieved successfully. Closure device additional comment: 4x4 tegaderm

## 2025-07-10 NOTE — DISCHARGE INSTRUCTIONS
[facility]  [address] [place] [address]    Coronary Angiogram (Radial/Ulnar Approach) After Care    Refer to this sheet in the next few weeks. These instructions provide you with information on caring for yourself after your procedure. Your caregiver may also give you more specific instructions. Your treatment has been planned according to current medical practices, but problems sometimes occur. Call your caregiver if you have any problems or questions after your procedure.    Home Care Instructions:  You may shower the day after the procedure. Remove the bandage (dressing) and gently wash the site with plain soap and water. Gently pat the site dry. You may apply a band aid daily for 2 days if desired.    Do not apply powder or lotion to the site.  Do not submerge the affected site in water for 3 to 5 days or until the site is completely healed.   Do not lift, push or pull anything over 5 pounds for 5 days after your procedure or as directed by your physician.  As a reference, a gallon of milk weighs 8 pounds.   Inspect the site at least twice daily. You may notice some bruising at the site and it may be tender for 1 to 2 weeks.     Increase your fluid intake for the next 2 days.    Keep arm elevated for 24 hours. For the remainder of the day, keep your arm in “Pledge of Allegiance” position when up and about.     You may drive 24 hours after the procedure unless otherwise instructed by your caregiver.  Do not operate machinery or power tools for 24 hours.  A responsible adult should be with you for the first 24 hours after you arrive home. Do not make any important legal decisions or sign legal papers for 24 hours.  Do not drink alcohol for 24 hours.    Metformin or any medications containing Metformin should not be taken for 48 hours after your procedure.      Call Your Doctor if:   You have unusual pain at the radial/ulnar (wrist) site.  You have redness, warmth, swelling, or pain at the  radial/ulnar (wrist) site.  You have drainage (other than a small amount of blood on the dressing).  `You have chills or a fever > 101.  Your arm becomes pale or dark, cool, tingly, or numb.  You develop chest pain, shortness of breath, feel faint or pass out.    You have heavy bleeding from the site, hold pressure on the site for 20 minutes.  If the bleeding stops, apply a fresh bandage and call your cardiologist.  However, if you        continue to have bleeding, call 911 and continue to apply pressure to the site.   You have any symptoms of a stroke.  Remember BE FAST  B-balance. Sudden trouble walking or loss of balance.  E-eyes.  Sudden changes in how you see or a sudden onset of a very bad headache.   F-face. Sudden weakness or loss of feeling of the face or facial droop on one side.   A-arms Sudden weakness or numbness in one arm.  One arm drifts down if they are both held out in front of you. This happens suddenly and usually on one side of the body.   S-speech.  Sudden trouble speaking, slurred speech or trouble understanding what are saying.   T-time  Time to call emergency services.  Write down the symptoms and the time they started.     [facility]  [address] [place] [address]    Right Heart Cath After Care    Refer to this sheet in the next few weeks. These instructions provide you with information on caring for yourself after your procedure. Your caregiver may also give you more specific instructions. Your treatment has been planned according to current medical practices, but problems sometimes occur. Call your caregiver if you have any problems or questions after your procedure.    Home Care Instructions:  You may shower the day after the procedure. Remove the bandage (dressing) and gently wash the site with plain soap and water. Gently pat the site dry. You may apply a band aid daily for 2 days if desired.    Do not apply powder or lotion to the site until the site is  completely healed.  Do not submerge the affected site in water until the site is completely healed.   No heavy lifting today.   Inspect the site at least twice daily. You may notice some bruising at the site..     If you received sedation a responsible adult should be with you for the first 24 hours after you arrive home. Do not make any important legal decisions or sign legal papers for 24 hours.  Do not drink alcohol for 24 hours.  Do not operate machinery or power tools for 24 hours. You may drive 24 hours after the procedure unless otherwise instructed by your caregiver.        Call Your Doctor if:   You have unusual pain at the puncture site.  You have redness, warmth, swelling, or pain at the puncture site.  You have drainage (other than a small amount of blood on the dressing).  `You have chills or a fever > 101.  Your arm becomes pale or dark, cool, tingly, or numb.  You develop chest pain, shortness of breath, feel faint or pass out.    You have heavy bleeding from the site, hold pressure on the site for 20 minutes.  If the bleeding stops, apply a fresh bandage and call your cardiologist.  However, if you        continue to have bleeding, call 911 and continue to apply pressure to the site.   You have any symptoms of a stroke.  Remember BE FAST  B-balance. Sudden trouble walking or loss of balance.  E-eyes.  Sudden changes in how you see or a sudden onset of a very bad headache.   F-face. Sudden weakness or loss of feeling of the face or facial droop on one side.   A-arms Sudden weakness or numbness in one arm.  One arm drifts down if they are both held out in front of you. This happens suddenly and usually on one side of the body.   S-speech.  Sudden trouble speaking, slurred speech or trouble understanding what are saying.   T-time  Time to call emergency services.  Write down the symptoms and the time they started.

## 2025-07-11 ENCOUNTER — TELEPHONE (OUTPATIENT)
Dept: CARDIOLOGY | Age: 75
End: 2025-07-11

## 2025-07-11 LAB
HCO3 BLDA-SCNC: 22 MMOL/L (ref 21–28)
HCT VFR BLDA CALC: 33 % (ref 38–51)
HGB BLDA-MCNC: 11.2 G/DL (ref 12–17)
PCO2 BLDA: 32.1 MM HG (ref 35–45)
PH BLDA: 7.44 PH UNITS (ref 7.35–7.45)
PO2 BLDA: 84 MMHG (ref 80–105)
POTASSIUM BLDA-SCNC: 3.9 MMOL/L (ref 3.5–4.9)
SAO2 % BLDA: 97 % (ref 95–98)

## 2025-07-14 ENCOUNTER — RESULTS FOLLOW-UP (OUTPATIENT)
Dept: CARDIOLOGY | Facility: HOSPITAL | Age: 75
End: 2025-07-14
Payer: MEDICARE

## 2025-07-15 RX ORDER — ASPIRIN 81 MG/1
81 TABLET ORAL DAILY
COMMUNITY

## 2025-07-15 NOTE — PROGRESS NOTES
"Please let patient know that thankfully her left heart cath procedure does not show any evidence of significant blocked heart arteries that manifest as shortness of breath episodes, however, there is a good bit of plaque in her heart arteries, just not causing obstruction.  Medication to treat heart failure can help treat this as well.  As well as a baby aspirin daily.    The heart cath procedure does show that she does have elevated blood pressure in the lungs that are moderately elevated, but her overall amount of volume appears good, and the weight obtained at the time of her heart catheter can be considered her \"dry weight\"    The pressure measurement called the \"wedge pressure\" is actually normal which makes it unlikely that there is significant congestive heart failure from heart muscle dysfunction, this is likely all related to a lung or lung blood vessel problem.    Can you please make sure that the heart Results are available to her pulmonologist as well as the heart failure clinic?  I do not have any additional recommendations at this time.  Thank you.    "

## 2025-07-15 NOTE — TELEPHONE ENCOUNTER
Reviewed recommendations with patient, verbalized understanding, will call with any further questions or complaints.  Pt states that she will change to aspirin 81mg daily as recommended.  Med list updated.    Coral Duckworth RN  Triage Nurse  07/15/25 09:32 EDT

## 2025-07-25 ENCOUNTER — OFFICE VISIT (OUTPATIENT)
Dept: CARDIOLOGY | Age: 75
End: 2025-07-25
Payer: MEDICARE

## 2025-07-25 VITALS
OXYGEN SATURATION: 94 % | BODY MASS INDEX: 37.87 KG/M2 | HEART RATE: 83 BPM | DIASTOLIC BLOOD PRESSURE: 72 MMHG | SYSTOLIC BLOOD PRESSURE: 118 MMHG | WEIGHT: 221.8 LBS | HEIGHT: 64 IN

## 2025-07-25 DIAGNOSIS — I50.32 CHRONIC HEART FAILURE WITH PRESERVED EJECTION FRACTION (HFPEF): ICD-10-CM

## 2025-07-25 DIAGNOSIS — E11.65 TYPE 2 DIABETES MELLITUS WITH HYPERGLYCEMIA, WITHOUT LONG-TERM CURRENT USE OF INSULIN: ICD-10-CM

## 2025-07-25 DIAGNOSIS — N18.31 STAGE 3A CHRONIC KIDNEY DISEASE: ICD-10-CM

## 2025-07-25 DIAGNOSIS — I25.10 NONOBSTRUCTIVE ATHEROSCLEROSIS OF CORONARY ARTERY: ICD-10-CM

## 2025-07-25 DIAGNOSIS — I27.20 PULMONARY HYPERTENSION: Primary | ICD-10-CM

## 2025-07-25 RX ORDER — TREPROSTINIL 1.74 MG/2.9ML
3 INHALANT ORAL
COMMUNITY

## 2025-07-25 NOTE — PROGRESS NOTES
[facility]    Subjective:     Encounter Date:07/25/25      Patient ID: Sally Rivera is a 75 y.o. female.    Chief Complaint:   Chief Complaint   Patient presents with    Hospital Follow Up Visit     Post Cath Procedure       History of Present Illness    Sally Rivera is a 75 y.o. lady who presents for further evaluation of heart failure with preserved ejection fraction.    She has chronic respiratory failure on 2 L of oxygen nasal cannula, coronary disease, type 2 diabetes, and pulmonary hypertension, precapillary.  She has restrictive lung disease.    She is felt to have Group 1 versus 3 pulmonary hypertension.    She is on low-dose sildenafil from the heart failure clinic and is on spironolactone 25.     Due to her ongoing dyspnea complaints, which were persistent after pneumonia episode in 2025, she underwent a left and right heart catheterization, which did show improvement when compared to the cath from 2022, with an improvement of her wedge as well as the right atrial pressure.  She still had an O2 requirement, but her pulmonary wedge as well as her right atrial pressure were both normal which points towards lung origin of her hypoxia.    She otherwise presents today with no new complaints.  No palpitations and she reports she is compliant with her Lasix 40 mg every morning, and 20 mg in the afternoon and this keeps her in balance.        Previous Cardiac Testing:  Left and right heart catheterization July 2025:    No evidence of obstructive coronary disease.  Normal LV filling pressures.  Low -normal LVEF.    Normal pulmonary capillary wedge pressure.  Moderate pulmonary hypertension, mean PA pressure 30 mm per mercury.  Cardiac index 1.87 L/min/m².   dynes second per centimeter squared    Recommendations: Pulmonary hypertension, mixed etiology.  Ongoing medical management of cardiomyopathy and pulmonary hypertension as well as underlying lung disease        Right heart catheterization November 2022:  1. Hemodynamics:  Right Atrium: Mean of 12 mmHg  Right Ventricle: 43/11 mmHg  Pulmonary Artery: 56/21/31 mmHg  Pulmonary Wedge: Mean of 16 mmHg  Cardiac Output/Index: 3.91 L/min 1.84 L/min/m2  PA Sat: 65%  AO Sat: 92%  PVR: 3.9 Wood units        Conclusions:  Mild precapillary hypertension (pulmonary wedge right at the cutoff of 15)    The following portions of the patient's history were reviewed and updated as appropriate: allergies, current medications, past family history, past medical history, past social history, past surgical history and problem list.    Past Medical History:   Diagnosis Date    Acid reflux     Asthma June 2022    CAD (coronary artery disease)     per dr deepthi cesar office note 4/22-dd    Chronic heart failure with preserved ejection fraction (HFpEF)     Cirrhosis 2011    CKD (chronic kidney disease) stage 3, GFR 30-59 ml/min     Diverticulitis     GERD (gastroesophageal reflux disease)     Hyperlipidemia     Hypertension     Hypertension     BERNARD (nonalcoholic steatohepatitis)     PONV (postoperative nausea and vomiting)     Pulmonary hypertension     Type 2 diabetes mellitus        Past Surgical History:   Procedure Laterality Date    CARDIAC CATHETERIZATION N/A 11/21/2022    Procedure: Right Heart Cath with vasodilator as indicated. pHTN workup;  Surgeon: Waqar Rush MD;  Location:  [facility];  Service: Cardiovascular;  Laterality: N/A;    CARDIAC CATHETERIZATION N/A 7/10/2025    Procedure: Right and Left Heart Cath;  Surgeon: Guerda Lora MD;  Location:  [facility];  Service: Cardiovascular;  Laterality: N/A;    CHOLECYSTECTOMY  2011    COLON SURGERY  2014    bowel resection for obstruction    COLONOSCOPY  2013    Dr. Sky    COLONOSCOPY N/A 10/07/2021    Procedure: COLONOSCOPY TO CECUM WITH COLD POLYPECTOMY;  Surgeon: Facundo Klein Jr., MD;  Location: [facility] ENDOSCOPY;  Service: General;   "Laterality: N/A;  PRE- H/O COLON POLYPS  POST-COLON POLYP, DIVERTICULOSIS    HYSTERECTOMY  2011    MOUTH BIOPSY      OOPHORECTOMY      TUBAL ABDOMINAL LIGATION  1978    US GUIDED LYMPH NODE BIOPSY  11/30/2020     I reviewed her EKG from her presentation to the hospital that was obtained on April 23, 2025.  It shows sinus tachycardia with lateral ST segment depressions      ECG 12 Lead    Date/Time: 7/25/2025 2:25 PM  Performed by: Cory Christensen MD    Authorized by: Cory Christensen MD  Comparison: compared with previous ECG from 4/20/2025  Similar to previous ECG  Rhythm: sinus rhythm  Rate: normal  Conduction: conduction normal  ST Segments: ST segments normal  T Waves: T waves normal  QRS axis: normal  Other: no other findings             Objective:     Vitals:    07/25/25 1412   BP: 118/72   Pulse: 83   SpO2: 94%   Weight: 101 kg (221 lb 12.8 oz)   Height: 162.6 cm (64\")         Constitutional:       Appearance: Healthy appearance. Not in distress.   Neck:      Vascular: JVD normal.   Pulmonary:      Effort: Pulmonary effort is normal.      Breath sounds: Normal air entry.      Comments: Velcro-like breath sounds to bilateral bases.  Normal work of breathing on 2 L nasal  Cardiovascular:      PMI at left midclavicular line. Normal rate. Regular rhythm. Normal S2.       Murmurs: There is no murmur.   Pulses:     Intact distal pulses.   Edema:     Peripheral edema absent.   Skin:     General: Skin is warm and dry.   Neurological:      General: No focal deficit present.      Mental Status: Alert, oriented to person, place, and time and oriented to person, place and time.   Psychiatric:         Mood and Affect: Mood and affect normal.         Lab Review:       BUN   Date Value Ref Range Status   06/30/2025 10.0 8.0 - 23.0 mg/dL Final     Creatinine   Date Value Ref Range Status   06/30/2025 0.94 0.57 - 1.00 mg/dL Final   11/11/2022 0.80 0.60 - 1.30 mg/dL Final     Comment:     Serial Number: [id number]:  [id number] "     Potassium   Date Value Ref Range Status   06/30/2025 3.7 3.5 - 5.2 mmol/L Final     ALT (SGPT)   Date Value Ref Range Status   06/30/2025 19 1 - 33 U/L Final     AST (SGOT)   Date Value Ref Range Status   06/30/2025 31 1 - 32 U/L Final         Performed        Assessment:          Diagnosis Plan   1. Pulmonary hypertension        2. Nonobstructive atherosclerosis of coronary artery  ECG 12 Lead      3. Chronic heart failure with preserved ejection fraction (HFpEF)        4. Stage 3a chronic kidney disease        5. Type 2 diabetes mellitus with hyperglycemia, without long-term current use of insulin                 Plan:         Chronic hypoxic respiratory failure on 2 to 3 L nasal cannula : She follows with Dr. Berry for restrictive lung disease.   She is to start on Tyvaso soon  Chronic HFpEF with borderline EF: Appears euvolemic today.   She has recurrent vaginal candidiasis and is not currently on SGLT2 inhibitors for this reason  On spironolactone, continue  Continue metoprolol  Maintain euvolemia with furosemide 40 daily every morning, 20 mg in the afternoon  Could consider changing this to torsemide if needed for better oral absorption, defer this to CHF clinic.  Nonobstructive coronary artery disease.  Noted on cardiac catheterization July 2025.  50% had mostly mid LAD disease.  Continue atorvastatin goal LDL less than 70,  Aspirin 81  Pulmonary hypertension: Group 1 versus 3.  She follows with Dr. Martínez in the CHF clinic  She is on low-dose sildenafil  She has intermittent episodes of paroxysmal atrial tachycardia in the past and is on diltiazem for this.  I do feel that is be reasonable to come off the diltiazem if her sildenafil needs to be increased in the future and we can consider ambulatory rhythm monitoring if we need to   Most recent right heart catheterization July 2025 showed euvolemia with a right atrial pressure of 5 and a wedge of 12  Recent pneumonia.     Possible dilated thoracic  aorta on CT imaging.  There is a lot of cardiac motion, I doubt that this represents a true aneurysm but will get another echo in November to reevaluate      RTC as scheduled November 2025 with echo same day       Cory Christensen MD  [facility]  07/25/25  08:52 EDT       Current Outpatient Medications:     Acetylcysteine capsule capsule, Take 1 capsule by mouth 2 (Two) Times a Day., Disp: , Rfl:     aspirin 81 MG EC tablet, Take 1 tablet by mouth Daily., Disp: , Rfl:     atorvastatin (LIPITOR) 40 MG tablet, Take 1 tablet by mouth Daily. 200001, Disp: 90 tablet, Rfl: 3    cholecalciferol (VITAMIN D3) 25 MCG (1000 UT) tablet, Take 1 tablet by mouth Daily., Disp: , Rfl:     dilTIAZem (CARDIZEM) 120 MG tablet, Take 1 tablet by mouth 2 (Two) Times a Day., Disp: , Rfl:     Dulaglutide (Trulicity) 0.75 MG/0.5ML solution pen-injector, INJECT 0.75MG (1 PEN) UNDER THE SKIN EVERY WEEK, Disp: 12 mL, Rfl: 3    ferrous sulfate 325 (65 FE) MG EC tablet, Take 1 tablet by mouth 3 (Three) Times a Week., Disp: , Rfl:     furosemide (LASIX) 20 MG tablet, Take 2 tablets in the morning, 1 tablet in the evening, Disp: 270 tablet, Rfl: 3    ipratropium-albuterol (DUO-NEB) 0.5-2.5 mg/3 ml nebulizer, Take 3 mL by nebulization 3 times a day., Disp: 270 mL, Rfl: 0    metFORMIN (GLUCOPHAGE) 500 MG tablet, Take 1 tablet by mouth 2 (Two) Times a Day With Meals., Disp: , Rfl:     metoprolol tartrate (LOPRESSOR) 25 MG tablet, Take 1 tablet by mouth 2 (Two) Times a Day., Disp: , Rfl:     O2 (OXYGEN), Inhale 2 L/min. 24 -7, Disp: , Rfl:     omeprazole (priLOSEC) 20 MG capsule, TAKE 1 CAPSULE BY MOUTH EVERY DAY, Disp: 90 capsule, Rfl: 2    sildenafil (Revatio) 20 MG tablet, Take 0.5 tablets by mouth 3 (Three) Times a Day., Disp: 135 tablet, Rfl: 3    spironolactone (ALDACTONE) 25 MG tablet, TAKE 1 TABLET BY MOUTH EVERY DAY, Disp: 90 tablet, Rfl: 0    Treprostinil (Tyvaso) 0.6 MG/ML solution inhalation solution, Inhale 3 puffs 4 (Four)  Times a Day., Disp: , Rfl:          No follow-ups on file.      Part of this note may be an electronic transcription/translation of spoken language to printed text using the Dragon Dictation System.

## 2025-07-25 NOTE — PATIENT INSTRUCTIONS
If you find that the furosemide is not causing the swelling to go down, let us know and we can try a different medication called torsemide which will replace the furosemide.

## 2025-08-01 ENCOUNTER — TRANSCRIBE ORDERS (OUTPATIENT)
Dept: ADMINISTRATIVE | Facility: HOSPITAL | Age: 75
End: 2025-08-01
Payer: MEDICARE

## 2025-08-01 DIAGNOSIS — J84.9 ILD (INTERSTITIAL LUNG DISEASE): Primary | ICD-10-CM

## (undated) DEVICE — SENSR O2 OXIMAX FNGR A/ 18IN NONSTR

## (undated) DEVICE — TR BAND RADIAL ARTERY COMPRESSION DEVICE: Brand: TR BAND

## (undated) DEVICE — CANN O2 ETCO2 FITS ALL CONN CO2 SMPL A/ 7IN DISP LF

## (undated) DEVICE — PK CATH CARD 40

## (undated) DEVICE — BALN PRESS WEDGE 5F 110CM

## (undated) DEVICE — KT ORCA ORCAPOD DISP STRL

## (undated) DEVICE — TUBING, SUCTION, 1/4" X 10', STRAIGHT: Brand: MEDLINE

## (undated) DEVICE — LN SMPL CO2 SHTRM SD STREAM W/M LUER

## (undated) DEVICE — DGW .035 FC J3MM 260CM TEF: Brand: EMERALD

## (undated) DEVICE — FEMORAL ENTRY ANGIOGRAPHY SHIELD-YELLOW: Brand: RADPAD

## (undated) DEVICE — PINNACLE INTRODUCER SHEATH: Brand: PINNACLE

## (undated) DEVICE — GLIDESHEATH SLENDER STAINLESS STEEL KIT: Brand: GLIDESHEATH SLENDER

## (undated) DEVICE — KT MANIFLD CARDIAC

## (undated) DEVICE — SINGLE-USE BIOPSY FORCEPS: Brand: RADIAL JAW 4

## (undated) DEVICE — CATH VENT MIV RADL PIG ST TIP 5F 110CM

## (undated) DEVICE — GLIDESHEATH BASIC HYDROPHILIC COATED INTRODUCER SHEATH: Brand: GLIDESHEATH

## (undated) DEVICE — ADAPT CLN BIOGUARD AIR/H2O DISP

## (undated) DEVICE — RADIFOCUS OPTITORQUE ANGIOGRAPHIC CATHETER: Brand: OPTITORQUE

## (undated) DEVICE — LOU PACE DEFIB: Brand: MEDLINE INDUSTRIES, INC.